# Patient Record
Sex: MALE | Race: OTHER | Employment: OTHER
[De-identification: names, ages, dates, MRNs, and addresses within clinical notes are randomized per-mention and may not be internally consistent; named-entity substitution may affect disease eponyms.]

---

## 2017-02-08 ENCOUNTER — OFFICE VISIT (OUTPATIENT)
Dept: INFECTIOUS DISEASES | Facility: CLINIC | Age: 72
End: 2017-02-08

## 2017-02-08 VITALS — DIASTOLIC BLOOD PRESSURE: 86 MMHG | HEART RATE: 69 BPM | SYSTOLIC BLOOD PRESSURE: 157 MMHG | TEMPERATURE: 98.5 F

## 2017-02-08 DIAGNOSIS — M46.26 OSTEOMYELITIS OF LUMBAR SPINE (HCC): Primary | ICD-10-CM

## 2017-02-08 PROCEDURE — 3017F COLORECTAL CA SCREEN DOC REV: CPT | Performed by: INTERNAL MEDICINE

## 2017-02-08 PROCEDURE — 1036F TOBACCO NON-USER: CPT | Performed by: INTERNAL MEDICINE

## 2017-02-08 PROCEDURE — G8427 DOCREV CUR MEDS BY ELIG CLIN: HCPCS | Performed by: INTERNAL MEDICINE

## 2017-02-08 PROCEDURE — 1123F ACP DISCUSS/DSCN MKR DOCD: CPT | Performed by: INTERNAL MEDICINE

## 2017-02-08 PROCEDURE — 99213 OFFICE O/P EST LOW 20 MIN: CPT | Performed by: INTERNAL MEDICINE

## 2017-02-08 PROCEDURE — G8419 CALC BMI OUT NRM PARAM NOF/U: HCPCS | Performed by: INTERNAL MEDICINE

## 2017-02-08 PROCEDURE — G8484 FLU IMMUNIZE NO ADMIN: HCPCS | Performed by: INTERNAL MEDICINE

## 2017-02-08 PROCEDURE — 4040F PNEUMOC VAC/ADMIN/RCVD: CPT | Performed by: INTERNAL MEDICINE

## 2017-02-08 RX ORDER — DOXYCYCLINE HYCLATE 100 MG
100 TABLET ORAL 2 TIMES DAILY
COMMUNITY
Start: 2017-01-01 | End: 2017-02-08

## 2017-02-08 RX ORDER — BRIMONIDINE TARTRATE 2 MG/ML
SOLUTION/ DROPS OPHTHALMIC
COMMUNITY
Start: 2017-01-18 | End: 2017-02-08 | Stop reason: ALTCHOICE

## 2017-02-08 RX ORDER — KETOROLAC TROMETHAMINE 4 MG/ML
SOLUTION/ DROPS OPHTHALMIC
COMMUNITY
Start: 2016-12-21 | End: 2018-09-07

## 2017-02-08 RX ORDER — LOTEPREDNOL ETABONATE 5 MG/ML
SUSPENSION/ DROPS OPHTHALMIC
COMMUNITY
Start: 2017-02-07 | End: 2018-09-07

## 2017-02-08 RX ORDER — ATORVASTATIN CALCIUM 20 MG/1
20 TABLET, FILM COATED ORAL DAILY
COMMUNITY
Start: 2017-01-01 | End: 2018-09-07

## 2017-02-08 RX ORDER — GATIFLOXACIN 5 MG/ML
SOLUTION/ DROPS OPHTHALMIC
COMMUNITY
Start: 2017-01-18 | End: 2017-02-08 | Stop reason: ALTCHOICE

## 2017-02-08 RX ORDER — DIPHENHYDRAMINE HCL 25 MG
25 TABLET ORAL NIGHTLY PRN
COMMUNITY
End: 2018-09-07

## 2017-02-25 ENCOUNTER — HOSPITAL ENCOUNTER (EMERGENCY)
Age: 72
Discharge: HOME | End: 2017-02-25
Attending: EMERGENCY MEDICINE
Payer: MEDICARE

## 2017-02-25 VITALS
SYSTOLIC BLOOD PRESSURE: 140 MMHG | BODY MASS INDEX: 22.4 KG/M2 | TEMPERATURE: 97.9 F | OXYGEN SATURATION: 97 % | DIASTOLIC BLOOD PRESSURE: 71 MMHG | HEIGHT: 71 IN | RESPIRATION RATE: 16 BRPM | HEART RATE: 71 BPM | WEIGHT: 160 LBS

## 2017-02-25 DIAGNOSIS — R89.9 ABNORMAL LABORATORY TEST RESULT: ICD-10-CM

## 2017-02-25 DIAGNOSIS — Z99.2 END-STAGE RENAL DISEASE ON HEMODIALYSIS (HCC): Primary | ICD-10-CM

## 2017-02-25 DIAGNOSIS — N18.6 END-STAGE RENAL DISEASE ON HEMODIALYSIS (HCC): Primary | ICD-10-CM

## 2017-02-25 LAB
ANION GAP SERPL CALCULATED.3IONS-SCNC: 13 MMOL/L (ref 9–17)
BUN BLDV-MCNC: 42 MG/DL (ref 8–23)
BUN/CREAT BLD: ABNORMAL (ref 9–20)
CALCIUM SERPL-MCNC: 8.9 MG/DL (ref 8.6–10.4)
CHLORIDE BLD-SCNC: 96 MMOL/L (ref 98–107)
CO2: 27 MMOL/L (ref 20–31)
CREAT SERPL-MCNC: 4.53 MG/DL (ref 0.7–1.2)
GFR AFRICAN AMERICAN: 16 ML/MIN
GFR NON-AFRICAN AMERICAN: 13 ML/MIN
GFR SERPL CREATININE-BSD FRML MDRD: ABNORMAL ML/MIN/{1.73_M2}
GFR SERPL CREATININE-BSD FRML MDRD: ABNORMAL ML/MIN/{1.73_M2}
GLUCOSE BLD-MCNC: 75 MG/DL (ref 70–99)
POTASSIUM SERPL-SCNC: 4.5 MMOL/L (ref 3.7–5.3)
SODIUM BLD-SCNC: 136 MMOL/L (ref 135–144)

## 2017-02-25 PROCEDURE — 99283 EMERGENCY DEPT VISIT LOW MDM: CPT

## 2017-02-25 PROCEDURE — 80048 BASIC METABOLIC PNL TOTAL CA: CPT

## 2017-02-25 PROCEDURE — 36415 COLL VENOUS BLD VENIPUNCTURE: CPT

## 2017-02-25 PROCEDURE — 93005 ELECTROCARDIOGRAM TRACING: CPT

## 2017-02-27 LAB
EKG ATRIAL RATE: 73 BPM
EKG P AXIS: 49 DEGREES
EKG P-R INTERVAL: 192 MS
EKG Q-T INTERVAL: 454 MS
EKG QRS DURATION: 156 MS
EKG QTC CALCULATION (BAZETT): 500 MS
EKG R AXIS: -47 DEGREES
EKG T AXIS: 59 DEGREES
EKG VENTRICULAR RATE: 73 BPM

## 2017-04-11 ENCOUNTER — HOSPITAL ENCOUNTER (OUTPATIENT)
Age: 72
Setting detail: SPECIMEN
Discharge: HOME | End: 2017-04-11
Payer: MEDICARE

## 2017-04-11 LAB
ABSOLUTE EOS #: 0.4 K/UL (ref 0–0.4)
ABSOLUTE LYMPH #: 1.7 K/UL (ref 1–4.8)
ABSOLUTE MONO #: 0.4 K/UL (ref 0.2–0.8)
BASOPHILS # BLD: 0 % (ref 0–2)
BASOPHILS ABSOLUTE: 0 K/UL (ref 0–0.2)
DIFFERENTIAL TYPE: ABNORMAL
EOSINOPHILS RELATIVE PERCENT: 9 % (ref 1–4)
HCT VFR BLD CALC: 30.4 % (ref 41–53)
HEMOGLOBIN: 10.5 G/DL (ref 13.5–17.5)
LYMPHOCYTES # BLD: 37 % (ref 24–44)
MCH RBC QN AUTO: 34.8 PG (ref 26–34)
MCHC RBC AUTO-ENTMCNC: 34.6 G/DL (ref 31–37)
MCV RBC AUTO: 100.5 FL (ref 80–100)
MONOCYTES # BLD: 8 % (ref 1–7)
PDW BLD-RTO: 14.8 % (ref 11.5–14.5)
PLATELET # BLD: 66 K/UL (ref 130–400)
PLATELET ESTIMATE: ABNORMAL
PMV BLD AUTO: 9.8 FL (ref 6–12)
POTASSIUM SERPL-SCNC: 4.3 MMOL/L (ref 3.7–5.3)
RBC # BLD: 3.02 M/UL (ref 4.5–5.9)
RBC # BLD: ABNORMAL 10*6/UL
SEG NEUTROPHILS: 46 % (ref 36–66)
SEGMENTED NEUTROPHILS ABSOLUTE COUNT: 2.1 K/UL (ref 1.8–7.7)
WBC # BLD: 4.6 K/UL (ref 3.5–11)
WBC # BLD: ABNORMAL 10*3/UL

## 2017-04-11 PROCEDURE — 84132 ASSAY OF SERUM POTASSIUM: CPT

## 2017-04-11 PROCEDURE — P9603 ONE-WAY ALLOW PRORATED MILES: HCPCS

## 2017-04-11 PROCEDURE — 85025 COMPLETE CBC W/AUTO DIFF WBC: CPT

## 2017-04-11 PROCEDURE — 36415 COLL VENOUS BLD VENIPUNCTURE: CPT

## 2017-05-19 ENCOUNTER — HOSPITAL ENCOUNTER (OUTPATIENT)
Age: 72
Setting detail: SPECIMEN
Discharge: HOME | End: 2017-05-19
Payer: MEDICARE

## 2017-05-19 LAB
C DIFFICILE TOXINS, PCR: NORMAL
SPECIMEN DESCRIPTION: NORMAL

## 2017-05-19 PROCEDURE — 87493 C DIFF AMPLIFIED PROBE: CPT

## 2017-06-08 ENCOUNTER — HOSPITAL ENCOUNTER (OUTPATIENT)
Age: 72
Setting detail: SPECIMEN
Discharge: HOME | End: 2017-06-08
Payer: MEDICARE

## 2017-06-08 LAB
CHOLESTEROL/HDL RATIO: 1.7
CHOLESTEROL: 64 MG/DL
ESTIMATED AVERAGE GLUCOSE: 103 MG/DL
HBA1C MFR BLD: 5.2 % (ref 4–6)
HDLC SERPL-MCNC: 37 MG/DL
LDL CHOLESTEROL: 13 MG/DL (ref 0–130)
TRIGL SERPL-MCNC: 68 MG/DL
VLDLC SERPL CALC-MCNC: ABNORMAL MG/DL (ref 1–30)

## 2017-06-08 PROCEDURE — 80061 LIPID PANEL: CPT

## 2017-06-08 PROCEDURE — 83036 HEMOGLOBIN GLYCOSYLATED A1C: CPT

## 2017-06-08 PROCEDURE — P9603 ONE-WAY ALLOW PRORATED MILES: HCPCS

## 2017-06-08 PROCEDURE — 36415 COLL VENOUS BLD VENIPUNCTURE: CPT

## 2017-06-09 ENCOUNTER — HOSPITAL ENCOUNTER (OUTPATIENT)
Age: 72
Setting detail: SPECIMEN
Discharge: HOME | End: 2017-06-09
Payer: MEDICARE

## 2017-06-09 LAB
C-REACTIVE PROTEIN: 1.9 MG/L (ref 0–5)
SEDIMENTATION RATE, ERYTHROCYTE: 28 MM (ref 0–10)

## 2017-06-09 PROCEDURE — P9603 ONE-WAY ALLOW PRORATED MILES: HCPCS

## 2017-06-09 PROCEDURE — 86140 C-REACTIVE PROTEIN: CPT

## 2017-06-09 PROCEDURE — 85651 RBC SED RATE NONAUTOMATED: CPT

## 2017-06-09 PROCEDURE — 36415 COLL VENOUS BLD VENIPUNCTURE: CPT

## 2017-07-14 ENCOUNTER — HOSPITAL ENCOUNTER (OUTPATIENT)
Age: 72
Setting detail: SPECIMEN
Discharge: HOME | End: 2017-07-14
Payer: MEDICARE

## 2017-07-14 LAB
ALBUMIN SERPL-MCNC: 2.8 G/DL (ref 3.5–5.2)
ALBUMIN/GLOBULIN RATIO: ABNORMAL (ref 1–2.5)
ALP BLD-CCNC: 155 U/L (ref 40–129)
ALT SERPL-CCNC: 35 U/L (ref 5–41)
ANION GAP SERPL CALCULATED.3IONS-SCNC: 17 MMOL/L (ref 9–17)
AST SERPL-CCNC: 29 U/L
BILIRUB SERPL-MCNC: 0.26 MG/DL (ref 0.3–1.2)
BUN BLDV-MCNC: 72 MG/DL (ref 8–23)
BUN/CREAT BLD: 12 (ref 9–20)
CALCIUM SERPL-MCNC: 7.9 MG/DL (ref 8.6–10.4)
CHLORIDE BLD-SCNC: 99 MMOL/L (ref 98–107)
CO2: 24 MMOL/L (ref 20–31)
CREAT SERPL-MCNC: 6.24 MG/DL (ref 0.7–1.2)
ESTIMATED AVERAGE GLUCOSE: 97 MG/DL
GFR AFRICAN AMERICAN: 11 ML/MIN
GFR NON-AFRICAN AMERICAN: 9 ML/MIN
GFR SERPL CREATININE-BSD FRML MDRD: ABNORMAL ML/MIN/{1.73_M2}
GFR SERPL CREATININE-BSD FRML MDRD: ABNORMAL ML/MIN/{1.73_M2}
GLUCOSE BLD-MCNC: 108 MG/DL (ref 70–99)
HBA1C MFR BLD: 5 % (ref 4–6)
HCT VFR BLD CALC: 26.6 % (ref 41–53)
HEMOGLOBIN: 9.1 G/DL (ref 13.5–17.5)
MCH RBC QN AUTO: 36.4 PG (ref 26–34)
MCHC RBC AUTO-ENTMCNC: 34.3 G/DL (ref 31–37)
MCV RBC AUTO: 105.9 FL (ref 80–100)
PDW BLD-RTO: 14.8 % (ref 11.5–14.5)
PLATELET # BLD: 104 K/UL (ref 130–400)
PMV BLD AUTO: 9.9 FL (ref 6–12)
POTASSIUM SERPL-SCNC: 5.7 MMOL/L (ref 3.7–5.3)
RBC # BLD: 2.51 M/UL (ref 4.5–5.9)
SODIUM BLD-SCNC: 140 MMOL/L (ref 135–144)
TOTAL PROTEIN: 5.1 G/DL (ref 6.4–8.3)
WBC # BLD: 4.6 K/UL (ref 3.5–11)

## 2017-07-14 PROCEDURE — 83036 HEMOGLOBIN GLYCOSYLATED A1C: CPT

## 2017-07-14 PROCEDURE — 80053 COMPREHEN METABOLIC PANEL: CPT

## 2017-07-14 PROCEDURE — 36415 COLL VENOUS BLD VENIPUNCTURE: CPT

## 2017-07-14 PROCEDURE — 85027 COMPLETE CBC AUTOMATED: CPT

## 2017-07-14 PROCEDURE — P9603 ONE-WAY ALLOW PRORATED MILES: HCPCS

## 2017-07-18 ENCOUNTER — HOSPITAL ENCOUNTER (OUTPATIENT)
Age: 72
Setting detail: SPECIMEN
Discharge: HOME | End: 2017-07-18
Payer: MEDICARE

## 2017-07-18 LAB
FOLATE: >20 NG/ML
VITAMIN B-12: 827 PG/ML (ref 211–946)

## 2017-07-18 PROCEDURE — 36415 COLL VENOUS BLD VENIPUNCTURE: CPT

## 2017-07-18 PROCEDURE — 82746 ASSAY OF FOLIC ACID SERUM: CPT

## 2017-07-18 PROCEDURE — 82607 VITAMIN B-12: CPT

## 2017-08-09 ENCOUNTER — OFFICE VISIT (OUTPATIENT)
Dept: INFECTIOUS DISEASES | Age: 72
End: 2017-08-09
Payer: MEDICARE

## 2017-08-09 VITALS
SYSTOLIC BLOOD PRESSURE: 154 MMHG | DIASTOLIC BLOOD PRESSURE: 97 MMHG | BODY MASS INDEX: 22.4 KG/M2 | HEIGHT: 71 IN | TEMPERATURE: 98.1 F | WEIGHT: 160 LBS | HEART RATE: 73 BPM

## 2017-08-09 DIAGNOSIS — M46.26 OSTEOMYELITIS OF LUMBAR SPINE (HCC): Primary | ICD-10-CM

## 2017-08-09 PROCEDURE — 99214 OFFICE O/P EST MOD 30 MIN: CPT | Performed by: INTERNAL MEDICINE

## 2017-08-09 PROCEDURE — G8420 CALC BMI NORM PARAMETERS: HCPCS | Performed by: INTERNAL MEDICINE

## 2017-08-09 PROCEDURE — 1036F TOBACCO NON-USER: CPT | Performed by: INTERNAL MEDICINE

## 2017-08-09 PROCEDURE — 4040F PNEUMOC VAC/ADMIN/RCVD: CPT | Performed by: INTERNAL MEDICINE

## 2017-08-09 PROCEDURE — 3017F COLORECTAL CA SCREEN DOC REV: CPT | Performed by: INTERNAL MEDICINE

## 2017-08-09 PROCEDURE — G8427 DOCREV CUR MEDS BY ELIG CLIN: HCPCS | Performed by: INTERNAL MEDICINE

## 2017-08-09 PROCEDURE — 1123F ACP DISCUSS/DSCN MKR DOCD: CPT | Performed by: INTERNAL MEDICINE

## 2017-08-09 RX ORDER — PROMETHAZINE HYDROCHLORIDE 25 MG/1
25 TABLET ORAL EVERY 6 HOURS PRN
COMMUNITY
End: 2018-09-07

## 2017-08-25 ENCOUNTER — HOSPITAL ENCOUNTER (OUTPATIENT)
Age: 72
Setting detail: SPECIMEN
Discharge: HOME | End: 2017-08-25
Payer: MEDICARE

## 2017-08-25 LAB
ABSOLUTE EOS #: 0.7 K/UL (ref 0–0.4)
ABSOLUTE LYMPH #: 2 K/UL (ref 1–4.8)
ABSOLUTE MONO #: 0.4 K/UL (ref 0.2–0.8)
BASOPHILS # BLD: 0 %
BASOPHILS ABSOLUTE: 0 K/UL (ref 0–0.2)
DIFFERENTIAL TYPE: ABNORMAL
EOSINOPHILS RELATIVE PERCENT: 11 %
HCT VFR BLD CALC: 35.6 % (ref 41–53)
HEMOGLOBIN: 12 G/DL (ref 13.5–17.5)
LYMPHOCYTES # BLD: 32 %
MCH RBC QN AUTO: 32.6 PG (ref 26–34)
MCHC RBC AUTO-ENTMCNC: 33.7 G/DL (ref 31–37)
MCV RBC AUTO: 96.7 FL (ref 80–100)
MONOCYTES # BLD: 7 %
PDW BLD-RTO: 14.3 % (ref 11.5–14.5)
PLATELET # BLD: 115 K/UL (ref 130–400)
PLATELET ESTIMATE: ABNORMAL
PMV BLD AUTO: 11.1 FL (ref 6–12)
RBC # BLD: 3.68 M/UL (ref 4.5–5.9)
RBC # BLD: ABNORMAL 10*6/UL
SEG NEUTROPHILS: 50 %
SEGMENTED NEUTROPHILS ABSOLUTE COUNT: 3.2 K/UL (ref 1.8–7.7)
URIC ACID: 4.3 MG/DL (ref 3.4–7)
WBC # BLD: 6.4 K/UL (ref 3.5–11)
WBC # BLD: ABNORMAL 10*3/UL

## 2017-08-25 PROCEDURE — P9603 ONE-WAY ALLOW PRORATED MILES: HCPCS

## 2017-08-25 PROCEDURE — 84550 ASSAY OF BLOOD/URIC ACID: CPT

## 2017-08-25 PROCEDURE — 85025 COMPLETE CBC W/AUTO DIFF WBC: CPT

## 2017-08-25 PROCEDURE — 36415 COLL VENOUS BLD VENIPUNCTURE: CPT

## 2017-09-08 ENCOUNTER — HOSPITAL ENCOUNTER (OUTPATIENT)
Age: 72
Setting detail: SPECIMEN
Discharge: HOME | End: 2017-09-08
Payer: MEDICARE

## 2017-09-08 LAB
C-REACTIVE PROTEIN: 3.8 MG/L (ref 0–5)
SEDIMENTATION RATE, ERYTHROCYTE: 30 MM (ref 0–15)

## 2017-09-08 PROCEDURE — 85651 RBC SED RATE NONAUTOMATED: CPT

## 2017-09-08 PROCEDURE — P9603 ONE-WAY ALLOW PRORATED MILES: HCPCS

## 2017-09-08 PROCEDURE — 36415 COLL VENOUS BLD VENIPUNCTURE: CPT

## 2017-09-08 PROCEDURE — 86140 C-REACTIVE PROTEIN: CPT

## 2017-12-07 ENCOUNTER — HOSPITAL ENCOUNTER (OUTPATIENT)
Age: 72
Setting detail: SPECIMEN
Discharge: HOME | End: 2017-12-07
Payer: MEDICARE

## 2017-12-07 LAB
C-REACTIVE PROTEIN: 7.5 MG/L (ref 0–5)
ESTIMATED AVERAGE GLUCOSE: 126 MG/DL
HBA1C MFR BLD: 6 % (ref 4–6)
SEDIMENTATION RATE, ERYTHROCYTE: 7 MM (ref 0–15)

## 2017-12-07 PROCEDURE — 83036 HEMOGLOBIN GLYCOSYLATED A1C: CPT

## 2017-12-07 PROCEDURE — 86140 C-REACTIVE PROTEIN: CPT

## 2017-12-07 PROCEDURE — 85651 RBC SED RATE NONAUTOMATED: CPT

## 2017-12-07 PROCEDURE — P9603 ONE-WAY ALLOW PRORATED MILES: HCPCS

## 2017-12-07 PROCEDURE — 36415 COLL VENOUS BLD VENIPUNCTURE: CPT

## 2018-02-01 ENCOUNTER — HOSPITAL ENCOUNTER (OUTPATIENT)
Age: 73
Setting detail: SPECIMEN
Discharge: HOME | End: 2018-02-01
Payer: MEDICARE

## 2018-02-01 LAB
C-REACTIVE PROTEIN: 4 MG/L (ref 0–5)
SEDIMENTATION RATE, ERYTHROCYTE: 65 MM (ref 0–15)

## 2018-02-01 PROCEDURE — P9603 ONE-WAY ALLOW PRORATED MILES: HCPCS

## 2018-02-01 PROCEDURE — 86140 C-REACTIVE PROTEIN: CPT

## 2018-02-01 PROCEDURE — 36415 COLL VENOUS BLD VENIPUNCTURE: CPT

## 2018-02-01 PROCEDURE — 85651 RBC SED RATE NONAUTOMATED: CPT

## 2018-02-07 ENCOUNTER — OFFICE VISIT (OUTPATIENT)
Dept: INFECTIOUS DISEASES | Age: 73
End: 2018-02-07
Payer: MEDICARE

## 2018-02-07 VITALS
RESPIRATION RATE: 16 BRPM | TEMPERATURE: 98.5 F | HEIGHT: 71 IN | SYSTOLIC BLOOD PRESSURE: 166 MMHG | BODY MASS INDEX: 22.41 KG/M2 | HEART RATE: 80 BPM | WEIGHT: 160.05 LBS | DIASTOLIC BLOOD PRESSURE: 84 MMHG

## 2018-02-07 DIAGNOSIS — M46.26 OSTEOMYELITIS OF LUMBAR SPINE (HCC): Primary | ICD-10-CM

## 2018-02-07 PROCEDURE — 1123F ACP DISCUSS/DSCN MKR DOCD: CPT | Performed by: INTERNAL MEDICINE

## 2018-02-07 PROCEDURE — 3017F COLORECTAL CA SCREEN DOC REV: CPT | Performed by: INTERNAL MEDICINE

## 2018-02-07 PROCEDURE — G8427 DOCREV CUR MEDS BY ELIG CLIN: HCPCS | Performed by: INTERNAL MEDICINE

## 2018-02-07 PROCEDURE — 4040F PNEUMOC VAC/ADMIN/RCVD: CPT | Performed by: INTERNAL MEDICINE

## 2018-02-07 PROCEDURE — G8484 FLU IMMUNIZE NO ADMIN: HCPCS | Performed by: INTERNAL MEDICINE

## 2018-02-07 PROCEDURE — 1036F TOBACCO NON-USER: CPT | Performed by: INTERNAL MEDICINE

## 2018-02-07 PROCEDURE — G8420 CALC BMI NORM PARAMETERS: HCPCS | Performed by: INTERNAL MEDICINE

## 2018-02-07 PROCEDURE — 99215 OFFICE O/P EST HI 40 MIN: CPT | Performed by: INTERNAL MEDICINE

## 2018-02-07 NOTE — PROGRESS NOTES
Chief Complaint   Patient presents with    Follow-up     6mo labs   Today patient is feeling well . He denied back pain today . He  denies fever , chill , headache, nausea or vomiting, denies abdomen pain, denies cough, shortness of breath , cough or sputum expectoration. Pt has been on oral Doxycycline for suppression . Pt was hospitalized  for diverting loop colostomy and I&D for perianal abscess 12/30 /15   12/29/15 MRI without contrast showed Discitis/osteomyelitis at L2/3 with destructive changes ,Fluid collection anterior aspect L3,  New edema and fluid L3/4  CT-guided L3 biopsy 1/4/16 ,no growth  Tissue culture grew MRSA   Dr Loi Horta was consulted and felt that the pt not a surgical candidate at that point and pt is declining any surgical intervention  . Pt was discharge on IV Vancomycin and oral Cipro finished 8 weeks course  2/19/2016 . Pt also had aspiration bone biopsy done on 6/29/15 which was no growth . Wound cultures as of 6/26/16 PSEUDOMONAS AERUGINOSA LIGHT GROWTH and JOHN ALBICANS LIGHT GROWTH    Patient was treated with ABXS for 6 weeks at that time . He was evaluated by Dr Loi Horta on 6/21/16  x-rays reportedly showed  Progression of lumbar kyphosis with progressive distraction of L2 and L3   and Dr Loi Horta recommend posterior surgical stabilization followed by anterior reconstruction. Pt was [facility] to [facility] surgeryand surgical intervention was declined   S/p diversion of colostomy  .                Current Medications:      Current Outpatient Prescriptions:     fluticasone propionate (FLOVENT DISKUS) 100 MCG/BLIST AEPB inhaler, Inhale 2 puffs into the lungs daily, Disp: , Rfl:     promethazine (PHENERGAN) 25 MG tablet, Take 25 mg by mouth every 6 hours as needed for Nausea, Disp: , Rfl:     atorvastatin (LIPITOR) 20 MG tablet, Take 20 mg by mouth daily, Disp: , Rfl:     NOVOFINE AUTOCOVER 30G X 8 MM MISC, , Disp: , Rfl:     ketorolac 0.4 % SOLN ophthalmic solution, , Disp: , Rfl:     LOTEMAX 0.5 % ophthalmic suspension, , Disp: , Rfl:     diphenhydrAMINE (BENADRYL) 25 MG tablet, Take 25 mg by mouth nightly as needed for Itching, Disp: , Rfl:     ASMANEX 60 METERED DOSES 220 MCG/INH inhaler, , Disp: , Rfl:     NULYTELY WITH FLAVOR PACKS 420 G solution, , Disp: , Rfl:     darbepoetin candice-polysorbate (ARANESP) 60 MCG/ML injection, Inject 60 mcg into the skin once a week, Disp: , Rfl:     famotidine (PEPCID) 20 MG tablet, Take 20 mg by mouth 2 times daily, Disp: , Rfl:     HYDROmorphone (DILAUDID) 2 MG tablet, Take 1 tablet by mouth every 4 hours as needed for Pain, Disp: 20 tablet, Rfl: 0    oxyCODONE-acetaminophen (PERCOCET) 7.5-325 MG per tablet, Take 1 tablet by mouth every 4 hours as needed for Pain For pain rating of 3-5, Disp: 30 tablet, Rfl: 0    Probiotic Product (DIFF-STAT) CHEW, Take 1 tablet by mouth daily, Disp: , Rfl:     insulin glargine (LANTUS) 100 UNIT/ML injection pen, Inject 5 Units into the skin nightly, Disp: , Rfl:     doxycycline (VIBRAMYCIN) 100 MG capsule, Take 100 mg by mouth 2 times daily, Disp: , Rfl:     Nutritional Supplements (BOOST GLUCOSE CONTROL) LIQD, Take 8 oz by mouth nightly , Disp: , Rfl:     bisacodyl (DULCOLAX) 10 MG suppository, Place 10 mg rectally daily as needed for Constipation, Disp: , Rfl:     senna-docusate (PERICOLACE) 8.6-50 MG per tablet, Take 2 tablets by mouth 2 times daily , Disp: , Rfl:     zinc sulfate (ZINCATE) 220 MG capsule, Take 220 mg by mouth daily, Disp: , Rfl:     b complex-C-folic acid (NEPHROCAPS) 1 MG capsule, Take 1 capsule by mouth daily, Disp: , Rfl:     acetaminophen (TYLENOL) 325 MG tablet, Take 650 mg by mouth every 6 hours as needed for Pain, Disp: , Rfl:     lidocaine-prilocaine (EMLA) 2.5-2.5 % cream, Apply 1 Applicatorful topically three times a week To right forearm in the morning on Mon, Wed, Fri before dialysis, Disp: , Rfl:     amLODIPine (NORVASC) 2.5 MG tablet, Take 1

## 2018-02-13 ENCOUNTER — HOSPITAL ENCOUNTER (OUTPATIENT)
Age: 73
Setting detail: SPECIMEN
Discharge: HOME | End: 2018-02-13
Payer: COMMERCIAL

## 2018-02-13 LAB
ANION GAP SERPL CALCULATED.3IONS-SCNC: 16 MMOL/L (ref 9–17)
BUN BLDV-MCNC: 38 MG/DL (ref 8–23)
BUN/CREAT BLD: 8 (ref 9–20)
CALCIUM SERPL-MCNC: 8.2 MG/DL (ref 8.6–10.4)
CHLORIDE BLD-SCNC: 92 MMOL/L (ref 98–107)
CO2: 28 MMOL/L (ref 20–31)
CREAT SERPL-MCNC: 4.98 MG/DL (ref 0.7–1.2)
GFR AFRICAN AMERICAN: 14 ML/MIN
GFR NON-AFRICAN AMERICAN: 11 ML/MIN
GFR SERPL CREATININE-BSD FRML MDRD: ABNORMAL ML/MIN/{1.73_M2}
GFR SERPL CREATININE-BSD FRML MDRD: ABNORMAL ML/MIN/{1.73_M2}
GLUCOSE BLD-MCNC: 145 MG/DL (ref 70–99)
HCT VFR BLD CALC: 33.5 % (ref 41–53)
HEMOGLOBIN: 11.2 G/DL (ref 13.5–17.5)
MCH RBC QN AUTO: 34.8 PG (ref 26–34)
MCHC RBC AUTO-ENTMCNC: 33.5 G/DL (ref 31–37)
MCV RBC AUTO: 103.8 FL (ref 80–100)
NRBC AUTOMATED: ABNORMAL PER 100 WBC
PDW BLD-RTO: 17 % (ref 11.5–14.5)
PLATELET # BLD: 129 K/UL (ref 130–400)
PMV BLD AUTO: 10.9 FL (ref 6–12)
POTASSIUM SERPL-SCNC: 5 MMOL/L (ref 3.7–5.3)
RBC # BLD: 3.22 M/UL (ref 4.5–5.9)
SODIUM BLD-SCNC: 136 MMOL/L (ref 135–144)
WBC # BLD: 7.2 K/UL (ref 3.5–11)

## 2018-02-13 PROCEDURE — 80048 BASIC METABOLIC PNL TOTAL CA: CPT

## 2018-02-13 PROCEDURE — 36415 COLL VENOUS BLD VENIPUNCTURE: CPT

## 2018-02-13 PROCEDURE — P9603 ONE-WAY ALLOW PRORATED MILES: HCPCS

## 2018-02-13 PROCEDURE — 85027 COMPLETE CBC AUTOMATED: CPT

## 2018-04-06 ENCOUNTER — HOSPITAL ENCOUNTER (OUTPATIENT)
Age: 73
Setting detail: SPECIMEN
Discharge: HOME | End: 2018-04-06
Payer: MEDICARE

## 2018-04-06 LAB — URIC ACID: 3 MG/DL (ref 3.4–7)

## 2018-04-06 PROCEDURE — 84550 ASSAY OF BLOOD/URIC ACID: CPT

## 2018-04-06 PROCEDURE — P9603 ONE-WAY ALLOW PRORATED MILES: HCPCS

## 2018-04-06 PROCEDURE — 36415 COLL VENOUS BLD VENIPUNCTURE: CPT

## 2018-04-09 ENCOUNTER — HOSPITAL ENCOUNTER (OUTPATIENT)
Age: 73
Setting detail: SPECIMEN
Discharge: HOME | End: 2018-04-09
Payer: MEDICARE

## 2018-04-09 LAB
ANION GAP SERPL CALCULATED.3IONS-SCNC: 18 MMOL/L (ref 9–17)
BUN BLDV-MCNC: 67 MG/DL (ref 8–23)
BUN/CREAT BLD: 9 (ref 9–20)
CALCIUM SERPL-MCNC: 8.4 MG/DL (ref 8.6–10.4)
CHLORIDE BLD-SCNC: 98 MMOL/L (ref 98–107)
CO2: 21 MMOL/L (ref 20–31)
CREAT SERPL-MCNC: 7.27 MG/DL (ref 0.7–1.2)
GFR AFRICAN AMERICAN: 9 ML/MIN
GFR NON-AFRICAN AMERICAN: 7 ML/MIN
GFR SERPL CREATININE-BSD FRML MDRD: ABNORMAL ML/MIN/{1.73_M2}
GFR SERPL CREATININE-BSD FRML MDRD: ABNORMAL ML/MIN/{1.73_M2}
GLUCOSE BLD-MCNC: 93 MG/DL (ref 70–99)
POTASSIUM SERPL-SCNC: 5.7 MMOL/L (ref 3.7–5.3)
SODIUM BLD-SCNC: 137 MMOL/L (ref 135–144)

## 2018-04-09 PROCEDURE — 80048 BASIC METABOLIC PNL TOTAL CA: CPT

## 2018-04-09 PROCEDURE — P9603 ONE-WAY ALLOW PRORATED MILES: HCPCS

## 2018-04-09 PROCEDURE — 36415 COLL VENOUS BLD VENIPUNCTURE: CPT

## 2018-04-10 ENCOUNTER — HOSPITAL ENCOUNTER (OUTPATIENT)
Age: 73
Setting detail: SPECIMEN
Discharge: HOME | End: 2018-04-10
Payer: MEDICARE

## 2018-04-10 LAB
ANION GAP SERPL CALCULATED.3IONS-SCNC: 15 MMOL/L (ref 9–17)
BUN BLDV-MCNC: 34 MG/DL (ref 8–23)
BUN/CREAT BLD: 7 (ref 9–20)
CALCIUM SERPL-MCNC: 8.6 MG/DL (ref 8.6–10.4)
CHLORIDE BLD-SCNC: 98 MMOL/L (ref 98–107)
CO2: 25 MMOL/L (ref 20–31)
CREAT SERPL-MCNC: 5.04 MG/DL (ref 0.7–1.2)
GFR AFRICAN AMERICAN: 14 ML/MIN
GFR NON-AFRICAN AMERICAN: 11 ML/MIN
GFR SERPL CREATININE-BSD FRML MDRD: ABNORMAL ML/MIN/{1.73_M2}
GFR SERPL CREATININE-BSD FRML MDRD: ABNORMAL ML/MIN/{1.73_M2}
GLUCOSE BLD-MCNC: 77 MG/DL (ref 70–99)
POTASSIUM SERPL-SCNC: 4.4 MMOL/L (ref 3.7–5.3)
SODIUM BLD-SCNC: 138 MMOL/L (ref 135–144)

## 2018-04-10 PROCEDURE — 36415 COLL VENOUS BLD VENIPUNCTURE: CPT

## 2018-04-10 PROCEDURE — P9603 ONE-WAY ALLOW PRORATED MILES: HCPCS

## 2018-04-10 PROCEDURE — 80048 BASIC METABOLIC PNL TOTAL CA: CPT

## 2018-05-01 ENCOUNTER — HOSPITAL ENCOUNTER (OUTPATIENT)
Age: 73
Setting detail: SPECIMEN
Discharge: HOME | End: 2018-05-01
Payer: MEDICARE

## 2018-05-01 LAB
ANION GAP SERPL CALCULATED.3IONS-SCNC: 18 MMOL/L (ref 9–17)
BUN BLDV-MCNC: 43 MG/DL (ref 8–23)
BUN/CREAT BLD: 7 (ref 9–20)
CALCIUM SERPL-MCNC: 8.8 MG/DL (ref 8.6–10.4)
CHLORIDE BLD-SCNC: 94 MMOL/L (ref 98–107)
CO2: 25 MMOL/L (ref 20–31)
CREAT SERPL-MCNC: 5.74 MG/DL (ref 0.7–1.2)
GFR AFRICAN AMERICAN: 12 ML/MIN
GFR NON-AFRICAN AMERICAN: 10 ML/MIN
GFR SERPL CREATININE-BSD FRML MDRD: ABNORMAL ML/MIN/{1.73_M2}
GFR SERPL CREATININE-BSD FRML MDRD: ABNORMAL ML/MIN/{1.73_M2}
GLUCOSE BLD-MCNC: 117 MG/DL (ref 70–99)
HCT VFR BLD CALC: 40.7 % (ref 41–53)
HEMOGLOBIN: 13.4 G/DL (ref 13.5–17.5)
MCH RBC QN AUTO: 32.2 PG (ref 26–34)
MCHC RBC AUTO-ENTMCNC: 33 G/DL (ref 31–37)
MCV RBC AUTO: 97.8 FL (ref 80–100)
NRBC AUTOMATED: ABNORMAL PER 100 WBC
PDW BLD-RTO: 16.9 % (ref 11.5–14.5)
PLATELET # BLD: 142 K/UL (ref 130–400)
PMV BLD AUTO: 9.5 FL (ref 6–12)
POTASSIUM SERPL-SCNC: 5.2 MMOL/L (ref 3.7–5.3)
RBC # BLD: 4.16 M/UL (ref 4.5–5.9)
SODIUM BLD-SCNC: 137 MMOL/L (ref 135–144)
WBC # BLD: 6.7 K/UL (ref 3.5–11)

## 2018-05-01 PROCEDURE — 85027 COMPLETE CBC AUTOMATED: CPT

## 2018-05-01 PROCEDURE — P9603 ONE-WAY ALLOW PRORATED MILES: HCPCS

## 2018-05-01 PROCEDURE — 80048 BASIC METABOLIC PNL TOTAL CA: CPT

## 2018-05-01 PROCEDURE — 36415 COLL VENOUS BLD VENIPUNCTURE: CPT

## 2018-05-15 ENCOUNTER — HOSPITAL ENCOUNTER (OUTPATIENT)
Age: 73
Setting detail: SPECIMEN
Discharge: HOME | End: 2018-05-15
Payer: MEDICARE

## 2018-05-15 LAB
HCT VFR BLD CALC: 44 % (ref 41–53)
HEMOGLOBIN: 14.4 G/DL (ref 13.5–17.5)
MCH RBC QN AUTO: 31.7 PG (ref 26–34)
MCHC RBC AUTO-ENTMCNC: 32.7 G/DL (ref 31–37)
MCV RBC AUTO: 97 FL (ref 80–100)
NRBC AUTOMATED: ABNORMAL PER 100 WBC
PDW BLD-RTO: 16.7 % (ref 11.5–14.5)
PLATELET # BLD: 154 K/UL (ref 130–400)
PMV BLD AUTO: 9.8 FL (ref 6–12)
RBC # BLD: 4.53 M/UL (ref 4.5–5.9)
WBC # BLD: 9.7 K/UL (ref 3.5–11)

## 2018-05-15 PROCEDURE — P9603 ONE-WAY ALLOW PRORATED MILES: HCPCS

## 2018-05-15 PROCEDURE — 36415 COLL VENOUS BLD VENIPUNCTURE: CPT

## 2018-05-15 PROCEDURE — 85027 COMPLETE CBC AUTOMATED: CPT

## 2018-05-18 ENCOUNTER — HOSPITAL ENCOUNTER (OUTPATIENT)
Age: 73
Setting detail: SPECIMEN
Discharge: HOME | End: 2018-05-18
Payer: MEDICARE

## 2018-05-18 LAB
FOLATE: 17.1 NG/ML
TSH SERPL DL<=0.05 MIU/L-ACNC: 1.47 MIU/L (ref 0.3–5)
VITAMIN B-12: 815 PG/ML (ref 232–1245)

## 2018-05-18 PROCEDURE — 82746 ASSAY OF FOLIC ACID SERUM: CPT

## 2018-05-18 PROCEDURE — P9603 ONE-WAY ALLOW PRORATED MILES: HCPCS

## 2018-05-18 PROCEDURE — 82607 VITAMIN B-12: CPT

## 2018-05-18 PROCEDURE — 36415 COLL VENOUS BLD VENIPUNCTURE: CPT

## 2018-05-18 PROCEDURE — 84443 ASSAY THYROID STIM HORMONE: CPT

## 2018-05-25 ENCOUNTER — TELEPHONE (OUTPATIENT)
Dept: OTHER | Age: 73
End: 2018-05-25

## 2018-06-05 ENCOUNTER — HOSPITAL ENCOUNTER (OUTPATIENT)
Age: 73
Setting detail: SPECIMEN
Discharge: HOME | End: 2018-06-05
Payer: MEDICARE

## 2018-06-05 LAB
CHOLESTEROL/HDL RATIO: 2.2
CHOLESTEROL: 71 MG/DL
HDLC SERPL-MCNC: 33 MG/DL
LDL CHOLESTEROL: 13 MG/DL (ref 0–130)
TRIGL SERPL-MCNC: 123 MG/DL
VLDLC SERPL CALC-MCNC: ABNORMAL MG/DL (ref 1–30)

## 2018-06-05 PROCEDURE — 80061 LIPID PANEL: CPT

## 2018-06-05 PROCEDURE — 36415 COLL VENOUS BLD VENIPUNCTURE: CPT

## 2018-06-05 PROCEDURE — P9603 ONE-WAY ALLOW PRORATED MILES: HCPCS

## 2018-06-06 ENCOUNTER — HOSPITAL ENCOUNTER (OUTPATIENT)
Age: 73
Setting detail: SPECIMEN
Discharge: HOME | End: 2018-06-06
Payer: MEDICARE

## 2018-06-06 LAB
ESTIMATED AVERAGE GLUCOSE: 117 MG/DL
HBA1C MFR BLD: 5.7 % (ref 4–6)

## 2018-06-06 PROCEDURE — P9603 ONE-WAY ALLOW PRORATED MILES: HCPCS

## 2018-06-06 PROCEDURE — 83036 HEMOGLOBIN GLYCOSYLATED A1C: CPT

## 2018-06-06 PROCEDURE — 36415 COLL VENOUS BLD VENIPUNCTURE: CPT

## 2018-08-03 ENCOUNTER — HOSPITAL ENCOUNTER (OUTPATIENT)
Age: 73
Setting detail: SPECIMEN
Discharge: HOME | End: 2018-08-03
Payer: MEDICARE

## 2018-08-03 LAB
C-REACTIVE PROTEIN: 1.6 MG/L (ref 0–5)
SEDIMENTATION RATE, ERYTHROCYTE: 29 MM (ref 0–10)

## 2018-08-03 PROCEDURE — 86140 C-REACTIVE PROTEIN: CPT

## 2018-08-03 PROCEDURE — 36415 COLL VENOUS BLD VENIPUNCTURE: CPT

## 2018-08-03 PROCEDURE — P9603 ONE-WAY ALLOW PRORATED MILES: HCPCS

## 2018-08-03 PROCEDURE — 85651 RBC SED RATE NONAUTOMATED: CPT

## 2018-08-21 ENCOUNTER — HOSPITAL ENCOUNTER (OUTPATIENT)
Age: 73
Setting detail: SPECIMEN
Discharge: HOME | End: 2018-08-21
Payer: MEDICARE

## 2018-08-21 LAB
ABSOLUTE EOS #: 0.4 K/UL (ref 0–0.4)
ABSOLUTE IMMATURE GRANULOCYTE: ABNORMAL K/UL (ref 0–0.3)
ABSOLUTE LYMPH #: 1.9 K/UL (ref 1–4.8)
ABSOLUTE MONO #: 0.7 K/UL (ref 0.2–0.8)
ANION GAP SERPL CALCULATED.3IONS-SCNC: 15 MMOL/L (ref 9–17)
BASOPHILS # BLD: 0 % (ref 0–2)
BASOPHILS ABSOLUTE: 0 K/UL (ref 0–0.2)
BUN BLDV-MCNC: 47 MG/DL (ref 8–23)
BUN/CREAT BLD: 9 (ref 9–20)
CALCIUM SERPL-MCNC: 8.5 MG/DL (ref 8.6–10.4)
CHLORIDE BLD-SCNC: 99 MMOL/L (ref 98–107)
CO2: 24 MMOL/L (ref 20–31)
CREAT SERPL-MCNC: 5.42 MG/DL (ref 0.7–1.2)
DIFFERENTIAL TYPE: ABNORMAL
EOSINOPHILS RELATIVE PERCENT: 7 % (ref 1–4)
GFR AFRICAN AMERICAN: 13 ML/MIN
GFR NON-AFRICAN AMERICAN: 10 ML/MIN
GFR SERPL CREATININE-BSD FRML MDRD: ABNORMAL ML/MIN/{1.73_M2}
GFR SERPL CREATININE-BSD FRML MDRD: ABNORMAL ML/MIN/{1.73_M2}
GLUCOSE BLD-MCNC: 150 MG/DL (ref 70–99)
HCT VFR BLD CALC: 28.6 % (ref 41–53)
HEMOGLOBIN: 9.5 G/DL (ref 13.5–17.5)
IMMATURE GRANULOCYTES: ABNORMAL %
LYMPHOCYTES # BLD: 30 % (ref 24–44)
MCH RBC QN AUTO: 33.2 PG (ref 26–34)
MCHC RBC AUTO-ENTMCNC: 33.4 G/DL (ref 31–37)
MCV RBC AUTO: 99.6 FL (ref 80–100)
MONOCYTES # BLD: 11 % (ref 1–7)
NRBC AUTOMATED: ABNORMAL PER 100 WBC
PDW BLD-RTO: 15.9 % (ref 11.5–14.5)
PLATELET # BLD: 104 K/UL (ref 130–400)
PLATELET ESTIMATE: ABNORMAL
PMV BLD AUTO: 9.5 FL (ref 6–12)
POTASSIUM SERPL-SCNC: 4.7 MMOL/L (ref 3.7–5.3)
RBC # BLD: 2.87 M/UL (ref 4.5–5.9)
RBC # BLD: ABNORMAL 10*6/UL
SEG NEUTROPHILS: 52 % (ref 36–66)
SEGMENTED NEUTROPHILS ABSOLUTE COUNT: 3.3 K/UL (ref 1.8–7.7)
SODIUM BLD-SCNC: 138 MMOL/L (ref 135–144)
WBC # BLD: 6.4 K/UL (ref 3.5–11)
WBC # BLD: ABNORMAL 10*3/UL

## 2018-08-21 PROCEDURE — 36415 COLL VENOUS BLD VENIPUNCTURE: CPT

## 2018-08-21 PROCEDURE — 86140 C-REACTIVE PROTEIN: CPT

## 2018-08-21 PROCEDURE — 80048 BASIC METABOLIC PNL TOTAL CA: CPT

## 2018-08-21 PROCEDURE — 85025 COMPLETE CBC W/AUTO DIFF WBC: CPT

## 2018-08-21 PROCEDURE — P9603 ONE-WAY ALLOW PRORATED MILES: HCPCS

## 2018-08-23 LAB — C-REACTIVE PROTEIN: 9 MG/L (ref 0–5)

## 2018-08-27 ENCOUNTER — HOSPITAL ENCOUNTER (OUTPATIENT)
Age: 73
Setting detail: SPECIMEN
Discharge: HOME | End: 2018-08-27
Payer: MEDICARE

## 2018-08-27 LAB
HCT VFR BLD CALC: 26.4 % (ref 41–53)
HEMOGLOBIN: 8.7 G/DL (ref 13.5–17.5)
MCH RBC QN AUTO: 33.3 PG (ref 26–34)
MCHC RBC AUTO-ENTMCNC: 33 G/DL (ref 31–37)
MCV RBC AUTO: 100.8 FL (ref 80–100)
NRBC AUTOMATED: ABNORMAL PER 100 WBC
PDW BLD-RTO: 15.9 % (ref 11.5–14.5)
PLATELET # BLD: 105 K/UL (ref 130–400)
PMV BLD AUTO: 10.3 FL (ref 6–12)
RBC # BLD: 2.62 M/UL (ref 4.5–5.9)
SEDIMENTATION RATE, ERYTHROCYTE: 34 MM (ref 0–15)
WBC # BLD: 6 K/UL (ref 3.5–11)

## 2018-08-27 PROCEDURE — 85651 RBC SED RATE NONAUTOMATED: CPT

## 2018-08-27 PROCEDURE — 85027 COMPLETE CBC AUTOMATED: CPT

## 2018-08-27 PROCEDURE — 36415 COLL VENOUS BLD VENIPUNCTURE: CPT

## 2018-08-27 PROCEDURE — P9603 ONE-WAY ALLOW PRORATED MILES: HCPCS

## 2018-08-28 ENCOUNTER — OFFICE VISIT (OUTPATIENT)
Dept: INFECTIOUS DISEASES | Age: 73
End: 2018-08-28
Payer: MEDICARE

## 2018-08-28 VITALS
HEART RATE: 84 BPM | BODY MASS INDEX: 23.25 KG/M2 | RESPIRATION RATE: 19 BRPM | DIASTOLIC BLOOD PRESSURE: 70 MMHG | TEMPERATURE: 98.8 F | HEIGHT: 71 IN | SYSTOLIC BLOOD PRESSURE: 136 MMHG | WEIGHT: 166.1 LBS

## 2018-08-28 DIAGNOSIS — M46.26 OSTEOMYELITIS OF LUMBAR SPINE (HCC): Primary | ICD-10-CM

## 2018-08-28 PROCEDURE — 3017F COLORECTAL CA SCREEN DOC REV: CPT | Performed by: INTERNAL MEDICINE

## 2018-08-28 PROCEDURE — 1123F ACP DISCUSS/DSCN MKR DOCD: CPT | Performed by: INTERNAL MEDICINE

## 2018-08-28 PROCEDURE — 1101F PT FALLS ASSESS-DOCD LE1/YR: CPT | Performed by: INTERNAL MEDICINE

## 2018-08-28 PROCEDURE — 1036F TOBACCO NON-USER: CPT | Performed by: INTERNAL MEDICINE

## 2018-08-28 PROCEDURE — 4040F PNEUMOC VAC/ADMIN/RCVD: CPT | Performed by: INTERNAL MEDICINE

## 2018-08-28 PROCEDURE — 99214 OFFICE O/P EST MOD 30 MIN: CPT | Performed by: INTERNAL MEDICINE

## 2018-08-28 PROCEDURE — G8428 CUR MEDS NOT DOCUMENT: HCPCS | Performed by: INTERNAL MEDICINE

## 2018-08-28 PROCEDURE — G8420 CALC BMI NORM PARAMETERS: HCPCS | Performed by: INTERNAL MEDICINE

## 2018-08-28 NOTE — PROGRESS NOTES
Chief Complaint   Patient presents with    Osteomyelitis      6 month follow up, could not verify medication, can't remember or has list.    Today patient is feeling well . He denied back pain today . He is complaining of acid reflux and going to have EGD done . He  denies fever , chill , headache, nausea or vomiting, denies abdomen pain, denies cough, shortness of breath , cough or sputum expectoration. Pt has been on oral Doxycycline for suppression . On 8/27 /18 Sedimentation rate was 34  On 8/21 S C-reactive protein was 9    Pt was hospitalized  for diverting loop colostomy and I&D for perianal abscess 12/30 /15   12/29/15 MRI without contrast showed Discitis/osteomyelitis at L2/3 with destructive changes ,Fluid collection anterior aspect L3,New edema and fluid L3/4    CT-guided L3 biopsy 1/4/16 ,no growth  Tissue culture grew MRSA   Dr Rylee Watson was consulted and felt that the pt not a surgical candidate at that point and pt declinedsurgical intervention  . Pt was discharge on IV Vancomycin and oral Cipro finished 8 weeks course  2/19/2016 . Pt also had aspiration bone biopsy done on 6/29/15 which was no growth . Wound cultures as of 6/26/16 PSEUDOMONAS AERUGINOSA LIGHT GROWTH and JOHN ALBICANS LIGHT GROWTH    Patient was treated with ABXS for 6 weeks at that time . He was evaluated by Dr Rylee Watson on 6/21/16  x-rays reportedly showed  Progression of lumbar kyphosis with progressive distraction of L2 and L3   and Dr Rylee Watson recommend posterior surgical stabilization followed by anterior reconstruction. Pt was referred to [facility] orthopedic surgery and surgical intervention was declined   S/p diversion of colostomy  .                Current Medications:      Current Outpatient Prescriptions:     fluticasone propionate (FLOVENT DISKUS) 100 MCG/BLIST AEPB inhaler, Inhale 2 puffs into the lungs daily, Disp: , Rfl:     promethazine (PHENERGAN) 25 MG tablet, Take 25 mg by mouth every 6 hours as needed for Nausea, Disp: , Rfl:     atorvastatin (LIPITOR) 20 MG tablet, Take 20 mg by mouth daily, Disp: , Rfl:     NOVOFINE AUTOCOVER 30G X 8 MM MISC, , Disp: , Rfl:     ketorolac 0.4 % SOLN ophthalmic solution, , Disp: , Rfl:     LOTEMAX 0.5 % ophthalmic suspension, , Disp: , Rfl:     diphenhydrAMINE (BENADRYL) 25 MG tablet, Take 25 mg by mouth nightly as needed for Itching, Disp: , Rfl:     ASMANEX 60 METERED DOSES 220 MCG/INH inhaler, , Disp: , Rfl:     NULYTELY WITH FLAVOR PACKS 420 G solution, , Disp: , Rfl:     darbepoetin candice-polysorbate (ARANESP) 60 MCG/ML injection, Inject 60 mcg into the skin once a week, Disp: , Rfl:     famotidine (PEPCID) 20 MG tablet, Take 20 mg by mouth 2 times daily, Disp: , Rfl:     HYDROmorphone (DILAUDID) 2 MG tablet, Take 1 tablet by mouth every 4 hours as needed for Pain, Disp: 20 tablet, Rfl: 0    oxyCODONE-acetaminophen (PERCOCET) 7.5-325 MG per tablet, Take 1 tablet by mouth every 4 hours as needed for Pain For pain rating of 3-5, Disp: 30 tablet, Rfl: 0    Probiotic Product (DIFF-STAT) CHEW, Take 1 tablet by mouth daily, Disp: , Rfl:     insulin glargine (LANTUS) 100 UNIT/ML injection pen, Inject 5 Units into the skin nightly, Disp: , Rfl:     doxycycline (VIBRAMYCIN) 100 MG capsule, Take 100 mg by mouth 2 times daily, Disp: , Rfl:     Nutritional Supplements (BOOST GLUCOSE CONTROL) LIQD, Take 8 oz by mouth nightly , Disp: , Rfl:     bisacodyl (DULCOLAX) 10 MG suppository, Place 10 mg rectally daily as needed for Constipation, Disp: , Rfl:     senna-docusate (PERICOLACE) 8.6-50 MG per tablet, Take 2 tablets by mouth 2 times daily , Disp: , Rfl:     b complex-C-folic acid (NEPHROCAPS) 1 MG capsule, Take 1 capsule by mouth daily, Disp: , Rfl:     acetaminophen (TYLENOL) 325 MG tablet, Take 650 mg by mouth every 6 hours as needed for Pain, Disp: , Rfl:     lidocaine-prilocaine (EMLA) 2.5-2.5 % cream, Apply 1 Applicatorful topically three times a week To right forearm in the morning on Mon, Wed, Fri before dialysis, Disp: , Rfl:     amLODIPine (NORVASC) 2.5 MG tablet, Take 1 tablet by mouth daily, Disp: 30 tablet, Rfl: 3    gabapentin (NEURONTIN) 100 MG capsule, Take 1 capsule by mouth 3 times daily, Disp: 30 capsule, Rfl: 0    sevelamer (RENVELA) 2.4 G PACK packet, Take 2.4 g by mouth 3 times daily (before meals) , Disp: , Rfl:     polyethylene glycol (GLYCOLAX) powder, Take 17 g by mouth daily, Disp: , Rfl:     midodrine (PROAMATINE) 5 MG tablet, Take 5 mg by mouth every 8 hours as needed Mon, wed, fri for SBP <100 at dialysis, Disp: , Rfl:     allopurinol (ZYLOPRIM) 100 MG tablet,  Take 100 mg by mouth daily , Disp: , Rfl:     clopidogrel (PLAVIX) 75 MG tablet, Take 75 mg by mouth every evening , Disp: , Rfl:     hydrALAZINE (APRESOLINE) 25 MG tablet, Take 25 mg by mouth 2 times daily Give only on non-dialysis days. , Disp: , Rfl:     aspirin 81 MG tablet, Take 81 mg by mouth every morning , Disp: , Rfl:     fluticasone (FLONASE) 50 MCG/ACT nasal spray, 2 sprays by Nasal route 2 times daily.   , Disp: , Rfl:     Past Medical History:   Diagnosis Date    Asthma     CAD (coronary artery disease)     CKD (chronic kidney disease) stage 4, GFR 15-29 ml/min ([facility])     [facility]     Depression     History of kidney stones     Hyperlipidemia     Hypertension     Osteoarthritis     Proteinuria     Type II or unspecified type diabetes mellitus without mention of complication, not stated as uncontrolled      Past Surgical History:   Procedure Laterality Date    BONE BIOPSY  6/29/15    L2-3    CARPAL TUNNEL RELEASE      CORONARY ARTERY BYPASS GRAFT      HIP SURGERY      JOINT REPLACEMENT      OTHER SURGICAL HISTORY  6/11/15    I&D coccyx wound    OTHER SURGICAL HISTORY  12/30/15    diverting loop colostomy; perirectal incision and drainage    SHOULDER SURGERY      left and right     Social History     Social History    Lab Results   Component Value Date     08/21/2018    K 4.7 08/21/2018    CL 99 08/21/2018    CO2 24 08/21/2018    BUN 47 08/21/2018    CREATININE 5.42 08/21/2018    GFRAA 13 08/21/2018    LABGLOM 10 08/21/2018    GLUCOSE 150 08/21/2018    PROT 5.1 07/14/2017    PROT 5.7 11/27/2012    LABALBU 2.8 07/14/2017    CALCIUM 8.5 08/21/2018    BILITOT 0.26 07/14/2017    ALKPHOS 155 07/14/2017    AST 29 07/14/2017    ALT 35 07/14/2017     Calcium:  No components found for: CA    Hepatic Function Panel:    Lab Results   Component Value Date    ALKPHOS 155 07/14/2017    ALT 35 07/14/2017    AST 29 07/14/2017    PROT 5.1 07/14/2017    PROT 5.7 11/27/2012    BILITOT 0.26 07/14/2017    BILIDIR 0.18 06/30/2016    IBILI 0.27 06/30/2016    LABALBU 2.8 07/14/2017       PT/INR:    Lab Results   Component Value Date    PROTIME 11.0 06/30/2016    INR 1.0 06/30/2016           Assessment     L3-L4 discitis /Osteomylitis with bone destruction and lumbar kyphosis  H/o oerianal abscess s/p diverting loop colostomy and I&D S/p diversion of colostomy   Gout   Active Problems:  Hypertension  CKD (chronic kidney disease) stage 4, GFR 15-29 ml/min  Protein-calorie malnutrition (HCC)  Diabetes mellitus ([facility]    Plan:   Cont chronic suppression with oral doxycycline   Follow CRP /sed rate   F/U in 6 months

## 2018-09-07 ENCOUNTER — HOSPITAL ENCOUNTER (OUTPATIENT)
Age: 73
Setting detail: SPECIMEN
Discharge: HOME | End: 2018-09-07
Payer: MEDICARE

## 2018-09-07 ENCOUNTER — APPOINTMENT (OUTPATIENT)
Dept: GENERAL RADIOLOGY | Age: 73
DRG: 698 | End: 2018-09-07
Payer: MEDICARE

## 2018-09-07 ENCOUNTER — HOSPITAL ENCOUNTER (INPATIENT)
Age: 73
LOS: 3 days | Discharge: NURSING, REHAB OR LONG-TERM CARE | DRG: 698 | End: 2018-09-11
Attending: EMERGENCY MEDICINE | Admitting: INTERNAL MEDICINE
Payer: MEDICARE

## 2018-09-07 DIAGNOSIS — R41.82 ALTERED MENTAL STATUS, UNSPECIFIED ALTERED MENTAL STATUS TYPE: Primary | ICD-10-CM

## 2018-09-07 DIAGNOSIS — R05.9 COUGH: ICD-10-CM

## 2018-09-07 DIAGNOSIS — Z95.1 S/P CABG (CORONARY ARTERY BYPASS GRAFT): ICD-10-CM

## 2018-09-07 DIAGNOSIS — N18.5 CHRONIC RENAL FAILURE, STAGE 5 (HCC): ICD-10-CM

## 2018-09-07 LAB
-: ABNORMAL
ABSOLUTE EOS #: 0 K/UL (ref 0–0.4)
ABSOLUTE IMMATURE GRANULOCYTE: ABNORMAL K/UL (ref 0–0.3)
ABSOLUTE LYMPH #: 1.2 K/UL (ref 1–4.8)
ABSOLUTE MONO #: 0.7 K/UL (ref 0.2–0.8)
AMORPHOUS: ABNORMAL
ANION GAP SERPL CALCULATED.3IONS-SCNC: 15 MMOL/L (ref 9–17)
BACTERIA: ABNORMAL
BASOPHILS # BLD: 0 % (ref 0–2)
BASOPHILS ABSOLUTE: 0 K/UL (ref 0–0.2)
BILIRUBIN URINE: NEGATIVE
BUN BLDV-MCNC: 65 MG/DL (ref 8–23)
BUN/CREAT BLD: 9 (ref 9–20)
CALCIUM SERPL-MCNC: 8.6 MG/DL (ref 8.6–10.4)
CASTS UA: ABNORMAL /LPF
CHLORIDE BLD-SCNC: 100 MMOL/L (ref 98–107)
CO2: 24 MMOL/L (ref 20–31)
COLOR: YELLOW
COMMENT UA: ABNORMAL
CREAT SERPL-MCNC: 7.47 MG/DL (ref 0.7–1.2)
CRYSTALS, UA: ABNORMAL /HPF
DIFFERENTIAL TYPE: ABNORMAL
EKG ATRIAL RATE: 68 BPM
EKG P AXIS: 55 DEGREES
EKG P-R INTERVAL: 194 MS
EKG Q-T INTERVAL: 424 MS
EKG QRS DURATION: 150 MS
EKG QTC CALCULATION (BAZETT): 450 MS
EKG R AXIS: -46 DEGREES
EKG T AXIS: 13 DEGREES
EKG VENTRICULAR RATE: 68 BPM
EOSINOPHILS RELATIVE PERCENT: 0 % (ref 1–4)
EPITHELIAL CELLS UA: ABNORMAL /HPF
GFR AFRICAN AMERICAN: 9 ML/MIN
GFR NON-AFRICAN AMERICAN: 7 ML/MIN
GFR SERPL CREATININE-BSD FRML MDRD: ABNORMAL ML/MIN/{1.73_M2}
GFR SERPL CREATININE-BSD FRML MDRD: ABNORMAL ML/MIN/{1.73_M2}
GLUCOSE BLD-MCNC: 116 MG/DL (ref 75–110)
GLUCOSE BLD-MCNC: 125 MG/DL (ref 70–99)
GLUCOSE URINE: ABNORMAL
HCT VFR BLD CALC: 27 % (ref 41–53)
HEMOGLOBIN: 9.1 G/DL (ref 13.5–17.5)
HEPATITIS B CORE IGM ANTIBODY: NONREACTIVE
HEPATITIS B SURFACE ANTIGEN: NONREACTIVE
IMMATURE GRANULOCYTES: ABNORMAL %
KETONES, URINE: NEGATIVE
LACTIC ACID: 0.9 MMOL/L (ref 0.5–2.2)
LEUKOCYTE ESTERASE, URINE: NEGATIVE
LYMPHOCYTES # BLD: 14 % (ref 24–44)
MCH RBC QN AUTO: 34.4 PG (ref 26–34)
MCHC RBC AUTO-ENTMCNC: 33.6 G/DL (ref 31–37)
MCV RBC AUTO: 102.4 FL (ref 80–100)
MONOCYTES # BLD: 8 % (ref 1–7)
MUCUS: ABNORMAL
MYOGLOBIN: 616 NG/ML (ref 28–72)
NITRITE, URINE: NEGATIVE
NRBC AUTOMATED: ABNORMAL PER 100 WBC
OTHER OBSERVATIONS UA: ABNORMAL
PDW BLD-RTO: 15.5 % (ref 11.5–14.5)
PH UA: 7.5 (ref 5–8)
PLATELET # BLD: 109 K/UL (ref 130–400)
PLATELET ESTIMATE: ABNORMAL
PMV BLD AUTO: 9.6 FL (ref 6–12)
POTASSIUM SERPL-SCNC: 5.2 MMOL/L (ref 3.7–5.3)
PROTEIN UA: ABNORMAL
RBC # BLD: 2.63 M/UL (ref 4.5–5.9)
RBC # BLD: ABNORMAL 10*6/UL
RBC UA: ABNORMAL /HPF
RENAL EPITHELIAL, UA: ABNORMAL /HPF
SEG NEUTROPHILS: 78 % (ref 36–66)
SEGMENTED NEUTROPHILS ABSOLUTE COUNT: 6.8 K/UL (ref 1.8–7.7)
SODIUM BLD-SCNC: 139 MMOL/L (ref 135–144)
SPECIFIC GRAVITY UA: 1.02 (ref 1–1.03)
TRICHOMONAS: ABNORMAL
TROPONIN INTERP: ABNORMAL
TROPONIN INTERP: ABNORMAL
TROPONIN T: 0.41 NG/ML
TROPONIN T: 0.45 NG/ML
TURBIDITY: CLEAR
URINE HGB: NEGATIVE
UROBILINOGEN, URINE: NORMAL
WBC # BLD: 8.7 K/UL (ref 3.5–11)
WBC # BLD: ABNORMAL 10*3/UL
WBC UA: ABNORMAL /HPF
YEAST: ABNORMAL

## 2018-09-07 PROCEDURE — G0378 HOSPITAL OBSERVATION PER HR: HCPCS

## 2018-09-07 PROCEDURE — 80048 BASIC METABOLIC PNL TOTAL CA: CPT

## 2018-09-07 PROCEDURE — 81001 URINALYSIS AUTO W/SCOPE: CPT

## 2018-09-07 PROCEDURE — 85025 COMPLETE CBC W/AUTO DIFF WBC: CPT

## 2018-09-07 PROCEDURE — 93005 ELECTROCARDIOGRAM TRACING: CPT

## 2018-09-07 PROCEDURE — P9603 ONE-WAY ALLOW PRORATED MILES: HCPCS

## 2018-09-07 PROCEDURE — 96366 THER/PROPH/DIAG IV INF ADDON: CPT

## 2018-09-07 PROCEDURE — 94664 DEMO&/EVAL PT USE INHALER: CPT

## 2018-09-07 PROCEDURE — 6360000002 HC RX W HCPCS: Performed by: STUDENT IN AN ORGANIZED HEALTH CARE EDUCATION/TRAINING PROGRAM

## 2018-09-07 PROCEDURE — 87340 HEPATITIS B SURFACE AG IA: CPT

## 2018-09-07 PROCEDURE — 82947 ASSAY GLUCOSE BLOOD QUANT: CPT

## 2018-09-07 PROCEDURE — 87040 BLOOD CULTURE FOR BACTERIA: CPT

## 2018-09-07 PROCEDURE — 36415 COLL VENOUS BLD VENIPUNCTURE: CPT

## 2018-09-07 PROCEDURE — 90937 HEMODIALYSIS REPEATED EVAL: CPT

## 2018-09-07 PROCEDURE — 87086 URINE CULTURE/COLONY COUNT: CPT

## 2018-09-07 PROCEDURE — 71046 X-RAY EXAM CHEST 2 VIEWS: CPT

## 2018-09-07 PROCEDURE — 86403 PARTICLE AGGLUT ANTBDY SCRN: CPT

## 2018-09-07 PROCEDURE — 84484 ASSAY OF TROPONIN QUANT: CPT

## 2018-09-07 PROCEDURE — 83605 ASSAY OF LACTIC ACID: CPT

## 2018-09-07 PROCEDURE — 2580000003 HC RX 258: Performed by: STUDENT IN AN ORGANIZED HEALTH CARE EDUCATION/TRAINING PROGRAM

## 2018-09-07 PROCEDURE — 5A1D70Z PERFORMANCE OF URINARY FILTRATION, INTERMITTENT, LESS THAN 6 HOURS PER DAY: ICD-10-PCS | Performed by: INTERNAL MEDICINE

## 2018-09-07 PROCEDURE — 86705 HEP B CORE ANTIBODY IGM: CPT

## 2018-09-07 PROCEDURE — 83874 ASSAY OF MYOGLOBIN: CPT

## 2018-09-07 PROCEDURE — 99285 EMERGENCY DEPT VISIT HI MDM: CPT

## 2018-09-07 PROCEDURE — 99220 PR INITIAL OBSERVATION CARE/DAY 70 MINUTES: CPT | Performed by: INTERNAL MEDICINE

## 2018-09-07 PROCEDURE — 96365 THER/PROPH/DIAG IV INF INIT: CPT

## 2018-09-07 RX ORDER — HEPARIN SODIUM 5000 [USP'U]/ML
5000 INJECTION, SOLUTION INTRAVENOUS; SUBCUTANEOUS EVERY 8 HOURS SCHEDULED
Status: DISCONTINUED | OUTPATIENT
Start: 2018-09-07 | End: 2018-09-11 | Stop reason: HOSPADM

## 2018-09-07 RX ORDER — DEXTROSE MONOHYDRATE 25 G/50ML
12.5 INJECTION, SOLUTION INTRAVENOUS PRN
Status: DISCONTINUED | OUTPATIENT
Start: 2018-09-07 | End: 2018-09-09 | Stop reason: SDUPTHER

## 2018-09-07 RX ORDER — SENNA AND DOCUSATE SODIUM 50; 8.6 MG/1; MG/1
2 TABLET, FILM COATED ORAL DAILY
Status: DISCONTINUED | OUTPATIENT
Start: 2018-09-07 | End: 2018-09-11 | Stop reason: HOSPADM

## 2018-09-07 RX ORDER — GABAPENTIN 100 MG/1
200 CAPSULE ORAL EVERY EVENING
Status: DISCONTINUED | OUTPATIENT
Start: 2018-09-08 | End: 2018-09-11 | Stop reason: HOSPADM

## 2018-09-07 RX ORDER — SUCRALFATE 1 G/1
1 TABLET ORAL
Status: ON HOLD | COMMUNITY
End: 2020-01-01 | Stop reason: HOSPADM

## 2018-09-07 RX ORDER — FLUCONAZOLE 100 MG/1
100 TABLET ORAL DAILY
Status: ON HOLD | COMMUNITY
Start: 2018-08-24 | End: 2019-01-25 | Stop reason: ALTCHOICE

## 2018-09-07 RX ORDER — FAMOTIDINE 20 MG/1
20 TABLET, FILM COATED ORAL 2 TIMES DAILY
Status: DISCONTINUED | OUTPATIENT
Start: 2018-09-07 | End: 2018-09-07

## 2018-09-07 RX ORDER — SEVELAMER CARBONATE 800 MG/1
3200 TABLET, FILM COATED ORAL
Status: DISCONTINUED | OUTPATIENT
Start: 2018-09-07 | End: 2018-09-11 | Stop reason: HOSPADM

## 2018-09-07 RX ORDER — VIT B COMP NO.3/FOLIC/C/BIOTIN 1 MG-60 MG
1 TABLET ORAL DAILY
Status: DISCONTINUED | OUTPATIENT
Start: 2018-09-07 | End: 2018-09-11 | Stop reason: HOSPADM

## 2018-09-07 RX ORDER — FAMOTIDINE 20 MG/1
20 TABLET, FILM COATED ORAL DAILY
Status: DISCONTINUED | OUTPATIENT
Start: 2018-09-07 | End: 2018-09-11 | Stop reason: HOSPADM

## 2018-09-07 RX ORDER — SEVELAMER CARBONATE 800 MG/1
4 TABLET, FILM COATED ORAL
Status: ON HOLD | COMMUNITY
End: 2020-01-01

## 2018-09-07 RX ORDER — MIDODRINE HYDROCHLORIDE 5 MG/1
5 TABLET ORAL EVERY 8 HOURS PRN
Status: DISCONTINUED | OUTPATIENT
Start: 2018-09-07 | End: 2018-09-11 | Stop reason: HOSPADM

## 2018-09-07 RX ORDER — INSULIN GLARGINE 100 [IU]/ML
6 INJECTION, SOLUTION SUBCUTANEOUS NIGHTLY
Status: DISCONTINUED | OUTPATIENT
Start: 2018-09-07 | End: 2018-09-11 | Stop reason: HOSPADM

## 2018-09-07 RX ORDER — ASPIRIN 81 MG/1
81 TABLET ORAL EVERY MORNING
Status: DISCONTINUED | OUTPATIENT
Start: 2018-09-08 | End: 2018-09-11 | Stop reason: HOSPADM

## 2018-09-07 RX ORDER — LIDOCAINE AND PRILOCAINE 25; 25 MG/G; MG/G
1 CREAM TOPICAL
Status: DISCONTINUED | OUTPATIENT
Start: 2018-09-07 | End: 2018-09-11 | Stop reason: HOSPADM

## 2018-09-07 RX ORDER — SODIUM CHLORIDE 0.9 % (FLUSH) 0.9 %
10 SYRINGE (ML) INJECTION PRN
Status: DISCONTINUED | OUTPATIENT
Start: 2018-09-07 | End: 2018-09-11 | Stop reason: HOSPADM

## 2018-09-07 RX ORDER — FLUTICASONE PROPIONATE 50 MCG
2 SPRAY, SUSPENSION (ML) NASAL 2 TIMES DAILY
Status: DISCONTINUED | OUTPATIENT
Start: 2018-09-07 | End: 2018-09-11 | Stop reason: HOSPADM

## 2018-09-07 RX ORDER — SUCRALFATE 1 G/1
1 TABLET ORAL
Status: DISCONTINUED | OUTPATIENT
Start: 2018-09-07 | End: 2018-09-11 | Stop reason: HOSPADM

## 2018-09-07 RX ORDER — CALCIUM CARBONATE 200(500)MG
2 TABLET,CHEWABLE ORAL EVERY 6 HOURS PRN
COMMUNITY

## 2018-09-07 RX ORDER — POLYETHYLENE GLYCOL 3350 17 G/17G
17 POWDER, FOR SOLUTION ORAL DAILY PRN
Status: DISCONTINUED | OUTPATIENT
Start: 2018-09-07 | End: 2018-09-11 | Stop reason: HOSPADM

## 2018-09-07 RX ORDER — CALCIUM CARBONATE 200(500)MG
2 TABLET,CHEWABLE ORAL EVERY 6 HOURS PRN
Status: DISCONTINUED | OUTPATIENT
Start: 2018-09-07 | End: 2018-09-11 | Stop reason: HOSPADM

## 2018-09-07 RX ORDER — BISACODYL 10 MG
10 SUPPOSITORY, RECTAL RECTAL DAILY PRN
Status: DISCONTINUED | OUTPATIENT
Start: 2018-09-07 | End: 2018-09-11 | Stop reason: HOSPADM

## 2018-09-07 RX ORDER — TETRAHYDROZOLINE HCL 0.05 %
1 DROPS OPHTHALMIC (EYE) 2 TIMES DAILY PRN
Status: DISCONTINUED | OUTPATIENT
Start: 2018-09-07 | End: 2018-09-11 | Stop reason: HOSPADM

## 2018-09-07 RX ORDER — LACTOSE-REDUCED FOOD/FIBER
8 LIQUID (ML) ORAL NIGHTLY
Status: DISCONTINUED | OUTPATIENT
Start: 2018-09-07 | End: 2018-09-07 | Stop reason: CLARIF

## 2018-09-07 RX ORDER — TETRAHYDROZOLINE HCL 0.05 %
1 DROPS OPHTHALMIC (EYE) 2 TIMES DAILY PRN
Status: ON HOLD | COMMUNITY
End: 2020-01-01

## 2018-09-07 RX ORDER — OXYCODONE AND ACETAMINOPHEN 7.5; 325 MG/1; MG/1
1 TABLET ORAL EVERY 6 HOURS PRN
Status: DISCONTINUED | OUTPATIENT
Start: 2018-09-07 | End: 2018-09-07 | Stop reason: CLARIF

## 2018-09-07 RX ORDER — DEXTROSE MONOHYDRATE 50 MG/ML
100 INJECTION, SOLUTION INTRAVENOUS PRN
Status: DISCONTINUED | OUTPATIENT
Start: 2018-09-07 | End: 2018-09-09 | Stop reason: SDUPTHER

## 2018-09-07 RX ORDER — CLOPIDOGREL BISULFATE 75 MG/1
75 TABLET ORAL EVERY EVENING
Status: DISCONTINUED | OUTPATIENT
Start: 2018-09-07 | End: 2018-09-11 | Stop reason: HOSPADM

## 2018-09-07 RX ORDER — FLUTICASONE PROPIONATE 110 UG/1
2 AEROSOL, METERED RESPIRATORY (INHALATION) 2 TIMES DAILY
Status: DISCONTINUED | OUTPATIENT
Start: 2018-09-07 | End: 2018-09-11 | Stop reason: HOSPADM

## 2018-09-07 RX ORDER — AMLODIPINE BESYLATE 5 MG/1
5 TABLET ORAL DAILY
Status: DISCONTINUED | OUTPATIENT
Start: 2018-09-07 | End: 2018-09-11 | Stop reason: HOSPADM

## 2018-09-07 RX ORDER — NICOTINE POLACRILEX 4 MG
15 LOZENGE BUCCAL PRN
Status: DISCONTINUED | OUTPATIENT
Start: 2018-09-07 | End: 2018-09-09 | Stop reason: SDUPTHER

## 2018-09-07 RX ORDER — DIPHENHYDRAMINE HCL 25 MG
25 TABLET ORAL NIGHTLY PRN
Status: DISCONTINUED | OUTPATIENT
Start: 2018-09-07 | End: 2018-09-11 | Stop reason: HOSPADM

## 2018-09-07 RX ORDER — SEVELAMER CARBONATE 800 MG/1
2 TABLET, FILM COATED ORAL EVERY 8 HOURS PRN
Status: ON HOLD | COMMUNITY
End: 2020-01-01

## 2018-09-07 RX ORDER — ATORVASTATIN CALCIUM 20 MG/1
20 TABLET, FILM COATED ORAL DAILY
Status: DISCONTINUED | OUTPATIENT
Start: 2018-09-07 | End: 2018-09-11 | Stop reason: HOSPADM

## 2018-09-07 RX ORDER — HYDRALAZINE HYDROCHLORIDE 25 MG/1
25 TABLET, FILM COATED ORAL 2 TIMES DAILY
Status: DISCONTINUED | OUTPATIENT
Start: 2018-09-07 | End: 2018-09-11 | Stop reason: HOSPADM

## 2018-09-07 RX ORDER — SODIUM CHLORIDE 0.9 % (FLUSH) 0.9 %
10 SYRINGE (ML) INJECTION EVERY 12 HOURS SCHEDULED
Status: DISCONTINUED | OUTPATIENT
Start: 2018-09-07 | End: 2018-09-11 | Stop reason: HOSPADM

## 2018-09-07 RX ADMIN — VANCOMYCIN HYDROCHLORIDE 1000 MG: 10 INJECTION, POWDER, LYOPHILIZED, FOR SOLUTION INTRAVENOUS at 18:28

## 2018-09-07 ASSESSMENT — ENCOUNTER SYMPTOMS
HEARTBURN: 0
BLURRED VISION: 0
NAUSEA: 0
COLOR CHANGE: 0
ABDOMINAL PAIN: 0
SORE THROAT: 0
CHEST TIGHTNESS: 0
EYE PAIN: 0
VOMITING: 0
EYE REDNESS: 0
TROUBLE SWALLOWING: 0
GASTROINTESTINAL NEGATIVE: 1
HEMOPTYSIS: 0
BACK PAIN: 0
SHORTNESS OF BREATH: 0
RHINORRHEA: 0
FACIAL SWELLING: 0
COUGH: 1
SINUS PRESSURE: 0
DOUBLE VISION: 0
DIARRHEA: 0
CONSTIPATION: 0
SPUTUM PRODUCTION: 0
WHEEZING: 0
BLOOD IN STOOL: 0
EYE DISCHARGE: 0
EYES NEGATIVE: 1

## 2018-09-07 ASSESSMENT — PAIN DESCRIPTION - LOCATION: LOCATION: HEAD

## 2018-09-07 ASSESSMENT — PAIN SCALES - GENERAL: PAINLEVEL_OUTOF10: 5

## 2018-09-07 ASSESSMENT — PAIN DESCRIPTION - DESCRIPTORS: DESCRIPTORS: ACHING

## 2018-09-07 NOTE — H&P
[address]      [address] [facility]     HISTORY AND PHYSICAL EXAMINATION            Date:   9/7/2018  Patient name:  Amira Lantigua  Date of admission:  9/7/2018 11:17 AM  MRN:   [id number]  Account:  [de-identified]  YOB: 1945  PCP:    Cleveland Goldmann, MD  Room:   [address] [place] [address]  Code Status:    Full Code    Chief Complaint:     Chief Complaint   Patient presents with    Fever    Other     abnormal labs     History Obtained From:     Patient, electronic medical record    History of Present Illness: The patient is a 75 y.o. / male who presents with Fever and Other (abnormal labs)   and he is admitted to the hospital for the management of Altered Mental Status. Patient came from the nursing home 2/2 abnormal labs and a feeling of feeling unwell. He had a fever earlier in the day and had refused to go to dialysis. Troponins were obtained in the ED were elevated compared to his baseline. He is more lethargic than his usual baseline. He had a chronic cough that has persisted over the past 2 months, he also has a history of lung disease. He c/o of a headache  Started approx 2 months ago, on and off. He had one today, that hasn't resolved. He did not take any medications for it. Nothing makes it better/worse. He states it is a 9/10 in severity. No pain anywhere else, breathing well. No issues with urination, no dysuria. He sees a regular Nephrologist after he was referred a while ago from his PCP and has been on HD 3 times a week. He denies fever, chills, n/v/d    History is difficult to obtain and he is lethargic. Patient is being regularly followed-up with ID for a history of Osteomyelitis of the Lumbar Spine  From EMR, patient had progress note from 8/28/2018: 6 month follow-up;  EGD pending at the time.  He was on an oral Doxycycline for suppression. Past Medical History:     Past Medical History:   Diagnosis Date    Asthma     CAD (coronary artery disease)     CKD (chronic kidney disease) stage 4, GFR 15-29 ml/min [facility]     [facility]) [facility]     Depression     History of kidney stones     Hyperlipidemia     Hypertension     Osteoarthritis     Proteinuria     Type II or unspecified type diabetes mellitus without mention of complication, not stated as uncontrolled         Past Surgical History:     Past Surgical History:   Procedure Laterality Date    BONE BIOPSY  6/29/15    L2-3    CARPAL TUNNEL RELEASE      CORONARY ARTERY BYPASS GRAFT      HIP SURGERY      JOINT REPLACEMENT      OTHER SURGICAL HISTORY  6/11/15    I&D coccyx wound    OTHER SURGICAL HISTORY  12/30/15    diverting loop colostomy; perirectal incision and drainage    SHOULDER SURGERY      left and right        Medications Prior to Admission:     Prior to Admission medications    Medication Sig Start Date End Date Taking?  Authorizing Provider   carboxymethylcellulose 1 % ophthalmic solution Place 1 drop into both eyes 3 times daily as needed for Dry Eyes   Yes Historical Provider, MD   vitamin D (CHOLECALCIFEROL) 1000 UNIT TABS tablet Take 1,000 Units by mouth daily   Yes Historical Provider, MD   fluconazole (DIFLUCAN) 100 MG tablet Take 100 mg by mouth daily For 21 days starting 8/24/18 8/24/18  Yes Historical Provider, MD   sevelamer (RENVELA) 800 MG tablet Take 2 tablets by mouth every 8 hours as needed For snacks   Yes Historical Provider, MD   sevelamer (RENVELA) 800 MG tablet Take 4 tablets by mouth 3 times daily (with meals)   Yes Historical Provider, MD   sertraline (ZOLOFT) 50 MG tablet Take 50 mg by mouth daily   Yes Historical Provider, MD   sucralfate (CARAFATE) 1 GM tablet Take 1 g by mouth 3 times daily (before meals)   Yes Historical Provider, MD   calcium carbonate (TUMS) 500 MG chewable tablet Take 2 tablets by glycol (GLYCOLAX) powder Take 17 g by mouth daily as needed (constipation)    Yes Historical Provider, MD   clopidogrel (PLAVIX) 75 MG tablet Take 75 mg by mouth every evening  2/13/14  Yes Historical Provider, MD   hydrALAZINE (APRESOLINE) 25 MG tablet Take 25 mg by mouth 2 times daily Give only on non-dialysis days. 2/13/14  Yes Historical Provider, MD   aspirin 81 MG EC tablet Take 81 mg by mouth every morning    Yes Historical Provider, MD Veronique Garcia 30G X 8 MM MISC  1/17/17   Historical Provider, MD   oxyCODONE-acetaminophen (PERCOCET) 7.5-325 MG per tablet Take 1 tablet by mouth every 4 hours as needed for Pain For pain rating of 3-5  Patient taking differently: Take 1 tablet by mouth every 6 hours as needed for Pain. . 7/1/16   Briseida Nichols MD   Nutritional Supplements (BOOST GLUCOSE CONTROL) LIQD Take 8 oz by mouth nightly     Historical Provider, MD        Allergies:     Oxycontin [oxycodone]; Avodart [dutasteride]; and Darvocet [propoxyphene n-acetaminophen]    Social History:     Tobacco:    reports that he has never smoked. He has never used smokeless tobacco.  Alcohol:      reports that he does not drink alcohol. Drug Use:  reports that he does not use drugs. Family History:     Family History   Problem Relation Age of Onset   [place] Stroke Mother     Heart Attack Mother     Heart Attack Father     Diabetes Sister     Diabetes Brother        Review of Systems:     Positive and Negative as described in HPI. Review of Systems   Constitutional: Negative. Negative for chills, fever and weight loss. HENT: Negative. Negative for hearing loss and tinnitus. Eyes: Negative. Negative for blurred vision and double vision. Respiratory: Positive for cough. Negative for hemoptysis and sputum production (dry cough for 2 months). Cardiovascular: Negative. Negative for chest pain and palpitations. Gastrointestinal: Negative. Negative for heartburn, nausea and vomiting. Result Value Ref Range    Glucose 125 (H) 70 - 99 mg/dL    BUN 65 (H) 8 - 23 mg/dL    CREATININE 7.47 (HH) 0.70 - 1.20 mg/dL    Bun/Cre Ratio 9 9 - 20    Calcium 8.6 8.6 - 10.4 mg/dL    Sodium 139 135 - 144 mmol/L    Potassium 5.2 3.7 - 5.3 mmol/L    Chloride 100 98 - 107 mmol/L    CO2 24 20 - 31 mmol/L    Anion Gap 15 9 - 17 mmol/L    GFR Non-African American 7 (L) >60 mL/min    GFR  9 (L) >60 mL/min    GFR Comment          GFR Staging NOT REPORTED    CBC Auto Differential    Collection Time: 09/07/18  9:10 AM   Result Value Ref Range    WBC 8.7 3.5 - 11.0 k/uL    RBC 2.63 (L) 4.5 - 5.9 m/uL    Hemoglobin 9.1 (L) 13.5 - 17.5 g/dL    Hematocrit 27.0 (L) 41 - 53 %    .4 (H) 80 - 100 fL    MCH 34.4 (H) 26 - 34 pg    MCHC 33.6 31 - 37 g/dL    RDW 15.5 (H) 11.5 - 14.5 %    Platelets 266 (L) 589 - 400 k/uL    MPV 9.6 6.0 - 12.0 fL    NRBC Automated NOT REPORTED per 100 WBC    Differential Type NOT REPORTED     Seg Neutrophils 78 (H) 36 - 66 %    Lymphocytes 14 (L) 24 - 44 %    Monocytes 8 (H) 1 - 7 %    Eosinophils % 0 (L) 1 - 4 %    Basophils 0 0 - 2 %    Immature Granulocytes NOT REPORTED 0 %    Segs Absolute 6.80 1.8 - 7.7 k/uL    Absolute Lymph # 1.20 1.0 - 4.8 k/uL    Absolute Mono # 0.70 0.2 - 0.8 k/uL    Absolute Eos # 0.00 0.0 - 0.4 k/uL    Basophils # 0.00 0.0 - 0.2 k/uL    Absolute Immature Granulocyte NOT REPORTED 0.00 - 0.30 k/uL    WBC Morphology NOT REPORTED     RBC Morphology NOT REPORTED     Platelet Estimate NOT REPORTED    Troponin    Collection Time: 09/07/18  9:10 AM   Result Value Ref Range    Troponin T 0.41 (HH) <0.03 ng/mL    Troponin Interp         Culture Blood #1    Collection Time: 09/07/18  9:15 AM   Result Value Ref Range    Specimen Description . BLOOD     Special Requests LEFT HAND, 7ML     Culture NO GROWTH 2 HOURS     Status Pending    Trop/Myoglobin    Collection Time: 09/07/18 11:40 AM   Result Value Ref Range    Troponin T 0.45 (HH) <0.03 ng/mL    Troponin Interp          Myoglobin 616 (H) 28 - 72 ng/mL   Lactic Acid    Collection Time: 09/07/18 11:40 AM   Result Value Ref Range    Lactic Acid 0.9 0.5 - 2.2 mmol/L   EKG 12 Lead    Collection Time: 09/07/18 11:47 AM   Result Value Ref Range    Ventricular Rate 68 BPM    Atrial Rate 68 BPM    P-R Interval 194 ms    QRS Duration 150 ms    Q-T Interval 424 ms    QTc Calculation (Bazett) 450 ms    P Axis 55 degrees    R Axis -46 degrees    T Axis 13 degrees   Urinalysis Reflex to Culture    Collection Time: 09/07/18 12:50 PM   Result Value Ref Range    Color, UA YELLOW YEL    Turbidity UA CLEAR CLEAR    Glucose, Ur 1+ (A) NEG    Bilirubin Urine NEGATIVE NEG    Ketones, Urine NEGATIVE NEG    Specific [place], UA 1.016 1.000 - 1.030    Urine Hgb NEGATIVE NEG    pH, UA 7.5 5.0 - 8.0    Protein, UA 4+ (A) NEG    Urobilinogen, Urine Normal NORM    Nitrite, Urine NEGATIVE NEG    Leukocyte Esterase, Urine NEGATIVE NEG    Urinalysis Comments NOT REPORTED    Microscopic Urinalysis    Collection Time: 09/07/18 12:50 PM   Result Value Ref Range    -          WBC, UA 0 TO 2 /HPF    RBC, UA 0 TO 2 /HPF    Casts UA NOT REPORTED /LPF    Crystals UA NOT REPORTED NONE /HPF    Epithelial Cells UA 2 TO 5 /HPF    Renal Epithelial, Urine NOT REPORTED 0 /HPF    Bacteria, UA FEW (A) NONE    Mucus, UA NOT REPORTED NONE    Trichomonas, UA NOT REPORTED NONE    Amorphous, UA 1+ (A) NONE    Other Observations UA NOT REPORTED NREQ    Yeast, UA NOT REPORTED NONE       Imaging/Diagnostics:  Xr Chest Standard (2 Vw)    Result Date: 9/7/2018  EXAMINATION: TWO VIEWS OF THE CHEST 9/7/2018 12:18 pm COMPARISON: 06/30/2016 HISTORY: ORDERING SYSTEM PROVIDED HISTORY: Cough, fever TECHNOLOGIST PROVIDED HISTORY: Cough, fever Ordering Physician Provided Reason for Exam: Abnormal lab, pt states hx of cough, fever, no other chest complaints;  Hx of asthma Acuity: Acute Type of Exam: Initial Additional signs and symptoms: Abnormal lab, pt states hx of cough, fever, no other chest complaints; Hx of asthma FINDINGS: Status post CABG. The lungs are without acute focal process. There is no effusion or pneumothorax. The cardiomediastinal silhouette is stable. The osseous structures are stable. No acute process. Assessment :      Primary Problem  Altered mental status    Active Hospital Problems    Diagnosis Date Noted    Diabetes mellitus ([facility] [E11.9] 12/29/2015    Altered mental status [R41.82] 06/09/2015    CKD (chronic kidney disease) stage 4, GFR 15-29 ml/min [N18.4]     Hypertension [I10]        Plan:     Patient status Admit as inpatient in the  Progressive Unit/Step down    Altered Mental status  -F/U daily CBC (WBC 8.7 -->    ;H/H 9.1/27.0 -->    )  -F/U BMP (K 5.2 -->    , BUN/Creat 65/7.47 --> 47/5.42)  -F/U Blood cultures  -ESR 34 (unchanged from 8/27/2018)  -Urinalysis - few bacteria, WBC 0-2, RBC 0-2; LeukE Neg  -troponin's 0.41-->0.45 (higher than baseline), myoglobin 616  -lactic acid 0.9    Cough  -CXR no acute process  -will re-assess post HD for any changes    Chronic Renal Failure  -patient received HD 1 time thus far, 2-3 L total, as tolerated   -Nephrology consulted/following the patient      Consultations:   Dianelys Eller [phone] TO PRIMARY CARE PROVIDER  IP CONSULT TO SOCIAL WORK    Patient is admitted as inpatient status because of co-morbidities listed above, severity of signs and symptoms as outlined, requirement for current medical therapies and most importantly because of direct risk to patient if care not provided in a hospital setting.     Paco Reid MD  9/7/2018  4:23 PM    Copy sent to Dr. Ladi Mckee MD None known

## 2018-09-07 NOTE — ED NOTES
Report to [facility]     Qasim Resendez RN  09/07/18 [id number]

## 2018-09-07 NOTE — ED PROVIDER NOTES
[address]  eMERGENCY dEPARTMENT eNCOUnter      Pt Name: Valeria Miguel  MRN: [id number]  [place] 1945  Date of evaluation: 9/7/18      CHIEF COMPLAINT       Chief Complaint   Patient presents with    Fever    Other     abnormal labs         HISTORY OF PRESENT ILLNESS    Valeria Miguel is a 75 y.o. male who presents complaining of Abnormal labs. Patient was sent in from the nursing home for abnormal labs and feeling ill. Per the nursing home report the patient was having a fever earlier today and was not feeling well and refused to go to dialysis. They did some blood work and it came back showing that his troponin was elevated compared to normal.  Patient was evidently feeling a little lethargic according to them but otherwise patient states that he feels okay. Patient is denying any shortness of breath or chest pain. Patient states he has a cough that is chronic and has not changed any. Patient does have a history of lung disease. Patient denies any pain anywhere but states he does have some swelling in his ankles. Patient cannot tell me why he did not want to go to dialysis today. REVIEW OF SYSTEMS       Review of Systems   Constitutional: Negative for activity change, appetite change, chills, diaphoresis and fever. HENT: Negative for congestion, ear pain, facial swelling, nosebleeds, rhinorrhea, sinus pressure, sore throat and trouble swallowing. Eyes: Negative for pain, discharge and redness. Respiratory: Positive for cough. Negative for chest tightness, shortness of breath and wheezing. Cardiovascular: Negative for chest pain, palpitations and leg swelling. Gastrointestinal: Negative for abdominal pain, blood in stool, constipation, diarrhea, nausea and vomiting. Genitourinary: Negative for difficulty urinating, dysuria, flank pain, frequency, genital sores and hematuria.    Musculoskeletal: Negative for arthralgias, back pain, gait problem, joint swelling, myalgias Cardiovascular: Normal rate, regular rhythm and normal heart sounds. Exam reveals no gallop and no friction rub. No murmur heard. Pulmonary/Chest: Effort normal. No respiratory distress. He has no decreased breath sounds. He has wheezes. He has rhonchi in the right middle field and the right lower field. He has no rales. He exhibits no tenderness. Abdominal: Soft. Bowel sounds are normal. He exhibits no distension and no mass. There is no tenderness. There is no rebound and no guarding. Musculoskeletal: Normal range of motion. He exhibits no edema or tenderness. Neurological: He is alert and oriented to person, place, and time. He displays normal reflexes. No cranial nerve deficit. He exhibits normal muscle tone. Coordination normal.   Skin: Skin is warm and dry. No rash noted. He is not diaphoretic. No erythema. No pallor. Psychiatric: He has a normal mood and affect. His behavior is normal. Judgment and thought content normal.   Nursing note and vitals reviewed. MEDICAL DECISION MAKING:     Patient was sent in due to some abnormal mentation and missing dialysis today. Patient's lab work was just done 2 hours ago which showed that his creatinine is elevated and that his troponin is slightly larger than it normally is but in looking back it does seem to fluctuate based on his kidney function. patient is asymptomatic at this time. Patient does seem a little slow to respond but I don't know if this is normal or not. We'll check a urine and a chest x-ray to rule out any signs of infection. Will repeat his troponin to make sure it is rising. We'll then speak with his nephrologist about possibly getting an dialyzed tonight.     DIAGNOSTIC RESULTS     EKG: All EKG's are interpreted by the Emergency Department Physician who either signs or Co-signs this chart in the absence of a cardiologist.      EKG Interpretation    Interpreted by emergency department physician    Rhythm: normal sinus   Rate:

## 2018-09-07 NOTE — PROGRESS NOTES
Dialysis Safety Checks:    Patient ID Verified (Y/N) [id number]     -Hepatitis Test                   Date      Result  Hepatitis B Surface Antigen   today pend     Hepatitis B Surface Antibody       Hepatitis B Core Antibody        -Treatment Initiation  Blood Vol Processed Goal (Liters)  Pump Speed x Treatment Hours x 60 Minutes    Target Fluid Removal 2 to 3 as manfred     * Intra-treatment documented Safety Checks include the followin) Access and face visible at all times. 2) All connections and blood lines are secure with no kinks. 3) NVL alarm engaged. 4) Hemosafe device applied (if applicable). 5) No collapse of Arterial or Venous blood chambers. 6) All blood lines / pump segments in the air detectors.   --------------------------------------------------------------------------------

## 2018-09-08 PROBLEM — J44.1 ACUTE EXACERBATION OF CHRONIC OBSTRUCTIVE AIRWAYS DISEASE (HCC): Status: ACTIVE | Noted: 2018-09-08

## 2018-09-08 LAB
ABSOLUTE EOS #: 0.3 K/UL (ref 0–0.4)
ABSOLUTE IMMATURE GRANULOCYTE: ABNORMAL K/UL (ref 0–0.3)
ABSOLUTE LYMPH #: 1.4 K/UL (ref 1–4.8)
ABSOLUTE MONO #: 0.6 K/UL (ref 0.1–1.3)
ANION GAP SERPL CALCULATED.3IONS-SCNC: 11 MMOL/L (ref 9–17)
BASOPHILS # BLD: 1 % (ref 0–2)
BASOPHILS ABSOLUTE: 0 K/UL (ref 0–0.2)
BUN BLDV-MCNC: 34 MG/DL (ref 8–23)
BUN/CREAT BLD: ABNORMAL (ref 9–20)
CALCIUM SERPL-MCNC: 8.8 MG/DL (ref 8.6–10.4)
CHLORIDE BLD-SCNC: 97 MMOL/L (ref 98–107)
CO2: 26 MMOL/L (ref 20–31)
CREAT SERPL-MCNC: 5.03 MG/DL (ref 0.7–1.2)
CULTURE: ABNORMAL
DIFFERENTIAL TYPE: ABNORMAL
EOSINOPHILS RELATIVE PERCENT: 6 % (ref 0–4)
GFR AFRICAN AMERICAN: 14 ML/MIN
GFR NON-AFRICAN AMERICAN: 11 ML/MIN
GFR SERPL CREATININE-BSD FRML MDRD: ABNORMAL ML/MIN/{1.73_M2}
GFR SERPL CREATININE-BSD FRML MDRD: ABNORMAL ML/MIN/{1.73_M2}
GLUCOSE BLD-MCNC: 121 MG/DL (ref 70–99)
GLUCOSE BLD-MCNC: 518 MG/DL (ref 75–110)
HCT VFR BLD CALC: 27.4 % (ref 41–53)
HEMOGLOBIN: 9.4 G/DL (ref 13.5–17.5)
IMMATURE GRANULOCYTES: ABNORMAL %
LYMPHOCYTES # BLD: 26 % (ref 24–44)
Lab: ABNORMAL
MCH RBC QN AUTO: 33.4 PG (ref 26–34)
MCHC RBC AUTO-ENTMCNC: 34.3 G/DL (ref 31–37)
MCV RBC AUTO: 97.4 FL (ref 80–100)
MONOCYTES # BLD: 11 % (ref 1–7)
NRBC AUTOMATED: ABNORMAL PER 100 WBC
PDW BLD-RTO: 14.8 % (ref 11.5–14.9)
PLATELET # BLD: 101 K/UL (ref 150–450)
PLATELET ESTIMATE: ABNORMAL
PMV BLD AUTO: 9.2 FL (ref 6–12)
POTASSIUM SERPL-SCNC: 4.4 MMOL/L (ref 3.7–5.3)
RBC # BLD: 2.81 M/UL (ref 4.5–5.9)
RBC # BLD: ABNORMAL 10*6/UL
SEG NEUTROPHILS: 56 % (ref 36–66)
SEGMENTED NEUTROPHILS ABSOLUTE COUNT: 3 K/UL (ref 1.3–9.1)
SODIUM BLD-SCNC: 134 MMOL/L (ref 135–144)
SPECIMEN DESCRIPTION: ABNORMAL
STATUS: ABNORMAL
WBC # BLD: 5.4 K/UL (ref 3.5–11)
WBC # BLD: ABNORMAL 10*3/UL

## 2018-09-08 PROCEDURE — 2580000003 HC RX 258: Performed by: STUDENT IN AN ORGANIZED HEALTH CARE EDUCATION/TRAINING PROGRAM

## 2018-09-08 PROCEDURE — 85025 COMPLETE CBC W/AUTO DIFF WBC: CPT

## 2018-09-08 PROCEDURE — 96372 THER/PROPH/DIAG INJ SC/IM: CPT

## 2018-09-08 PROCEDURE — 2580000003 HC RX 258: Performed by: INTERNAL MEDICINE

## 2018-09-08 PROCEDURE — 6370000000 HC RX 637 (ALT 250 FOR IP): Performed by: STUDENT IN AN ORGANIZED HEALTH CARE EDUCATION/TRAINING PROGRAM

## 2018-09-08 PROCEDURE — 6360000002 HC RX W HCPCS: Performed by: FAMILY MEDICINE

## 2018-09-08 PROCEDURE — 94640 AIRWAY INHALATION TREATMENT: CPT

## 2018-09-08 PROCEDURE — 36415 COLL VENOUS BLD VENIPUNCTURE: CPT

## 2018-09-08 PROCEDURE — 94760 N-INVAS EAR/PLS OXIMETRY 1: CPT

## 2018-09-08 PROCEDURE — 6370000000 HC RX 637 (ALT 250 FOR IP): Performed by: FAMILY MEDICINE

## 2018-09-08 PROCEDURE — 80048 BASIC METABOLIC PNL TOTAL CA: CPT

## 2018-09-08 PROCEDURE — 96375 TX/PRO/DX INJ NEW DRUG ADDON: CPT

## 2018-09-08 PROCEDURE — 2060000000 HC ICU INTERMEDIATE R&B

## 2018-09-08 PROCEDURE — 99233 SBSQ HOSP IP/OBS HIGH 50: CPT | Performed by: INTERNAL MEDICINE

## 2018-09-08 PROCEDURE — 82947 ASSAY GLUCOSE BLOOD QUANT: CPT

## 2018-09-08 PROCEDURE — 94761 N-INVAS EAR/PLS OXIMETRY MLT: CPT

## 2018-09-08 PROCEDURE — 96367 TX/PROPH/DG ADDL SEQ IV INF: CPT

## 2018-09-08 PROCEDURE — 6360000002 HC RX W HCPCS: Performed by: STUDENT IN AN ORGANIZED HEALTH CARE EDUCATION/TRAINING PROGRAM

## 2018-09-08 PROCEDURE — 94664 DEMO&/EVAL PT USE INHALER: CPT

## 2018-09-08 RX ORDER — OXYCODONE HYDROCHLORIDE 5 MG/1
2.5 TABLET ORAL EVERY 6 HOURS PRN
Status: DISCONTINUED | OUTPATIENT
Start: 2018-09-08 | End: 2018-09-11 | Stop reason: HOSPADM

## 2018-09-08 RX ORDER — OXYCODONE HYDROCHLORIDE AND ACETAMINOPHEN 5; 325 MG/1; MG/1
1 TABLET ORAL EVERY 6 HOURS PRN
Status: DISCONTINUED | OUTPATIENT
Start: 2018-09-08 | End: 2018-09-11 | Stop reason: HOSPADM

## 2018-09-08 RX ORDER — LEVOFLOXACIN 5 MG/ML
250 INJECTION, SOLUTION INTRAVENOUS EVERY 24 HOURS
Status: DISCONTINUED | OUTPATIENT
Start: 2018-09-08 | End: 2018-09-09

## 2018-09-08 RX ORDER — IPRATROPIUM BROMIDE AND ALBUTEROL SULFATE 2.5; .5 MG/3ML; MG/3ML
1 SOLUTION RESPIRATORY (INHALATION)
Status: DISCONTINUED | OUTPATIENT
Start: 2018-09-08 | End: 2018-09-11 | Stop reason: HOSPADM

## 2018-09-08 RX ORDER — METHYLPREDNISOLONE SODIUM SUCCINATE 40 MG/ML
30 INJECTION, POWDER, LYOPHILIZED, FOR SOLUTION INTRAMUSCULAR; INTRAVENOUS EVERY 6 HOURS
Status: DISCONTINUED | OUTPATIENT
Start: 2018-09-08 | End: 2018-09-11 | Stop reason: HOSPADM

## 2018-09-08 RX ADMIN — INSULIN GLARGINE 6 UNITS: 100 INJECTION, SOLUTION SUBCUTANEOUS at 22:31

## 2018-09-08 RX ADMIN — Medication 10 ML: at 22:52

## 2018-09-08 RX ADMIN — SUCRALFATE 1 G: 1 TABLET ORAL at 16:41

## 2018-09-08 RX ADMIN — HEPARIN SODIUM 5000 UNITS: 5000 INJECTION, SOLUTION INTRAVENOUS; SUBCUTANEOUS at 00:34

## 2018-09-08 RX ADMIN — CLOPIDOGREL BISULFATE 75 MG: 75 TABLET ORAL at 19:40

## 2018-09-08 RX ADMIN — IPRATROPIUM BROMIDE AND ALBUTEROL SULFATE 1 AMPULE: .5; 3 SOLUTION RESPIRATORY (INHALATION) at 15:40

## 2018-09-08 RX ADMIN — SERTRALINE HYDROCHLORIDE 50 MG: 50 TABLET ORAL at 09:31

## 2018-09-08 RX ADMIN — IPRATROPIUM BROMIDE AND ALBUTEROL SULFATE 1 AMPULE: .5; 3 SOLUTION RESPIRATORY (INHALATION) at 11:52

## 2018-09-08 RX ADMIN — FLUTICASONE PROPIONATE 2 PUFF: 110 AEROSOL, METERED RESPIRATORY (INHALATION) at 22:43

## 2018-09-08 RX ADMIN — SEVELAMER CARBONATE 3200 MG: 800 TABLET, FILM COATED ORAL at 11:59

## 2018-09-08 RX ADMIN — HYDRALAZINE HYDROCHLORIDE 25 MG: 25 TABLET, FILM COATED ORAL at 09:29

## 2018-09-08 RX ADMIN — HEPARIN SODIUM 5000 UNITS: 5000 INJECTION, SOLUTION INTRAVENOUS; SUBCUTANEOUS at 14:51

## 2018-09-08 RX ADMIN — FLUTICASONE PROPIONATE 2 PUFF: 110 AEROSOL, METERED RESPIRATORY (INHALATION) at 09:29

## 2018-09-08 RX ADMIN — SEVELAMER CARBONATE 3200 MG: 800 TABLET, FILM COATED ORAL at 09:33

## 2018-09-08 RX ADMIN — METHYLPREDNISOLONE SODIUM SUCCINATE 30 MG: 40 INJECTION, POWDER, LYOPHILIZED, FOR SOLUTION INTRAMUSCULAR; INTRAVENOUS at 11:57

## 2018-09-08 RX ADMIN — FLUTICASONE PROPIONATE 2 PUFF: 110 AEROSOL, METERED RESPIRATORY (INHALATION) at 00:41

## 2018-09-08 RX ADMIN — CLOPIDOGREL BISULFATE 75 MG: 75 TABLET ORAL at 00:41

## 2018-09-08 RX ADMIN — VITAMIN D, TAB 1000IU (100/BT) 1000 UNITS: 25 TAB at 09:34

## 2018-09-08 RX ADMIN — FLUTICASONE PROPIONATE 2 SPRAY: 50 SPRAY, METERED NASAL at 00:40

## 2018-09-08 RX ADMIN — ASPIRIN 81 MG: 81 TABLET, COATED ORAL at 09:26

## 2018-09-08 RX ADMIN — HYDRALAZINE HYDROCHLORIDE 25 MG: 25 TABLET, FILM COATED ORAL at 00:41

## 2018-09-08 RX ADMIN — IPRATROPIUM BROMIDE AND ALBUTEROL SULFATE 1 AMPULE: .5; 3 SOLUTION RESPIRATORY (INHALATION) at 19:52

## 2018-09-08 RX ADMIN — DICLOFENAC 2 G: 10 GEL TOPICAL at 00:41

## 2018-09-08 RX ADMIN — HYDRALAZINE HYDROCHLORIDE 25 MG: 25 TABLET, FILM COATED ORAL at 22:43

## 2018-09-08 RX ADMIN — GABAPENTIN 200 MG: 100 CAPSULE ORAL at 19:40

## 2018-09-08 RX ADMIN — ATORVASTATIN CALCIUM 20 MG: 20 TABLET, FILM COATED ORAL at 09:26

## 2018-09-08 RX ADMIN — Medication 1 TABLET: at 09:26

## 2018-09-08 RX ADMIN — LEVOFLOXACIN 250 MG: 250 INJECTION, SOLUTION INTRAVENOUS at 11:56

## 2018-09-08 RX ADMIN — DICLOFENAC 2 G: 10 GEL TOPICAL at 09:27

## 2018-09-08 RX ADMIN — SUCRALFATE 1 G: 1 TABLET ORAL at 11:58

## 2018-09-08 RX ADMIN — DICLOFENAC 2 G: 10 GEL TOPICAL at 22:43

## 2018-09-08 RX ADMIN — FLUTICASONE PROPIONATE 2 SPRAY: 50 SPRAY, METERED NASAL at 22:43

## 2018-09-08 RX ADMIN — AMLODIPINE BESYLATE 5 MG: 5 TABLET ORAL at 09:22

## 2018-09-08 RX ADMIN — CARBOXYMETHYLCELLULOSE SODIUM 1 DROP: 10 GEL OPHTHALMIC at 00:41

## 2018-09-08 RX ADMIN — Medication 10 ML: at 09:34

## 2018-09-08 RX ADMIN — DICLOFENAC 2 G: 10 GEL TOPICAL at 14:50

## 2018-09-08 RX ADMIN — HEPARIN SODIUM 5000 UNITS: 5000 INJECTION, SOLUTION INTRAVENOUS; SUBCUTANEOUS at 22:42

## 2018-09-08 RX ADMIN — FLUTICASONE PROPIONATE 2 SPRAY: 50 SPRAY, METERED NASAL at 09:28

## 2018-09-08 RX ADMIN — DOCUSATE SODIUM AND SENNOSIDES 2 TABLET: 8.6; 5 TABLET, FILM COATED ORAL at 09:30

## 2018-09-08 RX ADMIN — SEVELAMER CARBONATE 3200 MG: 800 TABLET, FILM COATED ORAL at 19:40

## 2018-09-08 RX ADMIN — OXYCODONE HYDROCHLORIDE AND ACETAMINOPHEN 1 TABLET: 5; 325 TABLET ORAL at 09:34

## 2018-09-08 RX ADMIN — CEFEPIME HYDROCHLORIDE 1 G: 1 INJECTION, POWDER, FOR SOLUTION INTRAMUSCULAR; INTRAVENOUS at 00:35

## 2018-09-08 RX ADMIN — FAMOTIDINE 20 MG: 20 TABLET, FILM COATED ORAL at 09:28

## 2018-09-08 RX ADMIN — Medication 10 ML: at 00:34

## 2018-09-08 RX ADMIN — CARBOXYMETHYLCELLULOSE SODIUM 1 DROP: 10 GEL OPHTHALMIC at 22:42

## 2018-09-08 RX ADMIN — METHYLPREDNISOLONE SODIUM SUCCINATE 30 MG: 40 INJECTION, POWDER, LYOPHILIZED, FOR SOLUTION INTRAMUSCULAR; INTRAVENOUS at 19:43

## 2018-09-08 ASSESSMENT — PAIN SCALES - GENERAL
PAINLEVEL_OUTOF10: 0
PAINLEVEL_OUTOF10: 1
PAINLEVEL_OUTOF10: 0

## 2018-09-08 ASSESSMENT — ENCOUNTER SYMPTOMS
VOMITING: 0
GASTROINTESTINAL NEGATIVE: 1
COUGH: 0
WHEEZING: 1
NAUSEA: 0
HEARTBURN: 0
EYES NEGATIVE: 1

## 2018-09-08 NOTE — CONSULTS
diverting loop colostomy; perirectal incision and drainage    SHOULDER SURGERY      left and right       Current Medications:      oxyCODONE-acetaminophen (PERCOCET) 5-325 MG per tablet 1 tablet Q6H PRN   And    oxyCODONE (ROXICODONE) immediate release tablet 2.5 mg Q6H PRN   sodium chloride flush 0.9 % injection 10 mL 2 times per day   sodium chloride flush 0.9 % injection 10 mL PRN   amLODIPine (NORVASC) tablet 5 mg Daily   aspirin EC tablet 81 mg QAM   atorvastatin (LIPITOR) tablet 20 mg Daily   B complex-vitamin C-folic acid (NEPHRO-LAWANDA) tablet 1 tablet Daily   bisacodyl (DULCOLAX) suppository 10 mg Daily PRN   calcium carbonate (TUMS) chewable tablet 1,000 mg Q6H PRN   carboxymethylcellulose 1 % ophthalmic solution 1 drop TID PRN   clopidogrel (PLAVIX) tablet 75 mg QPM   [START ON 9/12/2018] darbepoetin candice-polysorbate (ARANESP) injection 60 mcg Weekly   diclofenac sodium 1 % gel 2 g TID   diphenhydrAMINE (BENADRYL) tablet 25 mg Nightly PRN   fluticasone (FLONASE) 50 MCG/ACT nasal spray 2 spray BID   gabapentin (NEURONTIN) capsule 200 mg QPM   hydrALAZINE (APRESOLINE) tablet 25 mg BID   insulin glargine (LANTUS) injection vial 6 Units Nightly   lidocaine-prilocaine (EMLA) cream 1 Applicatorful Once per day on Mon Wed Fri   midodrine (PROAMATINE) tablet 5 mg Q8H PRN   polyethylene glycol (GLYCOLAX) packet 17 g Daily PRN   sennosides-docusate sodium (SENOKOT-S) 8.6-50 MG tablet 2 tablet Daily   sertraline (ZOLOFT) tablet 50 mg Daily   sevelamer (RENVELA) tablet 3,200 mg TID WC   sucralfate (CARAFATE) tablet 1 g TID AC   tetrahydrozoline 0.05 % ophthalmic solution 1 drop BID PRN   vitamin D (CHOLECALCIFEROL) tablet 1,000 Units Daily   glucose (GLUTOSE) 40 % oral gel 15 g PRN   dextrose 50 % solution 12.5 g PRN   glucagon (rDNA) injection 1 mg PRN   dextrose 5 % solution PRN   cefepime (MAXIPIME) 1 g IVPB minibag Q24H   vancomycin (VANCOCIN) intermittent dosing (placeholder) RX Placeholder   famotidine (PEPCID) tablet 20 mg Daily   fluticasone (FLOVENT HFA) 110 MCG/ACT inhaler 2 puff BID   heparin (porcine) injection 5,000 Units 3 times per day       Allergies:  Oxycontin [oxycodone]; Avodart [dutasteride]; and Darvocet [propoxyphene n-acetaminophen]    Social History:   Social History     Social History    Marital status:      Spouse name: N/A    Number of children: N/A    Years of education: N/A     Occupational History    Not on file. Social History Main Topics    Smoking status: Never Smoker    Smokeless tobacco: Never Used    Alcohol use No    Drug use: No    Sexual activity: No     Other Topics Concern    Not on file     Social History Narrative    No narrative on file       Family History:   Family History   Problem Relation Age of Onset    Stroke Mother     Heart Attack Mother     Heart Attack Father     Diabetes Sister     Diabetes Brother        Review of Systems:    Constitutional: Mild fever, no chills, no night sweats, complains of fatigue, generalized weakness, loss of appetite  HEENT:  No headache, otalgia, itchy eyes, epistaxis, nasal discharge or sore throat. Cardiac:  No chest pain, dyspnea, orthopnea or PND, palpitations  Chest:  Complains of Mild chronic cough, no hemoptysis, pleuritic chest pain, wheezing,SOB  Abdomen:  No abdominal pain, nausea, vomiting, diarrhea, melena, dysphagia hematemesis,constipation, abdominal bloating, flank pain  Neuro:  No CVA, TIA or seizure like activity. Skin:   No rashes, no itching. :   No hematuria, no pyuria, no dysuria, no flank pain. Extremities:  No swelling or joint pains.       Objective:  CURRENT TEMPERATURE:  Temp: 99 °F (37.2 °C)  MAXIMUM TEMPERATURE OVER 24HRS:  Temp (24hrs), Av.5 °F (36.9 °C), Min:97.2 °F (36.2 °C), Max:99.5 °F (37.5 °C)    CURRENT RESPIRATORY RATE:  Resp: 16  CURRENT PULSE:  Pulse: 66  CURRENT BLOOD PRESSURE:  BP: (!) 147/67  24HR BLOOD PRESSURE RANGE:  Systolic (58FTX), OEL:442 , Min:99 , Max:161   ;

## 2018-09-08 NOTE — PROGRESS NOTES
breathing well. No issues with urination, no dysuria. He sees a regular Nephrologist after he was referred a while ago from his PCP and has been on HD 3 times a week. He denies fever, chills, n/v/d     History is difficult to obtain and he is lethargic.      Patient is being regularly followed-up with ID for a history of Osteomyelitis of the Lumbar Spine  From EMR, patient had progress note from 8/28/2018: 6 month follow-up;  EGD pending at the time. He was on an oral Doxycycline for suppression. Review of Systems:     Review of Systems   Constitutional: Negative. Negative for chills, fever and weight loss. HENT: Negative. Eyes: Negative. Respiratory: Positive for wheezing (sometimes, chronic X2-3 months). Negative for cough (cough has resolved since yesterday). Cardiovascular: Negative for chest pain and palpitations. Gastrointestinal: Negative. Negative for heartburn, nausea and vomiting. Genitourinary: Negative. Negative for dysuria and urgency. Musculoskeletal: Positive for neck pain (chronic, ongoing). Skin: Negative. BLE skin discoloration, chronic     Neurological: Negative. Negative for headaches. Psychiatric/Behavioral: Negative. Medications: Allergies: Allergies   Allergen Reactions    Oxycontin [Oxycodone] Shortness Of Breath and Other (See Comments)     Patient had an overdose on Oxycontin before and does NOT want this ever again. He can take Oxycodone/acetaminophen as needed.     Avodart [Dutasteride]     Darvocet [Propoxyphene N-Acetaminophen]        Current Meds:   Scheduled Meds:    sodium chloride flush  10 mL Intravenous 2 times per day    amLODIPine  5 mg Oral Daily    aspirin  81 mg Oral QAM    atorvastatin  20 mg Oral Daily    B complex-vitamin C-folic acid  1 tablet Oral Daily    clopidogrel  75 mg Oral QPM    [START ON 9/12/2018] darbepoetin candice-polysorbate  60 mcg Subcutaneous Weekly    diclofenac sodium  2 g Topical TID    fluticasone  2 spray Nasal BID    gabapentin  200 mg Oral QPM    hydrALAZINE  25 mg Oral BID    insulin glargine  6 Units Subcutaneous Nightly    lidocaine-prilocaine  1 Applicatorful Topical Once per day on     sennosides-docusate sodium  2 tablet Oral Daily    sertraline  50 mg Oral Daily    sevelamer  3,200 mg Oral TID WC    sucralfate  1 g Oral TID AC    vitamin D  1,000 Units Oral Daily    cefepime  1 g Intravenous Q24H    vancomycin (VANCOCIN) intermittent dosing (placeholder)   Other RX Placeholder    famotidine  20 mg Oral Daily    fluticasone  2 puff Inhalation BID    heparin (porcine)  5,000 Units Subcutaneous 3 times per day     Continuous Infusions:    dextrose       PRN Meds: oxyCODONE-acetaminophen **AND** oxyCODONE, sodium chloride flush, bisacodyl, calcium carbonate, carboxymethylcellulose, diphenhydrAMINE, midodrine, polyethylene glycol, tetrahydrozoline, glucose, dextrose, glucagon (rDNA), dextrose    Data:     Past Medical History:   has a past medical history of Asthma; CAD (coronary artery disease); CKD (chronic kidney disease) stage 4, GFR 15-29 ml/min ([facility] [address] CVA (cerebral vascular accident) ([facility] [address] Depression; History of kidney stones; Hyperlipidemia; Hypertension; Osteoarthritis; Proteinuria; and Type II or unspecified type diabetes mellitus without mention of complication, not stated as uncontrolled. Social History:   reports that he has never smoked. He has never used smokeless tobacco. He reports that he does not drink alcohol or use drugs.      Family History:   Family History   Problem Relation Age of Onset    Stroke Mother     Heart Attack Mother     Heart Attack Father     Diabetes Sister     Diabetes Brother        Vitals:  BP (!) 143/59   Pulse 66   Temp 99 °F (37.2 °C) (Oral)   Resp 16   Ht 5' 11\" (1.803 m)   Wt 151 lb 10.8 oz (68.8 kg)   SpO2 100%   BMI 21.15 kg/m²   Temp (24hrs), Av.5 °F (36.9 °C), Min:97.2 °F (36.2 °C), Max:99.5 °F (37.5 °C)    Recent Labs      09/07/18   2217   POCGLU  116*       I/O (24Hr): Intake/Output Summary (Last 24 hours) at 09/08/18 0854  Last data filed at 09/07/18 1913   Gross per 24 hour   Intake                0 ml   Output             3200 ml   Net            -3200 ml       Labs:    [unfilled]    Lab Results   Component Value Date/Time    SPECIAL NOT REPORTED 09/07/2018 12:50 PM     Lab Results   Component Value Date/Time    CULTURE CULTURE IN PROGRESS 09/07/2018 12:50 PM       [unfilled]    Radiology:    Xr Chest Standard (2 Vw)    Result Date: 9/7/2018  EXAMINATION: TWO VIEWS OF THE CHEST 9/7/2018 12:18 pm COMPARISON: 06/30/2016 HISTORY: ORDERING SYSTEM PROVIDED HISTORY: Cough, fever TECHNOLOGIST PROVIDED HISTORY: Cough, fever Ordering Physician Provided Reason for Exam: Abnormal lab, pt states hx of cough, fever, no other chest complaints; Hx of asthma Acuity: Acute Type of Exam: Initial Additional signs and symptoms: Abnormal lab, pt states hx of cough, fever, no other chest complaints; Hx of asthma FINDINGS: Status post CABG. The lungs are without acute focal process. There is no effusion or pneumothorax. The cardiomediastinal silhouette is stable. The osseous structures are stable. No acute process. Physical Examination:        Physical Exam   Constitutional: He is oriented to person, place, and time and well-developed, well-nourished, and in no distress. No distress. HENT:   Head: Normocephalic and atraumatic. Eyes: Pupils are equal, round, and reactive to light. Conjunctivae are normal.   Neck: Normal range of motion. Neck supple. No neck stiffness     Cardiovascular: Normal rate, regular rhythm and normal heart sounds. Pulmonary/Chest: Effort normal. He has wheezes (bilateral lower lung fields, anterior/posterior). Abdominal: Soft. Bowel sounds are normal. He exhibits no distension. There is no tenderness. Musculoskeletal: Normal range of motion.    5/5 strength with bed ROM, hip ext/flexion, dorsi/plantarflexion; Neurological: He is alert and oriented to person, place, and time. Right sided tremor noted when patient undergoes hand extension;       Skin: Skin is warm. He is not diaphoretic. Psychiatric: Affect normal.   Vitals reviewed. Assessment:        Primary Problem  Altered mental status    Active Hospital Problems    Diagnosis Date Noted    Diabetes mellitus ([facility] [address] [E11.9] 12/29/2015    Altered mental status [R41.82] 06/09/2015    CKD (chronic kidney disease) stage 4, GFR 15-29 ml/min [N18.4]     Hypertension [I10]        Plan: Altered Mental status  -F/U daily CBC (WBC 8.7 --> 5.4   ;H/H 9.1/27.0 --> 9.4/27.4   )  -F/U BMP (K 5.2 --> 4.4    , BUN/Creat 65/7.47 --> 47/5.42 --> 34/5.03 [baseline])  -F/U Blood cultures - no growth 20 hours  -F/U Urine culture - in progress  -Urinalysis - few bacteria, WBC 0-2, RBC 0-2; LeukE Neg  -ESR 34 (unchanged from 8/27/2018)  -troponin's 0.41-->0.45 (higher than baseline), myoglobin 616  -lactic acid 0.9  -we will continue with Cefepime and Vanc pending culture results    Cough, AECOPD  -CXR no acute process  -will re-assess post HD for any changes  -appears to have resolved, continues to have wheezing  -incentive spirometry ordered     Chronic Renal Failure  -patient received HD 1 time thus far, [id number]; Right arm fistula(patent, palpable) - patient is still 1KG above EDW  -Nephrology consulted/following the patient    Orin Salazar MD  9/8/2018  8:54 AM     Attending Physician Statement  Patient seen and examined  I have discussed the care of the patient, including pertinent history and exam findings,  with the resident. I have reviewed the key elements of all parts of the encounter with the resident. I agree with the assessment, plan and orders as documented by the resident.     Worley Najjar Darr

## 2018-09-09 LAB
ABSOLUTE EOS #: 0 K/UL (ref 0–0.4)
ABSOLUTE IMMATURE GRANULOCYTE: ABNORMAL K/UL (ref 0–0.3)
ABSOLUTE LYMPH #: 0.6 K/UL (ref 1–4.8)
ABSOLUTE MONO #: 0.2 K/UL (ref 0.1–1.3)
ANION GAP SERPL CALCULATED.3IONS-SCNC: 17 MMOL/L (ref 9–17)
BASOPHILS # BLD: 0 % (ref 0–2)
BASOPHILS ABSOLUTE: 0 K/UL (ref 0–0.2)
BUN BLDV-MCNC: 61 MG/DL (ref 8–23)
BUN/CREAT BLD: ABNORMAL (ref 9–20)
CALCIUM SERPL-MCNC: 9 MG/DL (ref 8.6–10.4)
CHLORIDE BLD-SCNC: 90 MMOL/L (ref 98–107)
CO2: 22 MMOL/L (ref 20–31)
CREAT SERPL-MCNC: 6.99 MG/DL (ref 0.7–1.2)
DIFFERENTIAL TYPE: ABNORMAL
EOSINOPHILS RELATIVE PERCENT: 0 % (ref 0–4)
GFR AFRICAN AMERICAN: 9 ML/MIN
GFR NON-AFRICAN AMERICAN: 8 ML/MIN
GFR SERPL CREATININE-BSD FRML MDRD: ABNORMAL ML/MIN/{1.73_M2}
GFR SERPL CREATININE-BSD FRML MDRD: ABNORMAL ML/MIN/{1.73_M2}
GLUCOSE BLD-MCNC: 314 MG/DL (ref 75–110)
GLUCOSE BLD-MCNC: 340 MG/DL (ref 75–110)
GLUCOSE BLD-MCNC: 371 MG/DL (ref 75–110)
GLUCOSE BLD-MCNC: 412 MG/DL (ref 75–110)
GLUCOSE BLD-MCNC: 434 MG/DL (ref 75–110)
GLUCOSE BLD-MCNC: 482 MG/DL (ref 70–99)
GLUCOSE BLD-MCNC: 549 MG/DL (ref 75–110)
HCT VFR BLD CALC: 29.2 % (ref 41–53)
HEMOGLOBIN: 9.6 G/DL (ref 13.5–17.5)
IMMATURE GRANULOCYTES: ABNORMAL %
LYMPHOCYTES # BLD: 9 % (ref 24–44)
MCH RBC QN AUTO: 32.2 PG (ref 26–34)
MCHC RBC AUTO-ENTMCNC: 32.9 G/DL (ref 31–37)
MCV RBC AUTO: 97.8 FL (ref 80–100)
MONOCYTES # BLD: 4 % (ref 1–7)
NRBC AUTOMATED: ABNORMAL PER 100 WBC
PDW BLD-RTO: 14.2 % (ref 11.5–14.9)
PLATELET # BLD: 100 K/UL (ref 150–450)
PLATELET ESTIMATE: ABNORMAL
PMV BLD AUTO: 9.2 FL (ref 6–12)
POTASSIUM SERPL-SCNC: 5.3 MMOL/L (ref 3.7–5.3)
RBC # BLD: 2.99 M/UL (ref 4.5–5.9)
RBC # BLD: ABNORMAL 10*6/UL
SEG NEUTROPHILS: 87 % (ref 36–66)
SEGMENTED NEUTROPHILS ABSOLUTE COUNT: 5.3 K/UL (ref 1.3–9.1)
SODIUM BLD-SCNC: 129 MMOL/L (ref 135–144)
WBC # BLD: 6.1 K/UL (ref 3.5–11)
WBC # BLD: ABNORMAL 10*3/UL

## 2018-09-09 PROCEDURE — 80048 BASIC METABOLIC PNL TOTAL CA: CPT

## 2018-09-09 PROCEDURE — 36415 COLL VENOUS BLD VENIPUNCTURE: CPT

## 2018-09-09 PROCEDURE — 2580000003 HC RX 258: Performed by: INTERNAL MEDICINE

## 2018-09-09 PROCEDURE — 6360000002 HC RX W HCPCS: Performed by: STUDENT IN AN ORGANIZED HEALTH CARE EDUCATION/TRAINING PROGRAM

## 2018-09-09 PROCEDURE — 97530 THERAPEUTIC ACTIVITIES: CPT

## 2018-09-09 PROCEDURE — 82947 ASSAY GLUCOSE BLOOD QUANT: CPT

## 2018-09-09 PROCEDURE — 2060000000 HC ICU INTERMEDIATE R&B

## 2018-09-09 PROCEDURE — 97161 PT EVAL LOW COMPLEX 20 MIN: CPT

## 2018-09-09 PROCEDURE — 94640 AIRWAY INHALATION TREATMENT: CPT

## 2018-09-09 PROCEDURE — 6370000000 HC RX 637 (ALT 250 FOR IP): Performed by: STUDENT IN AN ORGANIZED HEALTH CARE EDUCATION/TRAINING PROGRAM

## 2018-09-09 PROCEDURE — 6370000000 HC RX 637 (ALT 250 FOR IP): Performed by: INTERNAL MEDICINE

## 2018-09-09 PROCEDURE — 6370000000 HC RX 637 (ALT 250 FOR IP): Performed by: FAMILY MEDICINE

## 2018-09-09 PROCEDURE — 99233 SBSQ HOSP IP/OBS HIGH 50: CPT | Performed by: INTERNAL MEDICINE

## 2018-09-09 PROCEDURE — 6360000002 HC RX W HCPCS: Performed by: FAMILY MEDICINE

## 2018-09-09 PROCEDURE — 85025 COMPLETE CBC W/AUTO DIFF WBC: CPT

## 2018-09-09 PROCEDURE — 94761 N-INVAS EAR/PLS OXIMETRY MLT: CPT

## 2018-09-09 RX ORDER — NICOTINE POLACRILEX 4 MG
15 LOZENGE BUCCAL PRN
Status: DISCONTINUED | OUTPATIENT
Start: 2018-09-09 | End: 2018-09-11 | Stop reason: HOSPADM

## 2018-09-09 RX ORDER — DEXTROSE MONOHYDRATE 50 MG/ML
100 INJECTION, SOLUTION INTRAVENOUS PRN
Status: DISCONTINUED | OUTPATIENT
Start: 2018-09-09 | End: 2018-09-11 | Stop reason: HOSPADM

## 2018-09-09 RX ORDER — DEXTROSE MONOHYDRATE 25 G/50ML
12.5 INJECTION, SOLUTION INTRAVENOUS PRN
Status: DISCONTINUED | OUTPATIENT
Start: 2018-09-09 | End: 2018-09-11 | Stop reason: HOSPADM

## 2018-09-09 RX ORDER — LEVOFLOXACIN 250 MG/1
250 TABLET ORAL DAILY
Status: DISCONTINUED | OUTPATIENT
Start: 2018-09-09 | End: 2018-09-11 | Stop reason: HOSPADM

## 2018-09-09 RX ADMIN — METHYLPREDNISOLONE SODIUM SUCCINATE 30 MG: 40 INJECTION, POWDER, LYOPHILIZED, FOR SOLUTION INTRAMUSCULAR; INTRAVENOUS at 06:25

## 2018-09-09 RX ADMIN — HYDRALAZINE HYDROCHLORIDE 25 MG: 25 TABLET, FILM COATED ORAL at 08:59

## 2018-09-09 RX ADMIN — INSULIN LISPRO 12 UNITS: 100 INJECTION, SOLUTION INTRAVENOUS; SUBCUTANEOUS at 18:21

## 2018-09-09 RX ADMIN — ASPIRIN 81 MG: 81 TABLET, COATED ORAL at 08:59

## 2018-09-09 RX ADMIN — DOCUSATE SODIUM AND SENNOSIDES 2 TABLET: 8.6; 5 TABLET, FILM COATED ORAL at 08:59

## 2018-09-09 RX ADMIN — CLOPIDOGREL BISULFATE 75 MG: 75 TABLET ORAL at 18:24

## 2018-09-09 RX ADMIN — HEPARIN SODIUM 5000 UNITS: 5000 INJECTION, SOLUTION INTRAVENOUS; SUBCUTANEOUS at 21:06

## 2018-09-09 RX ADMIN — ATORVASTATIN CALCIUM 20 MG: 20 TABLET, FILM COATED ORAL at 08:59

## 2018-09-09 RX ADMIN — SEVELAMER CARBONATE 3200 MG: 800 TABLET, FILM COATED ORAL at 18:24

## 2018-09-09 RX ADMIN — METHYLPREDNISOLONE SODIUM SUCCINATE 30 MG: 40 INJECTION, POWDER, LYOPHILIZED, FOR SOLUTION INTRAMUSCULAR; INTRAVENOUS at 18:27

## 2018-09-09 RX ADMIN — INSULIN LISPRO 9 UNITS: 100 INJECTION, SOLUTION INTRAVENOUS; SUBCUTANEOUS at 01:24

## 2018-09-09 RX ADMIN — SERTRALINE HYDROCHLORIDE 50 MG: 50 TABLET ORAL at 08:59

## 2018-09-09 RX ADMIN — Medication 1 TABLET: at 09:00

## 2018-09-09 RX ADMIN — METHYLPREDNISOLONE SODIUM SUCCINATE 30 MG: 40 INJECTION, POWDER, LYOPHILIZED, FOR SOLUTION INTRAMUSCULAR; INTRAVENOUS at 13:27

## 2018-09-09 RX ADMIN — INSULIN LISPRO 7 UNITS: 100 INJECTION, SOLUTION INTRAVENOUS; SUBCUTANEOUS at 21:06

## 2018-09-09 RX ADMIN — HEPARIN SODIUM 5000 UNITS: 5000 INJECTION, SOLUTION INTRAVENOUS; SUBCUTANEOUS at 06:24

## 2018-09-09 RX ADMIN — FLUTICASONE PROPIONATE 2 PUFF: 110 AEROSOL, METERED RESPIRATORY (INHALATION) at 09:01

## 2018-09-09 RX ADMIN — SEVELAMER CARBONATE 3200 MG: 800 TABLET, FILM COATED ORAL at 08:59

## 2018-09-09 RX ADMIN — INSULIN LISPRO 18 UNITS: 100 INJECTION, SOLUTION INTRAVENOUS; SUBCUTANEOUS at 06:24

## 2018-09-09 RX ADMIN — FAMOTIDINE 20 MG: 20 TABLET, FILM COATED ORAL at 09:00

## 2018-09-09 RX ADMIN — SUCRALFATE 1 G: 1 TABLET ORAL at 06:25

## 2018-09-09 RX ADMIN — Medication 10 ML: at 21:18

## 2018-09-09 RX ADMIN — FLUTICASONE PROPIONATE 2 SPRAY: 50 SPRAY, METERED NASAL at 09:01

## 2018-09-09 RX ADMIN — OXYCODONE HYDROCHLORIDE 2.5 MG: 5 TABLET ORAL at 13:29

## 2018-09-09 RX ADMIN — Medication 10 ML: at 23:43

## 2018-09-09 RX ADMIN — GABAPENTIN 200 MG: 100 CAPSULE ORAL at 18:24

## 2018-09-09 RX ADMIN — HEPARIN SODIUM 5000 UNITS: 5000 INJECTION, SOLUTION INTRAVENOUS; SUBCUTANEOUS at 13:29

## 2018-09-09 RX ADMIN — METHYLPREDNISOLONE SODIUM SUCCINATE 30 MG: 40 INJECTION, POWDER, LYOPHILIZED, FOR SOLUTION INTRAMUSCULAR; INTRAVENOUS at 01:25

## 2018-09-09 RX ADMIN — SUCRALFATE 1 G: 1 TABLET ORAL at 13:26

## 2018-09-09 RX ADMIN — Medication 10 ML: at 09:00

## 2018-09-09 RX ADMIN — FLUTICASONE PROPIONATE 2 PUFF: 110 AEROSOL, METERED RESPIRATORY (INHALATION) at 21:07

## 2018-09-09 RX ADMIN — DICLOFENAC 2 G: 10 GEL TOPICAL at 21:07

## 2018-09-09 RX ADMIN — FLUTICASONE PROPIONATE 2 SPRAY: 50 SPRAY, METERED NASAL at 21:07

## 2018-09-09 RX ADMIN — DICLOFENAC 2 G: 10 GEL TOPICAL at 14:00

## 2018-09-09 RX ADMIN — AMLODIPINE BESYLATE 5 MG: 5 TABLET ORAL at 09:00

## 2018-09-09 RX ADMIN — METHYLPREDNISOLONE SODIUM SUCCINATE 30 MG: 40 INJECTION, POWDER, LYOPHILIZED, FOR SOLUTION INTRAMUSCULAR; INTRAVENOUS at 23:43

## 2018-09-09 RX ADMIN — CARBOXYMETHYLCELLULOSE SODIUM 1 DROP: 10 GEL OPHTHALMIC at 13:42

## 2018-09-09 RX ADMIN — IPRATROPIUM BROMIDE AND ALBUTEROL SULFATE 1 AMPULE: .5; 3 SOLUTION RESPIRATORY (INHALATION) at 06:57

## 2018-09-09 RX ADMIN — INSULIN GLARGINE 6 UNITS: 100 INJECTION, SOLUTION SUBCUTANEOUS at 21:06

## 2018-09-09 RX ADMIN — LEVOFLOXACIN 250 MG: 250 TABLET, FILM COATED ORAL at 13:26

## 2018-09-09 RX ADMIN — IPRATROPIUM BROMIDE AND ALBUTEROL SULFATE 1 AMPULE: .5; 3 SOLUTION RESPIRATORY (INHALATION) at 20:51

## 2018-09-09 RX ADMIN — VITAMIN D, TAB 1000IU (100/BT) 1000 UNITS: 25 TAB at 08:59

## 2018-09-09 RX ADMIN — HYDRALAZINE HYDROCHLORIDE 25 MG: 25 TABLET, FILM COATED ORAL at 21:06

## 2018-09-09 RX ADMIN — SUCRALFATE 1 G: 1 TABLET ORAL at 18:24

## 2018-09-09 RX ADMIN — IPRATROPIUM BROMIDE AND ALBUTEROL SULFATE 1 AMPULE: .5; 3 SOLUTION RESPIRATORY (INHALATION) at 16:28

## 2018-09-09 RX ADMIN — SEVELAMER CARBONATE 3200 MG: 800 TABLET, FILM COATED ORAL at 13:26

## 2018-09-09 ASSESSMENT — ENCOUNTER SYMPTOMS
NAUSEA: 0
GASTROINTESTINAL NEGATIVE: 1
COUGH: 0
EYES NEGATIVE: 1
HEARTBURN: 0
VOMITING: 0
WHEEZING: 1

## 2018-09-09 ASSESSMENT — PAIN SCALES - GENERAL
PAINLEVEL_OUTOF10: 0
PAINLEVEL_OUTOF10: 0
PAINLEVEL_OUTOF10: 4

## 2018-09-09 NOTE — PROGRESS NOTES
been on HD 3 times a week. He denies fever, chills, n/v/d     History is difficult to obtain and he is lethargic.      Patient is being regularly followed-up with ID for a history of Osteomyelitis of the Lumbar Spine  From EMR, patient had progress note from 8/28/2018: 6 month follow-up;  EGD pending at the time. He was on an oral Doxycycline for suppression. Review of Systems:     Review of Systems   Constitutional: Negative. Negative for chills, fever and weight loss. HENT: Negative. Eyes: Negative. Respiratory: Positive for wheezing (sometimes, chronic X2-3 months). Negative for cough (Cocci continues to improve). Cardiovascular: Negative for chest pain and palpitations. Gastrointestinal: Negative. Negative for heartburn, nausea and vomiting. Genitourinary: Negative. Negative for dysuria and urgency. Musculoskeletal: Positive for neck pain (chronic, ongoing, unchanged). Skin: Negative. BLE skin discoloration, chronic     Neurological: Negative. Negative for headaches. Psychiatric/Behavioral: Negative. Medications: Allergies: Allergies   Allergen Reactions    Oxycontin [Oxycodone] Shortness Of Breath and Other (See Comments)     Patient had an overdose on Oxycontin before and does NOT want this ever again. He can take Oxycodone/acetaminophen as needed.     Avodart [Dutasteride]    Orlene Gala Darvocet [Propoxyphene N-Acetaminophen]        Current Meds:   Scheduled Meds:    insulin lispro  0-18 Units Subcutaneous TID WC    insulin lispro  0-9 Units Subcutaneous Nightly    methylPREDNISolone  30 mg Intravenous Q6H    levofloxacin  250 mg Intravenous Q24H    ipratropium-albuterol  1 ampule Inhalation Q4H WA    sodium chloride flush  10 mL Intravenous 2 times per day    amLODIPine  5 mg Oral Daily    aspirin  81 mg Oral QAM    atorvastatin  20 mg Oral Daily    B complex-vitamin C-folic acid  1 tablet Oral Daily    clopidogrel  75 mg Oral QPM    [START ON 2018] darbepoetin candice-polysorbate  60 mcg Subcutaneous Weekly    diclofenac sodium  2 g Topical TID    fluticasone  2 spray Nasal BID    gabapentin  200 mg Oral QPM    hydrALAZINE  25 mg Oral BID    insulin glargine  6 Units Subcutaneous Nightly    lidocaine-prilocaine  1 Applicatorful Topical Once per day on     sennosides-docusate sodium  2 tablet Oral Daily    sertraline  50 mg Oral Daily    sevelamer  3,200 mg Oral TID WC    sucralfate  1 g Oral TID AC    vitamin D  1,000 Units Oral Daily    famotidine  20 mg Oral Daily    fluticasone  2 puff Inhalation BID    heparin (porcine)  5,000 Units Subcutaneous 3 times per day     Continuous Infusions:    dextrose       PRN Meds: glucose, dextrose, glucagon (rDNA), dextrose, oxyCODONE-acetaminophen **AND** oxyCODONE, sodium chloride flush, bisacodyl, calcium carbonate, carboxymethylcellulose, diphenhydrAMINE, midodrine, polyethylene glycol, tetrahydrozoline    Data:     Past Medical History:   has a past medical history of Asthma; CAD (coronary artery disease); CKD (chronic kidney disease) stage 4, GFR 15-29 ml/min ([facility] CVA (cerebral vascular accident) ([facility] Depression; History of kidney stones; Hyperlipidemia; Hypertension; Osteoarthritis; Proteinuria; and Type II or unspecified type diabetes mellitus without mention of complication, not stated as uncontrolled. Social History:   reports that he has never smoked. He has never used smokeless tobacco. He reports that he does not drink alcohol or use drugs.      Family History:   Family History   Problem Relation Age of Onset    Stroke Mother     Heart Attack Mother     Heart Attack Father     Diabetes Sister     Diabetes Brother        Vitals:  BP (!) 140/75   Pulse 94   Temp 98.4 °F (36.9 °C) (Oral)   Resp 17   Ht 5' 11\" (1.803 m)   Wt 167 lb 12.3 oz (76.1 kg)   SpO2 99%   BMI 23.40 kg/m²   Temp (24hrs), Av.5 °F (36.9 °C), Min:98.4 °F (36.9 °C), Max:98.7 °F (37.1 °C)    Recent Labs      09/08/18   2124  09/09/18   0054  09/09/18   0618  09/09/18   0931   POCGLU  518*  549*  412*  340*       I/O (24Hr): Intake/Output Summary (Last 24 hours) at 09/09/18 0956  Last data filed at 09/09/18 2944   Gross per 24 hour   Intake              720 ml   Output                0 ml   Net              720 ml       Labs:    [unfilled]    Lab Results   Component Value Date/Time    SPECIAL NOT REPORTED 09/07/2018 12:50 PM     Lab Results   Component Value Date/Time    CULTURE (A) 09/07/2018 12:50 PM     STREPTOCOCCI, BETA HEMOLYTIC GROUP B 10 to 50,000 CFU/ML       [facility]    Radiology:    Xr Chest Standard (2 Vw)    Result Date: 9/7/2018  EXAMINATION: TWO VIEWS OF THE CHEST 9/7/2018 12:18 pm COMPARISON: 06/30/2016 HISTORY: ORDERING SYSTEM PROVIDED HISTORY: Cough, fever TECHNOLOGIST PROVIDED HISTORY: Cough, fever Ordering Physician Provided Reason for Exam: Abnormal lab, pt states hx of cough, fever, no other chest complaints; Hx of asthma Acuity: Acute Type of Exam: Initial Additional signs and symptoms: Abnormal lab, pt states hx of cough, fever, no other chest complaints; Hx of asthma FINDINGS: Status post CABG. The lungs are without acute focal process. There is no effusion or pneumothorax. The cardiomediastinal silhouette is stable. The osseous structures are stable. No acute process. Physical Examination:        Physical Exam   Constitutional: He is oriented to person, place, and time and well-developed, well-nourished, and in no distress. No distress. HENT:   Head: Normocephalic and atraumatic. Eyes: Pupils are equal, round, and reactive to light. Conjunctivae are normal.   Neck: Normal range of motion. Neck supple. No neck stiffness     Cardiovascular: Normal rate, regular rhythm and normal heart sounds. Pulmonary/Chest: Effort normal. He has wheezes (bilateral lower lung fields, anterior/posterior, unchanged since yesterday). Abdominal: Soft.  Bowel sounds are normal. He exhibits no distension. There is no tenderness. Musculoskeletal: Normal range of motion. 5/5 strength with bed ROM, hip ext/flexion, dorsi/plantarflexion; Neurological: He is alert and oriented to person, place, and time. Right sided tremor noted when patient undergoes hand extension;       Skin: Skin is warm. He is not diaphoretic. Psychiatric: Affect normal.   Vitals reviewed. Assessment:        Primary Problem  Altered mental status    Active Hospital Problems    Diagnosis Date Noted    Acute exacerbation of chronic obstructive airways disease ([facility] [J44.1] 09/08/2018    Diabetes mellitus ([facility] [E11.9] 12/29/2015    Altered mental status [R41.82] 06/09/2015    CKD (chronic kidney disease) stage 4, GFR 15-29 ml/min [N18.4]     Hypertension [I10]        Plan: Altered Mental status  -F/U daily CBC (WBC 6.1; H/H 9.6/29.2)  -F/U BMP (K 5.2    , BUN/Creat 34/5.03 --> 61/6.99 today)  -F/U Blood cultures - no growth 2 days  -F/U Urine culture - STREPTOCOCCI, BETA HEMOLYTIC GROUP B 10 to 50,000 CFU/ML  -Urinalysis - few bacteria, WBC 0-2, RBC 0-2; LeukE Neg  -ESR 34 (unchanged from 8/27/2018)  -we will continue with Levaquin PO instead of IV    Diabetes  -glucose 518 2124 last night, after patient had baked goods  -placed on high dose ISS for now. -we will continue to monitor    Cough, AECOPD  -CXR no acute process  -appears to have resolved, continues to have wheezing  -incentive spirometry  -OOB with PT     Chronic Renal Failure  -Nephrology consulted and is following the patient    Chandrakant Méndez MD  9/9/2018  9:56 AM   Attending Physician Statement  Patient seen and examined  I have discussed the care of the patient, including pertinent history and exam findings,  with the resident. I have reviewed the key elements of all parts of the encounter with the resident. I agree with the assessment, plan and orders as documented by the resident.     Gustabo Nichols

## 2018-09-09 NOTE — PROGRESS NOTES
NEPHROLOGY progress note      Patient :  Breonna Gonzalez; 75 y.o. MRN# [id number]  Location:  2119/2119-01  Attending:  Cathy Senior MD  Admit Date:  9/7/2018   Hospital Day: 1      Reason for Consult: ESRD requiring dialysis      Chief Complaint: Fever and confusion  History Obtained From:  Patient    History of Present Illness: This is a 75 y.o. male  with HTN, DM , CAD s/p CABG , end stage renal disease 2nd to diabetic kidney disease on hemodialysis MWF at [facility] using a rigth radiocephalic AVF, not on active transplant list.  Patient presented to the hospital complains of fever, and change in mental status lethargic. She was admitted for septic workup. Patient regular dialysis schedule is Monday Wednesday Friday patient did not go to his regular outpatient dialysis and was admitted to the hospital and was dialyzed inpatient on Friday. Patient has a AV fistula for dialysis. No erythema or tenderness over the fistula  Blood cultures no growth so far since 9/7/2018. UA unremarkable for UTI no WBC no leukocyte esterase negative nitrite but Urine culture is positive for streptococcus group B  Chest x-ray unremarkable. He does mention about cough and congestion. Subjective/interval history    Patient is feeling good sitting comfortably, no fever noted. Blood cultures remains negative.   Blood pressure stable    Past Medical History:        Diagnosis Date    Asthma     CAD (coronary artery disease)     CKD (chronic kidney disease) stage 4, GFR 15-29 ml/min [facility])     [facility]A [facility]cere[facility] vascular accident) ([facility]     Depression     History of kidney stones     Hyperlipidemia     Hypertension     Osteoarthritis     Proteinuria     Type II or unspecified type diabetes mellitus without mention of complication, not stated as uncontrolled        Past Surgical History:        Procedure Laterality Date    BONE BIOPSY  6/29/15    L2-3    CARPAL TUNNEL RELEASE      CORONARY ARTERY BYPASS GRAFT      HIP sennosides-docusate sodium (SENOKOT-S) 8.6-50 MG tablet 2 tablet Daily   sertraline (ZOLOFT) tablet 50 mg Daily   sevelamer (RENVELA) tablet 3,200 mg TID WC   sucralfate (CARAFATE) tablet 1 g TID AC   tetrahydrozoline 0.05 % ophthalmic solution 1 drop BID PRN   vitamin D (CHOLECALCIFEROL) tablet 1,000 Units Daily   famotidine (PEPCID) tablet 20 mg Daily   fluticasone (FLOVENT HFA) 110 MCG/ACT inhaler 2 puff BID   heparin (porcine) injection 5,000 Units 3 times per day       Allergies:  Oxycontin [oxycodone]; Avodart [dutasteride]; and Darvocet [propoxyphene n-acetaminophen]    Social History:   Social History     Social History    Marital status:      Spouse name: N/A    Number of children: N/A    Years of education: N/A     Occupational History    Not on file. Social History Main Topics    Smoking status: Never Smoker    Smokeless tobacco: Never Used    Alcohol use No    Drug use: No    Sexual activity: No     Other Topics Concern    Not on file     Social History Narrative    No narrative on file       Family History:   Family History   Problem Relation Age of Onset    Stroke Mother     Heart Attack Mother     Heart Attack Father     Diabetes Sister     Diabetes Brother        Review of Systems:    Constitutional: Mild fever, no chills, no night sweats, complains of fatigue, generalized weakness, loss of appetite  HEENT:  No headache, otalgia, itchy eyes, epistaxis, nasal discharge or sore throat. Cardiac:  No chest pain, dyspnea, orthopnea or PND, palpitations  Chest:  Complains of Mild chronic cough, no hemoptysis, pleuritic chest pain, wheezing,SOB  Abdomen:  No abdominal pain, nausea, vomiting, diarrhea, melena, dysphagia hematemesis,constipation, abdominal bloating, flank pain  Neuro:  No CVA, TIA or seizure like activity. Skin:   No rashes, no itching. :   No hematuria, no pyuria, no dysuria, no flank pain. Extremities:  No swelling or joint pains.       Objective:  CURRENT TEMPERATURE:  Temp: 98.4 °F (36.9 °C)  MAXIMUM TEMPERATURE OVER 24HRS:  Temp (24hrs), Av.4 °F (36.9 °C), Min:98.4 °F (36.9 °C), Max:98.4 °F (36.9 °C)    CURRENT RESPIRATORY RATE:  Resp: 17  CURRENT PULSE:  Pulse: 94  CURRENT BLOOD PRESSURE:  BP: (!) 140/75  24HR BLOOD PRESSURE RANGE:  Systolic (64JSD), XFU:784 , Min:140 , TMD:877   ; Diastolic (60IMX), OYB:15, Min:66, Max:75    24HR INTAKE/OUTPUT:      Intake/Output Summary (Last 24 hours) at 18 1234  Last data filed at 18 7480   Gross per 24 hour   Intake              720 ml   Output                0 ml   Net              720 ml     Patient Vitals for the past 96 hrs (Last 3 readings):   Weight   18 0630 167 lb 12.3 oz (76.1 kg)   18 1913 151 lb 10.8 oz (68.8 kg)   18 1520 158 lb 4.6 oz (71.8 kg)       Physical Exam:  GENERAL APPEARANCE: Alert and cooperative, and appears to be in no acute distress. HEAD: normocephalic  EYES: PERRL, EOMI. Not pale, anicteric   NOSE:  No nasal discharge. THROAT:  Oral cavity and pharynx normal. Moist  CARDIAC: Normal S1 and S2. No S3, S4 or murmurs. Rhythm is regular. LUNGS: Clear to auscultation and percussion without rales, rhonchi, wheezing or diminished breath sounds. NECK: Neck supple, non-tender without lymphadenopathy, masses or thyromegaly. BACK: Examination of the spine reveals normal gait and posture, no spinal deformity, symmetry of spinal muscles, without tenderness, decreased range of motion or muscular spasm  MUSKULOSKELETAL: Adequately aligned spine. No joint erythema or tenderness. EXTREMITIES: No edema. Peripheral pulses intact.    NEURO: Nonfocal  AV fistula positive bruit and thrill no erythema or tenderness    Labs:   CBC:  Recent Labs      18   0910  18   0500  18   0507   WBC  8.7  5.4  6.1   RBC  2.63*  2.81*  2.99*   HGB  9.1*  9.4*  9.6*   HCT  27.0*  27.4*  29.2*   MCV  102.4*  97.4  97.8   MCH  34.4*  33.4  32.2   MCHC  33.6  34.3  32.9   RDW 15.5*  14.8  14.2   PLT  109*  101*  100*   MPV  9.6  9.2  9.2      BMP:   Recent Labs      09/07/18   0910  09/08/18   0500  09/09/18   0507   NA  139  134*  129*   K  5.2  4.4  5.3   CL  100  97*  90*   CO2  24  26  22   BUN  65*  34*  61*   CREATININE  7.47*  5.03*  6.99*   GLUCOSE  125*  121*  482*   CALCIUM  8.6  8.8  9.0      Phosphorus:  No results for input(s): PHOS in the last 72 hours. Magnesium: No results for input(s): MG in the last 72 hours. Albumin: No results for input(s): LABALBU in the last 72 hours. Urinalysis:    Recent Labs      09/07/18   1250   NITRU  NEGATIVE   COLORU  YELLOW   PHUR  7.5   WBCUA  0 TO 2   RBCUA  0 TO 2   MUCUS  NOT REPORTED   TRICHOMONAS  NOT REPORTED   YEAST  NOT REPORTED   BACTERIA  FEW*   SPECGRAV  1.016   LEUKOCYTESUR  NEGATIVE   UROBILINOGEN  Normal   [address]  1+*   [address]  1+*         Radiology:  Reviewed as available. Assessment:  Patient admitted for fever and altered mental status    #1 ESRD secondary to diabetic nephropathy dialysis dependent dialysis days Monday Wednesday Friday dialysis access AV fistula. #2 Fever and altered mental status-septic workup in progress on antibiotics managed by primary team , blood cultures no growth so far  #3 type 2 diabetes IDDM  #4 essential hypertension  #5 anemia of chronic kidney disease  #6 secondary hyperparathyroidism due to renal failure  # #7 UTI, urine culture positive for streptococci beta-hemolytic group B 10-50,000 patient is on antibiotics    Plan:   Continue hemodialysis 3 times per week Monday Wednesday Friday, next dialysis Monday  No objections on discharge from nephrology standpoint if cleared by medical treatment    Thank you for the consultation.       Electronically signed by Trinidad Russo MD on 9/9/2018 at 12:34 PM

## 2018-09-09 NOTE — PROGRESS NOTES
to 100 degrees and DF to neutral  AROM LLE (degrees)  LLE AROM : WFL  LLE General AROM: knee flexion to 90 degrees and DF to neutral  Strength RLE  R Hip Flexion: 3+/5  R Knee Flexion: 4/5  R Knee Extension: 3+/5  R Ankle Dorsiflexion: 4/5  Strength LLE  L Hip Flexion: 3+/5  L Knee Flexion: 4-/5  L Knee Extension: 3+/5  L Ankle Dorsiflexion: 4-/5        Bed mobility  Supine to Sit: Minimal assistance (head of bed slightly elevated)  Comment: pt up in chair at end of session  Transfers  Sit to Stand: Minimal Assistance;Contact guard assistance (bed slightly elevated)  Stand to sit: Contact guard assistance  Bed to Chair: Contact guard assistance (with RW)  Comment: fiert stand pt required min assist but second time only CGA  Ambulation  Ambulation?: Yes  Ambulation 1  Device: Rolling Walker  Assistance: Contact guard assistance  Quality of Gait: marked forwardly flexed trunk; small steps  Distance: 15ft     Balance  Sitting - Static: Good  Sitting - Dynamic: Good  Standing - Static: Good;Fair;+ (SBA with RW and CGA with no device)        Assessment   Body structures, Functions, Activity limitations: Decreased functional mobility ; Decreased ROM; Decreased strength;Decreased endurance  Assessment: Impaired mobilkity due to decreased endurance and strength  Decision Making: Low Complexity  History: CVA  Exam: decreased gait, strength  Clinical Presentation: stable  REQUIRES PT FOLLOW UP: Yes         Plan   Plan  Times per week: 5-6x/wk  Times per day: Daily  Current Treatment Recommendations: Strengthening, Balance Training, Functional Mobility Training, Gait Training, Endurance Training  Safety Devices  Type of devices: Left in chair, Call light within reach, Patient at risk for falls (son present)         Goals  Short term goals  Time Frame for Short term goals: 3-4 sessions  Short term goal 1: bed mobility with min assist into bed  Short term goal 2: transfers SBA fro elevated surface and min assist from lower

## 2018-09-09 NOTE — DISCHARGE SUMMARY
[address]    Discharge Summary     Patient ID: German Guidry  :  1945   MRN: [id number]     ACCOUNT:  [de-identified]   Patient's PCP: Carla Matta MD  Admit Date: 2018   Discharge Date: 2018   Length of Stay: 3  Code Status:  Prior  Admitting Physician: Aury Jenkins MD  Discharge Physician: Mayito Guajardo MD     Active Discharge Diagnoses:       Primary Problem  Altered mental status      [place] Problems    Diagnosis Date Noted    Hyponatremia [E87.1] 09/10/2018    Acute exacerbation of chronic obstructive airways disease ([facility] [J44.1] 2018    Diabetes mellitus ([facility] [E11.9] 2015    Hypertension [I10]        Admission Condition:  Poor     Discharged Condition: Stable    Hospital Stay:     Hospital Course: German Guidry is a 75 y.o. male who was admitted for the management of Altered mental status , presented to ER with Fever and Other (abnormal labs)    CXR was obtained - no acute process. Urinalysis was obtained and Cultures indicated Strep B Beta Hemolytic Group B. Blood cultures were obtained and negative. He was started on Abx- Vancomycin and Cefepime originally. Nephrology was on board and he received HD on admission (3200L)  We provided him with breathing treatments as well. His SOB and cough improved. On  he had hyperglycemia and was placed on an ISS High Dose Corrective. On  he was switched to Levaquin IV and then PO on ;  On  there were no acute events, patient's cough was improving. On 9/10, he received HD, and had an episode of aspiration on a grape. He was able to clear the contents, although this episode was unwitnessed. He was placed NPO as a precaution, with aspiration precautions. He is to resume all of his home medications on discharge. Today on day of discharge pt feels better with no further complaints.  Vitals and Labs are at pts baseline. All consultants involved during this admission are agreeable to d/c with OP follow up. ToprolXL and Spiriva were prescribed. Outpatient ECHO was ordered. We strongly recommend to K[facility] Gillianan prior to dialysis.       Significant therapeutic interventions: Antibiotics    Significant Diagnostic Studies:   Labs / Micro:  CBC:   Lab Results   Component Value Date    WBC 7.7 09/11/2018    RBC 2.96 09/11/2018    HGB 9.7 09/11/2018    HCT 28.8 09/11/2018    MCV 97.2 09/11/2018    MCH 32.8 09/11/2018    MCHC 33.8 09/11/2018    RDW 14.6 09/11/2018     09/11/2018     BMP:    Lab Results   Component Value Date    GLUCOSE 235 09/11/2018     09/11/2018    K 4.5 09/11/2018    CL 95 09/11/2018    CO2 27 09/11/2018    ANIONGAP 14 09/11/2018    BUN 47 09/11/2018    CREATININE 5.68 09/11/2018    BUNCRER NOT REPORTED 09/11/2018    CALCIUM 8.6 09/11/2018    LABGLOM 10 09/11/2018    GFRAA 12 09/11/2018    GFR      09/11/2018    GFR NOT REPORTED 09/11/2018     HFP:    Lab Results   Component Value Date    PROT 5.1 07/14/2017    PROT 5.7 11/27/2012     CMP:    Lab Results   Component Value Date    GLUCOSE 235 09/11/2018     09/11/2018    K 4.5 09/11/2018    CL 95 09/11/2018    CO2 27 09/11/2018    BUN 47 09/11/2018    CREATININE 5.68 09/11/2018    ANIONGAP 14 09/11/2018    ALKPHOS 155 07/14/2017    ALT 35 07/14/2017    AST 29 07/14/2017    BILITOT 0.26 07/14/2017    LABALBU 2.8 07/14/2017    ALBUMIN NOT REPORTED 07/14/2017    LABGLOM 10 09/11/2018    GFRAA 12 09/11/2018    GFR      09/11/2018    GFR NOT REPORTED 09/11/2018    PROT 5.1 07/14/2017    PROT 5.7 11/27/2012    CALCIUM 8.6 09/11/2018     PT/INR:  No results found for: PTINR  PTT:   Lab Results   Component Value Date    APTT 37.8 11/30/2015     FLP:    Lab Results   Component Value Date    CHOL 71 06/05/2018    TRIG 123 06/05/2018    HDL 33 06/05/2018     U/A:    Lab Results   Component Value Date    COLORU YELLOW 09/07/2018    TURBIDITY CLEAR 09/07/2018    SPECGRAV 1.016 09/07/2018    HGBUR NEGATIVE 09/07/2018    PHUR 7.5 09/07/2018    PROTEINU 4+ 09/07/2018    GLUCOSEU 1+ 09/07/2018    KETUA NEGATIVE 09/07/2018    BILIRUBINUR NEGATIVE 09/07/2018    UROBILINOGEN Normal 09/07/2018    NITRU NEGATIVE 09/07/2018    LEUKOCYTESUR NEGATIVE 09/07/2018     TSH:    Lab Results   Component Value Date    TSH 1.47 05/18/2018     Blood culture: negative   Urine culture: positive - STREPTOCOCCI, BETA HEMOLYTIC GROUP B 10 to 50,000 CFU/ML      Radiology:  Xr Chest Standard (2 Vw)    Result Date: 9/7/2018  EXAMINATION: TWO VIEWS OF THE CHEST 9/7/2018 12:18 pm COMPARISON: 06/30/2016 HISTORY: ORDERING SYSTEM PROVIDED HISTORY: Cough, fever TECHNOLOGIST PROVIDED HISTORY: Cough, fever Ordering Physician Provided Reason for Exam: Abnormal lab, pt states hx of cough, fever, no other chest complaints; Hx of asthma Acuity: Acute Type of Exam: Initial Additional signs and symptoms: Abnormal lab, pt states hx of cough, fever, no other chest complaints; Hx of asthma FINDINGS: Status post CABG. The lungs are without acute focal process. There is no effusion or pneumothorax. The cardiomediastinal silhouette is stable. The osseous structures are stable. No acute process. Consultations:    Consults:     Final Specialist Recommendations/Findings:   IP CONSULT TO NEPHROLOGY  IP CONSULT TO PRIMARY CARE PROVIDER  IP CONSULT TO SOCIAL WORK  PHARMACY TO DOSE VANCOMYCIN      The patient was seen and examined on day of discharge and this discharge summary is in conjunction with any daily progress note from day of discharge. Discharge plan:       Disposition: Nursing Home    Physician Follow Up:   Erica Rivas MD  [address] [facility]  [address] [facility] [address] [phone]    Call  PCP follow-up    Sera Lawrence MD  [address] [facility] [address] [facility]  [address] [facility] [address]  [phone]    Call  Infectious Disease follow-up    Ritchie Rahman MD  [address]  [phone]    In 2

## 2018-09-10 PROBLEM — G93.49 UREMIC ENCEPHALOPATHY: Status: ACTIVE | Noted: 2018-09-10

## 2018-09-10 PROBLEM — E87.1 HYPONATREMIA: Status: ACTIVE | Noted: 2018-09-10

## 2018-09-10 PROBLEM — G93.49 UREMIC ENCEPHALOPATHY: Status: RESOLVED | Noted: 2018-09-10 | Resolved: 2018-09-10

## 2018-09-10 PROBLEM — N19 UREMIC ENCEPHALOPATHY: Status: ACTIVE | Noted: 2018-09-10

## 2018-09-10 PROBLEM — N19 UREMIC ENCEPHALOPATHY: Status: RESOLVED | Noted: 2018-09-10 | Resolved: 2018-09-10

## 2018-09-10 LAB
ABSOLUTE EOS #: 0 K/UL (ref 0–0.4)
ABSOLUTE IMMATURE GRANULOCYTE: ABNORMAL K/UL (ref 0–0.3)
ABSOLUTE LYMPH #: 0.5 K/UL (ref 1–4.8)
ABSOLUTE MONO #: 0.2 K/UL (ref 0.1–1.3)
ANION GAP SERPL CALCULATED.3IONS-SCNC: 17 MMOL/L (ref 9–17)
ANION GAP SERPL CALCULATED.3IONS-SCNC: 20 MMOL/L (ref 9–17)
BASOPHILS # BLD: 0 % (ref 0–2)
BASOPHILS ABSOLUTE: 0 K/UL (ref 0–0.2)
BUN BLDV-MCNC: 78 MG/DL (ref 8–23)
BUN BLDV-MCNC: 79 MG/DL (ref 8–23)
BUN/CREAT BLD: ABNORMAL (ref 9–20)
BUN/CREAT BLD: ABNORMAL (ref 9–20)
CALCIUM SERPL-MCNC: 8.8 MG/DL (ref 8.6–10.4)
CALCIUM SERPL-MCNC: 9 MG/DL (ref 8.6–10.4)
CHLORIDE BLD-SCNC: 90 MMOL/L (ref 98–107)
CHLORIDE BLD-SCNC: 93 MMOL/L (ref 98–107)
CO2: 19 MMOL/L (ref 20–31)
CO2: 22 MMOL/L (ref 20–31)
CREAT SERPL-MCNC: 8.76 MG/DL (ref 0.7–1.2)
CREAT SERPL-MCNC: 8.95 MG/DL (ref 0.7–1.2)
DIFFERENTIAL TYPE: ABNORMAL
EOSINOPHILS RELATIVE PERCENT: 0 % (ref 0–4)
GFR AFRICAN AMERICAN: 7 ML/MIN
GFR AFRICAN AMERICAN: 7 ML/MIN
GFR NON-AFRICAN AMERICAN: 6 ML/MIN
GFR NON-AFRICAN AMERICAN: 6 ML/MIN
GFR SERPL CREATININE-BSD FRML MDRD: ABNORMAL ML/MIN/{1.73_M2}
GLUCOSE BLD-MCNC: 182 MG/DL (ref 75–110)
GLUCOSE BLD-MCNC: 212 MG/DL (ref 75–110)
GLUCOSE BLD-MCNC: 297 MG/DL (ref 75–110)
GLUCOSE BLD-MCNC: 310 MG/DL (ref 75–110)
GLUCOSE BLD-MCNC: 346 MG/DL (ref 70–99)
GLUCOSE BLD-MCNC: 358 MG/DL (ref 70–99)
HCT VFR BLD CALC: 26.9 % (ref 41–53)
HEMOGLOBIN: 9.1 G/DL (ref 13.5–17.5)
IMMATURE GRANULOCYTES: ABNORMAL %
LYMPHOCYTES # BLD: 5 % (ref 24–44)
MCH RBC QN AUTO: 32.8 PG (ref 26–34)
MCHC RBC AUTO-ENTMCNC: 33.7 G/DL (ref 31–37)
MCV RBC AUTO: 97.5 FL (ref 80–100)
MONOCYTES # BLD: 3 % (ref 1–7)
NRBC AUTOMATED: ABNORMAL PER 100 WBC
PDW BLD-RTO: 14.5 % (ref 11.5–14.9)
PLATELET # BLD: 98 K/UL (ref 150–450)
PLATELET ESTIMATE: ABNORMAL
PMV BLD AUTO: 9.4 FL (ref 6–12)
POTASSIUM SERPL-SCNC: 5.1 MMOL/L (ref 3.7–5.3)
POTASSIUM SERPL-SCNC: 6.3 MMOL/L (ref 3.7–5.3)
RBC # BLD: 2.76 M/UL (ref 4.5–5.9)
RBC # BLD: ABNORMAL 10*6/UL
SEG NEUTROPHILS: 92 % (ref 36–66)
SEGMENTED NEUTROPHILS ABSOLUTE COUNT: 8.6 K/UL (ref 1.3–9.1)
SODIUM BLD-SCNC: 129 MMOL/L (ref 135–144)
SODIUM BLD-SCNC: 132 MMOL/L (ref 135–144)
WBC # BLD: 9.4 K/UL (ref 3.5–11)
WBC # BLD: ABNORMAL 10*3/UL

## 2018-09-10 PROCEDURE — 6360000002 HC RX W HCPCS: Performed by: FAMILY MEDICINE

## 2018-09-10 PROCEDURE — 6370000000 HC RX 637 (ALT 250 FOR IP): Performed by: INTERNAL MEDICINE

## 2018-09-10 PROCEDURE — 6370000000 HC RX 637 (ALT 250 FOR IP): Performed by: FAMILY MEDICINE

## 2018-09-10 PROCEDURE — 6370000000 HC RX 637 (ALT 250 FOR IP): Performed by: STUDENT IN AN ORGANIZED HEALTH CARE EDUCATION/TRAINING PROGRAM

## 2018-09-10 PROCEDURE — G8998 SWALLOW D/C STATUS: HCPCS

## 2018-09-10 PROCEDURE — G8987 SELF CARE CURRENT STATUS: HCPCS

## 2018-09-10 PROCEDURE — G8988 SELF CARE GOAL STATUS: HCPCS

## 2018-09-10 PROCEDURE — 85025 COMPLETE CBC W/AUTO DIFF WBC: CPT

## 2018-09-10 PROCEDURE — 92610 EVALUATE SWALLOWING FUNCTION: CPT

## 2018-09-10 PROCEDURE — 97165 OT EVAL LOW COMPLEX 30 MIN: CPT

## 2018-09-10 PROCEDURE — 6360000002 HC RX W HCPCS: Performed by: STUDENT IN AN ORGANIZED HEALTH CARE EDUCATION/TRAINING PROGRAM

## 2018-09-10 PROCEDURE — 94761 N-INVAS EAR/PLS OXIMETRY MLT: CPT

## 2018-09-10 PROCEDURE — G8996 SWALLOW CURRENT STATUS: HCPCS

## 2018-09-10 PROCEDURE — G8997 SWALLOW GOAL STATUS: HCPCS

## 2018-09-10 PROCEDURE — 2060000000 HC ICU INTERMEDIATE R&B

## 2018-09-10 PROCEDURE — 99233 SBSQ HOSP IP/OBS HIGH 50: CPT | Performed by: INTERNAL MEDICINE

## 2018-09-10 PROCEDURE — 82947 ASSAY GLUCOSE BLOOD QUANT: CPT

## 2018-09-10 PROCEDURE — 80048 BASIC METABOLIC PNL TOTAL CA: CPT

## 2018-09-10 PROCEDURE — 36415 COLL VENOUS BLD VENIPUNCTURE: CPT

## 2018-09-10 PROCEDURE — 2580000003 HC RX 258: Performed by: INTERNAL MEDICINE

## 2018-09-10 PROCEDURE — 94640 AIRWAY INHALATION TREATMENT: CPT

## 2018-09-10 RX ADMIN — HEPARIN SODIUM 5000 UNITS: 5000 INJECTION, SOLUTION INTRAVENOUS; SUBCUTANEOUS at 06:15

## 2018-09-10 RX ADMIN — FLUTICASONE PROPIONATE 2 PUFF: 110 AEROSOL, METERED RESPIRATORY (INHALATION) at 08:15

## 2018-09-10 RX ADMIN — AMLODIPINE BESYLATE 5 MG: 5 TABLET ORAL at 08:14

## 2018-09-10 RX ADMIN — METHYLPREDNISOLONE SODIUM SUCCINATE 30 MG: 40 INJECTION, POWDER, LYOPHILIZED, FOR SOLUTION INTRAMUSCULAR; INTRAVENOUS at 06:14

## 2018-09-10 RX ADMIN — SEVELAMER CARBONATE 3200 MG: 800 TABLET, FILM COATED ORAL at 17:32

## 2018-09-10 RX ADMIN — VITAMIN D, TAB 1000IU (100/BT) 1000 UNITS: 25 TAB at 08:14

## 2018-09-10 RX ADMIN — HEPARIN SODIUM 5000 UNITS: 5000 INJECTION, SOLUTION INTRAVENOUS; SUBCUTANEOUS at 13:55

## 2018-09-10 RX ADMIN — FAMOTIDINE 20 MG: 20 TABLET, FILM COATED ORAL at 08:14

## 2018-09-10 RX ADMIN — IPRATROPIUM BROMIDE AND ALBUTEROL SULFATE 1 AMPULE: .5; 3 SOLUTION RESPIRATORY (INHALATION) at 07:36

## 2018-09-10 RX ADMIN — DICLOFENAC 2 G: 10 GEL TOPICAL at 13:23

## 2018-09-10 RX ADMIN — INSULIN LISPRO 12 UNITS: 100 INJECTION, SOLUTION INTRAVENOUS; SUBCUTANEOUS at 08:17

## 2018-09-10 RX ADMIN — ATORVASTATIN CALCIUM 20 MG: 20 TABLET, FILM COATED ORAL at 08:14

## 2018-09-10 RX ADMIN — INSULIN GLARGINE 6 UNITS: 100 INJECTION, SOLUTION SUBCUTANEOUS at 21:58

## 2018-09-10 RX ADMIN — METHYLPREDNISOLONE SODIUM SUCCINATE 30 MG: 40 INJECTION, POWDER, LYOPHILIZED, FOR SOLUTION INTRAMUSCULAR; INTRAVENOUS at 17:36

## 2018-09-10 RX ADMIN — METHYLPREDNISOLONE SODIUM SUCCINATE 30 MG: 40 INJECTION, POWDER, LYOPHILIZED, FOR SOLUTION INTRAMUSCULAR; INTRAVENOUS at 13:22

## 2018-09-10 RX ADMIN — SERTRALINE HYDROCHLORIDE 50 MG: 50 TABLET ORAL at 08:14

## 2018-09-10 RX ADMIN — DOCUSATE SODIUM AND SENNOSIDES 2 TABLET: 8.6; 5 TABLET, FILM COATED ORAL at 08:14

## 2018-09-10 RX ADMIN — Medication 10 ML: at 08:15

## 2018-09-10 RX ADMIN — HYDRALAZINE HYDROCHLORIDE 25 MG: 25 TABLET, FILM COATED ORAL at 21:49

## 2018-09-10 RX ADMIN — INSULIN LISPRO 6 UNITS: 100 INJECTION, SOLUTION INTRAVENOUS; SUBCUTANEOUS at 17:31

## 2018-09-10 RX ADMIN — Medication 1 TABLET: at 08:14

## 2018-09-10 RX ADMIN — GABAPENTIN 200 MG: 100 CAPSULE ORAL at 17:36

## 2018-09-10 RX ADMIN — ASPIRIN 81 MG: 81 TABLET, COATED ORAL at 08:14

## 2018-09-10 RX ADMIN — HEPARIN SODIUM 5000 UNITS: 5000 INJECTION, SOLUTION INTRAVENOUS; SUBCUTANEOUS at 21:49

## 2018-09-10 RX ADMIN — FLUTICASONE PROPIONATE 2 PUFF: 110 AEROSOL, METERED RESPIRATORY (INHALATION) at 21:53

## 2018-09-10 RX ADMIN — Medication 10 ML: at 21:57

## 2018-09-10 RX ADMIN — Medication 10 ML: at 06:15

## 2018-09-10 RX ADMIN — FLUTICASONE PROPIONATE 2 SPRAY: 50 SPRAY, METERED NASAL at 08:14

## 2018-09-10 RX ADMIN — SUCRALFATE 1 G: 1 TABLET ORAL at 17:32

## 2018-09-10 RX ADMIN — INSULIN LISPRO 5 UNITS: 100 INJECTION, SOLUTION INTRAVENOUS; SUBCUTANEOUS at 21:48

## 2018-09-10 RX ADMIN — SEVELAMER CARBONATE 3200 MG: 800 TABLET, FILM COATED ORAL at 08:13

## 2018-09-10 RX ADMIN — FLUTICASONE PROPIONATE 2 SPRAY: 50 SPRAY, METERED NASAL at 21:53

## 2018-09-10 RX ADMIN — LEVOFLOXACIN 250 MG: 250 TABLET, FILM COATED ORAL at 08:14

## 2018-09-10 RX ADMIN — CLOPIDOGREL BISULFATE 75 MG: 75 TABLET ORAL at 17:32

## 2018-09-10 RX ADMIN — LIDOCAINE AND PRILOCAINE 1 APPLICATORFUL: 25; 25 CREAM TOPICAL at 08:17

## 2018-09-10 RX ADMIN — SUCRALFATE 1 G: 1 TABLET ORAL at 06:15

## 2018-09-10 RX ADMIN — IPRATROPIUM BROMIDE AND ALBUTEROL SULFATE 1 AMPULE: .5; 3 SOLUTION RESPIRATORY (INHALATION) at 20:10

## 2018-09-10 RX ADMIN — DICLOFENAC 2 G: 10 GEL TOPICAL at 21:00

## 2018-09-10 RX ADMIN — HYDRALAZINE HYDROCHLORIDE 25 MG: 25 TABLET, FILM COATED ORAL at 08:14

## 2018-09-10 ASSESSMENT — ENCOUNTER SYMPTOMS
COUGH: 0
WHEEZING: 1
VOMITING: 0
EYES NEGATIVE: 1
HEARTBURN: 0
NAUSEA: 0
GASTROINTESTINAL NEGATIVE: 1

## 2018-09-10 ASSESSMENT — PAIN SCALES - GENERAL
PAINLEVEL_OUTOF10: 0

## 2018-09-10 NOTE — PROGRESS NOTES
am.     Pain:  Pain Assessment  Patient Currently in Pain: No    Reason for Referral  Kristal Montoya was referred for a bedside swallow evaluation to assess the efficiency of his swallow function, identify signs and symptoms of aspiration and make recommendations regarding safe dietary consistencies, effective compensatory strategies, and safe eating environment. Impression  Dysphagia Diagnosis: Swallow function appears grossly intact  Dysphagia Impression : Pt. demonstrated functional swallow for dry solids and thin liquids when taking small sips. Pt. reports that if he takes large drinks of liquid, he will start to cough. Pt. had UGI on 08/30 at [facility], results found in [facility], that revealed aspiration c cough when drinking the thin barium contrast.  During the UGI test patients are told to take large, continuous gulps of the contrast.  ST educ. pt. to take small sips of liquid to avoid potiential aspiration. Dysphagia Outcome Severity Scale: Level 6: Within functional limits/Modified independence     Treatment Plan  Requires SLP Intervention: No             Recommended Diet and Intervention  Diet Solids Recommendation: Regular  Liquid Consistency Recommendation: Thin          Compensatory Swallowing Strategies  Compensatory Swallowing Strategies: Eat/Feed slowly;Upright as possible for all oral intake;Small bites/sips    General  Behavior/Cognition: Alert; Cooperative  Respiratory Status: Room air  Breath Sounds: Clear  O2 Device: None (Room air)  Communication Observation: Dysarthria  Follows Directions: Complex  Dentition: Dentures top;Dentures bottom  Patient Positioning: Upright in bed  Baseline Vocal Quality: Normal           Vision/Hearing  Vision  Vision: Within Functional Limits  Hearing  Hearing: Within functional limits    Oral Motor Deficits  Oral/Motor  Oral Motor:  Within functional limits    Oral Phase Dysfunction  Oral Phase  Oral Phase: WNL     Indicators of Pharyngeal Phase Dysfunction   Pharyngeal Phase  Pharyngeal Phase: WNL (for small sips of thin liquid)      Education  Patient Education Response: Verbalizes understanding      G-Code  SLP G-Codes  Functional Limitations: Swallowing  Swallow Current Status (): At least 1 percent but less than 20 percent impaired, limited or restricted  Swallow Goal Status (): At least 1 percent but less than 20 percent impaired, limited or restricted  Swallow Discharge Status (): At least 1 percent but less than 20 percent impaired, limited or restricted         Therapy Time  SLP Individual Minutes  Time In: 2373  Time Out: [address]  Minutes: Gaviota Salazar 81 M. A.CCC/SLP    9/10/2018 2:34 PM

## 2018-09-10 NOTE — FLOWSHEET NOTE
09/10/18 1547   Provider Notification   Reason for Communication Review case   Provider Name Dr. Volodymyr Liu   Provider Notification Resident   Method of Communication Call   Response No new orders   Notification Time 1547     Updated Dr. Volodymyr Liu regarding the patient's need for one more night inpatient r/t insurance guidelines prior to discharge back to [facility]. Per social work they will set up transportation for around 10:00 a.m. 09/11/18. No further questions or concerns at this time.

## 2018-09-10 NOTE — CARE COORDINATION
[address] Transitions Interview     9/10/2018    Patient: Jacey Santos Patient : 1945   MRN: [id number]  Reason for Admission: There are no discharge diagnoses documented for the most recent discharge. RARS: Readmission Risk Score: 24       Patient slightly confused when writer visited, nurse in room, she informed writer that the plan is for the patient to [place] [facility] [place]//JU      Readmission Risk  Patient Active Problem List   Diagnosis    Hypertension    CKD (chronic kidney disease) stage 4, GFR 15-29 ml/min    Decubitus ulcer    Osteomyelitis of lumbar spine (HCC)    Lumbar discitis    Discitis of lumbar region    ESRD (end stage renal disease) ([address]    Protein-calorie malnutrition ([address]    Thrombocytopenia ([address]    Perianal abscess    Diabetes mellitus ([address]    SIRS (systemic inflammatory response syndrome) ([address]    Sepsis ([address]    Abscess of anal or rectal region    Pneumonia of left lower lobe due to infectious organism ([address]    Acute exacerbation of chronic obstructive airways disease ([address]    Hyponatremia       Inpatient Assessment  Care Transitions Summary    Care Transitions Inpatient Review  Medication Review  Housing Review  Social Support  Durable Medical Equipment  Functional Review  Hearing and Vision  Care Transitions Interventions         Follow Up  Future Appointments  Date Time Provider Beronica Langford   2019 9:00 AM Eugenio Turner MD INF DIS [address] [place] [address]  There are no preventive care reminders to display for this patient.     Leeann Hernandez RN

## 2018-09-10 NOTE — PROGRESS NOTES
[address] [facility]    Occupational Therapy Evaluation  Date: 9/10/18  Patient Name: Sunday Benites       Room:   MRN: [id number]  Account: [de-identified]   : 1945  (70 y.o.) Gender: male     Discharge Recommendations:   Return to [facility]          Referring Practitioner: Rachael Morris  Diagnosis: Altered Menta Status  Additional Pertinent Hx: CAD, shoulder surgery L & R, hip surgery, carpal tunnel relsease, end stage renal disease    Treatment Diagnosis: impaire self care staus  Past Medical History:  has a past medical history of Asthma; CAD (coronary artery disease); CKD (chronic kidney disease) stage 4, GFR 15-29 ml/min ([facility] CVA (cerebral vascular accident) ([facility] Depression; History of kidney stones; Hyperlipidemia; Hypertension; Osteoarthritis; Proteinuria; and Type II or unspecified type diabetes mellitus without mention of complication, not stated as uncontrolled. Past Surgical History:   has a past surgical history that includes Coronary artery bypass graft; shoulder surgery; hip surgery; joint replacement; other surgical history (6/11/15); bone biopsy (6/29/15); other surgical history (12/30/15); and Carpal tunnel release.     Restrictions  Restrictions/Precautions: Fall Risk     Vitals  Temp: 98.1 °F (36.7 °C)  Pulse: 92  Resp: 16  BP: (!) 152/72  Height: 5' 11\" (180.3 cm)  Weight: 164 lb 3.9 oz (74.5 kg)  BMI (Calculated): 23  Oxygen Therapy  SpO2: 100 %  Pulse Oximeter Device Mode: Intermittent  Pulse Oximeter Device Location: Finger  O2 Device: None (Room air)  Level of Consciousness: Alert    Subjective  Comments: Pt is to return to [employer]: Within Functional Limits (Bifocals for acuity )  Hearing  Hearing: Within functional limits  Social/Functional History  Type of Home: Facility ([facility])  Home Equipment: Electric scooter  Ambulation Assistance: Needs assistance (RW for short distance, electric scooter for long distance)  Transfer Assistance:

## 2018-09-10 NOTE — PROGRESS NOTES
NEPHROLOGY progress note      Patient :  Elham Rodriguez; 75 y.o. MRN# [id number]  Location:  2119/2119-01  Attending:  Claudeen Langton, MD  Admit Date:  9/7/2018   Hospital Day: 2      Reason for Consult: ESRD requiring dialysis      Chief Complaint: Fever and confusion  History Obtained From:  Patient    History of Present Illness: This is a 75 y.o. male  with HTN, DM , CAD s/p CABG , end stage renal disease 2nd to diabetic kidney disease on hemodialysis MWF at [facility] using a rigth radiocephalic AVF, not on active transplant list.  Patient presented to the hospital complains of fever, and change in mental status lethargic. She was admitted for septic workup. Patient regular dialysis schedule is Monday Wednesday Friday patient did not go to his regular outpatient dialysis and was admitted to the hospital and was dialyzed inpatient on Friday. Patient has a AV fistula for dialysis. No erythema or tenderness over the fistula  Blood cultures no growth so far since 9/7/2018. UA unremarkable for UTI no WBC no leukocyte esterase negative nitrite but Urine culture is positive for streptococcus group B  Chest x-ray unremarkable. He does mention about cough and congestion. Subjective/interval history  Seen and examined during dialysis  Patient is feeling good  no fever noted. Blood cultures remains negative.   Blood pressure stable    Past Medical History:        Diagnosis Date    Asthma     CAD (coronary artery disease)     CKD (chronic kidney disease) stage 4, GFR 15-29 ml/min [facility]     CVA (cerebral vascular accident) [facility]     Depression     History of kidney stones     Hyperlipidemia     Hypertension     Osteoarthritis     Proteinuria     Type II or unspecified type diabetes mellitus without mention of complication, not stated as uncontrolled        Past Surgical History:        Procedure Laterality Date    BONE BIOPSY  6/29/15    L2-3    CARPAL TUNNEL RELEASE      CORONARY ARTERY BYPASS GRAFT      HIP SURGERY      JOINT REPLACEMENT      OTHER SURGICAL HISTORY  6/11/15    I&D coccyx wound    OTHER SURGICAL HISTORY  12/30/15    diverting loop colostomy; perirectal incision and drainage    SHOULDER SURGERY      left and right       Current Medications:      glucose (GLUTOSE) 40 % oral gel 15 g PRN   dextrose 50 % solution 12.5 g PRN   glucagon (rDNA) injection 1 mg PRN   dextrose 5 % solution PRN   insulin lispro (HUMALOG) injection vial 0-18 Units TID WC   insulin lispro (HUMALOG) injection vial 0-9 Units Nightly   levofloxacin (LEVAQUIN) tablet 250 mg Daily   oxyCODONE-acetaminophen (PERCOCET) 5-325 MG per tablet 1 tablet Q6H PRN   And    oxyCODONE (ROXICODONE) immediate release tablet 2.5 mg Q6H PRN   methylPREDNISolone sodium (SOLU-MEDROL) injection 30 mg Q6H   ipratropium-albuterol (DUONEB) nebulizer solution 1 ampule Q4H WA   sodium chloride flush 0.9 % injection 10 mL 2 times per day   sodium chloride flush 0.9 % injection 10 mL PRN   amLODIPine (NORVASC) tablet 5 mg Daily   aspirin EC tablet 81 mg QAM   atorvastatin (LIPITOR) tablet 20 mg Daily   B complex-vitamin C-folic acid (NEPHRO-LAWANDA) tablet 1 tablet Daily   bisacodyl (DULCOLAX) suppository 10 mg Daily PRN   calcium carbonate (TUMS) chewable tablet 1,000 mg Q6H PRN   carboxymethylcellulose 1 % ophthalmic solution 1 drop TID PRN   clopidogrel (PLAVIX) tablet 75 mg QPM   [START ON 9/12/2018] darbepoetin candice-polysorbate (ARANESP) injection 60 mcg Weekly   diclofenac sodium 1 % gel 2 g TID   diphenhydrAMINE (BENADRYL) tablet 25 mg Nightly PRN   fluticasone (FLONASE) 50 MCG/ACT nasal spray 2 spray BID   gabapentin (NEURONTIN) capsule 200 mg QPM   hydrALAZINE (APRESOLINE) tablet 25 mg BID   insulin glargine (LANTUS) injection vial 6 Units Nightly   lidocaine-prilocaine (EMLA) cream 1 Applicatorful Once per day on Mon Wed Fri   midodrine (PROAMATINE) tablet 5 mg Q8H PRN   polyethylene glycol (GLYCOLAX) packet 17 g Daily PRN sennosides-docusate sodium (SENOKOT-S) 8.6-50 MG tablet 2 tablet Daily   sertraline (ZOLOFT) tablet 50 mg Daily   sevelamer (RENVELA) tablet 3,200 mg TID WC   sucralfate (CARAFATE) tablet 1 g TID AC   tetrahydrozoline 0.05 % ophthalmic solution 1 drop BID PRN   vitamin D (CHOLECALCIFEROL) tablet 1,000 Units Daily   famotidine (PEPCID) tablet 20 mg Daily   fluticasone (FLOVENT HFA) 110 MCG/ACT inhaler 2 puff BID   heparin (porcine) injection 5,000 Units 3 times per day       Allergies:  Oxycontin [oxycodone]; Avodart [dutasteride]; and Darvocet [propoxyphene n-acetaminophen]    Social History:   Social History     Social History    Marital status:      Spouse name: N/A    Number of children: N/A    Years of education: N/A     Occupational History    Not on file. Social History Main Topics    Smoking status: Never Smoker    Smokeless tobacco: Never Used    Alcohol use No    Drug use: No    Sexual activity: No     Other Topics Concern    Not on file     Social History Narrative    No narrative on file       Family History:   Family History   Problem Relation Age of Onset    Stroke Mother     Heart Attack Mother     Heart Attack Father     Diabetes Sister     Diabetes Brother        Review of Systems:    Constitutional: Mild fever, no chills, no night sweats, complains of fatigue, generalized weakness, loss of appetite  HEENT:  No headache, otalgia, itchy eyes, epistaxis, nasal discharge or sore throat. Cardiac:  No chest pain, dyspnea, orthopnea or PND, palpitations  Chest:  Complains of Mild chronic cough, no hemoptysis, pleuritic chest pain, wheezing,SOB  Abdomen:  No abdominal pain, nausea, vomiting, diarrhea, melena, dysphagia hematemesis,constipation, abdominal bloating, flank pain  Neuro:  No CVA, TIA or seizure like activity. Skin:   No rashes, no itching. :   No hematuria, no pyuria, no dysuria, no flank pain. Extremities:  No swelling or joint pains.       Objective:  CURRENT TEMPERATURE:  Temp: 96.7 °F (35.9 °C)  MAXIMUM TEMPERATURE OVER 24HRS:  Temp (24hrs), Av.5 °F (36.4 °C), Min:96.7 °F (35.9 °C), Max:98.1 °F (36.7 °C)    CURRENT RESPIRATORY RATE:  Resp: 18  CURRENT PULSE:  Pulse: 86  CURRENT BLOOD PRESSURE:  BP: 125/80  24HR BLOOD PRESSURE RANGE:  Systolic (66TJB), AWQ:741 , Min:111 , HDA:894   ; Diastolic (62IIZ), HGI:68, Min:60, Max:84    24HR INTAKE/OUTPUT:      Intake/Output Summary (Last 24 hours) at 09/10/18 1135  Last data filed at 09/10/18 0829   Gross per 24 hour   Intake              100 ml   Output                0 ml   Net              100 ml     Patient Vitals for the past 96 hrs (Last 3 readings):   Weight   09/10/18 0835 168 lb 14 oz (76.6 kg)   09/10/18 0630 177 lb 14.6 oz (80.7 kg)   18 0630 167 lb 12.3 oz (76.1 kg)       Physical Exam:  GENERAL APPEARANCE: Alert and cooperative, and appears to be in no acute distress. HEAD: normocephalic  EYES: PERRL, EOMI. Not pale, anicteric   NOSE:  No nasal discharge. THROAT:  Oral cavity and pharynx normal. Moist  CARDIAC: Normal S1 and S2. No S3, S4 or murmurs. Rhythm is regular. LUNGS: Clear to auscultation and percussion without rales, rhonchi, wheezing or diminished breath sounds. NECK: Neck supple, non-tender without lymphadenopathy, masses or thyromegaly. BACK: Examination of the spine reveals normal gait and posture, no spinal deformity, symmetry of spinal muscles, without tenderness, decreased range of motion or muscular spasm  MUSKULOSKELETAL: Adequately aligned spine. No joint erythema or tenderness. EXTREMITIES: No edema. Peripheral pulses intact.    NEURO: Nonfocal  AV fistula positive bruit and thrill no erythema or tenderness    Labs:   CBC:  Recent Labs      18   0500  18   0507  09/10/18   0445   WBC  5.4  6.1  9.4   RBC  2.81*  2.99*  2.76*   HGB  9.4*  9.6*  9.1*   HCT  27.4*  29.2*  26.9*   MCV  97.4  97.8  97.5   MCH  33.4  32.2  32.8   MCHC  34.3  32.9  33.7   RDW  14.8  14.2 14.5   PLT  101*  100*  98*   MPV  9.2  9.2  9.4      BMP:   Recent Labs      09/09/18   0507  09/10/18   0445  09/10/18   0837   NA  129*  132*  129*   K  5.3  6.3*  5.1   CL  90*  93*  90*   CO2  22  22  19*   BUN  61*  78*  79*   CREATININE  6.99*  8.76*  8.95*   GLUCOSE  482*  346*  358*   CALCIUM  9.0  8.8  9.0      Phosphorus:  No results for input(s): PHOS in the last 72 hours. Magnesium: No results for input(s): MG in the last 72 hours. Albumin: No results for input(s): LABALBU in the last 72 hours. Urinalysis:    Recent Labs      09/07/18   1250   NITRU  NEGATIVE   COLORU  YELLOW   PHUR  7.5   WBCUA  0 TO 2   RBCUA  0 TO 2   MUCUS  NOT REPORTED   TRICHOMONAS  NOT REPORTED   YEAST  NOT REPORTED   BACTERIA  FEW*   SPECGRAV  1.016   LEUKOCYTESUR  NEGATIVE   UROBILINOGEN  Normal   [address]  1+*   [address]  1+*         Radiology:  Reviewed as available. Assessment:  Patient admitted for fever and altered mental status    #1 ESRD secondary to diabetic nephropathy dialysis dependent dialysis days Monday Wednesday Friday dialysis access AV fistula. #2 Fever and altered mental status-septic workup in progress on antibiotics managed by primary team , blood cultures no growth so far  #3 type 2 diabetes IDDM  #4 essential hypertension  #5 anemia of chronic kidney disease  #6 secondary hyperparathyroidism due to renal failure  # #7 UTI, urine culture positive for streptococci beta-hemolytic group B 10-50,000 patient is on antibiotics    Plan:   Continue hemodialysis 3 times per week Monday Wednesday Friday, Hemodialysis today as per orders  No objections on discharge from nephrology standpoint if cleared by medical treatment    Thank you for the consultation.       Electronically signed by Enedelia Arriaza MD on 9/10/2018 at 11:35 AM

## 2018-09-10 NOTE — PROGRESS NOTES
[address] [facility].    PROGRESS NOTE             9/10/2018    9:33 AM    Name:   Kameron Freeman  MRN:     [id number]     Acct:      [de-identified]   Room:   [facility]  IP Day:  2  Admit Date:  9/7/2018 11:17 AM    PCP:   Shine Chun MD  Code Status:  Full Code    Subjective:     C/C:   Chief Complaint   Patient presents with    Fever    Other     abnormal labs     Interval History Status: improved. No acute overnight events  Vital signs stable  T-max 36 9 Celsius  Blood sugars are in the 300s  No chest pain shortness of breath and he continues to have a cough, with slight improvement  No nausea vomiting diarrhea    He is scheduled for HD today    Patient had an episode of choking this morning on grapes  He likely aspirated some saliva, although the patient was able to cough up   This was an unwitnessed event  Episode of tachycardia in the 150s  Following this he resumed to have a pulse of 80    He will be kept nothing by mouth for now and SP Will evaluate and treat. Brief History:   Per EMR, H&P  The patient is a 75 y.o. / male who presents with Fever and Other (abnormal labs)   and he is admitted to the hospital for the management of Altered Mental Status.     Patient came from the nursing home 2/2 abnormal labs and a feeling of feeling unwell. He had a fever earlier in the day and had refused to go to dialysis. Troponins were obtained in the ED were elevated compared to his baseline. He is more lethargic than his usual baseline. He had a chronic cough that has persisted over the past 2 months, he also has a history of lung disease.     He c/o of a headache  Started approx 2 months ago, on and off. He had one today, that hasn't resolved. He did not take any medications for it. Nothing makes it better/worse. He states it is a 9/10 in severity. No pain anywhere else, breathing well.   No issues with urination, no dysuria. He sees a regular Nephrologist after he was referred a while ago from his PCP and has been on HD 3 times a week. He denies fever, chills, n/v/d     History is difficult to obtain and he is lethargic.      Patient is being regularly followed-up with ID for a history of Osteomyelitis of the Lumbar Spine  From EMR, patient had progress note from 8/28/2018: 6 month follow-up;  EGD pending at the time. He was on an oral Doxycycline for suppression. Review of Systems:     Review of Systems   Constitutional: Negative. Negative for chills, fever and weight loss. HENT: Negative. Eyes: Negative. Respiratory: Positive for wheezing (sometimes, chronic X2-3 months). Negative for cough (Cocci continues to improve). Cardiovascular: Negative for chest pain and palpitations. Gastrointestinal: Negative. Negative for heartburn, nausea and vomiting. Genitourinary: Negative. Negative for dysuria and urgency. Musculoskeletal: Positive for neck pain (chronic, ongoing, unchanged). Skin: Negative. BLE skin discoloration, chronic     Neurological: Negative. Negative for headaches. Psychiatric/Behavioral: Negative. Medications: Allergies: Allergies   Allergen Reactions    Oxycontin [Oxycodone] Shortness Of Breath and Other (See Comments)     Patient had an overdose on Oxycontin before and does NOT want this ever again. He can take Oxycodone/acetaminophen as needed.     Avodart [Dutasteride]     Darvocet [Propoxyphene N-Acetaminophen]        Current Meds:   Scheduled Meds:    insulin lispro  0-18 Units Subcutaneous TID WC    insulin lispro  0-9 Units Subcutaneous Nightly    levofloxacin  250 mg Oral Daily    methylPREDNISolone  30 mg Intravenous Q6H    ipratropium-albuterol  1 ampule Inhalation Q4H WA    sodium chloride flush  10 mL Intravenous 2 times per day    amLODIPine  5 mg Oral Daily    aspirin  81 mg Oral QAM    atorvastatin  20 mg Oral Daily    B 24.81 kg/m²   Temp (24hrs), Av.9 °F (36.6 °C), Min:97.6 °F (36.4 °C), Max:98.1 °F (36.7 °C)    Recent Labs      18   1354  18   1647  18   2047  09/10/18   0750   POCGLU  434*  314*  371*  310*       I/O (24Hr): Intake/Output Summary (Last 24 hours) at 09/10/18 0933  Last data filed at 09/10/18 8749   Gross per 24 hour   Intake              100 ml   Output                0 ml   Net              100 ml       Labs:    [unfilled]    Lab Results   Component Value Date/Time    SPECIAL NOT REPORTED 2018 12:50 PM     Lab Results   Component Value Date/Time    CULTURE (A) 2018 12:50 PM     STREPTOCOCCI, BETA HEMOLYTIC GROUP B 10 to 50,000 CFU/ML       [facility]    Radiology:    Xr Chest Standard (2 Vw)    Result Date: 2018  EXAMINATION: TWO VIEWS OF THE CHEST 2018 12:18 pm COMPARISON: 2016 HISTORY: ORDERING SYSTEM PROVIDED HISTORY: Cough, fever TECHNOLOGIST PROVIDED HISTORY: Cough, fever Ordering Physician Provided Reason for Exam: Abnormal lab, pt states hx of cough, fever, no other chest complaints; Hx of asthma Acuity: Acute Type of Exam: Initial Additional signs and symptoms: Abnormal lab, pt states hx of cough, fever, no other chest complaints; Hx of asthma FINDINGS: Status post CABG. The lungs are without acute focal process. There is no effusion or pneumothorax. The cardiomediastinal silhouette is stable. The osseous structures are stable. No acute process. Physical Examination:        Physical Exam   Constitutional: He is oriented to person, place, and time and well-developed, well-nourished, and in no distress. No distress. HENT:   Head: Normocephalic and atraumatic. Eyes: Pupils are equal, round, and reactive to light. Conjunctivae are normal.   Neck: Normal range of motion. Neck supple. No neck stiffness     Cardiovascular: Normal rate, regular rhythm and normal heart sounds.     Pulmonary/Chest: Effort normal. He has wheezes (bilateral lower lung fields, anterior/posterior, unchanged since yesterday). Abdominal: Soft. Bowel sounds are normal. He exhibits no distension. There is no tenderness. Musculoskeletal: Normal range of motion. 5/5 strength with bed ROM, hip ext/flexion, dorsi/plantarflexion; Neurological: He is alert and oriented to person, place, and time. Right sided tremor noted when patient undergoes hand extension;       Skin: Skin is warm. He is not diaphoretic. Psychiatric: Affect normal.   Vitals reviewed. Assessment:        Primary Problem  Altered mental status    Active Hospital Problems    Diagnosis Date Noted    Acute exacerbation of chronic obstructive airways disease ([facility] [address] [J44.1] 09/08/2018    [facility] [E11.9] 12/29/2015    Altered mental status [R41.82] 06/09/2015    CKD (chronic kidney disease) stage 4, GFR 15-29 ml/min [N18.4]     Hypertension [I10]        Plan: Altered Mental status, likely 2/2 Uremic Encephalopathy  -F/U daily CBC (WBC 9.4; H/H 9.4/26. 9)  -Hyponatremia (Na129 on admit, 129 today)  -F/U BMP (K 5.1  , BUN/Creat 78/8.95)   -F/U Blood cultures - no growth 3 days  -F/U Urine culture - STREPTOCOCCI, BETA HEMOLYTIC GROUP B 10 to 50,000 CFU/ML  -Urinalysis - few bacteria, WBC 0-2, RBC 0-2; LeukE Neg  -ESR 34 (unchanged from 8/27/2018)  -we will continue with Levaquin PO     Diabetes  -glucose 358 this am  -placed on high dose ISS for now. -we will continue to monitor    Cough, AECOPD  -CXR no acute process  -appears to have resolved, continues to have wheezing  -incentive spirometry  -OOB with PT  - Possible Aspiration - NPO for now, SLP to evaluate and treat     Chronic Renal Failure  -Nephrology consulted and is following the patient    Jerman Bennett MD  9/10/2018  9:33 AM   Attending Physician Statement  Patient seen and examined  I have discussed the care of the patient, including pertinent history and exam findings,  with the resident.  I have reviewed the key elements of all parts of the encounter with the resident. I agree with the assessment, plan and orders as documented by the resident.     Dulce Maria Nichols

## 2018-09-10 NOTE — PROGRESS NOTES
Physical Therapy  DATE: 9/10/2018    NAME: Chichi Trevino  MRN: [id number]   : 1945    Patient not seen this date for Physical Therapy due to:  [] Blood transfusion in progress  [x] Hemodialysis  []  Patient Declined  [] Spine Precautions   [] Strict Bedrest  [] Surgery/ Procedure  [] Testing      [] Other        [] PT being discontinued at this time. Patient independent. No further needs. [] PT being discontinued at this time as the patient has been transferred to palliative care. No further needs.     Mayra Rose, PTA

## 2018-09-10 NOTE — PLAN OF CARE
Problem: Infection:  Goal: Will remain free from infection  Will remain free from infection   Outcome: Met This Shift  The patient remains free from infection, afebrile, normal WBC. Problem: Pain:  Goal: Patient's pain/discomfort is manageable  Patient's pain/discomfort is manageable   Outcome: Met This Shift  Patient's pain has been well controlled throughout the entire shift, please see MAR. Problem: Mobility - Impaired:  Goal: Mobility will improve  Mobility will improve   Outcome: Ongoing  The patient has been fatigued today and also received dialysis therefore mobility has been minimal.    Problem: Risk for Impaired Skin Integrity  Goal: Tissue integrity - skin and mucous membranes  Structural intactness and normal physiological function of skin and  mucous membranes. Outcome: Met This Shift  The patient's skin remains dry and intact. Assist patient with turning Q2 hours and PRN. Problem: Falls - Risk of:  Goal: Absence of physical injury  Absence of physical injury   Outcome: Met This Shift  The patient remained free from falls this shift, call light within reach, bed in locked and lowest position. Side rails up x2. Continue to monitor closely.

## 2018-09-11 ENCOUNTER — TELEPHONE (OUTPATIENT)
Dept: INFECTIOUS DISEASES | Age: 73
End: 2018-09-11

## 2018-09-11 VITALS
WEIGHT: 182.54 LBS | SYSTOLIC BLOOD PRESSURE: 154 MMHG | HEIGHT: 71 IN | HEART RATE: 89 BPM | DIASTOLIC BLOOD PRESSURE: 74 MMHG | TEMPERATURE: 98.2 F | RESPIRATION RATE: 16 BRPM | BODY MASS INDEX: 25.56 KG/M2 | OXYGEN SATURATION: 97 %

## 2018-09-11 LAB
ABSOLUTE EOS #: 0 K/UL (ref 0–0.4)
ABSOLUTE IMMATURE GRANULOCYTE: ABNORMAL K/UL (ref 0–0.3)
ABSOLUTE LYMPH #: 0.6 K/UL (ref 1–4.8)
ABSOLUTE MONO #: 0.5 K/UL (ref 0.1–1.3)
ANION GAP SERPL CALCULATED.3IONS-SCNC: 14 MMOL/L (ref 9–17)
BASOPHILS # BLD: 0 % (ref 0–2)
BASOPHILS ABSOLUTE: 0 K/UL (ref 0–0.2)
BUN BLDV-MCNC: 47 MG/DL (ref 8–23)
BUN/CREAT BLD: ABNORMAL (ref 9–20)
CALCIUM SERPL-MCNC: 8.6 MG/DL (ref 8.6–10.4)
CHLORIDE BLD-SCNC: 95 MMOL/L (ref 98–107)
CO2: 27 MMOL/L (ref 20–31)
CREAT SERPL-MCNC: 5.68 MG/DL (ref 0.7–1.2)
DIFFERENTIAL TYPE: ABNORMAL
EOSINOPHILS RELATIVE PERCENT: 0 % (ref 0–4)
GFR AFRICAN AMERICAN: 12 ML/MIN
GFR NON-AFRICAN AMERICAN: 10 ML/MIN
GFR SERPL CREATININE-BSD FRML MDRD: ABNORMAL ML/MIN/{1.73_M2}
GFR SERPL CREATININE-BSD FRML MDRD: ABNORMAL ML/MIN/{1.73_M2}
GLUCOSE BLD-MCNC: 235 MG/DL (ref 70–99)
GLUCOSE BLD-MCNC: 287 MG/DL (ref 75–110)
HCT VFR BLD CALC: 28.8 % (ref 41–53)
HEMOGLOBIN: 9.7 G/DL (ref 13.5–17.5)
IMMATURE GRANULOCYTES: ABNORMAL %
LYMPHOCYTES # BLD: 7 % (ref 24–44)
MCH RBC QN AUTO: 32.8 PG (ref 26–34)
MCHC RBC AUTO-ENTMCNC: 33.8 G/DL (ref 31–37)
MCV RBC AUTO: 97.2 FL (ref 80–100)
MONOCYTES # BLD: 6 % (ref 1–7)
NRBC AUTOMATED: ABNORMAL PER 100 WBC
PDW BLD-RTO: 14.6 % (ref 11.5–14.9)
PLATELET # BLD: 107 K/UL (ref 150–450)
PLATELET ESTIMATE: ABNORMAL
PMV BLD AUTO: 9.9 FL (ref 6–12)
POTASSIUM SERPL-SCNC: 4.5 MMOL/L (ref 3.7–5.3)
RBC # BLD: 2.96 M/UL (ref 4.5–5.9)
RBC # BLD: ABNORMAL 10*6/UL
SEG NEUTROPHILS: 87 % (ref 36–66)
SEGMENTED NEUTROPHILS ABSOLUTE COUNT: 6.6 K/UL (ref 1.3–9.1)
SODIUM BLD-SCNC: 136 MMOL/L (ref 135–144)
WBC # BLD: 7.7 K/UL (ref 3.5–11)
WBC # BLD: ABNORMAL 10*3/UL

## 2018-09-11 PROCEDURE — 6370000000 HC RX 637 (ALT 250 FOR IP): Performed by: INTERNAL MEDICINE

## 2018-09-11 PROCEDURE — 80048 BASIC METABOLIC PNL TOTAL CA: CPT

## 2018-09-11 PROCEDURE — 99239 HOSP IP/OBS DSCHRG MGMT >30: CPT | Performed by: INTERNAL MEDICINE

## 2018-09-11 PROCEDURE — 2580000003 HC RX 258: Performed by: INTERNAL MEDICINE

## 2018-09-11 PROCEDURE — 94640 AIRWAY INHALATION TREATMENT: CPT

## 2018-09-11 PROCEDURE — 6370000000 HC RX 637 (ALT 250 FOR IP): Performed by: STUDENT IN AN ORGANIZED HEALTH CARE EDUCATION/TRAINING PROGRAM

## 2018-09-11 PROCEDURE — 6370000000 HC RX 637 (ALT 250 FOR IP): Performed by: FAMILY MEDICINE

## 2018-09-11 PROCEDURE — 36415 COLL VENOUS BLD VENIPUNCTURE: CPT

## 2018-09-11 PROCEDURE — 85025 COMPLETE CBC W/AUTO DIFF WBC: CPT

## 2018-09-11 PROCEDURE — 82947 ASSAY GLUCOSE BLOOD QUANT: CPT

## 2018-09-11 PROCEDURE — 6360000002 HC RX W HCPCS: Performed by: STUDENT IN AN ORGANIZED HEALTH CARE EDUCATION/TRAINING PROGRAM

## 2018-09-11 RX ORDER — METOPROLOL SUCCINATE 25 MG/1
25 TABLET, EXTENDED RELEASE ORAL DAILY
Qty: 30 TABLET | Refills: 0 | Status: SHIPPED | OUTPATIENT
Start: 2018-09-11

## 2018-09-11 RX ORDER — LEVOFLOXACIN 250 MG/1
250 TABLET ORAL DAILY
Qty: 4 TABLET | Refills: 0 | Status: SHIPPED | OUTPATIENT
Start: 2018-09-11 | End: 2018-09-11

## 2018-09-11 RX ORDER — LEVOFLOXACIN 250 MG/1
250 TABLET ORAL DAILY
Qty: 5 TABLET | Refills: 0 | Status: SHIPPED | OUTPATIENT
Start: 2018-09-11 | End: 2018-09-16

## 2018-09-11 RX ADMIN — METHYLPREDNISOLONE SODIUM SUCCINATE 30 MG: 40 INJECTION, POWDER, LYOPHILIZED, FOR SOLUTION INTRAMUSCULAR; INTRAVENOUS at 01:39

## 2018-09-11 RX ADMIN — DOCUSATE SODIUM AND SENNOSIDES 2 TABLET: 8.6; 5 TABLET, FILM COATED ORAL at 09:25

## 2018-09-11 RX ADMIN — SEVELAMER CARBONATE 3200 MG: 800 TABLET, FILM COATED ORAL at 09:22

## 2018-09-11 RX ADMIN — ASPIRIN 81 MG: 81 TABLET, COATED ORAL at 09:20

## 2018-09-11 RX ADMIN — Medication 10 ML: at 01:41

## 2018-09-11 RX ADMIN — Medication 10 ML: at 05:44

## 2018-09-11 RX ADMIN — Medication 1 TABLET: at 09:26

## 2018-09-11 RX ADMIN — VITAMIN D, TAB 1000IU (100/BT) 1000 UNITS: 25 TAB at 09:21

## 2018-09-11 RX ADMIN — SUCRALFATE 1 G: 1 TABLET ORAL at 09:21

## 2018-09-11 RX ADMIN — LEVOFLOXACIN 250 MG: 250 TABLET, FILM COATED ORAL at 09:21

## 2018-09-11 RX ADMIN — ATORVASTATIN CALCIUM 20 MG: 20 TABLET, FILM COATED ORAL at 09:25

## 2018-09-11 RX ADMIN — DICLOFENAC 2 G: 10 GEL TOPICAL at 09:22

## 2018-09-11 RX ADMIN — METHYLPREDNISOLONE SODIUM SUCCINATE 30 MG: 40 INJECTION, POWDER, LYOPHILIZED, FOR SOLUTION INTRAMUSCULAR; INTRAVENOUS at 05:45

## 2018-09-11 RX ADMIN — FLUTICASONE PROPIONATE 2 SPRAY: 50 SPRAY, METERED NASAL at 09:22

## 2018-09-11 RX ADMIN — Medication 10 ML: at 09:27

## 2018-09-11 RX ADMIN — AMLODIPINE BESYLATE 5 MG: 5 TABLET ORAL at 09:25

## 2018-09-11 RX ADMIN — IPRATROPIUM BROMIDE AND ALBUTEROL SULFATE 1 AMPULE: .5; 3 SOLUTION RESPIRATORY (INHALATION) at 08:37

## 2018-09-11 RX ADMIN — FAMOTIDINE 20 MG: 20 TABLET, FILM COATED ORAL at 09:26

## 2018-09-11 RX ADMIN — SERTRALINE HYDROCHLORIDE 50 MG: 50 TABLET ORAL at 09:21

## 2018-09-11 RX ADMIN — SUCRALFATE 1 G: 1 TABLET ORAL at 05:45

## 2018-09-11 RX ADMIN — INSULIN LISPRO 6 UNITS: 100 INJECTION, SOLUTION INTRAVENOUS; SUBCUTANEOUS at 07:30

## 2018-09-11 RX ADMIN — HYDRALAZINE HYDROCHLORIDE 25 MG: 25 TABLET, FILM COATED ORAL at 09:21

## 2018-09-11 RX ADMIN — ANTACID TABLETS 1000 MG: 500 TABLET, CHEWABLE ORAL at 09:21

## 2018-09-11 RX ADMIN — FLUTICASONE PROPIONATE 2 PUFF: 110 AEROSOL, METERED RESPIRATORY (INHALATION) at 09:23

## 2018-09-11 ASSESSMENT — ENCOUNTER SYMPTOMS
SPUTUM PRODUCTION: 0
NAUSEA: 0
HEMOPTYSIS: 0
DOUBLE VISION: 0
ABDOMINAL PAIN: 0
COUGH: 1
BLURRED VISION: 0
SHORTNESS OF BREATH: 0
VOMITING: 0
DIARRHEA: 0

## 2018-09-11 ASSESSMENT — PAIN SCALES - GENERAL: PAINLEVEL_OUTOF10: 0

## 2018-09-11 NOTE — FLOWSHEET NOTE
09/11/18 1521   Encounter Summary   Services provided to: Patient   Referral/Consult From: Nubia   Continue Visiting (9/11/18)   Volunteer Visit Yes   Complexity of Encounter Low   Length of Encounter 15 minutes   Routine   Type Follow up   Spiritual/Buddhist   Type Spiritual support   Intervention [address] Patient received communion

## 2018-09-11 NOTE — PROGRESS NOTES
again. He can take Oxycodone/acetaminophen as needed.  Avodart [Dutasteride]    Jaime Tomlinsonvocet [Propoxyphene N-Acetaminophen]        Current Meds:   Scheduled Meds:    insulin lispro  0-18 Units Subcutaneous TID WC    insulin lispro  0-9 Units Subcutaneous Nightly    levofloxacin  250 mg Oral Daily    methylPREDNISolone  30 mg Intravenous Q6H    ipratropium-albuterol  1 ampule Inhalation Q4H WA    sodium chloride flush  10 mL Intravenous 2 times per day    amLODIPine  5 mg Oral Daily    aspirin  81 mg Oral QAM    atorvastatin  20 mg Oral Daily    B complex-vitamin C-folic acid  1 tablet Oral Daily    clopidogrel  75 mg Oral QPM    [START ON 9/12/2018] darbepoetin candice-polysorbate  60 mcg Subcutaneous Weekly    diclofenac sodium  2 g Topical TID    fluticasone  2 spray Nasal BID    gabapentin  200 mg Oral QPM    hydrALAZINE  25 mg Oral BID    insulin glargine  6 Units Subcutaneous Nightly    lidocaine-prilocaine  1 Applicatorful Topical Once per day on Mon Wed Fri    sennosides-docusate sodium  2 tablet Oral Daily    sertraline  50 mg Oral Daily    sevelamer  3,200 mg Oral TID WC    sucralfate  1 g Oral TID AC    vitamin D  1,000 Units Oral Daily    famotidine  20 mg Oral Daily    fluticasone  2 puff Inhalation BID    heparin (porcine)  5,000 Units Subcutaneous 3 times per day     Continuous Infusions:    dextrose       PRN Meds: glucose, dextrose, glucagon (rDNA), dextrose, oxyCODONE-acetaminophen **AND** oxyCODONE, sodium chloride flush, bisacodyl, calcium carbonate, carboxymethylcellulose, diphenhydrAMINE, midodrine, polyethylene glycol, tetrahydrozoline    Data:     Past Medical History:   has a past medical history of Asthma; CAD (coronary artery disease); CKD (chronic kidney disease) stage 4, GFR 15-29 ml/min ([facility]); CVA (cerebral vascular accident) ([facility] [address] Depression; History of kidney stones; Hyperlipidemia;  Hypertension; Osteoarthritis; Proteinuria; and Type II or unspecified type exam findings,  with the resident. I have reviewed the key elements of all parts of the encounter with the resident. I agree with the assessment, plan and orders as documented by the resident.     Antonia Nichols

## 2018-09-11 NOTE — FLOWSHEET NOTE
Spoke with Medical resident and notified him of 5 beat run of V-Tach over night. No further episodes noted. Pt asymptomatic. Denying all c/o chest pain/discomfort. Resident to notify attending.  Electronically signed by Bhavna Angela RN on 9/11/2018 at 9:51 AM

## 2018-09-11 NOTE — FLOWSHEET NOTE
09/11/18 1007   Handoff   Communication Given Transfer Handoff  (D/c to [facility])   Oncoming Nurse/Offgoing Nurse Marcellus Severe RN/Elidia RN   Handoff Communication Telephone   Time Handoff Given 7399   End of Shift Check Performed Yes     Handoff called to RN Marcellus Severe at [facility]. Nurse informed of 5 beat run of V- Tach and new orders for metoprolol, outpatient ECHO, and f/u with Dr. Jennyfer Thomas in 1 week. Awaiting transportation.  Electronically signed by Pari Ritter RN on 9/11/2018 at 10:09 AM

## 2018-09-12 ENCOUNTER — CARE COORDINATION (OUTPATIENT)
Dept: CASE MANAGEMENT | Age: 73
End: 2018-09-12

## 2018-09-13 LAB
CULTURE: NORMAL
CULTURE: NORMAL
Lab: NORMAL
Lab: NORMAL
SPECIMEN DESCRIPTION: NORMAL
SPECIMEN DESCRIPTION: NORMAL
STATUS: NORMAL
STATUS: NORMAL

## 2018-09-24 NOTE — CARE COORDINATION
[facility] Update Call    2018    Patient: Tess Gregory Patient : 1945   MRN: [id number]  Reason for Admission: There are no discharge diagnoses documented for the most recent discharge. Discharge Date: 18 RARS: Readmission Risk Score: 24       Care Transitions Post Acute Facility Update    Care Transitions Interventions  Post Acute Facility:   [facility] Facility Update  ADLs:  Moderate Assistance   Transfer Assistance:   Moderate Assistance   Ambulation Assistance:  Contact Guard Assist - Hands on patient for balance   How far (in feet) is the patient ambulating?:  60   Does patient use an assistive device?:  Yes (Comment: front wheeled walker)

## 2018-10-01 ENCOUNTER — CARE COORDINATION (OUTPATIENT)
Dept: CASE MANAGEMENT | Age: 73
End: 2018-10-01

## 2018-10-01 NOTE — CARE COORDINATION
[facility] Update Call    10/1/2018    Patient: Rupert Prakash Patient : 1945   MRN: [id number]  Reason for Admission: There are no discharge diagnoses documented for the most recent discharge. Discharge Date: 18 RARS: Readmission Risk Score: 24       Care Transitions Post Acute Facility Update    Care Transitions Interventions  Post Acute Facility:  [address] Update  Reported Nursing Issues:  Increased wrist pain. Dialysis. ADLs:  Minimal Assistance   Transfer Assistance:  Z[address] 64 on patient for balance   How far (in feet) is the patient ambulating?:  145   Barriers to Discharge:  As of 18 patient refusing therapy due to wrist pain. Physician assessment requested.

## 2018-10-02 ENCOUNTER — HOSPITAL ENCOUNTER (OUTPATIENT)
Age: 73
Setting detail: SPECIMEN
Discharge: HOME | End: 2018-10-02
Payer: MEDICARE

## 2018-10-02 ENCOUNTER — OFFICE VISIT (OUTPATIENT)
Dept: INFECTIOUS DISEASES | Age: 73
End: 2018-10-02
Payer: MEDICARE

## 2018-10-02 VITALS
HEIGHT: 71 IN | WEIGHT: 171 LBS | OXYGEN SATURATION: 98 % | BODY MASS INDEX: 23.94 KG/M2 | SYSTOLIC BLOOD PRESSURE: 178 MMHG | DIASTOLIC BLOOD PRESSURE: 74 MMHG | HEART RATE: 77 BPM | TEMPERATURE: 98.5 F | RESPIRATION RATE: 12 BRPM

## 2018-10-02 DIAGNOSIS — M46.26 OSTEOMYELITIS OF LUMBAR SPINE (HCC): Primary | ICD-10-CM

## 2018-10-02 LAB — URIC ACID: 3.3 MG/DL (ref 3.4–7)

## 2018-10-02 PROCEDURE — 1101F PT FALLS ASSESS-DOCD LE1/YR: CPT | Performed by: INTERNAL MEDICINE

## 2018-10-02 PROCEDURE — P9603 ONE-WAY ALLOW PRORATED MILES: HCPCS

## 2018-10-02 PROCEDURE — G8427 DOCREV CUR MEDS BY ELIG CLIN: HCPCS | Performed by: INTERNAL MEDICINE

## 2018-10-02 PROCEDURE — 99214 OFFICE O/P EST MOD 30 MIN: CPT | Performed by: INTERNAL MEDICINE

## 2018-10-02 PROCEDURE — G8420 CALC BMI NORM PARAMETERS: HCPCS | Performed by: INTERNAL MEDICINE

## 2018-10-02 PROCEDURE — 1123F ACP DISCUSS/DSCN MKR DOCD: CPT | Performed by: INTERNAL MEDICINE

## 2018-10-02 PROCEDURE — 1036F TOBACCO NON-USER: CPT | Performed by: INTERNAL MEDICINE

## 2018-10-02 PROCEDURE — G8484 FLU IMMUNIZE NO ADMIN: HCPCS | Performed by: INTERNAL MEDICINE

## 2018-10-02 PROCEDURE — 84550 ASSAY OF BLOOD/URIC ACID: CPT

## 2018-10-02 PROCEDURE — 4040F PNEUMOC VAC/ADMIN/RCVD: CPT | Performed by: INTERNAL MEDICINE

## 2018-10-02 PROCEDURE — 36415 COLL VENOUS BLD VENIPUNCTURE: CPT

## 2018-10-02 PROCEDURE — 3017F COLORECTAL CA SCREEN DOC REV: CPT | Performed by: INTERNAL MEDICINE

## 2018-10-02 PROCEDURE — 1111F DSCHRG MED/CURRENT MED MERGE: CPT | Performed by: INTERNAL MEDICINE

## 2018-10-02 NOTE — PROGRESS NOTES
Take 1 g by mouth 3 times daily (before meals), Disp: , Rfl:     calcium carbonate (TUMS) 500 MG chewable tablet, Take 2 tablets by mouth every 6 hours as needed for Heartburn, Disp: , Rfl:     tetrahydrozoline 0.05 % ophthalmic solution, Place 1 drop into both eyes 2 times daily as needed (irritation/dryness), Disp: , Rfl:     diclofenac sodium 1 % GEL, Apply topically 3 times daily Apply between last two toes and to arch of right foot, Disp: , Rfl:     fluticasone propionate (FLOVENT DISKUS) 100 MCG/BLIST AEPB inhaler, Inhale 2 puffs into the lungs 2 times daily , Disp: , Rfl:     NOVOFINE AUTOCOVER 30G X 8 MM MISC, , Disp: , Rfl:     darbepoetin candice-polysorbate (ARANESP) 60 MCG/ML injection, Inject 60 mcg into the skin once a week On Wednesday with dialysis, Disp: , Rfl:     insulin glargine (LANTUS) 100 UNIT/ML injection pen, Inject 6 Units into the skin nightly , Disp: , Rfl:     doxycycline (VIBRAMYCIN) 100 MG capsule, Take 100 mg by mouth 2 times daily For prophylaxis s/p osteomyelitis, Disp: , Rfl:     Nutritional Supplements (BOOST GLUCOSE CONTROL) LIQD, Take 8 oz by mouth nightly , Disp: , Rfl:     senna-docusate (PERICOLACE) 8.6-50 MG per tablet, Take 2 tablets by mouth daily On non-dialysis days (Sun, Tues, Thurs, Sat), Disp: , Rfl:     b complex-C-folic acid (NEPHROCAPS) 1 MG capsule, Take 1 capsule by mouth daily, Disp: , Rfl:     lidocaine-prilocaine (EMLA) 2.5-2.5 % cream, Apply 1 Applicatorful topically three times a week To right forearm in the morning on Mon, Wed, Fri before dialysis, Disp: , Rfl:     amLODIPine (NORVASC) 2.5 MG tablet, Take 1 tablet by mouth daily (Patient taking differently: Take 5 mg by mouth daily ), Disp: 30 tablet, Rfl: 3    gabapentin (NEURONTIN) 100 MG capsule, Take 1 capsule by mouth 3 times daily (Patient taking differently: Take 200 mg by mouth 3 times daily. Eugenie Reyes ), Disp: 30 capsule, Rfl: 0    polyethylene glycol (GLYCOLAX) powder, Take 17 g by mouth daily

## 2018-10-08 ENCOUNTER — CARE COORDINATION (OUTPATIENT)
Dept: CASE MANAGEMENT | Age: 73
End: 2018-10-08

## 2018-10-08 RX ORDER — FLUTICASONE PROPIONATE 100 MCG
BLISTER, WITH INHALATION DEVICE INHALATION
Qty: 1 EACH | Refills: 5 | OUTPATIENT
Start: 2018-10-08

## 2018-10-09 ENCOUNTER — HOSPITAL ENCOUNTER (OUTPATIENT)
Age: 73
Setting detail: SPECIMEN
Discharge: HOME | End: 2018-10-09
Payer: MEDICARE

## 2018-10-09 LAB
ABSOLUTE EOS #: 0.1 K/UL (ref 0–0.4)
ABSOLUTE IMMATURE GRANULOCYTE: ABNORMAL K/UL (ref 0–0.3)
ABSOLUTE LYMPH #: 1.1 K/UL (ref 1–4.8)
ABSOLUTE MONO #: 0.7 K/UL (ref 0.2–0.8)
ALBUMIN SERPL-MCNC: 3.6 G/DL (ref 3.5–5.2)
ALBUMIN/GLOBULIN RATIO: ABNORMAL (ref 1–2.5)
ALP BLD-CCNC: 196 U/L (ref 40–129)
ALT SERPL-CCNC: 26 U/L (ref 5–41)
ANION GAP SERPL CALCULATED.3IONS-SCNC: 17 MMOL/L (ref 9–17)
AST SERPL-CCNC: 24 U/L
BASOPHILS # BLD: 0 % (ref 0–2)
BASOPHILS ABSOLUTE: 0 K/UL (ref 0–0.2)
BILIRUB SERPL-MCNC: 0.36 MG/DL (ref 0.3–1.2)
BUN BLDV-MCNC: 70 MG/DL (ref 8–23)
BUN/CREAT BLD: 9 (ref 9–20)
CALCIUM SERPL-MCNC: 9 MG/DL (ref 8.6–10.4)
CHLORIDE BLD-SCNC: 96 MMOL/L (ref 98–107)
CO2: 22 MMOL/L (ref 20–31)
CREAT SERPL-MCNC: 7.95 MG/DL (ref 0.7–1.2)
DIFFERENTIAL TYPE: ABNORMAL
EOSINOPHILS RELATIVE PERCENT: 1 % (ref 1–4)
GFR AFRICAN AMERICAN: 8 ML/MIN
GFR NON-AFRICAN AMERICAN: 7 ML/MIN
GFR SERPL CREATININE-BSD FRML MDRD: ABNORMAL ML/MIN/{1.73_M2}
GFR SERPL CREATININE-BSD FRML MDRD: ABNORMAL ML/MIN/{1.73_M2}
GLUCOSE BLD-MCNC: 215 MG/DL (ref 70–99)
HCT VFR BLD CALC: 30.5 % (ref 41–53)
HEMOGLOBIN: 10.2 G/DL (ref 13.5–17.5)
IMMATURE GRANULOCYTES: ABNORMAL %
LYMPHOCYTES # BLD: 10 % (ref 24–44)
MCH RBC QN AUTO: 32.4 PG (ref 26–34)
MCHC RBC AUTO-ENTMCNC: 33.4 G/DL (ref 31–37)
MCV RBC AUTO: 97.1 FL (ref 80–100)
MONOCYTES # BLD: 7 % (ref 1–7)
NRBC AUTOMATED: ABNORMAL PER 100 WBC
PDW BLD-RTO: 14.4 % (ref 11.5–14.5)
PLATELET # BLD: 193 K/UL (ref 130–400)
PLATELET ESTIMATE: ABNORMAL
PMV BLD AUTO: 9.4 FL (ref 6–12)
POTASSIUM SERPL-SCNC: 4.7 MMOL/L (ref 3.7–5.3)
RBC # BLD: 3.14 M/UL (ref 4.5–5.9)
RBC # BLD: ABNORMAL 10*6/UL
SEG NEUTROPHILS: 82 % (ref 36–66)
SEGMENTED NEUTROPHILS ABSOLUTE COUNT: 8.5 K/UL (ref 1.8–7.7)
SODIUM BLD-SCNC: 135 MMOL/L (ref 135–144)
TOTAL PROTEIN: 6.5 G/DL (ref 6.4–8.3)
WBC # BLD: 10.5 K/UL (ref 3.5–11)
WBC # BLD: ABNORMAL 10*3/UL

## 2018-10-09 PROCEDURE — P9603 ONE-WAY ALLOW PRORATED MILES: HCPCS

## 2018-10-09 PROCEDURE — 36415 COLL VENOUS BLD VENIPUNCTURE: CPT

## 2018-10-09 PROCEDURE — 80053 COMPREHEN METABOLIC PANEL: CPT

## 2018-10-09 PROCEDURE — 85025 COMPLETE CBC W/AUTO DIFF WBC: CPT

## 2018-10-15 ENCOUNTER — CARE COORDINATION (OUTPATIENT)
Dept: CASE MANAGEMENT | Age: 73
End: 2018-10-15

## 2018-10-22 ENCOUNTER — CARE COORDINATION (OUTPATIENT)
Dept: CASE MANAGEMENT | Age: 73
End: 2018-10-22

## 2018-10-22 NOTE — CARE COORDINATION
[facility] Update Call    10/22/2018    Patient: Rupert Prakash Patient : 1945   MRN: [id number]  Reason for Admission: There are no discharge diagnoses documented for the most recent discharge. Discharge Date: 18 RARS: Readmission Risk Score: 24       Care Transitions Post Acute Facility Update    Care Transitions Interventions  Post Acute Facility:   [facility] Acute Facility Update  Reported Nursing Issues:  Daily dressing changes to ulcer on foot changing to every 3 days. Wound measurements 0.3x.4x0.1        PT and OT stopped services. PT done 10/19/18. Requested a d/c date ASAP. This is not a justification for skilled services. Early Pearl to call back with date.

## 2018-10-24 ENCOUNTER — HOSPITAL ENCOUNTER (OUTPATIENT)
Age: 73
Setting detail: SPECIMEN
Discharge: HOME | End: 2018-10-24
Payer: MEDICARE

## 2018-10-24 LAB — URIC ACID: 3.6 MG/DL (ref 3.4–7)

## 2018-10-24 PROCEDURE — 36415 COLL VENOUS BLD VENIPUNCTURE: CPT

## 2018-10-24 PROCEDURE — P9603 ONE-WAY ALLOW PRORATED MILES: HCPCS

## 2018-10-24 PROCEDURE — 84550 ASSAY OF BLOOD/URIC ACID: CPT

## 2018-10-30 ENCOUNTER — CARE COORDINATION (OUTPATIENT)
Dept: CASE MANAGEMENT | Age: 73
End: 2018-10-30

## 2018-10-30 NOTE — CARE COORDINATION
[address] [facility] Update Call    10/30/2018    Patient: Anuj Moreira Patient : 1945   MRN: [id number]  Reason for Admission: There are no discharge diagnoses documented for the most recent discharge. Discharge Date: 18 RARS: Readmission Risk Score: 24       Care Transitions Post Acute Facility Update    Care Transitions Interventions  Post Acute Facility:  [address] [place], [place] [address]     Transferred to [address] [place] [address] within facility 10/27/18. Care Transitions Episode Closed.

## 2018-11-18 ENCOUNTER — HOSPITAL ENCOUNTER (INPATIENT)
Age: 73
LOS: 7 days | Discharge: HOME | DRG: 867 | End: 2018-11-25
Attending: EMERGENCY MEDICINE | Admitting: INTERNAL MEDICINE
Payer: MEDICARE

## 2018-11-18 DIAGNOSIS — L03.113 CELLULITIS OF FOREARM, RIGHT: Primary | ICD-10-CM

## 2018-11-18 DIAGNOSIS — D84.9 IMMUNOSUPPRESSION (HCC): ICD-10-CM

## 2018-11-18 DIAGNOSIS — Z87.39 HISTORY OF OSTEOMYELITIS: ICD-10-CM

## 2018-11-18 DIAGNOSIS — T82.9XXA COMPLICATION FROM RENAL DIALYSIS DEVICE, INITIAL ENCOUNTER: ICD-10-CM

## 2018-11-18 PROBLEM — L03.119 CELLULITIS OF FOREARM: Status: ACTIVE | Noted: 2018-11-18

## 2018-11-18 LAB
ALBUMIN SERPL-MCNC: 3.8 G/DL (ref 3.5–5.2)
ALBUMIN/GLOBULIN RATIO: ABNORMAL (ref 1–2.5)
ALP BLD-CCNC: 244 U/L (ref 40–129)
ALT SERPL-CCNC: 43 U/L (ref 5–41)
ANION GAP SERPL CALCULATED.3IONS-SCNC: 14 MMOL/L (ref 9–17)
AST SERPL-CCNC: 34 U/L
BILIRUB SERPL-MCNC: 0.35 MG/DL (ref 0.3–1.2)
BUN BLDV-MCNC: 41 MG/DL (ref 8–23)
BUN/CREAT BLD: ABNORMAL (ref 9–20)
CALCIUM SERPL-MCNC: 9.1 MG/DL (ref 8.6–10.4)
CHLORIDE BLD-SCNC: 93 MMOL/L (ref 98–107)
CO2: 27 MMOL/L (ref 20–31)
CREAT SERPL-MCNC: 5.73 MG/DL (ref 0.7–1.2)
GFR AFRICAN AMERICAN: 12 ML/MIN
GFR NON-AFRICAN AMERICAN: 10 ML/MIN
GFR SERPL CREATININE-BSD FRML MDRD: ABNORMAL ML/MIN/{1.73_M2}
GFR SERPL CREATININE-BSD FRML MDRD: ABNORMAL ML/MIN/{1.73_M2}
GLUCOSE BLD-MCNC: 182 MG/DL (ref 75–110)
GLUCOSE BLD-MCNC: 255 MG/DL (ref 70–99)
HCT VFR BLD CALC: 34.2 % (ref 41–53)
HEMOGLOBIN: 11.3 G/DL (ref 13.5–17.5)
MCH RBC QN AUTO: 32.8 PG (ref 26–34)
MCHC RBC AUTO-ENTMCNC: 33 G/DL (ref 31–37)
MCV RBC AUTO: 99.5 FL (ref 80–100)
NRBC AUTOMATED: ABNORMAL PER 100 WBC
PDW BLD-RTO: 14.9 % (ref 11.5–14.9)
PLATELET # BLD: 119 K/UL (ref 150–450)
PMV BLD AUTO: 9.6 FL (ref 6–12)
POTASSIUM SERPL-SCNC: 4.7 MMOL/L (ref 3.7–5.3)
RBC # BLD: 3.44 M/UL (ref 4.5–5.9)
SODIUM BLD-SCNC: 134 MMOL/L (ref 135–144)
TOTAL PROTEIN: 6.7 G/DL (ref 6.4–8.3)
WBC # BLD: 5 K/UL (ref 3.5–11)

## 2018-11-18 PROCEDURE — 2580000003 HC RX 258: Performed by: INTERNAL MEDICINE

## 2018-11-18 PROCEDURE — 85027 COMPLETE CBC AUTOMATED: CPT

## 2018-11-18 PROCEDURE — 1200000000 HC SEMI PRIVATE

## 2018-11-18 PROCEDURE — 99284 EMERGENCY DEPT VISIT MOD MDM: CPT

## 2018-11-18 PROCEDURE — 80053 COMPREHEN METABOLIC PANEL: CPT

## 2018-11-18 PROCEDURE — 36415 COLL VENOUS BLD VENIPUNCTURE: CPT

## 2018-11-18 PROCEDURE — 2580000003 HC RX 258: Performed by: EMERGENCY MEDICINE

## 2018-11-18 PROCEDURE — 82947 ASSAY GLUCOSE BLOOD QUANT: CPT

## 2018-11-18 PROCEDURE — 6370000000 HC RX 637 (ALT 250 FOR IP): Performed by: INTERNAL MEDICINE

## 2018-11-18 PROCEDURE — 6360000002 HC RX W HCPCS: Performed by: EMERGENCY MEDICINE

## 2018-11-18 RX ORDER — DEXTROSE MONOHYDRATE 25 G/50ML
12.5 INJECTION, SOLUTION INTRAVENOUS PRN
Status: DISCONTINUED | OUTPATIENT
Start: 2018-11-18 | End: 2018-11-25 | Stop reason: HOSPADM

## 2018-11-18 RX ORDER — DEXTROSE MONOHYDRATE 50 MG/ML
100 INJECTION, SOLUTION INTRAVENOUS PRN
Status: DISCONTINUED | OUTPATIENT
Start: 2018-11-18 | End: 2018-11-25 | Stop reason: HOSPADM

## 2018-11-18 RX ORDER — SODIUM CHLORIDE 0.9 % (FLUSH) 0.9 %
10 SYRINGE (ML) INJECTION EVERY 12 HOURS SCHEDULED
Status: DISCONTINUED | OUTPATIENT
Start: 2018-11-18 | End: 2018-11-25 | Stop reason: HOSPADM

## 2018-11-18 RX ORDER — NICOTINE POLACRILEX 4 MG
15 LOZENGE BUCCAL PRN
Status: DISCONTINUED | OUTPATIENT
Start: 2018-11-18 | End: 2018-11-25 | Stop reason: HOSPADM

## 2018-11-18 RX ORDER — HEPARIN SODIUM 5000 [USP'U]/ML
5000 INJECTION, SOLUTION INTRAVENOUS; SUBCUTANEOUS 2 TIMES DAILY
Status: DISCONTINUED | OUTPATIENT
Start: 2018-11-18 | End: 2018-11-25 | Stop reason: HOSPADM

## 2018-11-18 RX ORDER — AMLODIPINE BESYLATE 2.5 MG/1
2.5 TABLET ORAL ONCE
Status: COMPLETED | OUTPATIENT
Start: 2018-11-18 | End: 2018-11-18

## 2018-11-18 RX ORDER — SODIUM CHLORIDE 0.9 % (FLUSH) 0.9 %
10 SYRINGE (ML) INJECTION PRN
Status: DISCONTINUED | OUTPATIENT
Start: 2018-11-18 | End: 2018-11-25 | Stop reason: HOSPADM

## 2018-11-18 RX ORDER — HYDRALAZINE HYDROCHLORIDE 25 MG/1
25 TABLET, FILM COATED ORAL ONCE
Status: COMPLETED | OUTPATIENT
Start: 2018-11-18 | End: 2018-11-18

## 2018-11-18 RX ORDER — ACETAMINOPHEN 325 MG/1
650 TABLET ORAL EVERY 4 HOURS PRN
Status: DISCONTINUED | OUTPATIENT
Start: 2018-11-18 | End: 2018-11-25 | Stop reason: HOSPADM

## 2018-11-18 RX ADMIN — DEXTROSE MONOHYDRATE 1 G: 5 INJECTION INTRAVENOUS at 20:51

## 2018-11-18 RX ADMIN — Medication 10 ML: at 22:42

## 2018-11-18 RX ADMIN — AMLODIPINE BESYLATE 2.5 MG: 2.5 TABLET ORAL at 23:44

## 2018-11-18 RX ADMIN — HYDRALAZINE HYDROCHLORIDE 25 MG: 25 TABLET, FILM COATED ORAL at 23:44

## 2018-11-18 RX ADMIN — INSULIN LISPRO 1 UNITS: 100 INJECTION, SOLUTION INTRAVENOUS; SUBCUTANEOUS at 23:43

## 2018-11-18 ASSESSMENT — PAIN DESCRIPTION - PAIN TYPE: TYPE: CHRONIC PAIN

## 2018-11-18 ASSESSMENT — PAIN DESCRIPTION - DESCRIPTORS: DESCRIPTORS: SHARP

## 2018-11-18 ASSESSMENT — PAIN SCALES - GENERAL: PAINLEVEL_OUTOF10: 6

## 2018-11-18 ASSESSMENT — PAIN DESCRIPTION - LOCATION: LOCATION: SHOULDER

## 2018-11-18 ASSESSMENT — PAIN DESCRIPTION - FREQUENCY: FREQUENCY: CONTINUOUS

## 2018-11-18 ASSESSMENT — PAIN DESCRIPTION - ORIENTATION: ORIENTATION: LEFT

## 2018-11-18 NOTE — ED NOTES
Bed: 12  Expected date:   Expected time:   Means of arrival:   Comments:     Elvira Valero RN  11/18/18 0461

## 2018-11-19 ENCOUNTER — APPOINTMENT (OUTPATIENT)
Dept: INTERVENTIONAL RADIOLOGY/VASCULAR | Age: 73
DRG: 867 | End: 2018-11-19
Payer: MEDICARE

## 2018-11-19 LAB
ABSOLUTE EOS #: 0.6 K/UL (ref 0–0.4)
ABSOLUTE IMMATURE GRANULOCYTE: ABNORMAL K/UL (ref 0–0.3)
ABSOLUTE LYMPH #: 2 K/UL (ref 1–4.8)
ABSOLUTE MONO #: 0.7 K/UL (ref 0.1–1.3)
ANION GAP SERPL CALCULATED.3IONS-SCNC: 14 MMOL/L (ref 9–17)
BASOPHILS # BLD: 1 % (ref 0–2)
BASOPHILS ABSOLUTE: 0.1 K/UL (ref 0–0.2)
BUN BLDV-MCNC: 53 MG/DL (ref 8–23)
BUN/CREAT BLD: ABNORMAL (ref 9–20)
CALCIUM SERPL-MCNC: 8.9 MG/DL (ref 8.6–10.4)
CHLORIDE BLD-SCNC: 98 MMOL/L (ref 98–107)
CO2: 26 MMOL/L (ref 20–31)
CREAT SERPL-MCNC: 6.99 MG/DL (ref 0.7–1.2)
DIFFERENTIAL TYPE: ABNORMAL
EOSINOPHILS RELATIVE PERCENT: 10 % (ref 0–4)
GFR AFRICAN AMERICAN: 9 ML/MIN
GFR NON-AFRICAN AMERICAN: 8 ML/MIN
GFR SERPL CREATININE-BSD FRML MDRD: ABNORMAL ML/MIN/{1.73_M2}
GFR SERPL CREATININE-BSD FRML MDRD: ABNORMAL ML/MIN/{1.73_M2}
GLUCOSE BLD-MCNC: 123 MG/DL (ref 70–99)
GLUCOSE BLD-MCNC: 131 MG/DL (ref 75–110)
GLUCOSE BLD-MCNC: 136 MG/DL (ref 75–110)
GLUCOSE BLD-MCNC: 152 MG/DL (ref 75–110)
GLUCOSE BLD-MCNC: 250 MG/DL (ref 75–110)
HBV SURFACE AB TITR SER: 13.13 MIU/ML
HCT VFR BLD CALC: 32.5 % (ref 41–53)
HEMOGLOBIN: 10.6 G/DL (ref 13.5–17.5)
HEPATITIS B SURFACE ANTIGEN: NONREACTIVE
IMMATURE GRANULOCYTES: ABNORMAL %
INR BLD: 1
LYMPHOCYTES # BLD: 31 % (ref 24–44)
MCH RBC QN AUTO: 32.4 PG (ref 26–34)
MCHC RBC AUTO-ENTMCNC: 32.6 G/DL (ref 31–37)
MCV RBC AUTO: 99.2 FL (ref 80–100)
MONOCYTES # BLD: 11 % (ref 1–7)
NRBC AUTOMATED: ABNORMAL PER 100 WBC
PARTIAL THROMBOPLASTIN TIME: 34.3 SEC (ref 23–31)
PDW BLD-RTO: 14.8 % (ref 11.5–14.9)
PLATELET # BLD: 115 K/UL (ref 150–450)
PLATELET ESTIMATE: ABNORMAL
PMV BLD AUTO: 9.4 FL (ref 6–12)
POTASSIUM SERPL-SCNC: 4.5 MMOL/L (ref 3.7–5.3)
POTASSIUM SERPL-SCNC: 5.4 MMOL/L (ref 3.7–5.3)
PROTHROMBIN TIME: 10.7 SEC (ref 9.7–12)
RBC # BLD: 3.28 M/UL (ref 4.5–5.9)
RBC # BLD: ABNORMAL 10*6/UL
SEG NEUTROPHILS: 47 % (ref 36–66)
SEGMENTED NEUTROPHILS ABSOLUTE COUNT: 3.1 K/UL (ref 1.3–9.1)
SODIUM BLD-SCNC: 138 MMOL/L (ref 135–144)
VANCOMYCIN RANDOM DATE LAST DOSE: NORMAL
VANCOMYCIN RANDOM DOSE AMOUNT: NORMAL
VANCOMYCIN RANDOM TIME LAST DOSE: NORMAL
VANCOMYCIN RANDOM: 5.3 UG/ML
WBC # BLD: 6.5 K/UL (ref 3.5–11)
WBC # BLD: ABNORMAL 10*3/UL

## 2018-11-19 PROCEDURE — 80048 BASIC METABOLIC PNL TOTAL CA: CPT

## 2018-11-19 PROCEDURE — 90937 HEMODIALYSIS REPEATED EVAL: CPT

## 2018-11-19 PROCEDURE — 2580000003 HC RX 258: Performed by: INTERNAL MEDICINE

## 2018-11-19 PROCEDURE — 36556 INSERT NON-TUNNEL CV CATH: CPT | Performed by: RADIOLOGY

## 2018-11-19 PROCEDURE — 6360000002 HC RX W HCPCS: Performed by: EMERGENCY MEDICINE

## 2018-11-19 PROCEDURE — 99222 1ST HOSP IP/OBS MODERATE 55: CPT | Performed by: INTERNAL MEDICINE

## 2018-11-19 PROCEDURE — 77001 FLUOROGUIDE FOR VEIN DEVICE: CPT | Performed by: RADIOLOGY

## 2018-11-19 PROCEDURE — 2709999900 IR NONTUNNELED VASCULAR CATHETER > 5 YEARS

## 2018-11-19 PROCEDURE — 2500000003 HC RX 250 WO HCPCS: Performed by: RADIOLOGY

## 2018-11-19 PROCEDURE — 5A1D70Z PERFORMANCE OF URINARY FILTRATION, INTERMITTENT, LESS THAN 6 HOURS PER DAY: ICD-10-PCS | Performed by: INTERNAL MEDICINE

## 2018-11-19 PROCEDURE — 6370000000 HC RX 637 (ALT 250 FOR IP): Performed by: INTERNAL MEDICINE

## 2018-11-19 PROCEDURE — 82947 ASSAY GLUCOSE BLOOD QUANT: CPT

## 2018-11-19 PROCEDURE — 85610 PROTHROMBIN TIME: CPT

## 2018-11-19 PROCEDURE — 1200000000 HC SEMI PRIVATE

## 2018-11-19 PROCEDURE — 84132 ASSAY OF SERUM POTASSIUM: CPT

## 2018-11-19 PROCEDURE — 02H633Z INSERTION OF INFUSION DEVICE INTO RIGHT ATRIUM, PERCUTANEOUS APPROACH: ICD-10-PCS | Performed by: RADIOLOGY

## 2018-11-19 PROCEDURE — 76937 US GUIDE VASCULAR ACCESS: CPT | Performed by: RADIOLOGY

## 2018-11-19 PROCEDURE — 36415 COLL VENOUS BLD VENIPUNCTURE: CPT

## 2018-11-19 PROCEDURE — 87340 HEPATITIS B SURFACE AG IA: CPT

## 2018-11-19 PROCEDURE — 6360000002 HC RX W HCPCS: Performed by: RADIOLOGY

## 2018-11-19 PROCEDURE — 86317 IMMUNOASSAY INFECTIOUS AGENT: CPT

## 2018-11-19 PROCEDURE — 6360000002 HC RX W HCPCS: Performed by: INTERNAL MEDICINE

## 2018-11-19 PROCEDURE — 85025 COMPLETE CBC W/AUTO DIFF WBC: CPT

## 2018-11-19 PROCEDURE — 99223 1ST HOSP IP/OBS HIGH 75: CPT | Performed by: INTERNAL MEDICINE

## 2018-11-19 PROCEDURE — 80202 ASSAY OF VANCOMYCIN: CPT

## 2018-11-19 PROCEDURE — 85730 THROMBOPLASTIN TIME PARTIAL: CPT

## 2018-11-19 PROCEDURE — 87040 BLOOD CULTURE FOR BACTERIA: CPT

## 2018-11-19 PROCEDURE — 86022 PLATELET ANTIBODIES: CPT

## 2018-11-19 RX ORDER — CARBOXYMETHYLCELLULOSE SODIUM 5 MG/ML
1 SOLUTION/ DROPS OPHTHALMIC 3 TIMES DAILY PRN
Status: DISCONTINUED | OUTPATIENT
Start: 2018-11-19 | End: 2018-11-25 | Stop reason: HOSPADM

## 2018-11-19 RX ORDER — OXYCODONE AND ACETAMINOPHEN 7.5; 325 MG/1; MG/1
1 TABLET ORAL EVERY 6 HOURS PRN
Status: ON HOLD | COMMUNITY
End: 2018-11-24 | Stop reason: HOSPADM

## 2018-11-19 RX ORDER — CLOPIDOGREL BISULFATE 75 MG/1
75 TABLET ORAL EVERY EVENING
Status: DISCONTINUED | OUTPATIENT
Start: 2018-11-19 | End: 2018-11-25 | Stop reason: HOSPADM

## 2018-11-19 RX ORDER — SENNA AND DOCUSATE SODIUM 50; 8.6 MG/1; MG/1
2 TABLET, FILM COATED ORAL DAILY
Status: DISCONTINUED | OUTPATIENT
Start: 2018-11-19 | End: 2018-11-25 | Stop reason: HOSPADM

## 2018-11-19 RX ORDER — HEPARIN SODIUM 5000 [USP'U]/ML
INJECTION, SOLUTION INTRAVENOUS; SUBCUTANEOUS
Status: COMPLETED | OUTPATIENT
Start: 2018-11-19 | End: 2018-11-19

## 2018-11-19 RX ORDER — OXYCODONE AND ACETAMINOPHEN 7.5; 325 MG/1; MG/1
1 TABLET ORAL EVERY 6 HOURS PRN
Status: DISCONTINUED | OUTPATIENT
Start: 2018-11-19 | End: 2018-11-19 | Stop reason: CLARIF

## 2018-11-19 RX ORDER — HYDROMORPHONE HYDROCHLORIDE 2 MG/1
2 TABLET ORAL EVERY 12 HOURS PRN
Status: ON HOLD | COMMUNITY
End: 2018-11-24 | Stop reason: HOSPADM

## 2018-11-19 RX ORDER — AMLODIPINE BESYLATE 5 MG/1
5 TABLET ORAL DAILY
Status: DISCONTINUED | OUTPATIENT
Start: 2018-11-19 | End: 2018-11-25 | Stop reason: HOSPADM

## 2018-11-19 RX ORDER — ALLOPURINOL 100 MG/1
100 TABLET ORAL DAILY
COMMUNITY

## 2018-11-19 RX ORDER — MIDODRINE HYDROCHLORIDE 5 MG/1
5 TABLET ORAL
Status: ON HOLD | COMMUNITY
End: 2020-01-01

## 2018-11-19 RX ORDER — METOPROLOL SUCCINATE 25 MG/1
25 TABLET, EXTENDED RELEASE ORAL DAILY
Status: DISCONTINUED | OUTPATIENT
Start: 2018-11-19 | End: 2018-11-25 | Stop reason: HOSPADM

## 2018-11-19 RX ORDER — SENNA PLUS 8.6 MG/1
2 TABLET ORAL DAILY PRN
Status: DISCONTINUED | OUTPATIENT
Start: 2018-11-19 | End: 2018-11-25 | Stop reason: HOSPADM

## 2018-11-19 RX ORDER — INSULIN GLARGINE 100 [IU]/ML
6 INJECTION, SOLUTION SUBCUTANEOUS NIGHTLY
Status: DISCONTINUED | OUTPATIENT
Start: 2018-11-19 | End: 2018-11-25 | Stop reason: HOSPADM

## 2018-11-19 RX ORDER — OXYCODONE HYDROCHLORIDE AND ACETAMINOPHEN 5; 325 MG/1; MG/1
1 TABLET ORAL EVERY 6 HOURS PRN
Status: DISCONTINUED | OUTPATIENT
Start: 2018-11-19 | End: 2018-11-25 | Stop reason: HOSPADM

## 2018-11-19 RX ORDER — ALLOPURINOL 100 MG/1
100 TABLET ORAL DAILY
Status: DISCONTINUED | OUTPATIENT
Start: 2018-11-19 | End: 2018-11-25 | Stop reason: HOSPADM

## 2018-11-19 RX ORDER — VIT B COMP NO.3/FOLIC/C/BIOTIN 1 MG-60 MG
1 TABLET ORAL DAILY
Status: DISCONTINUED | OUTPATIENT
Start: 2018-11-19 | End: 2018-11-25 | Stop reason: HOSPADM

## 2018-11-19 RX ORDER — POLYETHYLENE GLYCOL 3350 17 G/17G
17 POWDER, FOR SOLUTION ORAL DAILY PRN
Status: DISCONTINUED | OUTPATIENT
Start: 2018-11-19 | End: 2018-11-25 | Stop reason: HOSPADM

## 2018-11-19 RX ORDER — DOXYCYCLINE 100 MG/1
100 CAPSULE ORAL 2 TIMES DAILY
Status: DISCONTINUED | OUTPATIENT
Start: 2018-11-19 | End: 2018-11-21

## 2018-11-19 RX ORDER — CINACALCET 30 MG/1
30 TABLET, FILM COATED ORAL DAILY
Status: ON HOLD | COMMUNITY
End: 2019-01-25 | Stop reason: ALTCHOICE

## 2018-11-19 RX ORDER — SUCRALFATE 1 G/1
1 TABLET ORAL
Status: DISCONTINUED | OUTPATIENT
Start: 2018-11-19 | End: 2018-11-25 | Stop reason: HOSPADM

## 2018-11-19 RX ORDER — LACTOSE-REDUCED FOOD/FIBER
8 LIQUID (ML) ORAL NIGHTLY
Status: DISCONTINUED | OUTPATIENT
Start: 2018-11-19 | End: 2018-11-19 | Stop reason: CLARIF

## 2018-11-19 RX ORDER — MIDODRINE HYDROCHLORIDE 5 MG/1
5 TABLET ORAL 3 TIMES DAILY
Status: DISCONTINUED | OUTPATIENT
Start: 2018-11-19 | End: 2018-11-25 | Stop reason: HOSPADM

## 2018-11-19 RX ORDER — LIDOCAINE HYDROCHLORIDE 10 MG/ML
INJECTION, SOLUTION INFILTRATION; PERINEURAL
Status: COMPLETED | OUTPATIENT
Start: 2018-11-19 | End: 2018-11-19

## 2018-11-19 RX ORDER — OXYCODONE HYDROCHLORIDE 5 MG/1
2.5 TABLET ORAL EVERY 6 HOURS PRN
Status: DISCONTINUED | OUTPATIENT
Start: 2018-11-19 | End: 2018-11-25 | Stop reason: HOSPADM

## 2018-11-19 RX ORDER — CALCIUM CARBONATE 200(500)MG
2 TABLET,CHEWABLE ORAL EVERY 6 HOURS PRN
Status: DISCONTINUED | OUTPATIENT
Start: 2018-11-19 | End: 2018-11-25 | Stop reason: HOSPADM

## 2018-11-19 RX ORDER — HYDRALAZINE HYDROCHLORIDE 25 MG/1
25 TABLET, FILM COATED ORAL 2 TIMES DAILY
Status: DISCONTINUED | OUTPATIENT
Start: 2018-11-19 | End: 2018-11-25 | Stop reason: HOSPADM

## 2018-11-19 RX ORDER — SEVELAMER CARBONATE 800 MG/1
3200 TABLET, FILM COATED ORAL
Status: DISCONTINUED | OUTPATIENT
Start: 2018-11-19 | End: 2018-11-25 | Stop reason: HOSPADM

## 2018-11-19 RX ORDER — FLUTICASONE PROPIONATE 110 UG/1
2 AEROSOL, METERED RESPIRATORY (INHALATION) 2 TIMES DAILY
Status: DISCONTINUED | OUTPATIENT
Start: 2018-11-19 | End: 2018-11-25 | Stop reason: HOSPADM

## 2018-11-19 RX ORDER — SENNA PLUS 8.6 MG/1
2 TABLET ORAL DAILY PRN
Status: ON HOLD | COMMUNITY
End: 2020-01-01

## 2018-11-19 RX ORDER — CINACALCET 30 MG/1
30 TABLET, FILM COATED ORAL DAILY
Status: DISCONTINUED | OUTPATIENT
Start: 2018-11-19 | End: 2018-11-25 | Stop reason: HOSPADM

## 2018-11-19 RX ORDER — HYDROMORPHONE HYDROCHLORIDE 2 MG/1
2 TABLET ORAL EVERY 12 HOURS PRN
Status: DISCONTINUED | OUTPATIENT
Start: 2018-11-19 | End: 2018-11-25 | Stop reason: HOSPADM

## 2018-11-19 RX ORDER — GABAPENTIN 100 MG/1
200 CAPSULE ORAL 3 TIMES DAILY
Status: DISCONTINUED | OUTPATIENT
Start: 2018-11-19 | End: 2018-11-25 | Stop reason: HOSPADM

## 2018-11-19 RX ORDER — ASPIRIN 81 MG/1
81 TABLET ORAL EVERY MORNING
Status: DISCONTINUED | OUTPATIENT
Start: 2018-11-20 | End: 2018-11-25 | Stop reason: HOSPADM

## 2018-11-19 RX ADMIN — HEPARIN SODIUM 1.3 ML: 5000 INJECTION, SOLUTION INTRAVENOUS; SUBCUTANEOUS at 14:07

## 2018-11-19 RX ADMIN — GABAPENTIN 200 MG: 100 CAPSULE ORAL at 15:01

## 2018-11-19 RX ADMIN — LIDOCAINE HYDROCHLORIDE 2 ML: 10 INJECTION, SOLUTION INFILTRATION; PERINEURAL at 13:49

## 2018-11-19 RX ADMIN — DOXYCYCLINE 100 MG: 100 CAPSULE ORAL at 15:01

## 2018-11-19 RX ADMIN — HYDRALAZINE HYDROCHLORIDE 25 MG: 25 TABLET, FILM COATED ORAL at 15:02

## 2018-11-19 RX ADMIN — Medication 1 TABLET: at 15:01

## 2018-11-19 RX ADMIN — ALLOPURINOL 100 MG: 100 TABLET ORAL at 15:01

## 2018-11-19 RX ADMIN — AMLODIPINE BESYLATE 5 MG: 5 TABLET ORAL at 15:01

## 2018-11-19 RX ADMIN — INSULIN GLARGINE 6 UNITS: 100 INJECTION, SOLUTION SUBCUTANEOUS at 21:19

## 2018-11-19 RX ADMIN — Medication 2 PUFF: at 21:20

## 2018-11-19 RX ADMIN — TIOTROPIUM BROMIDE 18 MCG: 18 CAPSULE ORAL; RESPIRATORY (INHALATION) at 16:25

## 2018-11-19 RX ADMIN — HEPARIN SODIUM 5000 UNITS: 5000 INJECTION, SOLUTION INTRAVENOUS; SUBCUTANEOUS at 21:20

## 2018-11-19 RX ADMIN — METOPROLOL SUCCINATE 25 MG: 25 TABLET, EXTENDED RELEASE ORAL at 15:04

## 2018-11-19 RX ADMIN — DOXYCYCLINE 100 MG: 100 CAPSULE ORAL at 21:20

## 2018-11-19 RX ADMIN — SEVELAMER CARBONATE 3200 MG: 800 TABLET, FILM COATED ORAL at 16:24

## 2018-11-19 RX ADMIN — VITAMIN D, TAB 1000IU (100/BT) 1000 UNITS: 25 TAB at 15:01

## 2018-11-19 RX ADMIN — SERTRALINE HYDROCHLORIDE 50 MG: 50 TABLET ORAL at 15:01

## 2018-11-19 RX ADMIN — Medication 10 ML: at 21:26

## 2018-11-19 RX ADMIN — INSULIN LISPRO 3 UNITS: 100 INJECTION, SOLUTION INTRAVENOUS; SUBCUTANEOUS at 21:19

## 2018-11-19 RX ADMIN — MIDODRINE HYDROCHLORIDE 5 MG: 5 TABLET ORAL at 15:04

## 2018-11-19 RX ADMIN — HEPARIN SODIUM 1.2 ML: 5000 INJECTION, SOLUTION INTRAVENOUS; SUBCUTANEOUS at 14:08

## 2018-11-19 RX ADMIN — CINACALCET HYDROCHLORIDE 30 MG: 30 TABLET, COATED ORAL at 16:25

## 2018-11-19 RX ADMIN — HYDRALAZINE HYDROCHLORIDE 25 MG: 25 TABLET, FILM COATED ORAL at 21:26

## 2018-11-19 RX ADMIN — SENNOSIDES AND DOCUSATE SODIUM 2 TABLET: 8.6; 5 TABLET ORAL at 15:09

## 2018-11-19 RX ADMIN — SUCRALFATE 1 G: 1 TABLET ORAL at 15:01

## 2018-11-19 RX ADMIN — CLOPIDOGREL BISULFATE 75 MG: 75 TABLET ORAL at 17:47

## 2018-11-19 RX ADMIN — VANCOMYCIN HYDROCHLORIDE 1000 MG: 1 INJECTION, POWDER, LYOPHILIZED, FOR SOLUTION INTRAVENOUS at 13:39

## 2018-11-19 RX ADMIN — GABAPENTIN 200 MG: 100 CAPSULE ORAL at 21:20

## 2018-11-19 RX ADMIN — MIDODRINE HYDROCHLORIDE 5 MG: 5 TABLET ORAL at 21:20

## 2018-11-19 ASSESSMENT — PAIN SCALES - GENERAL: PAINLEVEL_OUTOF10: 0

## 2018-11-19 NOTE — FLOWSHEET NOTE
11/18/18 2316   Provider Notification   Reason for Communication New orders   Provider Name Dr. Gladys James    Provider Notification Physician   Method of Communication Call   Response See orders   Notification Time 2316   RN spoke with Dr. Gladys James regarding the pt having type 2 Diabetes and not having any insulin ordered. RN explained that pts blood sugar was 255 in the ER and 182 here. Physician stated to order a medium dose sliding scale. RN also explained that the pts BP was 158/76 and that he takes Norvasc 2.5 mg and Hydralazine 25 mg, physician stated to order both for a one time dose to be given now.  Arcelia stated to have pharmacy dose the Vancomycin and to start that tomorrow morning and to put a consult in for Infectious Disease

## 2018-11-19 NOTE — PROGRESS NOTES
lb (78 kg), % Ideal Body 99%  · BMI Classification: BMI 18.5 - 24.9 Normal Weight    Nutrition Interventions:   Continue current diet, Start ONS  Continued Inpatient Monitoring    Nutrition Evaluation:   · Evaluation: Goals set   · Goals: PO intake % of meals and supplements to meet estimated nutrition needs.      · Monitoring: Meal Intake, Supplement Intake, Diet Tolerance, Wound Healing, Weight, Pertinent Labs, Monitor Hemodynamic Status      Ambrosio Kebede RD, LD  Office phone [phone]

## 2018-11-19 NOTE — PROGRESS NOTES
Dialysis Post Treatment Report:     -Access Assessment  access Dr. Maryam darby.      -Ultrafiltration   UF Goal 1000   Prime (-)    NS Flush (-)    Other (-/+)    Total UF Removed 500     -Medications / Blood Administration  Medications Given (Y/N) n   Blood Products (Y/N) n     Narrative:  Pt. to be picked up from dialysis by IR, to place temp. Cath. Pt. tolerated treatment well.

## 2018-11-19 NOTE — PLAN OF CARE
Problem: Nutrition  Goal: Optimal nutrition therapy  Outcome: Ongoing  Nutrition Problem: Increased nutrient needs  Intervention: Food and/or Nutrient Delivery: Continue current diet, Start ONS  Nutritional Goals: PO intake % of meals and supplements to meet estimated nutrition needs.

## 2018-11-19 NOTE — PROGRESS NOTES
hospital dialysis schedule. Other admission notes that there are both nephrology and infectious disease consults for 11/19. A random vancomycin level has been ordered for morning on 11/19. Subsequent vancomycin dosing by pharmacy will follow lab results. Thank you for the consult. Will continue to follow.   Michael Eller RPh  11/19/2018  12:49 AM

## 2018-11-20 PROBLEM — L03.113 CELLULITIS OF FOREARM, RIGHT: Status: ACTIVE | Noted: 2018-11-18

## 2018-11-20 LAB
ABSOLUTE EOS #: 0.6 K/UL (ref 0–0.4)
ABSOLUTE IMMATURE GRANULOCYTE: ABNORMAL K/UL (ref 0–0.3)
ABSOLUTE LYMPH #: 1.6 K/UL (ref 1–4.8)
ABSOLUTE MONO #: 0.6 K/UL (ref 0.1–1.3)
ANION GAP SERPL CALCULATED.3IONS-SCNC: 13 MMOL/L (ref 9–17)
BASOPHILS # BLD: 1 % (ref 0–2)
BASOPHILS ABSOLUTE: 0.1 K/UL (ref 0–0.2)
BUN BLDV-MCNC: 58 MG/DL (ref 8–23)
BUN/CREAT BLD: ABNORMAL (ref 9–20)
CALCIUM SERPL-MCNC: 8.7 MG/DL (ref 8.6–10.4)
CHLORIDE BLD-SCNC: 97 MMOL/L (ref 98–107)
CO2: 26 MMOL/L (ref 20–31)
CREAT SERPL-MCNC: 7.37 MG/DL (ref 0.7–1.2)
DIFFERENTIAL TYPE: ABNORMAL
EOSINOPHILS RELATIVE PERCENT: 10 % (ref 0–4)
GFR AFRICAN AMERICAN: 9 ML/MIN
GFR NON-AFRICAN AMERICAN: 7 ML/MIN
GFR SERPL CREATININE-BSD FRML MDRD: ABNORMAL ML/MIN/{1.73_M2}
GFR SERPL CREATININE-BSD FRML MDRD: ABNORMAL ML/MIN/{1.73_M2}
GLUCOSE BLD-MCNC: 101 MG/DL (ref 70–99)
GLUCOSE BLD-MCNC: 180 MG/DL (ref 75–110)
GLUCOSE BLD-MCNC: 215 MG/DL (ref 75–110)
GLUCOSE BLD-MCNC: 89 MG/DL (ref 75–110)
HBV SURFACE AB TITR SER: 11.63 MIU/ML
HCT VFR BLD CALC: 33 % (ref 41–53)
HEMOGLOBIN: 11.1 G/DL (ref 13.5–17.5)
HEPARIN INDUCED PLATELET ANTIBODY: 0.19 O.D. (ref 0–0.4)
IMMATURE GRANULOCYTES: ABNORMAL %
LYMPHOCYTES # BLD: 25 % (ref 24–44)
MCH RBC QN AUTO: 33.2 PG (ref 26–34)
MCHC RBC AUTO-ENTMCNC: 33.6 G/DL (ref 31–37)
MCV RBC AUTO: 98.7 FL (ref 80–100)
MONOCYTES # BLD: 10 % (ref 1–7)
NRBC AUTOMATED: ABNORMAL PER 100 WBC
PDW BLD-RTO: 15.1 % (ref 11.5–14.9)
PHOSPHORUS: 4.1 MG/DL (ref 2.5–4.5)
PLATELET # BLD: 120 K/UL (ref 150–450)
PLATELET ESTIMATE: ABNORMAL
PMV BLD AUTO: 9.4 FL (ref 6–12)
POTASSIUM SERPL-SCNC: 4.5 MMOL/L (ref 3.7–5.3)
POTASSIUM SERPL-SCNC: 5.7 MMOL/L (ref 3.7–5.3)
RBC # BLD: 3.35 M/UL (ref 4.5–5.9)
RBC # BLD: ABNORMAL 10*6/UL
SEG NEUTROPHILS: 54 % (ref 36–66)
SEGMENTED NEUTROPHILS ABSOLUTE COUNT: 3.4 K/UL (ref 1.3–9.1)
SODIUM BLD-SCNC: 136 MMOL/L (ref 135–144)
WBC # BLD: 6.2 K/UL (ref 3.5–11)
WBC # BLD: ABNORMAL 10*3/UL

## 2018-11-20 PROCEDURE — 99232 SBSQ HOSP IP/OBS MODERATE 35: CPT | Performed by: INTERNAL MEDICINE

## 2018-11-20 PROCEDURE — 6360000002 HC RX W HCPCS: Performed by: EMERGENCY MEDICINE

## 2018-11-20 PROCEDURE — 2580000003 HC RX 258: Performed by: INTERNAL MEDICINE

## 2018-11-20 PROCEDURE — 80048 BASIC METABOLIC PNL TOTAL CA: CPT

## 2018-11-20 PROCEDURE — 86317 IMMUNOASSAY INFECTIOUS AGENT: CPT

## 2018-11-20 PROCEDURE — 1200000000 HC SEMI PRIVATE

## 2018-11-20 PROCEDURE — 6360000002 HC RX W HCPCS: Performed by: INTERNAL MEDICINE

## 2018-11-20 PROCEDURE — 84132 ASSAY OF SERUM POTASSIUM: CPT

## 2018-11-20 PROCEDURE — 82947 ASSAY GLUCOSE BLOOD QUANT: CPT

## 2018-11-20 PROCEDURE — 85025 COMPLETE CBC W/AUTO DIFF WBC: CPT

## 2018-11-20 PROCEDURE — 99233 SBSQ HOSP IP/OBS HIGH 50: CPT | Performed by: INTERNAL MEDICINE

## 2018-11-20 PROCEDURE — 84100 ASSAY OF PHOSPHORUS: CPT

## 2018-11-20 PROCEDURE — 90937 HEMODIALYSIS REPEATED EVAL: CPT

## 2018-11-20 PROCEDURE — 36415 COLL VENOUS BLD VENIPUNCTURE: CPT

## 2018-11-20 PROCEDURE — 6370000000 HC RX 637 (ALT 250 FOR IP): Performed by: INTERNAL MEDICINE

## 2018-11-20 RX ORDER — HEPARIN SODIUM 1000 [USP'U]/ML
3300 INJECTION, SOLUTION INTRAVENOUS; SUBCUTANEOUS PRN
Status: DISCONTINUED | OUTPATIENT
Start: 2018-11-20 | End: 2018-11-25 | Stop reason: HOSPADM

## 2018-11-20 RX ADMIN — HEPARIN SODIUM 5000 UNITS: 5000 INJECTION, SOLUTION INTRAVENOUS; SUBCUTANEOUS at 08:46

## 2018-11-20 RX ADMIN — INSULIN LISPRO 1 UNITS: 100 INJECTION, SOLUTION INTRAVENOUS; SUBCUTANEOUS at 21:16

## 2018-11-20 RX ADMIN — ASPIRIN 81 MG: 81 TABLET, COATED ORAL at 08:46

## 2018-11-20 RX ADMIN — SUCRALFATE 1 G: 1 TABLET ORAL at 12:18

## 2018-11-20 RX ADMIN — CINACALCET HYDROCHLORIDE 30 MG: 30 TABLET, COATED ORAL at 08:44

## 2018-11-20 RX ADMIN — DOXYCYCLINE 100 MG: 100 CAPSULE ORAL at 08:44

## 2018-11-20 RX ADMIN — Medication 10 ML: at 21:26

## 2018-11-20 RX ADMIN — Medication 2 PUFF: at 21:23

## 2018-11-20 RX ADMIN — SUCRALFATE 1 G: 1 TABLET ORAL at 05:54

## 2018-11-20 RX ADMIN — Medication 1 TABLET: at 08:45

## 2018-11-20 RX ADMIN — ALLOPURINOL 100 MG: 100 TABLET ORAL at 08:44

## 2018-11-20 RX ADMIN — METOPROLOL SUCCINATE 25 MG: 25 TABLET, EXTENDED RELEASE ORAL at 08:46

## 2018-11-20 RX ADMIN — SENNOSIDES AND DOCUSATE SODIUM 2 TABLET: 8.6; 5 TABLET ORAL at 08:46

## 2018-11-20 RX ADMIN — MIDODRINE HYDROCHLORIDE 5 MG: 5 TABLET ORAL at 12:18

## 2018-11-20 RX ADMIN — Medication 2 PUFF: at 08:46

## 2018-11-20 RX ADMIN — AMLODIPINE BESYLATE 5 MG: 5 TABLET ORAL at 08:44

## 2018-11-20 RX ADMIN — DOXYCYCLINE 100 MG: 100 CAPSULE ORAL at 21:23

## 2018-11-20 RX ADMIN — HYDRALAZINE HYDROCHLORIDE 25 MG: 25 TABLET, FILM COATED ORAL at 21:22

## 2018-11-20 RX ADMIN — INSULIN LISPRO 4 UNITS: 100 INJECTION, SOLUTION INTRAVENOUS; SUBCUTANEOUS at 12:17

## 2018-11-20 RX ADMIN — SEVELAMER CARBONATE 3200 MG: 800 TABLET, FILM COATED ORAL at 12:18

## 2018-11-20 RX ADMIN — SEVELAMER CARBONATE 3200 MG: 800 TABLET, FILM COATED ORAL at 08:45

## 2018-11-20 RX ADMIN — SERTRALINE HYDROCHLORIDE 50 MG: 50 TABLET ORAL at 08:44

## 2018-11-20 RX ADMIN — INSULIN GLARGINE 6 UNITS: 100 INJECTION, SOLUTION SUBCUTANEOUS at 21:16

## 2018-11-20 RX ADMIN — MIDODRINE HYDROCHLORIDE 5 MG: 5 TABLET ORAL at 08:46

## 2018-11-20 RX ADMIN — Medication 10 ML: at 08:48

## 2018-11-20 RX ADMIN — VITAMIN D, TAB 1000IU (100/BT) 1000 UNITS: 25 TAB at 08:46

## 2018-11-20 RX ADMIN — SUCRALFATE 1 G: 1 TABLET ORAL at 18:48

## 2018-11-20 RX ADMIN — HEPARIN SODIUM 3300 UNITS: 1000 INJECTION, SOLUTION INTRAVENOUS; SUBCUTANEOUS at 18:24

## 2018-11-20 RX ADMIN — MIDODRINE HYDROCHLORIDE 5 MG: 5 TABLET ORAL at 18:48

## 2018-11-20 RX ADMIN — HEPARIN SODIUM 5000 UNITS: 5000 INJECTION, SOLUTION INTRAVENOUS; SUBCUTANEOUS at 21:17

## 2018-11-20 RX ADMIN — SEVELAMER CARBONATE 3200 MG: 800 TABLET, FILM COATED ORAL at 18:47

## 2018-11-20 RX ADMIN — HYDRALAZINE HYDROCHLORIDE 25 MG: 25 TABLET, FILM COATED ORAL at 08:45

## 2018-11-20 RX ADMIN — CLOPIDOGREL BISULFATE 75 MG: 75 TABLET ORAL at 18:48

## 2018-11-20 RX ADMIN — GABAPENTIN 200 MG: 100 CAPSULE ORAL at 21:22

## 2018-11-20 RX ADMIN — TIOTROPIUM BROMIDE 18 MCG: 18 CAPSULE ORAL; RESPIRATORY (INHALATION) at 08:46

## 2018-11-20 RX ADMIN — GABAPENTIN 200 MG: 100 CAPSULE ORAL at 08:44

## 2018-11-20 ASSESSMENT — PAIN SCALES - GENERAL
PAINLEVEL_OUTOF10: 0

## 2018-11-20 NOTE — DISCHARGE INSTR - COC
ml/min N18.4    Decubitus ulcer L89.90    Osteomyelitis of lumbar spine ([facility]) M46.26    Lumbar discitis M46.46    Discitis of lumbar region M46.46    ESRD (end stage renal disease) ([facility] N18.6    Protein-calorie malnutrition ([facility]) E46    Thrombocytopenia ([facility]) D69.6    Perianal abscess K61.0    Diabetes mellitus ([facility] E11.9    SIRS (systemic inflammatory response syndrome) ([facility]) R65.10    Sepsis ([facility]) A41.9    Abscess of anal or rectal region K61.2    Pneumonia of left lower lobe due to infectious organism ([facility] J18.1    Acute exacerbation of chronic obstructive airways disease ([facility]) J44.1    Hyponatremia E87.1    Cellulitis of forearm L03.119       Isolation/Infection:   Isolation          Contact        Patient Infection Status     Infection Encounter Level? Added Added By Resolved Resolved By Review Date Onset Date    MRSA No 01/01/16 [facility] Jorgito RN        Nasal screen - 1/2016          Nurse Assessment:  Last Vital Signs: BP (!) 146/69   Pulse 71   Temp 98.2 °F (36.8 °C) (Oral)   Resp 18   Ht 5' 11\" (1.803 m)   Wt 157 lb 10.1 oz (71.5 kg)   SpO2 94%   BMI 21.98 kg/m²     Last documented pain score (0-10 scale): Pain Level: 0  Last Weight:   Wt Readings from Last 1 Encounters:   11/19/18 157 lb 10.1 oz (71.5 kg)     Mental Status:  oriented, alert, coherent, logical and thought processes intact    IV Access:  [place] [place]:[id number]}    Nursing Mobility/ADLs:  Walking   Assisted  Transfer  Assisted  Bathing  Assisted  Dressing  Assisted  Toileting  Assisted  Feeding  Assisted  Med Admin  Independent  Med Delivery   whole    Wound Care Documentation and Therapy:  Pressure Ulcer 11/18/18 Coccyx (Active)   Chana-wound Assessment Clean;Dry; Intact 11/19/2018  9:32 PM   Pressure Ulcer Staging Stage I 11/19/2018  9:32 PM   Wound Assessment Pink;Painful 11/19/2018  9:32 PM   Closure Adhesive bandage 11/19/2018  9:32 PM   Culture Taken No 11/19/2018  9:32 PM   Dressing Status Clean;Dry; Intact 11/19/2018  9:32 PM   Dressing Changed Changed/New 11/19/2018  3:00 PM   Dressing/Treatment Adhesive bandage 11/19/2018  9:32 PM   Dressing Change Due 11/22/18 11/19/2018  9:32 PM   Drainage Amount None 11/19/2018  9:32 PM   Number of days: 1       Pressure Ulcer 06/26/15 Shoulder Right;Posterior round (Active)   Number of days: 1243       Incision 12/30/15 Rectum (Active)   Number of days: 1055        Elimination:  Continence:   · Bowel: Yes  · Bladder: Yes  Urinary Catheter: None   Colostomy/Ileostomy/Ileal Conduit: No       Date of Last BM:   No intake or output data in the 24 hours ending 11/20/18 0903  No intake/output data recorded. Safety Concerns: At Risk for Falls    Impairments/Disabilities:      None    Nutrition Therapy:  Current Nutrition Therapy:   - Oral Diet:  Renal    Routes of Feeding: Oral  Liquids: No Restrictions  Daily Fluid Restriction: no  Last Modified Barium Swallow with Video (Video Swallowing Test): not done    Treatments at the Time of Hospital Discharge:   Respiratory Treatments: none  Oxygen Therapy:  is not on home oxygen therapy. Ventilator:    - No ventilator support    Rehab Therapies: Physical Therapy and Occupational Therapy  Weight Bearing Status/Restrictions: No weight bearing restirctions  Other Medical Equipment (for information only, NOT a DME order): Other Treatments: skilled nursing assessment, medication education and monitoring, resume previous orders.     Patient's personal belongings (please select all that are sent with patient):  None     RN SIGNATURE:  Ofelia    CASE MANAGEMENT/SOCIAL WORK SECTION    Inpatient Status Date: 11/18/18    Readmission Risk Assessment Score:  Readmission Risk              Risk of Unplanned Readmission:        29           Discharging to Facility/ [facility] [address]  Phone [phone]  Fax [phone]  · Evening fax [phone]   ·     Dialysis Facility (if applicable)

## 2018-11-20 NOTE — PROGRESS NOTES
Nephrology Progress Note    Subjective/   75y.o. year old male who we are seeing in consultation for ESRD on hemodialysis  This is a 75 y.o. 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 He had received 1 gram of vancomycin at the end of outpatient dialysis yesterday. He did not have fever, chills or nausea. At presentation,he was normotensive and WBC was 6.2. Serum potassium on admission was 4.7 mmol/l but up to 5.4 mmol/L today. Interval history:  Patient denies any fever shortness of breath or nausea.   He had only 1 hour of dialysis yesterday and his potassium is 5.7 today  Blood cultures were sent on 11/19/2018-He is on IV vancomycin    Objective/     Vitals:    11/20/18 0557 11/20/18 1341 11/20/18 1437 11/20/18 1500   BP: (!) 146/69 128/65 115/73 (!) 103/55   Pulse: 71 67 65 63   Resp: 18 17 18    Temp: 98.2 °F (36.8 °C) 97.7 °F (36.5 °C)  98.1 °F (36.7 °C)   TempSrc: Oral Oral     SpO2: 94% 96%     Weight:       Height:         24HR INTAKE/OUTPUT:  No intake or output data in the 24 hours ending 11/20/18 1551  Patient Vitals for the past 96 hrs (Last 3 readings):   Weight   11/19/18 1213 157 lb 10.1 oz (71.5 kg)   11/19/18 1109 158 lb 11.7 oz (72 kg)   11/18/18 1723 160 lb (72.6 kg)       Constitutional:  Alert, awake, no apparent distress  Cardiovascular:  S1, S2 without m/r/g  Respiratory:  CTA B without w/r/r  Abdomen: +bs, soft, nt  Ext: LE edema-Right arm AV fistula With warmth redness no drainage    Data/  Recent Labs      11/18/18   1835  11/19/18   0544  11/20/18   0711   WBC  5.0  6.5  6.2   HGB  11.3*  10.6*  11.1*   HCT  34.2*  32.5*  33.0*   MCV  99.5  99.2  98.7   PLT  119*  115*  120*     Recent

## 2018-11-21 LAB
ABSOLUTE EOS #: 0.5 K/UL (ref 0–0.4)
ABSOLUTE IMMATURE GRANULOCYTE: ABNORMAL K/UL (ref 0–0.3)
ABSOLUTE LYMPH #: 1.5 K/UL (ref 1–4.8)
ABSOLUTE MONO #: 0.5 K/UL (ref 0.1–1.3)
ANION GAP SERPL CALCULATED.3IONS-SCNC: 14 MMOL/L (ref 9–17)
BASOPHILS # BLD: 1 % (ref 0–2)
BASOPHILS ABSOLUTE: 0 K/UL (ref 0–0.2)
BUN BLDV-MCNC: 32 MG/DL (ref 8–23)
BUN/CREAT BLD: ABNORMAL (ref 9–20)
CALCIUM SERPL-MCNC: 8.6 MG/DL (ref 8.6–10.4)
CHLORIDE BLD-SCNC: 97 MMOL/L (ref 98–107)
CO2: 27 MMOL/L (ref 20–31)
CREAT SERPL-MCNC: 5.36 MG/DL (ref 0.7–1.2)
DIFFERENTIAL TYPE: ABNORMAL
EOSINOPHILS RELATIVE PERCENT: 9 % (ref 0–4)
GFR AFRICAN AMERICAN: 13 ML/MIN
GFR NON-AFRICAN AMERICAN: 11 ML/MIN
GFR SERPL CREATININE-BSD FRML MDRD: ABNORMAL ML/MIN/{1.73_M2}
GFR SERPL CREATININE-BSD FRML MDRD: ABNORMAL ML/MIN/{1.73_M2}
GLUCOSE BLD-MCNC: 114 MG/DL (ref 75–110)
GLUCOSE BLD-MCNC: 130 MG/DL (ref 75–110)
GLUCOSE BLD-MCNC: 218 MG/DL (ref 75–110)
GLUCOSE BLD-MCNC: 266 MG/DL (ref 75–110)
GLUCOSE BLD-MCNC: 85 MG/DL (ref 75–110)
GLUCOSE BLD-MCNC: 89 MG/DL (ref 75–110)
GLUCOSE BLD-MCNC: 97 MG/DL (ref 70–99)
HCT VFR BLD CALC: 33.5 % (ref 41–53)
HEMOGLOBIN: 10.9 G/DL (ref 13.5–17.5)
IMMATURE GRANULOCYTES: ABNORMAL %
LYMPHOCYTES # BLD: 25 % (ref 24–44)
MCH RBC QN AUTO: 32.3 PG (ref 26–34)
MCHC RBC AUTO-ENTMCNC: 32.7 G/DL (ref 31–37)
MCV RBC AUTO: 98.7 FL (ref 80–100)
MONOCYTES # BLD: 9 % (ref 1–7)
NRBC AUTOMATED: ABNORMAL PER 100 WBC
PDW BLD-RTO: 15.2 % (ref 11.5–14.9)
PLATELET # BLD: 107 K/UL (ref 150–450)
PLATELET ESTIMATE: ABNORMAL
PMV BLD AUTO: 9.2 FL (ref 6–12)
POTASSIUM SERPL-SCNC: 4.8 MMOL/L (ref 3.7–5.3)
RBC # BLD: 3.39 M/UL (ref 4.5–5.9)
RBC # BLD: ABNORMAL 10*6/UL
SEG NEUTROPHILS: 56 % (ref 36–66)
SEGMENTED NEUTROPHILS ABSOLUTE COUNT: 3.4 K/UL (ref 1.3–9.1)
SODIUM BLD-SCNC: 138 MMOL/L (ref 135–144)
WBC # BLD: 6 K/UL (ref 3.5–11)
WBC # BLD: ABNORMAL 10*3/UL

## 2018-11-21 PROCEDURE — G8979 MOBILITY GOAL STATUS: HCPCS

## 2018-11-21 PROCEDURE — 99221 1ST HOSP IP/OBS SF/LOW 40: CPT | Performed by: SURGERY

## 2018-11-21 PROCEDURE — 97530 THERAPEUTIC ACTIVITIES: CPT

## 2018-11-21 PROCEDURE — 1200000000 HC SEMI PRIVATE

## 2018-11-21 PROCEDURE — 80048 BASIC METABOLIC PNL TOTAL CA: CPT

## 2018-11-21 PROCEDURE — 85025 COMPLETE CBC W/AUTO DIFF WBC: CPT

## 2018-11-21 PROCEDURE — 6360000002 HC RX W HCPCS: Performed by: EMERGENCY MEDICINE

## 2018-11-21 PROCEDURE — 97166 OT EVAL MOD COMPLEX 45 MIN: CPT

## 2018-11-21 PROCEDURE — G8978 MOBILITY CURRENT STATUS: HCPCS

## 2018-11-21 PROCEDURE — 6370000000 HC RX 637 (ALT 250 FOR IP): Performed by: INTERNAL MEDICINE

## 2018-11-21 PROCEDURE — 90937 HEMODIALYSIS REPEATED EVAL: CPT

## 2018-11-21 PROCEDURE — 99233 SBSQ HOSP IP/OBS HIGH 50: CPT | Performed by: INTERNAL MEDICINE

## 2018-11-21 PROCEDURE — 36415 COLL VENOUS BLD VENIPUNCTURE: CPT

## 2018-11-21 PROCEDURE — 2580000003 HC RX 258: Performed by: INTERNAL MEDICINE

## 2018-11-21 PROCEDURE — 97116 GAIT TRAINING THERAPY: CPT

## 2018-11-21 PROCEDURE — 6360000002 HC RX W HCPCS: Performed by: INTERNAL MEDICINE

## 2018-11-21 PROCEDURE — 99232 SBSQ HOSP IP/OBS MODERATE 35: CPT | Performed by: INTERNAL MEDICINE

## 2018-11-21 PROCEDURE — 2500000003 HC RX 250 WO HCPCS: Performed by: INTERNAL MEDICINE

## 2018-11-21 PROCEDURE — 97162 PT EVAL MOD COMPLEX 30 MIN: CPT

## 2018-11-21 RX ADMIN — Medication 1 TABLET: at 08:43

## 2018-11-21 RX ADMIN — AMLODIPINE BESYLATE 5 MG: 5 TABLET ORAL at 08:43

## 2018-11-21 RX ADMIN — ASPIRIN 81 MG: 81 TABLET, COATED ORAL at 08:44

## 2018-11-21 RX ADMIN — HEPARIN SODIUM 5000 UNITS: 5000 INJECTION, SOLUTION INTRAVENOUS; SUBCUTANEOUS at 22:50

## 2018-11-21 RX ADMIN — SUCRALFATE 1 G: 1 TABLET ORAL at 05:51

## 2018-11-21 RX ADMIN — METOPROLOL SUCCINATE 25 MG: 25 TABLET, EXTENDED RELEASE ORAL at 08:44

## 2018-11-21 RX ADMIN — SEVELAMER CARBONATE 3200 MG: 800 TABLET, FILM COATED ORAL at 17:42

## 2018-11-21 RX ADMIN — GABAPENTIN 200 MG: 100 CAPSULE ORAL at 08:43

## 2018-11-21 RX ADMIN — MIDODRINE HYDROCHLORIDE 5 MG: 5 TABLET ORAL at 08:44

## 2018-11-21 RX ADMIN — VITAMIN D, TAB 1000IU (100/BT) 1000 UNITS: 25 TAB at 08:43

## 2018-11-21 RX ADMIN — OXYCODONE HYDROCHLORIDE AND ACETAMINOPHEN 1 TABLET: 5; 325 TABLET ORAL at 16:01

## 2018-11-21 RX ADMIN — SEVELAMER CARBONATE 3200 MG: 800 TABLET, FILM COATED ORAL at 08:43

## 2018-11-21 RX ADMIN — ALLOPURINOL 100 MG: 100 TABLET ORAL at 08:44

## 2018-11-21 RX ADMIN — MIDODRINE HYDROCHLORIDE 5 MG: 5 TABLET ORAL at 17:30

## 2018-11-21 RX ADMIN — SERTRALINE HYDROCHLORIDE 50 MG: 50 TABLET ORAL at 08:44

## 2018-11-21 RX ADMIN — SUCRALFATE 1 G: 1 TABLET ORAL at 17:30

## 2018-11-21 RX ADMIN — SENNOSIDES AND DOCUSATE SODIUM 2 TABLET: 8.6; 5 TABLET ORAL at 08:44

## 2018-11-21 RX ADMIN — CLOPIDOGREL BISULFATE 75 MG: 75 TABLET ORAL at 17:30

## 2018-11-21 RX ADMIN — HYDRALAZINE HYDROCHLORIDE 25 MG: 25 TABLET, FILM COATED ORAL at 08:44

## 2018-11-21 RX ADMIN — Medication 2 PUFF: at 22:49

## 2018-11-21 RX ADMIN — DOXYCYCLINE 100 MG: 100 CAPSULE ORAL at 08:43

## 2018-11-21 RX ADMIN — HYDRALAZINE HYDROCHLORIDE 25 MG: 25 TABLET, FILM COATED ORAL at 22:50

## 2018-11-21 RX ADMIN — Medication 10 ML: at 09:47

## 2018-11-21 RX ADMIN — Medication 2 PUFF: at 08:51

## 2018-11-21 RX ADMIN — GABAPENTIN 200 MG: 100 CAPSULE ORAL at 22:50

## 2018-11-21 RX ADMIN — Medication 3 ML: at 15:39

## 2018-11-21 RX ADMIN — Medication 10 ML: at 22:51

## 2018-11-21 RX ADMIN — VANCOMYCIN HYDROCHLORIDE 750 MG: 5 INJECTION, POWDER, LYOPHILIZED, FOR SOLUTION INTRAVENOUS at 17:43

## 2018-11-21 RX ADMIN — GABAPENTIN 200 MG: 100 CAPSULE ORAL at 17:30

## 2018-11-21 ASSESSMENT — PAIN SCALES - GENERAL
PAINLEVEL_OUTOF10: 0
PAINLEVEL_OUTOF10: 8
PAINLEVEL_OUTOF10: 0
PAINLEVEL_OUTOF10: 8
PAINLEVEL_OUTOF10: 8
PAINLEVEL_OUTOF10: 0
PAINLEVEL_OUTOF10: 2
PAINLEVEL_OUTOF10: 0

## 2018-11-21 ASSESSMENT — ENCOUNTER SYMPTOMS
COLOR CHANGE: 1
SHORTNESS OF BREATH: 0
ABDOMINAL PAIN: 0
ALLERGIC/IMMUNOLOGIC NEGATIVE: 1

## 2018-11-21 ASSESSMENT — PAIN DESCRIPTION - ONSET: ONSET: ON-GOING

## 2018-11-21 ASSESSMENT — PAIN DESCRIPTION - FREQUENCY
FREQUENCY: INTERMITTENT

## 2018-11-21 ASSESSMENT — PAIN DESCRIPTION - ORIENTATION
ORIENTATION: RIGHT;LEFT
ORIENTATION: RIGHT
ORIENTATION: RIGHT;LEFT

## 2018-11-21 ASSESSMENT — PAIN DESCRIPTION - DESCRIPTORS
DESCRIPTORS: SHARP
DESCRIPTORS: ACHING
DESCRIPTORS: SHARP

## 2018-11-21 ASSESSMENT — PAIN DESCRIPTION - LOCATION
LOCATION: HIP
LOCATION: HIP
LOCATION: ARM

## 2018-11-21 ASSESSMENT — PAIN DESCRIPTION - PAIN TYPE
TYPE: ACUTE PAIN
TYPE: CHRONIC PAIN
TYPE: CHRONIC PAIN

## 2018-11-21 NOTE — PROGRESS NOTES
Physical Therapy    Facility/Department: [facility] MED SURG  Initial Assessment    NAME: Pelon Zurita  : 1945  MRN: [id number]    Date of Service: 2018    Discharge Recommendations:  ECF with PT        Patient Diagnosis(es): The primary encounter diagnosis was Cellulitis of forearm, right. Diagnoses of Complication from renal dialysis device, initial encounter, Immunosuppression ([address] and History of osteomyelitis were also pertinent to this visit. has a past medical history of Asthma; CAD (coronary artery disease); CKD (chronic kidney disease) stage 4, GFR 15-29 ml/min ([facility]); CVA (cerebral vascular accident) ([address] Depression; History of kidney stones; Hyperlipidemia; Hypertension; Osteoarthritis; Proteinuria; and Type II or unspecified type diabetes mellitus without mention of complication, not stated as uncontrolled. has a past surgical history that includes Coronary artery bypass graft; shoulder surgery; hip surgery; joint replacement; other surgical history (6/11/15); bone biopsy (6/29/15); other surgical history (12/30/15); and Carpal tunnel release. Restrictions  Restrictions/Precautions  Restrictions/Precautions: Fall Risk  Required Braces or Orthoses?: No  Implants present? : Metal implants (L KATHY)  Position Activity Restriction  Other position/activity restrictions: R UE fistula-redness noted. Hemodialysis pt. Vision/Hearing  Vision: Impaired  Vision Exceptions: Wears glasses at all times  Hearing: Within functional limits     Subjective  General  Patient assessed for rehabilitation services?: Yes  Additional Pertinent Hx: presented to the ED at [facility] yesterday after dialysis for further evaluation of redness over his AVF.   Referral Date : 18  Diagnosis: Cellulitis R forearm  Follows Commands: Within Functional Limits  Pain Screening  Patient Currently in Pain: Yes  Pain Assessment  Pain Assessment: 0-10  Pain Level: 8  Pain Type: Chronic pain  Pain Location: Hip  Pain reach, Nurse notified    G-Code  PT G-Codes  Functional Assessment Tool Used: AM-PAC Mobility without Stair Climbing Inpatient   Score: 10/24  Functional Limitation: Mobility: Walking and moving around  Mobility: Walking and Moving Around Current Status (): At least 60 percent but less than 80 percent impaired, limited or restricted  Mobility: Walking and Moving Around Goal Status (): At least 40 percent but less than 60 percent impaired, limited or restricted  OutComes Score                                           AM-PAC Score     AM-PAC Inpatient Mobility without Stair Climbing Raw Score : 10  AM-PAC Inpatient without Stair Climbing T-Scale Score : 34.07  Mobility Inpatient CMS 0-100% Score: 71.66  Mobility Inpatient without Stair CMS G-Code Modifier : CL       Goals  Short term goals  Time Frame for Short term goals: 5 visits  Short term goal 1: Pt able to perfrom supine to sit at [address] with bed rail  Short term goal 2:  Pt able to perfrom sit to supine at mod A   Short term goal 3:  Pt able to ambulate  with RW dist fo 20 ft, min A, 2nd person follow with a chiar for safety  Short term goal 4:  Pt able to transfer at mod A .   Patient Goals   Patient goals : Like to keep walking with assistnace       Therapy Time   Individual Concurrent Group Co-treatment   Time In 0840         Time Out 0934         Minutes 54         Timed Code Treatment Minutes: 24 Minutes       Shay Christy PT

## 2018-11-21 NOTE — CONSULTS
Consults       Department of Internal Medicine  Nephrology Alessandra Lee MD   Consult Note      SUBJECTIVE: This is a 75 y.o. 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 He had received 1 gram of vancomycin at the end of outpatient dialysis yesterday. He did not have fever, chills or nausea. At presentation,he was normotensive and WBC was 6.2. Serum potassium on admission was 4.7 mmol/l but up to 5.4 mmol/L today. Oxycontin [oxycodone];  Avodart [dutasteride]; and Darvocet [propoxyphene n-acetaminophen]    Past Medical History:   Diagnosis Date    Asthma     CAD (coronary artery disease)     CKD (chronic kidney disease) stage 4, GFR 15-29 ml/min [facility])     CVA (cere[facility] vascular accident) ([facility]     Depression     History of kidney stones     Hyperlipidemia     Hypertension     Osteoarthritis     Proteinuria     Type II or unspecified type diabetes mellitus without mention of complication, not stated as uncontrolled        Scheduled Meds:   vancomycin (VANCOCIN) intermittent dosing (placeholder)   Other RX Placeholder    vancomycin  1,000 mg Intravenous Once    sodium chloride flush  10 mL Intravenous 2 times per day    heparin (porcine)  5,000 Units Subcutaneous BID    insulin lispro  0-12 Units Subcutaneous TID WC    insulin lispro  0-6 Units Subcutaneous Nightly     Continuous Infusions:   dextrose       PRN Meds:.sodium chloride flush, acetaminophen, glucose, dextrose, glucagon (rDNA), dextrose    Family History   Problem Relation Age of Onset    Stroke Mother     Heart Attack Mother     Heart Attack Father     Diabetes Sister     Diabetes Brother         Social History     Social History   
Facility-Administered Medications   Medication Dose Route Frequency Provider Last Rate Last Dose    vancomycin (VANCOCIN) 750 mg in dextrose 5 % 250 mL IVPB  750 mg Intravenous Once Artur Arana MD        heparin (porcine) injection 3,300 Units  3,300 Units Intercatheter PRN Nisreen Rojas MD   3,300 Units at 11/20/18 1824    vancomycin (VANCOCIN) intermittent dosing (placeholder)   Other RX Placeholder Artur Arana MD        allopurinol (ZYLOPRIM) tablet 100 mg  100 mg Oral Daily Karla Amador MD   100 mg at 11/21/18 0844    amLODIPine (NORVASC) tablet 5 mg  5 mg Oral Daily Karla Amador MD   5 mg at 11/21/18 0843    aspirin EC tablet 81 mg  81 mg Oral QAM Karla Amador MD   81 mg at 11/21/18 0844    B complex-vitamin C-folic acid (NEPHRO-LAWANDA) tablet 1 tablet  1 tablet Oral Daily Karla Amador MD   1 tablet at 11/21/18 0843    calcium carbonate (TUMS) chewable tablet 1,000 mg  2 tablet Oral Q6H PRN Karla Amador MD        cinacalcet (SENSIPAR) tablet 30 mg  30 mg Oral Daily Karla Amador MD   30 mg at 11/20/18 0844    clopidogrel (PLAVIX) tablet 75 mg  75 mg Oral QPM Karla Amador MD   75 mg at 11/20/18 1848    darbepoetin candice-polysorbate (ARANESP) injection 60 mcg  60 mcg Subcutaneous Weekly Dmitriy Angélica Bellamy MD        doxycycline monohydrate (MONODOX) capsule 100 mg  100 mg Oral BID Karla Amador MD   100 mg at 11/21/18 0843    fluticasone (FLOVENT HFA) 110 MCG/ACT inhaler 2 puff  2 puff Inhalation BID Karla Amador MD   2 puff at 11/21/18 0851    gabapentin (NEURONTIN) capsule 200 mg  200 mg Oral TID Karla Amador MD   200 mg at 11/21/18 0843    hydrALAZINE (APRESOLINE) tablet 25 mg  25 mg Oral BID Karla Amador MD   25 mg at 11/21/18 0844    HYDROmorphone (DILAUDID) tablet 2 mg  2 mg Oral Q12H PRN Karla Amador MD        insulin glargine (LANTUS) injection vial 6 Units  6 Units Subcutaneous Nightly Ranjan Bellamy MD   6 Units at
two toes and to arch of right foot      NOVOFINE AUTOCOVER 30G X 8 MM MISC       darbepoetin candice-polysorbate (ARANESP) 60 MCG/ML injection Inject 60 mcg into the skin once a week On Wednesday with dialysis      Nutritional Supplements (BOOST GLUCOSE CONTROL) LIQD Take 8 oz by mouth nightly       senna-docusate (PERICOLACE) 8.6-50 MG per tablet Take 2 tablets by mouth daily On non-dialysis days (Sun, Tues, Thurs, Sat)      b complex-C-folic acid (NEPHROCAPS) 1 MG capsule Take 1 capsule by mouth daily      lidocaine-prilocaine (EMLA) 2.5-2.5 % cream Apply 1 Applicatorful topically three times a week To right forearm in the morning on Mon, Wed, Fri before dialysis      amLODIPine (NORVASC) 2.5 MG tablet Take 1 tablet by mouth daily (Patient taking differently: Take 5 mg by mouth daily ) 30 tablet 3       Patient denies ASA, NSAID use. Allergies  Allergies   Allergen Reactions    Oxycontin [Oxycodone] Shortness Of Breath and Other (See Comments)     Patient had an overdose on Oxycontin before and does NOT want this ever again. He can take Oxycodone/acetaminophen as needed.  Avodart [Dutasteride]     Darvocet [Propoxyphene N-Acetaminophen]         Social   Social History   Substance Use Topics    Smoking status: Never Smoker    Smokeless tobacco: Never Used    Alcohol use No       MARITAL STATUS:     OCCUPATION:       Patient currently lives   PSYCH HISTORY:  Depression No  Anxiety Yes  Suicide No       Family History   Problem Relation Age of Onset    Stroke Mother     Heart Attack Mother     Heart Attack Father     Diabetes Sister     Diabetes Brother           Review of Systems  No fever / chills / sweats. No weight loss. No visual change, eye pain, eye discharge. No oral lesion, sore throat, dysphagia. Positive for cough, chronic, hx of COPD . Denies chest pain, palpitations. Denies n / v / abd pain. No diarrhea. Denies dysuria or change in urinary function.   Denies joint swelling

## 2018-11-21 NOTE — H&P
interval not displayed. Hematology:  Recent Labs      11/18/18 1835 11/19/18   0544  11/19/18   1115  11/20/18   0711   WBC  5.0  6.5   --   6.2   RBC  3.44*  3.28*   --   3.35*   HGB  11.3*  10.6*   --   11.1*   HCT  34.2*  32.5*   --   33.0*   MCV  99.5  99.2   --   98.7   MCH  32.8  32.4   --   33.2   MCHC  33.0  32.6   --   33.6   RDW  14.9  14.8   --   15.1*   PLT  119*  115*   --   120*   MPV  9.6  9.4   --   9.4   INR   --    --   1.0   --      Chemistry:  Recent Labs      11/18/18 1835 11/19/18   0544  11/19/18   1115  11/20/18   0711   NA  134*  138   --   136   K  4.7  5.4*  4.5  5.7*   CL  93*  98   --   97*   CO2  27  26   --   26   GLUCOSE  255*  123*   --   101*   BUN  41*  53*   --   58*   CREATININE  5.73*  6.99*   --   7.37*   ANIONGAP  14  14   --   13   LABGLOM  10*  8*   --   7*   GFRAA  12*  9*   --   9*   CALCIUM  9.1  8.9   --   8.7   PHOS   --    --    --   4.1     Recent Labs      11/18/18   1835 11/19/18   1148  11/19/18   1628  11/19/18 2011 11/20/18   0620  11/20/18   1126  11/20/18   2040   PROT  6.7   --    --    --    --    --    --    --    LABALBU  3.8   --    --    --    --    --    --    --    AST  34   --    --    --    --    --    --    --    ALT  43*   --    --    --    --    --    --    --    ALKPHOS  244*   --    --    --    --    --    --    --    BILITOT  0.35   --    --    --    --    --    --    --    POCGLU   --    < >  152*  136*  250*  89  215*  180*    < > = values in this interval not displayed. Imaging/Diagnostics:       Ir Nontunneled Vascular Catheter > 5 Years    Result Date: 11/19/2018  PROCEDURE: ULTRASOUND GUIDED VASCULAR ACCESS. FLUOROSCOPY GUIDED PLACEMENT OF A NON-TUNNELED CATHETER. 11/19/2018.  HISTORY: ORDERING SYSTEM PROVIDED HISTORY: cellulitis near fistula, need for temporary access TECHNOLOGIST PROVIDED HISTORY: cellulitis near fistula, need for temporary access SEDATION: Local anesthesia FLUOROSCOPY DOSE AND TYPE OR TIME AND

## 2018-11-21 NOTE — PROGRESS NOTES
5 mg by mouth 3 times daily One time per day every Mon, Wed, Fri for low pressure   Yes Historical Provider, MD   oxyCODONE-acetaminophen (PERCOCET) 7.5-325 MG per tablet Take 1 tablet by mouth every 6 hours as needed for Pain. .   Yes Historical Provider, MD   senna (SENOKOT) 8.6 MG tablet Take 2 tablets by mouth daily as needed for Constipation   Yes Historical Provider, MD   cinacalcet (SENSIPAR) 30 MG tablet Take 30 mg by mouth daily   Yes Historical Provider, MD   tiotropium (SPIRIVA RESPIMAT) 1.25 MCG/ACT AERS inhaler Inhale 2 puffs into the lungs daily 9/11/18  Yes Don Sosa MD   carboxymethylcellulose 1 % ophthalmic solution Place 1 drop into both eyes 3 times daily as needed for Dry Eyes   Yes Historical Provider, MD   sevelamer (RENVELA) 800 MG tablet Take 2 tablets by mouth every 8 hours as needed For snacks   Yes Historical Provider, MD   sevelamer (RENVELA) 800 MG tablet Take 4 tablets by mouth 3 times daily (with meals)   Yes Historical Provider, MD   sertraline (ZOLOFT) 50 MG tablet Take 50 mg by mouth daily   Yes Historical Provider, MD   sucralfate (CARAFATE) 1 GM tablet Take 1 g by mouth 3 times daily (before meals)   Yes Historical Provider, MD   calcium carbonate (TUMS) 500 MG chewable tablet Take 2 tablets by mouth every 6 hours as needed for Heartburn   Yes Historical Provider, MD   tetrahydrozoline 0.05 % ophthalmic solution Place 1 drop into both eyes 2 times daily as needed (irritation/dryness)   Yes Historical Provider, MD   fluticasone propionate (FLOVENT DISKUS) 100 MCG/BLIST AEPB inhaler Inhale 2 puffs into the lungs 2 times daily    Yes Historical Provider, MD   insulin glargine (LANTUS) 100 UNIT/ML injection pen Inject 6 Units into the skin nightly    Yes Historical Provider, MD   doxycycline (VIBRAMYCIN) 100 MG capsule Take 100 mg by mouth 2 times daily For prophylaxis s/p osteomyelitis   Yes Historical Provider, MD   gabapentin (NEURONTIN) 100 MG capsule Take 1 capsule by mouth

## 2018-11-21 NOTE — FLOWSHEET NOTE
rounding on unit. Assessment: Patient was quiet and engaged in brief conversation. He spoke very low so it as difficult to hear him. He said his daughter was here but he had been out of the room. He also mentioned a son. Patient said he lives in an assisted living home. Patient asked  to see if he could get some soup. Intervention and Outcome:  provided active listening and a supportive presence.  let nurse know that patient was requesting soup. Patient thanked  for visit. Plan: Chaplains will remain available to offer spiritual and emotional support as needed. 11/20/18 2023   Encounter Summary   Services provided to: Patient   Referral/Consult From: [address] Visiting (11/20/18)   Complexity of Encounter Low   Length of Encounter 15 minutes   Spiritual Assessment Completed Yes   Spiritual/Congregation   Type Spiritual support   Assessment Calm; Approachable;Coping   Intervention Active listening;Explored feelings, thoughts, concerns   Outcome Expressed gratitude;Expressed feelings/needs/concerns;Receptive     Electronically signed by Edward Bain on 11/20/2018 at 8:27 PM

## 2018-11-21 NOTE — PROGRESS NOTES
appearing, and in no acute distress  Mental status: oriented to person, place, and time with normal affect  Head:  normocephalic, atraumatic. Eye: no icterus, redness, pupils equal and reactive, extraocular eye movements intact, conjunctiva clear  Ear: normal external ear, no discharge, hearing intact  Nose:  no drainage noted  Mouth: mucous membranes moist  Neck: supple, no carotid bruits, thyroid not palpable  Lungs: Bilateral equal air entry, clear to ausculation, no wheezing, rales or rhonchi, normal effort  Cardiovascular: normal rate, regular rhythm, no murmur, gallop, rub.   Abdomen: Soft, nontender, nondistended, normal bowel sounds, no hepatomegaly or splenomegaly  Neurologic: There are no new focal motor or sensory deficits, normal muscle tone and bulk, no abnormal sensation, normal speech, cranial nerves II through XII grossly intact  Skin: No gross lesions, rashes, bruising or bleeding on exposed skin area  Extremities:right arm fistula site cellulitis    Psych: normal affect     Investigations:      Laboratory Testing:  Recent Results (from the past 24 hour(s))   POC Glucose Fingerstick    Collection Time: 11/20/18  6:20 AM   Result Value Ref Range    POC Glucose 89 75 - 110 mg/dL   CBC with DIFF    Collection Time: 11/20/18  7:11 AM   Result Value Ref Range    WBC 6.2 3.5 - 11.0 k/uL    RBC 3.35 (L) 4.5 - 5.9 m/uL    Hemoglobin 11.1 (L) 13.5 - 17.5 g/dL    Hematocrit 33.0 (L) 41 - 53 %    MCV 98.7 80 - 100 fL    MCH 33.2 26 - 34 pg    MCHC 33.6 31 - 37 g/dL    RDW 15.1 (H) 11.5 - 14.9 %    Platelets 418 (L) 970 - 450 k/uL    MPV 9.4 6.0 - 12.0 fL    NRBC Automated NOT REPORTED per 100 WBC    Differential Type NOT REPORTED     Seg Neutrophils 54 36 - 66 %    Lymphocytes 25 24 - 44 %    Monocytes 10 (H) 1 - 7 %    Eosinophils % 10 (H) 0 - 4 %    Basophils 1 0 - 2 %    Immature Granulocytes NOT REPORTED 0 %    Segs Absolute 3.40 1.3 - 9.1 k/uL    Absolute Lymph # 1.60 1.0 - 4.8 k/uL    Absolute Mono # GUIDED PLACEMENT OF A NON-TUNNELED CATHETER. 11/19/2018. HISTORY: ORDERING SYSTEM PROVIDED HISTORY: cellulitis near fistula, need for temporary access TECHNOLOGIST PROVIDED HISTORY: cellulitis near fistula, need for temporary access SEDATION: Local anesthesia FLUOROSCOPY DOSE AND TYPE OR TIME AND EXPOSURES: Fluoroscopy time-1 minute and 8 seconds. D  cGy cm squared. TECHNIQUE: Informed consent was obtained after a detailed explanation of the procedure including risks, benefits, and alternatives. Universal protocol was observed. The right neck and chest were prepped and draped in sterile fashion using maximum sterile barrier technique. Local anesthesia was achieved with lidocaine. A micropuncture needle was used to access the right internal jugular vein using ultrasound guidance. An ultrasound image demonstrating patency of the vein with needle tip located within it. An image was obtained and stored in PACs. A 0.035 guidewire was used to place a 13 Greek x 15 cm temporary hemodialysis catheter using fluoroscopic guidance with tip in the right atrium after fascial tract dilation. The catheter flushed easily and there was a good blood return. The catheter was sutured to the skin. The catheter was locked with heparinized saline. The patient tolerated the procedure well and there were no immediate complications. FINDINGS: Fluoroscopic image demonstrates the tip of the catheter in the upper right atrium. Successful ultrasound and fluoroscopy guided non-tunneled right IJ temporary hemodialysis catheter placement. Catheter is ready for use at this time. Impressions :      1. Active Problems:    Cellulitis of forearm, right  Resolved Problems:    * No resolved hospital problems. *        2.  has a past medical history of Asthma; CAD (coronary artery disease); CKD (chronic kidney disease) stage 4, GFR 15-29 ml/min ([facility]); CVA (cerebral vascular accident) ([facility] [address] Depression;  History of kidney stones;

## 2018-11-22 LAB
ABSOLUTE EOS #: 0.5 K/UL (ref 0–0.4)
ABSOLUTE IMMATURE GRANULOCYTE: ABNORMAL K/UL (ref 0–0.3)
ABSOLUTE LYMPH #: 1.6 K/UL (ref 1–4.8)
ABSOLUTE MONO #: 0.6 K/UL (ref 0.1–1.3)
ANION GAP SERPL CALCULATED.3IONS-SCNC: 12 MMOL/L (ref 9–17)
BASOPHILS # BLD: 1 % (ref 0–2)
BASOPHILS ABSOLUTE: 0.1 K/UL (ref 0–0.2)
BUN BLDV-MCNC: 29 MG/DL (ref 8–23)
BUN/CREAT BLD: ABNORMAL (ref 9–20)
CALCIUM SERPL-MCNC: 8.5 MG/DL (ref 8.6–10.4)
CHLORIDE BLD-SCNC: 97 MMOL/L (ref 98–107)
CO2: 27 MMOL/L (ref 20–31)
CREAT SERPL-MCNC: 4.59 MG/DL (ref 0.7–1.2)
DIFFERENTIAL TYPE: ABNORMAL
EOSINOPHILS RELATIVE PERCENT: 9 % (ref 0–4)
GFR AFRICAN AMERICAN: 15 ML/MIN
GFR NON-AFRICAN AMERICAN: 13 ML/MIN
GFR SERPL CREATININE-BSD FRML MDRD: ABNORMAL ML/MIN/{1.73_M2}
GFR SERPL CREATININE-BSD FRML MDRD: ABNORMAL ML/MIN/{1.73_M2}
GLUCOSE BLD-MCNC: 118 MG/DL (ref 75–110)
GLUCOSE BLD-MCNC: 149 MG/DL (ref 70–99)
GLUCOSE BLD-MCNC: 161 MG/DL (ref 75–110)
GLUCOSE BLD-MCNC: 176 MG/DL (ref 75–110)
GLUCOSE BLD-MCNC: 216 MG/DL (ref 75–110)
HCT VFR BLD CALC: 33.5 % (ref 41–53)
HEMOGLOBIN: 11.2 G/DL (ref 13.5–17.5)
IMMATURE GRANULOCYTES: ABNORMAL %
LYMPHOCYTES # BLD: 29 % (ref 24–44)
MCH RBC QN AUTO: 32.9 PG (ref 26–34)
MCHC RBC AUTO-ENTMCNC: 33.4 G/DL (ref 31–37)
MCV RBC AUTO: 98.6 FL (ref 80–100)
MONOCYTES # BLD: 10 % (ref 1–7)
NRBC AUTOMATED: ABNORMAL PER 100 WBC
PDW BLD-RTO: 14.7 % (ref 11.5–14.9)
PLATELET # BLD: 89 K/UL (ref 150–450)
PLATELET ESTIMATE: ABNORMAL
PMV BLD AUTO: 9.9 FL (ref 6–12)
POTASSIUM SERPL-SCNC: 4.5 MMOL/L (ref 3.7–5.3)
RBC # BLD: 3.39 M/UL (ref 4.5–5.9)
RBC # BLD: ABNORMAL 10*6/UL
SEG NEUTROPHILS: 51 % (ref 36–66)
SEGMENTED NEUTROPHILS ABSOLUTE COUNT: 2.9 K/UL (ref 1.3–9.1)
SODIUM BLD-SCNC: 136 MMOL/L (ref 135–144)
WBC # BLD: 5.7 K/UL (ref 3.5–11)
WBC # BLD: ABNORMAL 10*3/UL

## 2018-11-22 PROCEDURE — 85025 COMPLETE CBC W/AUTO DIFF WBC: CPT

## 2018-11-22 PROCEDURE — 6360000002 HC RX W HCPCS: Performed by: EMERGENCY MEDICINE

## 2018-11-22 PROCEDURE — 36415 COLL VENOUS BLD VENIPUNCTURE: CPT

## 2018-11-22 PROCEDURE — 90937 HEMODIALYSIS REPEATED EVAL: CPT

## 2018-11-22 PROCEDURE — 99232 SBSQ HOSP IP/OBS MODERATE 35: CPT | Performed by: INTERNAL MEDICINE

## 2018-11-22 PROCEDURE — 82947 ASSAY GLUCOSE BLOOD QUANT: CPT

## 2018-11-22 PROCEDURE — 80048 BASIC METABOLIC PNL TOTAL CA: CPT

## 2018-11-22 PROCEDURE — 6370000000 HC RX 637 (ALT 250 FOR IP): Performed by: INTERNAL MEDICINE

## 2018-11-22 PROCEDURE — 1200000000 HC SEMI PRIVATE

## 2018-11-22 PROCEDURE — 2580000003 HC RX 258: Performed by: INTERNAL MEDICINE

## 2018-11-22 RX ADMIN — Medication 10 ML: at 08:25

## 2018-11-22 RX ADMIN — MIDODRINE HYDROCHLORIDE 5 MG: 5 TABLET ORAL at 16:30

## 2018-11-22 RX ADMIN — METOPROLOL SUCCINATE 25 MG: 25 TABLET, EXTENDED RELEASE ORAL at 08:21

## 2018-11-22 RX ADMIN — CLOPIDOGREL BISULFATE 75 MG: 75 TABLET ORAL at 21:09

## 2018-11-22 RX ADMIN — MIDODRINE HYDROCHLORIDE 5 MG: 5 TABLET ORAL at 12:13

## 2018-11-22 RX ADMIN — SEVELAMER CARBONATE 3200 MG: 800 TABLET, FILM COATED ORAL at 15:54

## 2018-11-22 RX ADMIN — ALLOPURINOL 100 MG: 100 TABLET ORAL at 08:21

## 2018-11-22 RX ADMIN — HYDRALAZINE HYDROCHLORIDE 25 MG: 25 TABLET, FILM COATED ORAL at 21:10

## 2018-11-22 RX ADMIN — GABAPENTIN 200 MG: 100 CAPSULE ORAL at 08:19

## 2018-11-22 RX ADMIN — MIDODRINE HYDROCHLORIDE 5 MG: 5 TABLET ORAL at 08:20

## 2018-11-22 RX ADMIN — OXYCODONE HYDROCHLORIDE AND ACETAMINOPHEN 1 TABLET: 5; 325 TABLET ORAL at 08:21

## 2018-11-22 RX ADMIN — SEVELAMER CARBONATE 3200 MG: 800 TABLET, FILM COATED ORAL at 12:13

## 2018-11-22 RX ADMIN — HEPARIN SODIUM 5000 UNITS: 5000 INJECTION, SOLUTION INTRAVENOUS; SUBCUTANEOUS at 08:25

## 2018-11-22 RX ADMIN — VITAMIN D, TAB 1000IU (100/BT) 1000 UNITS: 25 TAB at 08:21

## 2018-11-22 RX ADMIN — INSULIN GLARGINE 6 UNITS: 100 INJECTION, SOLUTION SUBCUTANEOUS at 21:10

## 2018-11-22 RX ADMIN — SUCRALFATE 1 G: 1 TABLET ORAL at 15:55

## 2018-11-22 RX ADMIN — SENNOSIDES AND DOCUSATE SODIUM 2 TABLET: 8.6; 5 TABLET ORAL at 08:20

## 2018-11-22 RX ADMIN — Medication 2 PUFF: at 08:19

## 2018-11-22 RX ADMIN — AMLODIPINE BESYLATE 5 MG: 5 TABLET ORAL at 08:20

## 2018-11-22 RX ADMIN — SEVELAMER CARBONATE 3200 MG: 800 TABLET, FILM COATED ORAL at 08:23

## 2018-11-22 RX ADMIN — SUCRALFATE 1 G: 1 TABLET ORAL at 12:13

## 2018-11-22 RX ADMIN — TIOTROPIUM BROMIDE 18 MCG: 18 CAPSULE ORAL; RESPIRATORY (INHALATION) at 08:19

## 2018-11-22 RX ADMIN — SUCRALFATE 1 G: 1 TABLET ORAL at 08:21

## 2018-11-22 RX ADMIN — SERTRALINE HYDROCHLORIDE 50 MG: 50 TABLET ORAL at 08:21

## 2018-11-22 RX ADMIN — ASPIRIN 81 MG: 81 TABLET, COATED ORAL at 08:20

## 2018-11-22 RX ADMIN — GABAPENTIN 200 MG: 100 CAPSULE ORAL at 21:10

## 2018-11-22 RX ADMIN — HYDRALAZINE HYDROCHLORIDE 25 MG: 25 TABLET, FILM COATED ORAL at 08:20

## 2018-11-22 RX ADMIN — Medication 2 PUFF: at 21:10

## 2018-11-22 RX ADMIN — INSULIN LISPRO 2 UNITS: 100 INJECTION, SOLUTION INTRAVENOUS; SUBCUTANEOUS at 21:10

## 2018-11-22 RX ADMIN — Medication 10 ML: at 21:10

## 2018-11-22 RX ADMIN — GABAPENTIN 200 MG: 100 CAPSULE ORAL at 12:13

## 2018-11-22 RX ADMIN — CINACALCET HYDROCHLORIDE 30 MG: 30 TABLET, COATED ORAL at 08:19

## 2018-11-22 RX ADMIN — Medication 1 TABLET: at 08:19

## 2018-11-22 ASSESSMENT — PAIN SCALES - GENERAL
PAINLEVEL_OUTOF10: 7
PAINLEVEL_OUTOF10: 2

## 2018-11-22 ASSESSMENT — PAIN DESCRIPTION - PAIN TYPE
TYPE: CHRONIC PAIN
TYPE: CHRONIC PAIN

## 2018-11-22 ASSESSMENT — PAIN DESCRIPTION - LOCATION
LOCATION: BACK
LOCATION: BACK

## 2018-11-22 NOTE — PROGRESS NOTES
33.0*  33.5*  33.5*   MCV  98.7  98.7  98.6   PLT  120*  107*  89*     Recent Labs      11/20/18   0711  11/20/18   2156  11/21/18   0607  11/22/18   0602   NA  136   --   138  136   K  5.7*  4.5  4.8  4.5   CL  97*   --   97*  97*   CO2  26   --   27  27   GLUCOSE  101*   --   97  149*   PHOS  4.1   --    --    --    BUN  58*   --   32*  29*   CREATININE  7.37*   --   5.36*  4.59*   LABGLOM  7*   --   11*  13*   GFRAA  9*   --   13*  15*       Assessment/   1. ESRD - He has a Romain catheter through  which he is receiving dialysis. Follow final  blood cultures. IV  vancomycin with pharmacy to dose. Renal diet,i.e 2-gram sodium,2-gram potassium,1500 ml fluid restriction,1-gram phosphorus, 1800 KCal and 1.2 gram protein per day.     2. Hyperkalemia -Result     3. Anemia of ESRD - Hb 11.1 g/dl is within target.     4.  Renal osteodystrophy - Phosphorus within goal.     5. Systemic hypertension - continue current treatment.     6.   Thrombocytopenia - .    Plan/   Vascular and ID recommendations noted  Patient will have a tunnel catheter placed once his blood cultures are negative and clearance from infectious disease  Continue vancomycin  Ultrasound of the fistula pending        [place]bed [place]

## 2018-11-22 NOTE — PROGRESS NOTES
3 times daily  Patient taking differently: Take 200 mg by mouth 3 times daily. . 6/17/15  Yes Shoal Carreno MD   polyethylene glycol (GLYCOLAX) powder Take 17 g by mouth daily as needed (constipation)    Yes Historical Provider, MD   clopidogrel (PLAVIX) 75 MG tablet Take 75 mg by mouth every evening  2/13/14  Yes Historical Provider, MD   hydrALAZINE (APRESOLINE) 25 MG tablet Take 25 mg by mouth 2 times daily Give only on non-dialysis days.  2/13/14  Yes Historical Provider, MD   aspirin 81 MG EC tablet Take 81 mg by mouth every morning    Yes Historical Provider, MD   metoprolol succinate (TOPROL XL) 25 MG extended release tablet Take 1 tablet by mouth daily 9/11/18   Renetta Dill MD   vitamin D (CHOLECALCIFEROL) 1000 UNIT TABS tablet Take 1,000 Units by mouth daily    Historical Provider, MD   fluconazole (DIFLUCAN) 100 MG tablet Take 100 mg by mouth daily For 21 days starting 8/24/18 8/24/18   Historical Provider, MD   diclofenac sodium 1 % GEL Apply topically 3 times daily Apply between last two toes and to arch of right foot    Historical Provider, MD Yue Larry 30G X 8 MM [id number] [facility]  1/17/17   Historical Provider, MD   darbepoetin candice-polysorbate (ARANESP) 60 MCG/ML injection Inject 60 mcg into the skin once a week On Wednesday with dialysis    Historical Provider, MD   Nutritional Supplements (BOOST GLUCOSE CONTROL) LIQD Take 8 oz by mouth nightly     Historical Provider, MD   senna-docusate (PERICOLACE) 8.6-50 MG per tablet Take 2 tablets by mouth daily On non-dialysis days (Sun, Tues, Thurs, Sat)    Historical Provider, MD   b complex-C-folic acid (NEPHROCAPS) 1 MG capsule Take 1 capsule by mouth daily    Historical Provider, MD   lidocaine-prilocaine (EMLA) 2.5-2.5 % cream Apply 1 Applicatorful topically three times a week To right forearm in the morning on Mon, Wed, Fri before dialysis    Historical Provider, MD   amLODIPine (NORVASC) 2.5 MG tablet Take 1 tablet by mouth daily  Patient 9.9 6.0 - 12.0 fL    NRBC Automated NOT REPORTED per 100 WBC    Differential Type NOT REPORTED     Immature Granulocytes NOT REPORTED 0 %    Absolute Immature Granulocyte NOT REPORTED 0.00 - 0.30 k/uL    WBC Morphology NOT REPORTED     RBC Morphology NOT REPORTED     Platelet Estimate NOT REPORTED     Seg Neutrophils 51 36 - 66 %    Lymphocytes 29 24 - 44 %    Monocytes 10 (H) 1 - 7 %    Eosinophils % 9 (H) 0 - 4 %    Basophils 1 0 - 2 %    Segs Absolute 2.90 1.3 - 9.1 k/uL    Absolute Lymph # 1.60 1.0 - 4.8 k/uL    Absolute Mono # 0.60 0.1 - 1.3 k/uL    Absolute Eos # 0.50 (H) 0.0 - 0.4 k/uL    Basophils # 0.10 0.0 - 0.2 k/uL   Basic Metabolic Panel w/ Reflex to MG    Collection Time: 11/22/18  6:02 AM   Result Value Ref Range    Glucose 149 (H) 70 - 99 mg/dL    BUN 29 (H) 8 - 23 mg/dL    CREATININE 4.59 (H) 0.70 - 1.20 mg/dL    Bun/Cre Ratio NOT REPORTED 9 - 20    Calcium 8.5 (L) 8.6 - 10.4 mg/dL    Sodium 136 135 - 144 mmol/L    Potassium 4.5 3.7 - 5.3 mmol/L    Chloride 97 (L) 98 - 107 mmol/L    CO2 27 20 - 31 mmol/L    Anion Gap 12 9 - 17 mmol/L    GFR Non-African American 13 (L) >60 mL/min    GFR African American 15 (L) >60 mL/min    GFR Comment          GFR Staging NOT REPORTED    POC Glucose Fingerstick    Collection Time: 11/22/18  6:24 AM   Result Value Ref Range    POC Glucose 118 (H) 75 - 110 mg/dL       Recent Labs      11/22/18   0602   11/19/18   1115   11/18/18   1835   HGB  11.2*   < >   --    < >  11.3*   HCT  33.5*   < >   --    < >  34.2*   WBC  5.7   < >   --    < >  5.0   MCV  98.6   < >   --    < >  99.5   NA  136   < >   --    < >  134*   K  4.5   < >  4.5   < >  4.7   CL  97*   < >   --    < >  93*   CO2  27   < >   --    < >  27   BUN  29*   < >   --    < >  41*   CREATININE  4.59*   < >   --    < >  5.73*   GLUCOSE  149*   < >   --    < >  255*   INR   --    --   1.0   --    --    PROTIME   --    --   10.7   --    --    APTT   --    --   34.3*   --    --    AST   --    --    --    --   34 ALT   --    --    --    --   43*   LABALBU   --    --    --    --   3.8    < > = values in this interval not displayed. Hematology:  Recent Labs      11/19/18   1115  11/20/18   0711  11/21/18   0607  11/22/18   0602   WBC   --   6.2  6.0  5.7   RBC   --   3.35*  3.39*  3.39*   HGB   --   11.1*  10.9*  11.2*   HCT   --   33.0*  33.5*  33.5*   MCV   --   98.7  98.7  98.6   MCH   --   33.2  32.3  32.9   MCHC   --   33.6  32.7  33.4   RDW   --   15.1*  15.2*  14.7   PLT   --   120*  107*  89*   MPV   --   9.4  9.2  9.9   INR  1.0   --    --    --      Chemistry:  Recent Labs      11/20/18   0711  11/20/18   2156  11/21/18   0607  11/22/18   0602   NA  136   --   138  136   K  5.7*  4.5  4.8  4.5   CL  97*   --   97*  97*   CO2  26   --   27  27   GLUCOSE  101*   --   97  149*   BUN  58*   --   32*  29*   CREATININE  7.37*   --   5.36*  4.59*   ANIONGAP  13   --   14  12   LABGLOM  7*   --   11*  13*   GFRAA  9*   --   13*  15*   CALCIUM  8.7   --   8.6  8.5*   PHOS  4.1   --    --    --      Recent Labs      11/21/18   0711  11/21/18   1107  11/21/18   1606  11/21/18   2126  11/21/18   2326  11/22/18   0624   POCGLU  89  266*  114*  218*  130*  118*       Imaging/Diagnostics:       Ir Nontunneled Vascular Catheter > 5 Years    Result Date: 11/19/2018  PROCEDURE: ULTRASOUND GUIDED VASCULAR ACCESS. FLUOROSCOPY GUIDED PLACEMENT OF A NON-TUNNELED CATHETER. 11/19/2018. HISTORY: ORDERING SYSTEM PROVIDED HISTORY: cellulitis near fistula, need for temporary access TECHNOLOGIST PROVIDED HISTORY: cellulitis near fistula, need for temporary access SEDATION: Local anesthesia FLUOROSCOPY DOSE AND TYPE OR TIME AND EXPOSURES: Fluoroscopy time-1 minute and 8 seconds. D  cGy cm squared. TECHNIQUE: Informed consent was obtained after a detailed explanation of the procedure including risks, benefits, and alternatives. Universal protocol was observed.  The right neck and chest were prepped and draped in sterile fashion using MD Ish   3,300 Units at 11/20/18 1824    vancomycin (VANCOCIN) intermittent dosing (placeholder)   Other RX Placeholder Patrick Rich MD        allopurinol (ZYLOPRIM) tablet 100 mg  100 mg Oral Daily Guillermina Lo MD   100 mg at 11/22/18 0821    amLODIPine (NORVASC) tablet 5 mg  5 mg Oral Daily Guillermina Lo MD   5 mg at 11/22/18 0820    aspirin EC tablet 81 mg  81 mg Oral QAM Guillermina Lo MD   81 mg at 11/22/18 0820    B complex-vitamin C-folic acid (NEPHRO-LAWANDA) tablet 1 tablet  1 tablet Oral Daily Guillermina Lo MD   1 tablet at 11/22/18 0819    calcium carbonate (TUMS) chewable tablet 1,000 mg  2 tablet Oral Q6H PRN Guillermina Lo MD        cinacalcet (SENSIPAR) tablet 30 mg  30 mg Oral Daily Guillermina Lo MD   30 mg at 11/22/18 0819    clopidogrel (PLAVIX) tablet 75 mg  75 mg Oral QPM Guillermina Lo MD   75 mg at 11/21/18 1730    darbepoetin candice-polysorbate (ARANESP) injection 60 mcg  60 mcg Subcutaneous Weekly Guillermina Lo MD   Stopped at 11/22/18 0110    fluticasone (FLOVENT HFA) 110 MCG/ACT inhaler 2 puff  2 puff Inhalation BID Guillermina Lo MD   2 puff at 11/22/18 0819    gabapentin (NEURONTIN) capsule 200 mg  200 mg Oral TID Guillermina Lo MD   200 mg at 11/22/18 0819    hydrALAZINE (APRESOLINE) tablet 25 mg  25 mg Oral BID Guillermina Lo MD   25 mg at 11/22/18 0820    HYDROmorphone (DILAUDID) tablet 2 mg  2 mg Oral Q12H PRN Guillermina Lo MD        insulin glargine (LANTUS) injection vial 6 Units  6 Units Subcutaneous Nightly Guillermina Lo MD   6 Units at 11/20/18 2116    metoprolol succinate (TOPROL XL) extended release tablet 25 mg  25 mg Oral Daily Guillermina Lo MD   25 mg at 11/22/18 1342    midodrine (PROAMATINE) tablet 5 mg  5 mg Oral TID Guillermina Lo MD   5 mg at 11/22/18 0820    polyethylene glycol (GLYCOLAX) packet 17 g  17 g Oral Daily PRN Guillermina Lo MD        senna (SENOKOT) tablet 17.2 mg  2 tablet Oral

## 2018-11-23 ENCOUNTER — APPOINTMENT (OUTPATIENT)
Dept: INTERVENTIONAL RADIOLOGY/VASCULAR | Age: 73
DRG: 867 | End: 2018-11-23
Payer: MEDICARE

## 2018-11-23 LAB
ABSOLUTE EOS #: 0.6 K/UL (ref 0–0.4)
ABSOLUTE IMMATURE GRANULOCYTE: ABNORMAL K/UL (ref 0–0.3)
ABSOLUTE LYMPH #: 2.2 K/UL (ref 1–4.8)
ABSOLUTE MONO #: 0.5 K/UL (ref 0.1–1.3)
ANION GAP SERPL CALCULATED.3IONS-SCNC: 14 MMOL/L (ref 9–17)
BASOPHILS # BLD: 1 % (ref 0–2)
BASOPHILS ABSOLUTE: 0.1 K/UL (ref 0–0.2)
BUN BLDV-MCNC: 48 MG/DL (ref 8–23)
BUN/CREAT BLD: ABNORMAL (ref 9–20)
CALCIUM SERPL-MCNC: 8.6 MG/DL (ref 8.6–10.4)
CHLORIDE BLD-SCNC: 95 MMOL/L (ref 98–107)
CO2: 25 MMOL/L (ref 20–31)
CREAT SERPL-MCNC: 6.7 MG/DL (ref 0.7–1.2)
DIFFERENTIAL TYPE: ABNORMAL
EOSINOPHILS RELATIVE PERCENT: 10 % (ref 0–4)
GFR AFRICAN AMERICAN: 10 ML/MIN
GFR NON-AFRICAN AMERICAN: 8 ML/MIN
GFR SERPL CREATININE-BSD FRML MDRD: ABNORMAL ML/MIN/{1.73_M2}
GFR SERPL CREATININE-BSD FRML MDRD: ABNORMAL ML/MIN/{1.73_M2}
GLUCOSE BLD-MCNC: 107 MG/DL (ref 75–110)
GLUCOSE BLD-MCNC: 111 MG/DL (ref 70–99)
GLUCOSE BLD-MCNC: 158 MG/DL (ref 75–110)
GLUCOSE BLD-MCNC: 164 MG/DL (ref 75–110)
GLUCOSE BLD-MCNC: 96 MG/DL (ref 75–110)
HCT VFR BLD CALC: 31.5 % (ref 41–53)
HEMOGLOBIN: 10.4 G/DL (ref 13.5–17.5)
IMMATURE GRANULOCYTES: ABNORMAL %
LYMPHOCYTES # BLD: 34 % (ref 24–44)
MCH RBC QN AUTO: 32.9 PG (ref 26–34)
MCHC RBC AUTO-ENTMCNC: 33.1 G/DL (ref 31–37)
MCV RBC AUTO: 99.5 FL (ref 80–100)
MONOCYTES # BLD: 8 % (ref 1–7)
NRBC AUTOMATED: ABNORMAL PER 100 WBC
PDW BLD-RTO: 14.8 % (ref 11.5–14.9)
PLATELET # BLD: 88 K/UL (ref 150–450)
PLATELET ESTIMATE: ABNORMAL
PMV BLD AUTO: 9.8 FL (ref 6–12)
POTASSIUM SERPL-SCNC: 5 MMOL/L (ref 3.7–5.3)
RBC # BLD: 3.17 M/UL (ref 4.5–5.9)
RBC # BLD: ABNORMAL 10*6/UL
SEG NEUTROPHILS: 47 % (ref 36–66)
SEGMENTED NEUTROPHILS ABSOLUTE COUNT: 3 K/UL (ref 1.3–9.1)
SODIUM BLD-SCNC: 134 MMOL/L (ref 135–144)
VANCOMYCIN RANDOM DATE LAST DOSE: NORMAL
VANCOMYCIN RANDOM DOSE AMOUNT: NORMAL
VANCOMYCIN RANDOM TIME LAST DOSE: NORMAL
VANCOMYCIN RANDOM: 18.3 UG/ML
WBC # BLD: 6.4 K/UL (ref 3.5–11)
WBC # BLD: ABNORMAL 10*3/UL

## 2018-11-23 PROCEDURE — 2580000003 HC RX 258: Performed by: INTERNAL MEDICINE

## 2018-11-23 PROCEDURE — 36561 INSERT TUNNELED CV CATH: CPT | Performed by: RADIOLOGY

## 2018-11-23 PROCEDURE — 77001 FLUOROGUIDE FOR VEIN DEVICE: CPT | Performed by: RADIOLOGY

## 2018-11-23 PROCEDURE — 85025 COMPLETE CBC W/AUTO DIFF WBC: CPT

## 2018-11-23 PROCEDURE — 36415 COLL VENOUS BLD VENIPUNCTURE: CPT

## 2018-11-23 PROCEDURE — 97116 GAIT TRAINING THERAPY: CPT

## 2018-11-23 PROCEDURE — 2709999900 IR TUNNELED CVC PLACE WO SQ PORT/PUMP > 5 YEARS

## 2018-11-23 PROCEDURE — 82947 ASSAY GLUCOSE BLOOD QUANT: CPT

## 2018-11-23 PROCEDURE — 6360000002 HC RX W HCPCS: Performed by: INTERNAL MEDICINE

## 2018-11-23 PROCEDURE — 2500000003 HC RX 250 WO HCPCS: Performed by: RADIOLOGY

## 2018-11-23 PROCEDURE — 2500000003 HC RX 250 WO HCPCS: Performed by: INTERNAL MEDICINE

## 2018-11-23 PROCEDURE — 6360000002 HC RX W HCPCS: Performed by: RADIOLOGY

## 2018-11-23 PROCEDURE — 1200000000 HC SEMI PRIVATE

## 2018-11-23 PROCEDURE — 80202 ASSAY OF VANCOMYCIN: CPT

## 2018-11-23 PROCEDURE — 97530 THERAPEUTIC ACTIVITIES: CPT

## 2018-11-23 PROCEDURE — 86022 PLATELET ANTIBODIES: CPT

## 2018-11-23 PROCEDURE — 02H633Z INSERTION OF INFUSION DEVICE INTO RIGHT ATRIUM, PERCUTANEOUS APPROACH: ICD-10-PCS | Performed by: RADIOLOGY

## 2018-11-23 PROCEDURE — 97110 THERAPEUTIC EXERCISES: CPT

## 2018-11-23 PROCEDURE — 93931 UPPER EXTREMITY STUDY: CPT

## 2018-11-23 PROCEDURE — 80048 BASIC METABOLIC PNL TOTAL CA: CPT

## 2018-11-23 PROCEDURE — 99232 SBSQ HOSP IP/OBS MODERATE 35: CPT | Performed by: INTERNAL MEDICINE

## 2018-11-23 PROCEDURE — 0JH63XZ INSERTION OF TUNNELED VASCULAR ACCESS DEVICE INTO CHEST SUBCUTANEOUS TISSUE AND FASCIA, PERCUTANEOUS APPROACH: ICD-10-PCS | Performed by: RADIOLOGY

## 2018-11-23 PROCEDURE — 2580000003 HC RX 258: Performed by: RADIOLOGY

## 2018-11-23 PROCEDURE — 6370000000 HC RX 637 (ALT 250 FOR IP): Performed by: INTERNAL MEDICINE

## 2018-11-23 RX ORDER — HEPARIN SODIUM 5000 [USP'U]/ML
INJECTION, SOLUTION INTRAVENOUS; SUBCUTANEOUS
Status: COMPLETED | OUTPATIENT
Start: 2018-11-23 | End: 2018-11-23

## 2018-11-23 RX ORDER — LIDOCAINE HYDROCHLORIDE 10 MG/ML
INJECTION, SOLUTION INFILTRATION; PERINEURAL
Status: COMPLETED | OUTPATIENT
Start: 2018-11-23 | End: 2018-11-23

## 2018-11-23 RX ADMIN — SUCRALFATE 1 G: 1 TABLET ORAL at 06:04

## 2018-11-23 RX ADMIN — INSULIN GLARGINE 6 UNITS: 100 INJECTION, SOLUTION SUBCUTANEOUS at 21:34

## 2018-11-23 RX ADMIN — GABAPENTIN 200 MG: 100 CAPSULE ORAL at 21:30

## 2018-11-23 RX ADMIN — Medication 1 TABLET: at 08:30

## 2018-11-23 RX ADMIN — Medication 1.6 ML: at 20:46

## 2018-11-23 RX ADMIN — SENNOSIDES AND DOCUSATE SODIUM 2 TABLET: 8.6; 5 TABLET ORAL at 08:30

## 2018-11-23 RX ADMIN — LIDOCAINE HYDROCHLORIDE 2 ML: 10 INJECTION, SOLUTION INFILTRATION; PERINEURAL at 15:59

## 2018-11-23 RX ADMIN — Medication 10 ML: at 08:33

## 2018-11-23 RX ADMIN — INSULIN LISPRO 2 UNITS: 100 INJECTION, SOLUTION INTRAVENOUS; SUBCUTANEOUS at 11:39

## 2018-11-23 RX ADMIN — SUCRALFATE 1 G: 1 TABLET ORAL at 11:01

## 2018-11-23 RX ADMIN — GABAPENTIN 200 MG: 100 CAPSULE ORAL at 08:29

## 2018-11-23 RX ADMIN — GABAPENTIN 200 MG: 100 CAPSULE ORAL at 14:21

## 2018-11-23 RX ADMIN — DEXTROSE MONOHYDRATE 1 G: 5 INJECTION INTRAVENOUS at 15:50

## 2018-11-23 RX ADMIN — Medication 10 ML: at 11:02

## 2018-11-23 RX ADMIN — SERTRALINE HYDROCHLORIDE 50 MG: 50 TABLET ORAL at 08:30

## 2018-11-23 RX ADMIN — HEPARIN SODIUM 1.6 ML: 5000 INJECTION, SOLUTION INTRAVENOUS; SUBCUTANEOUS at 16:17

## 2018-11-23 RX ADMIN — ALLOPURINOL 100 MG: 100 TABLET ORAL at 08:29

## 2018-11-23 RX ADMIN — AMLODIPINE BESYLATE 5 MG: 5 TABLET ORAL at 08:29

## 2018-11-23 RX ADMIN — MIDODRINE HYDROCHLORIDE 5 MG: 5 TABLET ORAL at 17:40

## 2018-11-23 RX ADMIN — SUCRALFATE 1 G: 1 TABLET ORAL at 21:30

## 2018-11-23 RX ADMIN — VITAMIN D, TAB 1000IU (100/BT) 1000 UNITS: 25 TAB at 08:30

## 2018-11-23 RX ADMIN — SEVELAMER CARBONATE 3200 MG: 800 TABLET, FILM COATED ORAL at 21:29

## 2018-11-23 RX ADMIN — ASPIRIN 81 MG: 81 TABLET, COATED ORAL at 08:29

## 2018-11-23 RX ADMIN — Medication 2 PUFF: at 21:32

## 2018-11-23 RX ADMIN — HYDRALAZINE HYDROCHLORIDE 25 MG: 25 TABLET, FILM COATED ORAL at 21:30

## 2018-11-23 RX ADMIN — METOPROLOL SUCCINATE 25 MG: 25 TABLET, EXTENDED RELEASE ORAL at 08:30

## 2018-11-23 RX ADMIN — CINACALCET HYDROCHLORIDE 30 MG: 30 TABLET, COATED ORAL at 08:33

## 2018-11-23 RX ADMIN — HYDRALAZINE HYDROCHLORIDE 25 MG: 25 TABLET, FILM COATED ORAL at 08:29

## 2018-11-23 RX ADMIN — Medication 2 PUFF: at 08:30

## 2018-11-23 RX ADMIN — CLOPIDOGREL BISULFATE 75 MG: 75 TABLET ORAL at 21:30

## 2018-11-23 RX ADMIN — SEVELAMER CARBONATE 3200 MG: 800 TABLET, FILM COATED ORAL at 13:05

## 2018-11-23 RX ADMIN — VANCOMYCIN HYDROCHLORIDE 750 MG: 5 INJECTION, POWDER, LYOPHILIZED, FOR SOLUTION INTRAVENOUS at 11:01

## 2018-11-23 RX ADMIN — Medication 10 ML: at 21:32

## 2018-11-23 RX ADMIN — TIOTROPIUM BROMIDE 18 MCG: 18 CAPSULE ORAL; RESPIRATORY (INHALATION) at 08:30

## 2018-11-23 RX ADMIN — HEPARIN SODIUM 1.6 ML: 5000 INJECTION, SOLUTION INTRAVENOUS; SUBCUTANEOUS at 16:18

## 2018-11-23 RX ADMIN — SEVELAMER CARBONATE 3200 MG: 800 TABLET, FILM COATED ORAL at 07:43

## 2018-11-23 NOTE — PROGRESS NOTES
Pt can return to [facility]. Upon discharge call building at [phone]. Pt is a long term resident.

## 2018-11-23 NOTE — PROGRESS NOTES
5 mg by mouth 3 times daily One time per day every Mon, Wed, Fri for low pressure   Yes Historical Provider, MD   oxyCODONE-acetaminophen (PERCOCET) 7.5-325 MG per tablet Take 1 tablet by mouth every 6 hours as needed for Pain. .   Yes Historical Provider, MD   senna (SENOKOT) 8.6 MG tablet Take 2 tablets by mouth daily as needed for Constipation   Yes Historical Provider, MD   cinacalcet (SENSIPAR) 30 MG tablet Take 30 mg by mouth daily   Yes Historical Provider, MD   tiotropium (SPIRIVA RESPIMAT) 1.25 MCG/ACT AERS inhaler Inhale 2 puffs into the lungs daily 9/11/18  Yes Wen Diaz MD   carboxymethylcellulose 1 % ophthalmic solution Place 1 drop into both eyes 3 times daily as needed for Dry Eyes   Yes Historical Provider, MD   sevelamer (RENVELA) 800 MG tablet Take 2 tablets by mouth every 8 hours as needed For snacks   Yes Historical Provider, MD   sevelamer (RENVELA) 800 MG tablet Take 4 tablets by mouth 3 times daily (with meals)   Yes Historical Provider, MD   sertraline (ZOLOFT) 50 MG tablet Take 50 mg by mouth daily   Yes Historical Provider, MD   sucralfate (CARAFATE) 1 GM tablet Take 1 g by mouth 3 times daily (before meals)   Yes Historical Provider, MD   calcium carbonate (TUMS) 500 MG chewable tablet Take 2 tablets by mouth every 6 hours as needed for Heartburn   Yes Historical Provider, MD   tetrahydrozoline 0.05 % ophthalmic solution Place 1 drop into both eyes 2 times daily as needed (irritation/dryness)   Yes Historical Provider, MD   fluticasone propionate (FLOVENT DISKUS) 100 MCG/BLIST AEPB inhaler Inhale 2 puffs into the lungs 2 times daily    Yes Historical Provider, MD   insulin glargine (LANTUS) 100 UNIT/ML injection pen Inject 6 Units into the skin nightly    Yes Historical Provider, MD   doxycycline (VIBRAMYCIN) 100 MG capsule Take 100 mg by mouth 2 times daily For prophylaxis s/p osteomyelitis   Yes Historical Provider, MD   gabapentin (NEURONTIN) 100 MG capsule Take 1 capsule by mouth --    --   10.7   --    --    APTT   --    --   34.3*   --    --    AST   --    --    --    --   34   ALT   --    --    --    --   43*   LABALBU   --    --    --    --   3.8    < > = values in this interval not displayed. Hematology:  Recent Labs      11/21/18 0607 11/22/18   0602 11/23/18   0533   WBC  6.0  5.7  6.4   RBC  3.39*  3.39*  3.17*   HGB  10.9*  11.2*  10.4*   HCT  33.5*  33.5*  31.5*   MCV  98.7  98.6  99.5   MCH  32.3  32.9  32.9   MCHC  32.7  33.4  33.1   RDW  15.2*  14.7  14.8   PLT  107*  89*  88*   MPV  9.2  9.9  9.8     Chemistry:  Recent Labs      11/21/18 0607 11/22/18   0602  11/23/18   0533   NA  138  136  134*   K  4.8  4.5  5.0   CL  97*  97*  95*   CO2  27  27  25   GLUCOSE  97  149*  111*   BUN  32*  29*  48*   CREATININE  5.36*  4.59*  6.70*   ANIONGAP  14  12  14   LABGLOM  11*  13*  8*   GFRAA  13*  15*  10*   CALCIUM  8.6  8.5*  8.6     Recent Labs      11/21/18   2326  11/22/18   0624  11/22/18   1111  11/22/18   1608  11/22/18   2018  11/23/18   0638   POCGLU  130*  118*  161*  176*  216*  96       Imaging/Diagnostics:       Ir Nontunneled Vascular Catheter > 5 Years    Result Date: 11/19/2018  PROCEDURE: ULTRASOUND GUIDED VASCULAR ACCESS. FLUOROSCOPY GUIDED PLACEMENT OF A NON-TUNNELED CATHETER. 11/19/2018. HISTORY: ORDERING SYSTEM PROVIDED HISTORY: cellulitis near fistula, need for temporary access TECHNOLOGIST PROVIDED HISTORY: cellulitis near fistula, need for temporary access SEDATION: Local anesthesia FLUOROSCOPY DOSE AND TYPE OR TIME AND EXPOSURES: Fluoroscopy time-1 minute and 8 seconds. D  cGy cm squared. TECHNIQUE: Informed consent was obtained after a detailed explanation of the procedure including risks, benefits, and alternatives. Universal protocol was observed. The right neck and chest were prepped and draped in sterile fashion using maximum sterile barrier technique. Local anesthesia was achieved with lidocaine.  A micropuncture needle was used to

## 2018-11-23 NOTE — FLOWSHEET NOTE
11/23/18 1200   Encounter Summary   Services provided to: Patient   Referral/Consult From: [facility]   Continue Visiting (11/23/18)   Volunteer Visit Yes   Complexity of Encounter Low   Length of Encounter 15 minutes   Sacraments   Communion Patient received communion  (Visited by [facility] [employer])

## 2018-11-23 NOTE — PROGRESS NOTES
Dialysis Safety Checks:    Patient ID Verified (Y/N) YES     -Hepatitis Test                   Date      Result  Hepatitis B Surface Antigen   18 NEG     Hepatitis B Surface Antibody 18 POS     Hepatitis B Core Antibody        -Treatment Initiation  Blood Vol Processed Goal (Liters)  Pump Speed x Treatment Hours x 60 Minutes    Target Fluid Removal 3000     * Intra-treatment documented Safety Checks include the followin) Access and face visible at all times. 2) All connections and blood lines are secure with no kinks. 3) NVL alarm engaged. 4) Hemosafe device applied (if applicable). 5) No collapse of Arterial or Venous blood chambers. 6) All blood lines / pump segments in the air detectors. --------------------------------------------------------------------------------    Denies any complaints. Came from IR with new tunnelled cath right chest oozing small amount blood. RUE AVF with thrill and bruit. No edema. Bedscale descrepancy. UF as manfred. Pt voiced they normally take off 3L.

## 2018-11-23 NOTE — PROGRESS NOTES
Infectious disease Consult Note      Patient: Cristóbal Rangel  : 1945  Acct#:  [id number]     Date:  2018    Subjective:       History of Present Illness  Patient is a 75 y.o.  male  who is seen in consult for cellulitis of the right forearm over his fistula sight. He denies any fevers or chills. Blood cultures no growth so far . Right IJ temporary HD cath placed. Right arm redness and swelling better ,denied fever or chills . No new complaints . Past Medical History:   Diagnosis Date    Asthma     CAD (coronary artery disease)     CKD (chronic kidney disease) stage 4, GFR 15-29 ml/min ([facility])     [facility]     Depression     History of kidney stones     Hyperlipidemia     Hypertension     Osteoarthritis     Proteinuria     Type II or unspecified type diabetes mellitus without mention of complication, not stated as uncontrolled       Past Surgical History:   Procedure Laterality Date    BONE BIOPSY  6/29/15    L2-3    CARPAL TUNNEL RELEASE      CORONARY ARTERY BYPASS GRAFT      HIP SURGERY      JOINT REPLACEMENT      OTHER SURGICAL HISTORY  6/11/15    I&D coccyx wound    OTHER SURGICAL HISTORY  12/30/15    diverting loop colostomy; perirectal incision and drainage    SHOULDER SURGERY      left and right          Admission Meds  No current facility-administered medications on file prior to encounter.       Current Outpatient Prescriptions on File Prior to Encounter   Medication Sig Dispense Refill    tiotropium (SPIRIVA RESPIMAT) 1.25 MCG/ACT AERS inhaler Inhale 2 puffs into the lungs daily 1 Inhaler 1    carboxymethylcellulose 1 % ophthalmic solution Place 1 drop into both eyes 3 times daily as needed for Dry Eyes      sevelamer (RENVELA) 800 MG tablet Take 2 tablets by mouth every 8 hours as needed For snacks      sevelamer (RENVELA) 800 MG tablet Take 4 tablets by mouth 3 times daily (with meals)      sertraline (ZOLOFT) 50 supple   Pulmonary/Chest: clear to auscultation bilaterally- no wheezes  Cardiovascular: normal rate, regular rhythm, normal S1 and S2  Abdomen: soft, non-tender, non-distended, normal bowel sounds, no masses or organomegaly  Extremities: Right forearm erythema at the AVF site ,no open area ,no fluctuation improving   Musculoskeletal: normal range of motion, no joint swelling, deformity or tenderness  Right IJ HD cath site with no erythema     Data Review:    Recent Labs      11/21/18   0607 11/22/18   0602 11/23/18   0533   WBC  6.0  5.7  6.4   HGB  10.9*  11.2*  10.4*   HCT  33.5*  33.5*  31.5*   MCV  98.7  98.6  99.5   PLT  107*  89*  88*     Recent Labs      11/21/18 0607 11/22/18 0602 11/23/18   0533   NA  138  136  134*   K  4.8  4.5  5.0   CL  97*  97*  95*   CO2  27  27  25   BUN  32*  29*  48*   CREATININE  5.36*  4.59*  6.70*     No results for input(s): AST, ALT, ALB, BILIDIR, BILITOT, ALKPHOS in the last 72 hours. No results for input(s): LIPASE, AMYLASE in the last 72 hours. No results for input(s): PROTIME, INR in the last 72 hours. Imaging Studies:                           All appropriate imaging studies and reports reviewed: Yes                 Assessment:       Cellulitis of right forearm at the AVF site   L3-L4 chronic discitis /Osteomylitis with bone destruction and lumbar kyphosis  H/o perianal abscess s/p diverting loop colostomy and I&D S/p diversion of colostomy   Gout   Active Problems:  Hypertension  CKD (chronic kidney disease) stage 4, GFR 15-29 ml/min  Protein-calorie malnutrition (HCC)  Diabetes mellitus    Recommendations: Follow-final blood cultures . Continue vancomycin to be arranged after each HD until further evaluation in 2 weeks   . [id number] [address] for Tunneled HD  . Avoid accessing his AV fistula until the cellulitis healed .    Will D/W  nephrology         Reba Mosley

## 2018-11-24 LAB
ABSOLUTE EOS #: 0.4 K/UL (ref 0–0.4)
ABSOLUTE IMMATURE GRANULOCYTE: ABNORMAL K/UL (ref 0–0.3)
ABSOLUTE LYMPH #: 1.7 K/UL (ref 1–4.8)
ABSOLUTE MONO #: 0.6 K/UL (ref 0.1–1.3)
ANION GAP SERPL CALCULATED.3IONS-SCNC: 11 MMOL/L (ref 9–17)
BASOPHILS # BLD: 1 % (ref 0–2)
BASOPHILS ABSOLUTE: 0 K/UL (ref 0–0.2)
BUN BLDV-MCNC: 35 MG/DL (ref 8–23)
BUN/CREAT BLD: ABNORMAL (ref 9–20)
CALCIUM SERPL-MCNC: 8.5 MG/DL (ref 8.6–10.4)
CHLORIDE BLD-SCNC: 97 MMOL/L (ref 98–107)
CO2: 27 MMOL/L (ref 20–31)
CREAT SERPL-MCNC: 5.03 MG/DL (ref 0.7–1.2)
DIFFERENTIAL TYPE: ABNORMAL
EOSINOPHILS RELATIVE PERCENT: 7 % (ref 0–4)
GFR AFRICAN AMERICAN: 14 ML/MIN
GFR NON-AFRICAN AMERICAN: 11 ML/MIN
GFR SERPL CREATININE-BSD FRML MDRD: ABNORMAL ML/MIN/{1.73_M2}
GFR SERPL CREATININE-BSD FRML MDRD: ABNORMAL ML/MIN/{1.73_M2}
GLUCOSE BLD-MCNC: 165 MG/DL (ref 75–110)
GLUCOSE BLD-MCNC: 168 MG/DL (ref 75–110)
GLUCOSE BLD-MCNC: 174 MG/DL (ref 75–110)
GLUCOSE BLD-MCNC: 182 MG/DL (ref 70–99)
GLUCOSE BLD-MCNC: 235 MG/DL (ref 75–110)
HCT VFR BLD CALC: 31.6 % (ref 41–53)
HEMOGLOBIN: 10.5 G/DL (ref 13.5–17.5)
HEPARIN INDUCED PLATELET ANTIBODY: 0.18 O.D. (ref 0–0.4)
IMMATURE GRANULOCYTES: ABNORMAL %
LYMPHOCYTES # BLD: 26 % (ref 24–44)
MCH RBC QN AUTO: 32.5 PG (ref 26–34)
MCHC RBC AUTO-ENTMCNC: 33.3 G/DL (ref 31–37)
MCV RBC AUTO: 97.8 FL (ref 80–100)
MONOCYTES # BLD: 9 % (ref 1–7)
NRBC AUTOMATED: ABNORMAL PER 100 WBC
PDW BLD-RTO: 14.7 % (ref 11.5–14.9)
PLATELET # BLD: 98 K/UL (ref 150–450)
PLATELET ESTIMATE: ABNORMAL
PMV BLD AUTO: 10.3 FL (ref 6–12)
POTASSIUM SERPL-SCNC: 4.6 MMOL/L (ref 3.7–5.3)
RBC # BLD: 3.23 M/UL (ref 4.5–5.9)
RBC # BLD: ABNORMAL 10*6/UL
SEG NEUTROPHILS: 57 % (ref 36–66)
SEGMENTED NEUTROPHILS ABSOLUTE COUNT: 3.8 K/UL (ref 1.3–9.1)
SODIUM BLD-SCNC: 135 MMOL/L (ref 135–144)
WBC # BLD: 6.6 K/UL (ref 3.5–11)
WBC # BLD: ABNORMAL 10*3/UL

## 2018-11-24 PROCEDURE — 80048 BASIC METABOLIC PNL TOTAL CA: CPT

## 2018-11-24 PROCEDURE — 99232 SBSQ HOSP IP/OBS MODERATE 35: CPT | Performed by: INTERNAL MEDICINE

## 2018-11-24 PROCEDURE — 1200000000 HC SEMI PRIVATE

## 2018-11-24 PROCEDURE — 85025 COMPLETE CBC W/AUTO DIFF WBC: CPT

## 2018-11-24 PROCEDURE — 6370000000 HC RX 637 (ALT 250 FOR IP): Performed by: INTERNAL MEDICINE

## 2018-11-24 PROCEDURE — APPSS30 APP SPLIT SHARED TIME 16-30 MINUTES: Performed by: NURSE PRACTITIONER

## 2018-11-24 PROCEDURE — 36415 COLL VENOUS BLD VENIPUNCTURE: CPT

## 2018-11-24 PROCEDURE — 2580000003 HC RX 258: Performed by: INTERNAL MEDICINE

## 2018-11-24 PROCEDURE — 82947 ASSAY GLUCOSE BLOOD QUANT: CPT

## 2018-11-24 RX ORDER — OXYCODONE HYDROCHLORIDE AND ACETAMINOPHEN 5; 325 MG/1; MG/1
1 TABLET ORAL EVERY 6 HOURS PRN
Qty: 12 TABLET | Refills: 0 | Status: SHIPPED | OUTPATIENT
Start: 2018-11-24 | End: 2018-11-26

## 2018-11-24 RX ADMIN — SEVELAMER CARBONATE 3200 MG: 800 TABLET, FILM COATED ORAL at 07:35

## 2018-11-24 RX ADMIN — INSULIN LISPRO 2 UNITS: 100 INJECTION, SOLUTION INTRAVENOUS; SUBCUTANEOUS at 07:35

## 2018-11-24 RX ADMIN — SEVELAMER CARBONATE 3200 MG: 800 TABLET, FILM COATED ORAL at 17:52

## 2018-11-24 RX ADMIN — SUCRALFATE 1 G: 1 TABLET ORAL at 11:31

## 2018-11-24 RX ADMIN — SERTRALINE HYDROCHLORIDE 50 MG: 50 TABLET ORAL at 07:34

## 2018-11-24 RX ADMIN — GABAPENTIN 200 MG: 100 CAPSULE ORAL at 07:34

## 2018-11-24 RX ADMIN — TIOTROPIUM BROMIDE 18 MCG: 18 CAPSULE ORAL; RESPIRATORY (INHALATION) at 07:35

## 2018-11-24 RX ADMIN — METOPROLOL SUCCINATE 25 MG: 25 TABLET, EXTENDED RELEASE ORAL at 07:34

## 2018-11-24 RX ADMIN — SENNOSIDES AND DOCUSATE SODIUM 2 TABLET: 8.6; 5 TABLET ORAL at 07:34

## 2018-11-24 RX ADMIN — INSULIN GLARGINE 6 UNITS: 100 INJECTION, SOLUTION SUBCUTANEOUS at 21:14

## 2018-11-24 RX ADMIN — HYDRALAZINE HYDROCHLORIDE 25 MG: 25 TABLET, FILM COATED ORAL at 21:12

## 2018-11-24 RX ADMIN — INSULIN LISPRO 1 UNITS: 100 INJECTION, SOLUTION INTRAVENOUS; SUBCUTANEOUS at 21:14

## 2018-11-24 RX ADMIN — Medication 10 ML: at 21:22

## 2018-11-24 RX ADMIN — AMLODIPINE BESYLATE 5 MG: 5 TABLET ORAL at 07:35

## 2018-11-24 RX ADMIN — Medication 1 TABLET: at 07:34

## 2018-11-24 RX ADMIN — SUCRALFATE 1 G: 1 TABLET ORAL at 15:45

## 2018-11-24 RX ADMIN — SUCRALFATE 1 G: 1 TABLET ORAL at 06:05

## 2018-11-24 RX ADMIN — HYDRALAZINE HYDROCHLORIDE 25 MG: 25 TABLET, FILM COATED ORAL at 07:34

## 2018-11-24 RX ADMIN — ASPIRIN 81 MG: 81 TABLET, COATED ORAL at 07:35

## 2018-11-24 RX ADMIN — ALLOPURINOL 100 MG: 100 TABLET ORAL at 07:35

## 2018-11-24 RX ADMIN — GABAPENTIN 200 MG: 100 CAPSULE ORAL at 21:12

## 2018-11-24 RX ADMIN — Medication 2 PUFF: at 07:44

## 2018-11-24 RX ADMIN — VITAMIN D, TAB 1000IU (100/BT) 1000 UNITS: 25 TAB at 07:35

## 2018-11-24 RX ADMIN — SEVELAMER CARBONATE 3200 MG: 800 TABLET, FILM COATED ORAL at 11:31

## 2018-11-24 RX ADMIN — INSULIN LISPRO 2 UNITS: 100 INJECTION, SOLUTION INTRAVENOUS; SUBCUTANEOUS at 13:34

## 2018-11-24 RX ADMIN — Medication 2 PUFF: at 21:13

## 2018-11-24 RX ADMIN — INSULIN LISPRO 4 UNITS: 100 INJECTION, SOLUTION INTRAVENOUS; SUBCUTANEOUS at 17:52

## 2018-11-24 RX ADMIN — CINACALCET HYDROCHLORIDE 30 MG: 30 TABLET, COATED ORAL at 07:34

## 2018-11-24 RX ADMIN — Medication 10 ML: at 07:43

## 2018-11-24 RX ADMIN — GABAPENTIN 200 MG: 100 CAPSULE ORAL at 15:44

## 2018-11-24 ASSESSMENT — ENCOUNTER SYMPTOMS
GASTROINTESTINAL NEGATIVE: 1
COUGH: 0
ALLERGIC/IMMUNOLOGIC NEGATIVE: 1
ABDOMINAL PAIN: 0
COLOR CHANGE: 0
EYE ITCHING: 0
RESPIRATORY NEGATIVE: 1
EYE DISCHARGE: 0
BACK PAIN: 0
SHORTNESS OF BREATH: 0
DIARRHEA: 0

## 2018-11-24 NOTE — PROGRESS NOTES
Nephrology Progress Note    Subjective/     This is a 75 y.o. 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 He had received 1 gram of vancomycin at the end of outpatient dialysis yesterday. He did not have fever, chills or nausea. At presentation,he was normotensive and WBC was 6.2. Serum potassium on admission was 4.7 mmol/l but up to 5.4 mmol/L today. Interval history: Patient was seen and examined today and is asymptomatic. Redness over his AV fistula is improving and blood cultures are negative so far. He is receiving IV vancomycin intermittently for cellulitis of his AV fistula. He had tunneled catheter placed yesterday and said persistent bleeding over the site with dressing change.   Currently has a sandbag over the bleeding site-his dialysis was yesterday    Objective/     Vitals:    11/23/18 2005 11/23/18 2101 11/24/18 0729 11/24/18 1349   BP: (!) 141/70 131/66 (!) 125/54 (!) 138/59   Pulse: 63 64 67 73   Resp:  16 16 14   Temp: 98.1 °F (36.7 °C) 98.6 °F (37 °C) 98.8 °F (37.1 °C) 98.1 °F (36.7 °C)   TempSrc:  Oral Oral Oral   SpO2:  100% 100% 100%   Weight: 174 lb 2.6 oz (79 kg)      Height:         24HR INTAKE/OUTPUT:      Intake/Output Summary (Last 24 hours) at 11/24/18 1726  Last data filed at 11/23/18 2005   Gross per 24 hour   Intake                0 ml   Output             3000 ml   Net            -3000 ml     Patient Vitals for the past 96 hrs (Last 3 readings):   Weight   11/23/18 2005 174 lb 2.6 oz (79 kg)   11/23/18 1642 180 lb 12.4 oz (82 kg)   11/22/18 0815 180 lb 1.9 oz (81.7 kg)       Constitutional:  Alert, awake, no apparent distress  Cardiovascular:  S1, S2 without m/r/g  Respiratory:  CTA B without w/r/r  Abdomen: +bs, soft, nt  Ext: Improving right arm AV fistula erythema. Positive thrill and bruit in access documented. Data/  Recent Labs      11/22/18   0602  11/23/18   0533  11/24/18   0535   WBC  5.7  6.4  6.6   HGB  11.2*  10.4*  10.5*   HCT  33.5*  31.5*  31.6*   MCV  98.6  99.5  97.8   PLT  89*  88*  98*     Recent Labs      11/22/18   0602  11/23/18   0533  11/24/18   0535   NA  136  134*  135   K  4.5  5.0  4.6   CL  97*  95*  97*   CO2  27  25  27   GLUCOSE  149*  111*  182*   BUN  29*  48*  35*   CREATININE  4.59*  6.70*  5.03*   LABGLOM  13*  8*  11*   GFRAA  15*  10*  14*       Assessment/Plan:     1. ESRD - MWF   Status post tunneled catheter placement    IV vancomycin with pharmacy to dose. Renal diet,i.e 2-gram sodium,2-gram potassium,1500 ml fluid restriction,1-gram phosphorus, 1800 KCal and 1.2 gram protein per day.     2. Hyponatremia - emphasize fluid restriction 1500 mL/day.     3. Anemia of ESRD - Hb 10.4 g/dl is within target.     4.  Renal osteodystrophy - Phosphorus within goal.     5. Systemic hypertension - Controlled on current treatment.     6. Thrombocytopenia - Platelet antibody negative.       Kaleigh Barnett MD, RODRIGO  Attending nephrologist

## 2018-11-24 NOTE — DISCHARGE SUMMARY
every 6 hours as needed for Pain for up to 2 days. .  Replaces:  oxyCODONE-acetaminophen 7.5-325 MG per tablet     vancomycin infusion  Commonly known as:  VANCOCIN  Infuse 1,000 mg intravenously every 24 hours Compound per protocol.  With HD  Three times a week For two weeks        CHANGE how you take these medications    amLODIPine 2.5 MG tablet  Commonly known as:  NORVASC  Take 1 tablet by mouth daily  What changed:  how much to take     gabapentin 100 MG capsule  Commonly known as:  NEURONTIN  Take 1 capsule by mouth 3 times daily  What changed:  how much to take        CONTINUE taking these medications    allopurinol 100 MG tablet  Commonly known as:  ZYLOPRIM     aspirin 81 MG EC tablet     b complex-C-folic acid 1 MG capsule     BOOST GLUCOSE CONTROL Liqd     calcium carbonate 500 MG chewable tablet  Commonly known as:  TUMS     carboxymethylcellulose 1 % ophthalmic solution     cinacalcet 30 MG tablet  Commonly known as:  SENSIPAR     darbepoetin candice-polysorbate 60 MCG/ML injection  Commonly known as:  ARANESP     diclofenac sodium 1 % Gel     FLOVENT DISKUS 100 MCG/BLIST Aepb inhaler  Generic drug:  fluticasone propionate     fluconazole 100 MG tablet  Commonly known as:  DIFLUCAN     hydrALAZINE 25 MG tablet  Commonly known as:  APRESOLINE     insulin glargine 100 UNIT/ML injection pen  Commonly known as:  LANTUS     lidocaine-prilocaine 2.5-2.5 % cream  Commonly known as:  EMLA     metoprolol succinate 25 MG extended release tablet  Commonly known as:  TOPROL XL  Take 1 tablet by mouth daily     midodrine 5 MG tablet  Commonly known as:  PROAMATINE     NOVOFINE AUTOCOVER 30G X 8 MM Misc  Generic drug:  Insulin Pen Needle     polyethylene glycol powder  Commonly known as:  GLYCOLAX     senna 8.6 MG tablet  Commonly known as:  SENOKOT     senna-docusate 8.6-50 MG per tablet  Commonly known as:  PERICOLACE     sertraline 50 MG tablet  Commonly known as:  ZOLOFT     * sevelamer 800 MG tablet  Commonly known

## 2018-11-24 NOTE — PROGRESS NOTES
Inhalation Daily    vitamin D  1,000 Units Oral Daily    sodium chloride flush  10 mL Intravenous 2 times per day    heparin (porcine)  5,000 Units Subcutaneous BID    insulin lispro  0-12 Units Subcutaneous TID WC    insulin lispro  0-6 Units Subcutaneous Nightly       Social History:     Social History     Social History    Marital status:      Spouse name: N/A    Number of children: N/A    Years of education: N/A     Occupational History    Not on file. Social History Main Topics    Smoking status: Never Smoker    Smokeless tobacco: Never Used    Alcohol use No    Drug use: No    Sexual activity: No     Other Topics Concern    Not on file     Social History Narrative    No narrative on file       Family History:     Family History   Problem Relation Age of Onset    Stroke Mother     Heart Attack Mother     Heart Attack Father     Diabetes Sister     Diabetes Brother         Allergies:   Oxycontin [oxycodone]; Avodart [dutasteride]; and Darvocet [propoxyphene n-acetaminophen]     Review of Systems:     Review of Systems   Constitutional: Negative. Negative for appetite change, chills and fever. HENT: Negative. Negative for ear pain and hearing loss. Eyes: Negative for discharge and itching. Respiratory: Negative. Negative for cough and shortness of breath. Cardiovascular: Negative. Negative for chest pain and leg swelling. Gastrointestinal: Negative. Negative for abdominal pain and diarrhea. Endocrine: Negative for cold intolerance and heat intolerance. Genitourinary: Negative. Negative for difficulty urinating and dysuria. Musculoskeletal: Negative for arthralgias and back pain. Skin: Negative for color change. Cellulitis over RUE AVF   Allergic/Immunologic: Negative. Negative for environmental allergies and food allergies. Neurological: Negative. Negative for light-headedness and headaches. Hematological: Bruises/bleeds easily.         On plavix   Psychiatric/Behavioral: Negative for agitation and confusion. Physical Examination :   Patient Vitals for the past 8 hrs:   BP Temp Temp src Pulse Resp SpO2   11/24/18 1349 (!) 138/59 98.1 °F (36.7 °C) Oral 73 14 100 %   11/24/18 0729 (!) 125/54 98.8 °F (37.1 °C) Oral 67 16 100 %       Physical Exam   Constitutional: He is oriented to person, place, and time. He appears well-developed and well-nourished. No distress. HENT:   Head: Normocephalic and atraumatic. Right Ear: External ear normal.   Left Ear: External ear normal.   Eyes: Pupils are equal, round, and reactive to light. Conjunctivae are normal. Right eye exhibits no discharge. Left eye exhibits no discharge. Neck: Normal range of motion. Neck supple. No JVD present. No tracheal deviation present. Cardiovascular: Normal rate, regular rhythm, normal heart sounds and intact distal pulses. Pulmonary/Chest: Effort normal and breath sounds normal. No respiratory distress. Abdominal: Soft. Bowel sounds are normal. There is no tenderness. Genitourinary:   Genitourinary Comments: No morejon   Musculoskeletal: Normal range of motion. He exhibits no edema or deformity. Neurological: He is alert and oriented to person, place, and time. No cranial nerve deficit. Skin: Skin is warm and dry. Capillary refill takes less than 2 seconds. No erythema. Psychiatric: He has a normal mood and affect.  His behavior is normal. Judgment and thought content normal.         Medical Decision Making:   I have independently reviewed/ordered the following labs:    CBC with Differential: Recent Labs      11/23/18 0533 11/24/18   0535   WBC  6.4  6.6   HGB  10.4*  10.5*   HCT  31.5*  31.6*   PLT  88*  98*   LYMPHOPCT  34  26   MONOPCT  8*  9*     BMP:  Recent Labs      11/23/18 0533 11/24/18   0535   NA  134*  135   K  5.0  4.6   CL  95*  97*   CO2  25  27   BUN  48*  35*   CREATININE  6.70*  5.03*     Hepatic Function Panel: No results for input(s):

## 2018-11-25 VITALS
WEIGHT: 174.16 LBS | OXYGEN SATURATION: 97 % | HEART RATE: 76 BPM | SYSTOLIC BLOOD PRESSURE: 100 MMHG | DIASTOLIC BLOOD PRESSURE: 55 MMHG | BODY MASS INDEX: 24.38 KG/M2 | RESPIRATION RATE: 12 BRPM | TEMPERATURE: 97.9 F | HEIGHT: 71 IN

## 2018-11-25 LAB
ABSOLUTE EOS #: 0.6 K/UL (ref 0–0.4)
ABSOLUTE IMMATURE GRANULOCYTE: ABNORMAL K/UL (ref 0–0.3)
ABSOLUTE LYMPH #: 2.1 K/UL (ref 1–4.8)
ABSOLUTE MONO #: 0.6 K/UL (ref 0.1–1.3)
ANION GAP SERPL CALCULATED.3IONS-SCNC: 16 MMOL/L (ref 9–17)
BASOPHILS # BLD: 1 % (ref 0–2)
BASOPHILS ABSOLUTE: 0 K/UL (ref 0–0.2)
BUN BLDV-MCNC: 58 MG/DL (ref 8–23)
BUN/CREAT BLD: ABNORMAL (ref 9–20)
CALCIUM SERPL-MCNC: 8.6 MG/DL (ref 8.6–10.4)
CHLORIDE BLD-SCNC: 96 MMOL/L (ref 98–107)
CO2: 24 MMOL/L (ref 20–31)
CREAT SERPL-MCNC: 7.53 MG/DL (ref 0.7–1.2)
CULTURE: NORMAL
CULTURE: NORMAL
DIFFERENTIAL TYPE: ABNORMAL
EOSINOPHILS RELATIVE PERCENT: 9 % (ref 0–4)
GFR AFRICAN AMERICAN: 9 ML/MIN
GFR NON-AFRICAN AMERICAN: 7 ML/MIN
GFR SERPL CREATININE-BSD FRML MDRD: ABNORMAL ML/MIN/{1.73_M2}
GFR SERPL CREATININE-BSD FRML MDRD: ABNORMAL ML/MIN/{1.73_M2}
GLUCOSE BLD-MCNC: 101 MG/DL (ref 75–110)
GLUCOSE BLD-MCNC: 114 MG/DL (ref 70–99)
GLUCOSE BLD-MCNC: 192 MG/DL (ref 75–110)
HCT VFR BLD CALC: 29.7 % (ref 41–53)
HEMOGLOBIN: 9.9 G/DL (ref 13.5–17.5)
IMMATURE GRANULOCYTES: ABNORMAL %
LYMPHOCYTES # BLD: 31 % (ref 24–44)
Lab: NORMAL
Lab: NORMAL
MCH RBC QN AUTO: 32.9 PG (ref 26–34)
MCHC RBC AUTO-ENTMCNC: 33.4 G/DL (ref 31–37)
MCV RBC AUTO: 98.5 FL (ref 80–100)
MONOCYTES # BLD: 9 % (ref 1–7)
NRBC AUTOMATED: ABNORMAL PER 100 WBC
PDW BLD-RTO: 14.6 % (ref 11.5–14.9)
PLATELET # BLD: 90 K/UL (ref 150–450)
PLATELET ESTIMATE: ABNORMAL
PMV BLD AUTO: 9.8 FL (ref 6–12)
POTASSIUM SERPL-SCNC: 5.3 MMOL/L (ref 3.7–5.3)
RBC # BLD: 3.01 M/UL (ref 4.5–5.9)
RBC # BLD: ABNORMAL 10*6/UL
SEG NEUTROPHILS: 50 % (ref 36–66)
SEGMENTED NEUTROPHILS ABSOLUTE COUNT: 3.4 K/UL (ref 1.3–9.1)
SODIUM BLD-SCNC: 136 MMOL/L (ref 135–144)
SPECIMEN DESCRIPTION: NORMAL
SPECIMEN DESCRIPTION: NORMAL
STATUS: NORMAL
STATUS: NORMAL
WBC # BLD: 6.6 K/UL (ref 3.5–11)
WBC # BLD: ABNORMAL 10*3/UL

## 2018-11-25 PROCEDURE — 85025 COMPLETE CBC W/AUTO DIFF WBC: CPT

## 2018-11-25 PROCEDURE — 82947 ASSAY GLUCOSE BLOOD QUANT: CPT

## 2018-11-25 PROCEDURE — 80048 BASIC METABOLIC PNL TOTAL CA: CPT

## 2018-11-25 PROCEDURE — 6370000000 HC RX 637 (ALT 250 FOR IP): Performed by: INTERNAL MEDICINE

## 2018-11-25 PROCEDURE — 99239 HOSP IP/OBS DSCHRG MGMT >30: CPT | Performed by: INTERNAL MEDICINE

## 2018-11-25 PROCEDURE — 36415 COLL VENOUS BLD VENIPUNCTURE: CPT

## 2018-11-25 PROCEDURE — 2580000003 HC RX 258: Performed by: INTERNAL MEDICINE

## 2018-11-25 RX ADMIN — Medication 1 TABLET: at 08:09

## 2018-11-25 RX ADMIN — SENNOSIDES AND DOCUSATE SODIUM 2 TABLET: 8.6; 5 TABLET ORAL at 08:09

## 2018-11-25 RX ADMIN — SUCRALFATE 1 G: 1 TABLET ORAL at 11:47

## 2018-11-25 RX ADMIN — MIDODRINE HYDROCHLORIDE 5 MG: 5 TABLET ORAL at 15:08

## 2018-11-25 RX ADMIN — AMLODIPINE BESYLATE 5 MG: 5 TABLET ORAL at 08:09

## 2018-11-25 RX ADMIN — SEVELAMER CARBONATE 3200 MG: 800 TABLET, FILM COATED ORAL at 08:09

## 2018-11-25 RX ADMIN — GABAPENTIN 200 MG: 100 CAPSULE ORAL at 15:08

## 2018-11-25 RX ADMIN — Medication 10 ML: at 08:14

## 2018-11-25 RX ADMIN — VITAMIN D, TAB 1000IU (100/BT) 1000 UNITS: 25 TAB at 08:09

## 2018-11-25 RX ADMIN — GABAPENTIN 200 MG: 100 CAPSULE ORAL at 08:09

## 2018-11-25 RX ADMIN — METOPROLOL SUCCINATE 25 MG: 25 TABLET, EXTENDED RELEASE ORAL at 08:09

## 2018-11-25 RX ADMIN — ASPIRIN 81 MG: 81 TABLET, COATED ORAL at 08:09

## 2018-11-25 RX ADMIN — CINACALCET HYDROCHLORIDE 30 MG: 30 TABLET, COATED ORAL at 08:09

## 2018-11-25 RX ADMIN — SERTRALINE HYDROCHLORIDE 50 MG: 50 TABLET ORAL at 08:09

## 2018-11-25 RX ADMIN — INSULIN LISPRO 2 UNITS: 100 INJECTION, SOLUTION INTRAVENOUS; SUBCUTANEOUS at 11:47

## 2018-11-25 RX ADMIN — HYDRALAZINE HYDROCHLORIDE 25 MG: 25 TABLET, FILM COATED ORAL at 08:09

## 2018-11-25 RX ADMIN — Medication 2 PUFF: at 08:10

## 2018-11-25 RX ADMIN — ALLOPURINOL 100 MG: 100 TABLET ORAL at 08:09

## 2018-11-25 RX ADMIN — SUCRALFATE 1 G: 1 TABLET ORAL at 06:00

## 2018-11-25 RX ADMIN — SEVELAMER CARBONATE 3200 MG: 800 TABLET, FILM COATED ORAL at 11:47

## 2018-11-25 NOTE — CARE COORDINATION
CASE MANAGEMENT DISCHARGE PLANNING NOTE :  Pt is to return to [facility] at d/c, is long term resident  crispin started, not signed  Will need Vanco 1000 mg with HD x 2 weeks  Has redness over AV fistula which is improving  Will follow , vince d/c today  crispin not signed  Electronically signed by Kenia Andrews RN on 11/24/2018 at 12:46 PM
CASE MANAGEMENT DISCHARGE PLANNING NOTE :  Pt is to return to [facility] at d/c, is long term resident  crispin started, not signed  Will need vanco with HD, pharmacy to dose  Has redness over AV fistula which is improving  Will follow   Electronically signed by Giovana Alba RN on 11/23/2018 at 3:11 PM
ONGOING DISCHARGE PLAN:    LSW following for d/c back to [facility] where he is a long term resident.    Goes to OP HD at [facility] M-W-F at 10:45am.     On PO Doxy and IV Vanco.  Per ID, follow cultures, if negative- nephro to order fistulogram.    Electronically signed by Giselle Rojas RN on 11/20/2018 at 9:00 AM
Social Work-Received referral to make arrangements for pt to transfer to [facility]. Notified [facility]. They will admit at 600. AYDIN is not signed. Once AYDIN is signed, please fax AYDIN to [facility] [phone] and Linda Moreau at [phone]. Please notify family. Please call report to [phone].  Climmie Flakes
hours as needed For snacks       !! sevelamer (RENVELA) 800 MG tablet 4 tablets   Take 4 tablets by mouth 3 times daily (with meals)       sertraline (ZOLOFT) 50 MG tablet 50 mg   Take 50 mg by mouth daily       sucralfate (CARAFATE) 1 GM tablet 1 g   Take 1 g by mouth 3 times daily (before meals)       calcium carbonate (TUMS) 500 MG chewable tablet 2 tablets   Take 2 tablets by mouth every 6 hours as needed for Heartburn       tetrahydrozoline 0.05 % ophthalmic solution 1 drop   Place 1 drop into both eyes 2 times daily as needed (irritation/dryness)       fluticasone propionate (FLOVENT DISKUS) 100 MCG/BLIST AEPB inhaler 2 puffs   Inhale 2 puffs into the lungs 2 times daily        insulin glargine (LANTUS) 100 UNIT/ML injection pen 6 Units   Inject 6 Units into the skin nightly        gabapentin (NEURONTIN) 100 MG capsule 100 mg   Take 1 capsule by mouth 3 times daily   Quantity: 30 capsule Refills: 0       polyethylene glycol (GLYCOLAX) powder 17 g   Take 17 g by mouth daily as needed (constipation)        hydrALAZINE (APRESOLINE) 25 MG tablet 25 mg   Take 25 mg by mouth 2 times daily Give only on non-dialysis days.        aspirin 81 MG EC tablet 81 mg   Take 81 mg by mouth every morning        metoprolol succinate (TOPROL XL) 25 MG extended release tablet 25 mg   Take 1 tablet by mouth daily   Quantity: 30 tablet Refills: 0       vitamin D (CHOLECALCIFEROL) 1000 UNIT TABS tablet 1,000 Units   Take 1,000 Units by mouth daily       fluconazole (DIFLUCAN) 100 MG tablet 100 mg   Take 100 mg by mouth daily For 21 days starting 8/24/18       diclofenac sodium 1 % GEL    Apply topically 3 times daily Apply between last two toes and to arch of right foot       NOVOFINE AUTOCOVER 30G X 8 MM MISC           darbepoetin candice-polysorbate (ARANESP) 60 MCG/ML injection 60 mcg   Inject 60 mcg into the skin once a week On Wednesday with dialysis       Nutritional Supplements (BOOST GLUCOSE CONTROL) LIQD 8 oz   Take 8 oz by mouth

## 2018-11-25 NOTE — FLOWSHEET NOTE
11/25/18 1305   Encounter Summary   Services provided to: Patient   Referral/Consult From: Nubia   Continue Visiting (11/25/18 (V))   Volunteer Visit Yes   Complexity of Encounter Low   Routine   Type Follow up   Spiritual/Zoroastrianism   Type Spiritual support   Intervention Prayer

## 2018-11-25 NOTE — PROGRESS NOTES
History and Physical Service  [facility] Internal Medicine    Progress note              Date:   11/25/2018  Patient name:  Valerie Montano  MRN:   [id number]  Account:  [de-identified]  YOB: 1945  PCP:    Lucina Driscoll MD  Code Status:    Full Code    Chief Complaint:     Cellulitis      History Obtained From:     patient    History of Present Illness: The patient is a 75 y.o. / male who presents  Right arm cellulitis  Fever no chills  Some pain  On hd  For esrd  HTN  Onset more than 2 years ago  kiran mild to mod  Controlled with current po meds  Not associated with headaches or blurry vision  No chest pain            Past Medical History:     Past Medical History:   Diagnosis Date    Asthma     CAD (coronary artery disease)     CKD (chronic kidney disease) stage 4, GFR 15-29 ml/min ([facility])     [facility] accident[facility] [address]     Depression     History of kidney stones     Hyperlipidemia     Hypertension     Osteoarthritis     Proteinuria     Type II or unspecified type diabetes mellitus without mention of complication, not stated as uncontrolled         Past Surgical History:     Past Surgical History:   Procedure Laterality Date    BONE BIOPSY  6/29/15    L2-3    CARPAL TUNNEL RELEASE      CORONARY ARTERY BYPASS GRAFT      HIP SURGERY      JOINT REPLACEMENT      OTHER SURGICAL HISTORY  6/11/15    I&D coccyx wound    OTHER SURGICAL HISTORY  12/30/15    diverting loop colostomy; perirectal incision and drainage    SHOULDER SURGERY      left and right        Medications Prior to Admission:     Prior to Admission medications    Medication Sig Start Date End Date Taking? Authorizing Provider   oxyCODONE-acetaminophen (PERCOCET) 5-325 MG per tablet Take 1 tablet by mouth every 6 hours as needed for Pain for up to 2 days. . 11/24/18 11/26/18 Yes Jules Thomas MD   vancomycin (VANCOCIN) infusion Infuse 1,000 mg intravenously every 24 hours Compound Morphology NOT REPORTED     Platelet Estimate NOT REPORTED     Seg Neutrophils 50 36 - 66 %    Lymphocytes 31 24 - 44 %    Monocytes 9 (H) 1 - 7 %    Eosinophils % 9 (H) 0 - 4 %    Basophils 1 0 - 2 %    Segs Absolute 3.40 1.3 - 9.1 k/uL    Absolute Lymph # 2.10 1.0 - 4.8 k/uL    Absolute Mono # 0.60 0.1 - 1.3 k/uL    Absolute Eos # 0.60 (H) 0.0 - 0.4 k/uL    Basophils # 0.00 0.0 - 0.2 k/uL   Basic Metabolic Panel w/ Reflex to MG    Collection Time: 11/25/18  6:01 AM   Result Value Ref Range    Glucose 114 (H) 70 - 99 mg/dL    BUN 58 (H) 8 - 23 mg/dL    CREATININE 7.53 (HH) 0.70 - 1.20 mg/dL    Bun/Cre Ratio NOT REPORTED 9 - 20    Calcium 8.6 8.6 - 10.4 mg/dL    Sodium 136 135 - 144 mmol/L    Potassium 5.3 3.7 - 5.3 mmol/L    Chloride 96 (L) 98 - 107 mmol/L    CO2 24 20 - 31 mmol/L    Anion Gap 16 9 - 17 mmol/L    GFR Non-African American 7 (L) >60 mL/min    GFR  9 (L) >60 mL/min    GFR Comment          GFR Staging NOT REPORTED    POC Glucose Fingerstick    Collection Time: 11/25/18  6:54 AM   Result Value Ref Range    POC Glucose 101 75 - 110 mg/dL       Recent Labs      11/25/18   0601   11/19/18   1115   11/18/18   1835   HGB  9.9*   < >   --    < >  11.3*   HCT  29.7*   < >   --    < >  34.2*   WBC  6.6   < >   --    < >  5.0   MCV  98.5   < >   --    < >  99.5   NA  136   < >   --    < >  134*   K  5.3   < >  4.5   < >  4.7   CL  96*   < >   --    < >  93*   CO2  24   < >   --    < >  27   BUN  58*   < >   --    < >  41*   CREATININE  7.53*   < >   --    < >  5.73*   GLUCOSE  114*   < >   --    < >  255*   INR   --    --   1.0   --    --    PROTIME   --    --   10.7   --    --    APTT   --    --   34.3*   --    --    AST   --    --    --    --   34   ALT   --    --    --    --   43*   LABALBU   --    --    --    --   3.8    < > = values in this interval not displayed.        Hematology:  Recent Labs      11/23/18   0533  11/24/18   0535  11/25/18   0601   WBC  6.4  6.6  6.6   RBC  3.17*  3.23* 3.01*   HGB  10.4*  10.5*  9.9*   HCT  31.5*  31.6*  29.7*   MCV  99.5  97.8  98.5   MCH  32.9  32.5  32.9   MCHC  33.1  33.3  33.4   RDW  14.8  14.7  14.6   PLT  88*  98*  90*   MPV  9.8  10.3  9.8     Chemistry:  Recent Labs      11/23/18   0533  11/24/18   0535  11/25/18   0601   NA  134*  135  136   K  5.0  4.6  5.3   CL  95*  97*  96*   CO2  25  27  24   GLUCOSE  111*  182*  114*   BUN  48*  35*  58*   CREATININE  6.70*  5.03*  7.53*   ANIONGAP  14  11  16   LABGLOM  8*  11*  7*   GFRAA  10*  14*  9*   CALCIUM  8.6  8.5*  8.6     Recent Labs      11/23/18   2103  11/24/18   0736  11/24/18   1134  11/24/18   1608  11/24/18   2051  11/25/18   0654   POCGLU  107  174*  165*  235*  168*  101       Imaging/Diagnostics:       Ir Nontunneled Vascular Catheter > 5 Years    Result Date: 11/19/2018  PROCEDURE: ULTRASOUND GUIDED VASCULAR ACCESS. FLUOROSCOPY GUIDED PLACEMENT OF A NON-TUNNELED CATHETER. 11/19/2018. HISTORY: ORDERING SYSTEM PROVIDED HISTORY: cellulitis near fistula, need for temporary access TECHNOLOGIST PROVIDED HISTORY: cellulitis near fistula, need for temporary access SEDATION: Local anesthesia FLUOROSCOPY DOSE AND TYPE OR TIME AND EXPOSURES: Fluoroscopy time-1 minute and 8 seconds. D  cGy cm squared. TECHNIQUE: Informed consent was obtained after a detailed explanation of the procedure including risks, benefits, and alternatives. Universal protocol was observed. The right neck and chest were prepped and draped in sterile fashion using maximum sterile barrier technique. Local anesthesia was achieved with lidocaine. A micropuncture needle was used to access the right internal jugular vein using ultrasound guidance. An ultrasound image demonstrating patency of the vein with needle tip located within it. An image was obtained and stored in PACs.   A 0.035 guidewire was used to place a 13 Kinyarwanda x 15 cm temporary hemodialysis catheter using fluoroscopic guidance with tip in the right atrium after allopurinol (ZYLOPRIM) tablet 100 mg  100 mg Oral Daily MD Hailey   100 mg at 11/25/18 0809    amLODIPine (NORVASC) tablet 5 mg  5 mg Oral Daily MD Hailey   5 mg at 11/25/18 0809    aspirin EC tablet 81 mg  81 mg Oral QAM MD Hailey   81 mg at 11/25/18 0809    B complex-vitamin C-folic acid (NEPHRO-LAWANDA) tablet 1 tablet  1 tablet Oral Daily MD Hailey   1 tablet at 11/25/18 0809    calcium carbonate (TUMS) chewable tablet 1,000 mg  2 tablet Oral Q6H PRN MD Hailey        cinacalcet (SENSIPAR) tablet 30 mg  30 mg Oral Daily MD Hailey   30 mg at 11/25/18 0809    clopidogrel (PLAVIX) tablet 75 mg  75 mg Oral QPM MD Hailey   Stopped at 11/24/18 1754    darbepoetin candice-polysorbate (ARANESP) injection 60 mcg  60 mcg Subcutaneous Weekly MD Hailey   Stopped at 11/22/18 0110    fluticasone (FLOVENT HFA) 110 MCG/ACT inhaler 2 puff  2 puff Inhalation BID MD Hailey   2 puff at 11/25/18 0810    gabapentin (NEURONTIN) capsule 200 mg  200 mg Oral TID MD Hailey   200 mg at 11/25/18 0809    hydrALAZINE (APRESOLINE) tablet 25 mg  25 mg Oral BID MD Hailey   25 mg at 11/25/18 0809    HYDROmorphone (DILAUDID) tablet 2 mg  2 mg Oral Q12H PRN MD Hailey        insulin glargine (LANTUS) injection vial 6 Units  6 Units Subcutaneous Nightly MD Hailey   6 Units at 11/24/18 2114    metoprolol succinate (TOPROL XL) extended release tablet 25 mg  25 mg Oral Daily MD Hailey   25 mg at 11/25/18 0809    midodrine (PROAMATINE) tablet 5 mg  5 mg Oral TID MD Hailey   5 mg at 11/23/18 1740    polyethylene glycol (GLYCOLAX) packet 17 g  17 g Oral Daily PRN MD Hailey        senna (SENOKOT) tablet 17.2 mg  2 tablet Oral Daily PRN Dmitriy Karon Oneal MD        sennosides-docusate sodium (SENOKOT-S) 8.6-50 MG tablet 2 tablet  2 tablet Oral Daily MD Hailey   2 MD Ras Roa Ala, MD  11/25/2018  10:45 AM

## 2018-11-26 ENCOUNTER — CARE COORDINATION (OUTPATIENT)
Dept: CASE MANAGEMENT | Age: 73
End: 2018-11-26

## 2018-11-26 NOTE — CARE COORDINATION
[facility] Update Call    2018    Patient: Edwardo Gottron Patient : 1945   MRN: [id number]  Reason for Admission: There are no discharge diagnoses documented for the most recent discharge. Discharge Date: 18 RARS: Readmission Risk Score: [facility] Update    Care Transitions Interventions  Post Acute Facility:   [facility] Update  Barriers to Discharge:  Mild decline assessed. OT through 12/3/18, PT planned through 12/10/18   Anticipated date for discharge:  12/10/18    Anticipated discharge services:  [address] within facility.

## 2018-11-30 ENCOUNTER — HOSPITAL ENCOUNTER (EMERGENCY)
Age: 73
Discharge: NURSING, REHAB OR LONG-TERM CARE | End: 2018-11-30
Attending: EMERGENCY MEDICINE
Payer: MEDICARE

## 2018-11-30 VITALS
HEART RATE: 96 BPM | HEIGHT: 71 IN | OXYGEN SATURATION: 98 % | BODY MASS INDEX: 24.36 KG/M2 | WEIGHT: 174 LBS | TEMPERATURE: 98.7 F | RESPIRATION RATE: 16 BRPM | DIASTOLIC BLOOD PRESSURE: 80 MMHG | SYSTOLIC BLOOD PRESSURE: 168 MMHG

## 2018-11-30 DIAGNOSIS — T82.9XXA DIALYSIS COMPLICATION, INITIAL ENCOUNTER: Primary | ICD-10-CM

## 2018-11-30 LAB
ABSOLUTE EOS #: 0.5 K/UL (ref 0–0.4)
ABSOLUTE IMMATURE GRANULOCYTE: ABNORMAL K/UL (ref 0–0.3)
ABSOLUTE LYMPH #: 1.6 K/UL (ref 1–4.8)
ABSOLUTE MONO #: 0.6 K/UL (ref 0.1–1.3)
ANION GAP SERPL CALCULATED.3IONS-SCNC: 14 MMOL/L (ref 9–17)
BASOPHILS # BLD: 1 % (ref 0–2)
BASOPHILS ABSOLUTE: 0 K/UL (ref 0–0.2)
BUN BLDV-MCNC: 63 MG/DL (ref 8–23)
BUN/CREAT BLD: ABNORMAL (ref 9–20)
CALCIUM SERPL-MCNC: 8.4 MG/DL (ref 8.6–10.4)
CHLORIDE BLD-SCNC: 96 MMOL/L (ref 98–107)
CO2: 27 MMOL/L (ref 20–31)
CREAT SERPL-MCNC: 6.8 MG/DL (ref 0.7–1.2)
DIFFERENTIAL TYPE: ABNORMAL
EOSINOPHILS RELATIVE PERCENT: 9 % (ref 0–4)
GFR AFRICAN AMERICAN: 10 ML/MIN
GFR NON-AFRICAN AMERICAN: 8 ML/MIN
GFR SERPL CREATININE-BSD FRML MDRD: ABNORMAL ML/MIN/{1.73_M2}
GFR SERPL CREATININE-BSD FRML MDRD: ABNORMAL ML/MIN/{1.73_M2}
GLUCOSE BLD-MCNC: 198 MG/DL (ref 70–99)
HCT VFR BLD CALC: 28.5 % (ref 41–53)
HEMOGLOBIN: 9.6 G/DL (ref 13.5–17.5)
IMMATURE GRANULOCYTES: ABNORMAL %
INR BLD: 1
LYMPHOCYTES # BLD: 30 % (ref 24–44)
MCH RBC QN AUTO: 33.4 PG (ref 26–34)
MCHC RBC AUTO-ENTMCNC: 33.6 G/DL (ref 31–37)
MCV RBC AUTO: 99.4 FL (ref 80–100)
MONOCYTES # BLD: 10 % (ref 1–7)
NRBC AUTOMATED: ABNORMAL PER 100 WBC
PARTIAL THROMBOPLASTIN TIME: 30.6 SEC (ref 23–31)
PDW BLD-RTO: 14.5 % (ref 11.5–14.9)
PLATELET # BLD: 127 K/UL (ref 150–450)
PLATELET ESTIMATE: ABNORMAL
PMV BLD AUTO: 9.5 FL (ref 6–12)
POTASSIUM SERPL-SCNC: 4.6 MMOL/L (ref 3.7–5.3)
PROTHROMBIN TIME: 10 SEC (ref 9.7–12)
RBC # BLD: 2.86 M/UL (ref 4.5–5.9)
RBC # BLD: ABNORMAL 10*6/UL
SEG NEUTROPHILS: 50 % (ref 36–66)
SEGMENTED NEUTROPHILS ABSOLUTE COUNT: 2.8 K/UL (ref 1.3–9.1)
SODIUM BLD-SCNC: 137 MMOL/L (ref 135–144)
WBC # BLD: 5.5 K/UL (ref 3.5–11)
WBC # BLD: ABNORMAL 10*3/UL

## 2018-11-30 PROCEDURE — 99285 EMERGENCY DEPT VISIT HI MDM: CPT

## 2018-11-30 PROCEDURE — 85730 THROMBOPLASTIN TIME PARTIAL: CPT

## 2018-11-30 PROCEDURE — 85025 COMPLETE CBC W/AUTO DIFF WBC: CPT

## 2018-11-30 PROCEDURE — 6370000000 HC RX 637 (ALT 250 FOR IP): Performed by: EMERGENCY MEDICINE

## 2018-11-30 PROCEDURE — 36415 COLL VENOUS BLD VENIPUNCTURE: CPT

## 2018-11-30 PROCEDURE — 80048 BASIC METABOLIC PNL TOTAL CA: CPT

## 2018-11-30 PROCEDURE — 85610 PROTHROMBIN TIME: CPT

## 2018-11-30 RX ORDER — OXYCODONE AND ACETAMINOPHEN 7.5; 325 MG/1; MG/1
1 TABLET ORAL EVERY 6 HOURS PRN
Status: ON HOLD | COMMUNITY
End: 2019-01-27

## 2018-11-30 RX ORDER — ACETAMINOPHEN 325 MG/1
650 TABLET ORAL ONCE
Status: COMPLETED | OUTPATIENT
Start: 2018-11-30 | End: 2018-11-30

## 2018-11-30 RX ADMIN — ACETAMINOPHEN 650 MG: 325 TABLET, FILM COATED ORAL at 03:10

## 2018-11-30 ASSESSMENT — ENCOUNTER SYMPTOMS
RESPIRATORY NEGATIVE: 1
EYES NEGATIVE: 1
BACK PAIN: 0
ABDOMINAL PAIN: 0
SHORTNESS OF BREATH: 0
GASTROINTESTINAL NEGATIVE: 1

## 2018-11-30 ASSESSMENT — PAIN SCALES - GENERAL
PAINLEVEL_OUTOF10: 3
PAINLEVEL_OUTOF10: 2

## 2018-11-30 NOTE — ED PROVIDER NOTES
Eosinophils % 9 (*)     Absolute Eos # 0.50 (*)     All other components within normal limits   BASIC METABOLIC PANEL - Abnormal; Notable for the following:     Glucose 198 (*)     BUN 63 (*)     CREATININE 6.80 (*)     Calcium 8.4 (*)     Chloride 96 (*)     GFR Non- 8 (*)     GFR  10 (*)     All other components within normal limits   APTT   PROTIME-INR     EMERGENCY DEPARTMENTCOURSE:         Vitals:    Vitals:    11/30/18 0233 11/30/18 0246   BP: (!) 202/88 (!) 168/80   Pulse: 103 96   Resp: 18 16   Temp: 98.7 °F (37.1 °C)    TempSrc: Oral    SpO2: 98% 98%   Weight: 174 lb (78.9 kg)    Height: 5' 11\" (1.803 m)        The patient was given the following medications while in the emergency department:  Orders Placed This Encounter   Medications    acetaminophen (TYLENOL) tablet 650 mg     CONSULTS:  None    FINAL IMPRESSION      1. Dialysis complication, initial encounter          DISPOSITION/PLAN   DISPOSITION Decision To Discharge 11/30/2018 03:38:59 AM      PATIENT REFERRED TO:  Samia Mann, [address] [place] [address]  [phone]    Call in 1 day      DISCHARGE MEDICATIONS:  New Prescriptions    No medications on file     Adelita Casarez MD  Attending Emergency Physician  [employer] voice recognition software used in portions of this document.                     Ofelia Dill MD  11/30/18 4660

## 2018-12-03 ENCOUNTER — CARE COORDINATION (OUTPATIENT)
Dept: CASE MANAGEMENT | Age: 73
End: 2018-12-03

## 2018-12-03 ENCOUNTER — CARE COORDINATION (OUTPATIENT)
Dept: CARE COORDINATION | Age: 73
End: 2018-12-03

## 2018-12-10 ENCOUNTER — HOSPITAL ENCOUNTER (OUTPATIENT)
Age: 73
Setting detail: SPECIMEN
Discharge: HOME | End: 2018-12-10
Payer: COMMERCIAL

## 2018-12-10 ENCOUNTER — CARE COORDINATION (OUTPATIENT)
Dept: CASE MANAGEMENT | Age: 73
End: 2018-12-10

## 2018-12-10 LAB
ANION GAP SERPL CALCULATED.3IONS-SCNC: 16 MMOL/L (ref 9–17)
BUN BLDV-MCNC: 64 MG/DL (ref 8–23)
BUN/CREAT BLD: 8 (ref 9–20)
CALCIUM SERPL-MCNC: 8.5 MG/DL (ref 8.6–10.4)
CHLORIDE BLD-SCNC: 100 MMOL/L (ref 98–107)
CO2: 23 MMOL/L (ref 20–31)
CREAT SERPL-MCNC: 8.13 MG/DL (ref 0.7–1.2)
ESTIMATED AVERAGE GLUCOSE: 131 MG/DL
GFR AFRICAN AMERICAN: 8 ML/MIN
GFR NON-AFRICAN AMERICAN: 7 ML/MIN
GFR SERPL CREATININE-BSD FRML MDRD: ABNORMAL ML/MIN/{1.73_M2}
GFR SERPL CREATININE-BSD FRML MDRD: ABNORMAL ML/MIN/{1.73_M2}
GLUCOSE BLD-MCNC: 125 MG/DL (ref 70–99)
HBA1C MFR BLD: 6.2 % (ref 4–6)
POTASSIUM SERPL-SCNC: 5.4 MMOL/L (ref 3.7–5.3)
SODIUM BLD-SCNC: 139 MMOL/L (ref 135–144)

## 2018-12-10 PROCEDURE — P9603 ONE-WAY ALLOW PRORATED MILES: HCPCS

## 2018-12-10 PROCEDURE — 80048 BASIC METABOLIC PNL TOTAL CA: CPT

## 2018-12-10 PROCEDURE — 83036 HEMOGLOBIN GLYCOSYLATED A1C: CPT

## 2018-12-10 PROCEDURE — 36415 COLL VENOUS BLD VENIPUNCTURE: CPT

## 2018-12-10 NOTE — CARE COORDINATION
[facility] Update Call    12/10/2018    Patient: Roslyn Giraldo Patient : 1945   MRN: [id number]  Reason for Admission: There are no discharge diagnoses documented for the most recent discharge.   Discharge Date: 18 RARS: Readmission Risk Score: 0       Care Transitions Post Acute Facility Update    Care Transitions Interventions  Post Acute Facility:  [facility] Update  Anticipated date for discharge:  18    Anticipated discharge services:  [facility] within facility

## 2018-12-13 ENCOUNTER — CARE COORDINATION (OUTPATIENT)
Dept: CASE MANAGEMENT | Age: 73
End: 2018-12-13

## 2018-12-13 DIAGNOSIS — M46.26 OSTEOMYELITIS OF LUMBAR SPINE (HCC): Primary | ICD-10-CM

## 2018-12-13 PROCEDURE — 1111F DSCHRG MED/CURRENT MED MERGE: CPT

## 2018-12-13 RX ORDER — M-VIT,TX,IRON,MINS/CALC/FOLIC 27MG-0.4MG
1 TABLET ORAL DAILY
COMMUNITY

## 2018-12-13 RX ORDER — OXYCODONE HYDROCHLORIDE AND ACETAMINOPHEN 5; 325 MG/1; MG/1
1 TABLET ORAL EVERY 6 HOURS PRN
COMMUNITY
End: 2019-01-15 | Stop reason: ALTCHOICE

## 2018-12-13 RX ORDER — HYDROMORPHONE HYDROCHLORIDE 2 MG/1
2 TABLET ORAL 2 TIMES DAILY
Status: ON HOLD | COMMUNITY
End: 2019-01-27 | Stop reason: HOSPADM

## 2018-12-18 ENCOUNTER — CARE COORDINATION (OUTPATIENT)
Dept: CASE MANAGEMENT | Age: 73
End: 2018-12-18

## 2018-12-18 NOTE — CARE COORDINATION
Called [facility] to follow up on patient's transition to [address] [place] [address] since 12/10/18. This nurse was transferred to a  stating no one available to answer your call. Called back spoke with nurse Tanisha River who informs this nurse patient was LTC prior to being skilled. Post acute care coordinator signing off.  Edgar Francisco, CHAZN RN  Care Transition Coordinator  [phone]

## 2019-01-01 ENCOUNTER — HOSPITAL ENCOUNTER (OUTPATIENT)
Age: 74
Setting detail: SPECIMEN
Discharge: HOME | End: 2019-12-03
Payer: MEDICARE

## 2019-01-01 ENCOUNTER — HOSPITAL ENCOUNTER (OUTPATIENT)
Age: 74
Setting detail: SPECIMEN
Discharge: HOME | End: 2019-10-14
Payer: MEDICARE

## 2019-01-01 ENCOUNTER — OFFICE VISIT (OUTPATIENT)
Dept: INFECTIOUS DISEASES | Age: 74
End: 2019-01-01
Payer: MEDICARE

## 2019-01-01 VITALS
TEMPERATURE: 98.6 F | WEIGHT: 196.21 LBS | BODY MASS INDEX: 28.09 KG/M2 | SYSTOLIC BLOOD PRESSURE: 157 MMHG | HEIGHT: 70 IN | HEART RATE: 77 BPM | DIASTOLIC BLOOD PRESSURE: 77 MMHG

## 2019-01-01 DIAGNOSIS — M46.26 OSTEOMYELITIS OF LUMBAR SPINE (HCC): Primary | ICD-10-CM

## 2019-01-01 LAB
ANION GAP SERPL CALCULATED.3IONS-SCNC: 15 MMOL/L (ref 9–17)
ANION GAP SERPL CALCULATED.3IONS-SCNC: 21 MMOL/L (ref 9–17)
BUN BLDV-MCNC: 43 MG/DL (ref 8–23)
BUN BLDV-MCNC: 65 MG/DL (ref 8–23)
BUN/CREAT BLD: 5 (ref 9–20)
BUN/CREAT BLD: ABNORMAL (ref 9–20)
CALCIUM SERPL-MCNC: 8.1 MG/DL (ref 8.6–10.4)
CALCIUM SERPL-MCNC: 8.4 MG/DL (ref 8.6–10.4)
CHLORIDE BLD-SCNC: 96 MMOL/L (ref 98–107)
CHLORIDE BLD-SCNC: 99 MMOL/L (ref 98–107)
CO2: 20 MMOL/L (ref 20–31)
CO2: 23 MMOL/L (ref 20–31)
CREAT SERPL-MCNC: 10.6 MG/DL (ref 0.7–1.2)
CREAT SERPL-MCNC: 8.28 MG/DL (ref 0.7–1.2)
ESTIMATED AVERAGE GLUCOSE: 140 MG/DL
ESTIMATED AVERAGE GLUCOSE: 160 MG/DL
GFR AFRICAN AMERICAN: 6 ML/MIN
GFR AFRICAN AMERICAN: 8 ML/MIN
GFR NON-AFRICAN AMERICAN: 5 ML/MIN
GFR NON-AFRICAN AMERICAN: 6 ML/MIN
GFR SERPL CREATININE-BSD FRML MDRD: ABNORMAL ML/MIN/{1.73_M2}
GLUCOSE BLD-MCNC: 151 MG/DL (ref 70–99)
GLUCOSE BLD-MCNC: 79 MG/DL (ref 70–99)
HBA1C MFR BLD: 6.5 % (ref 4–6)
HBA1C MFR BLD: 7.2 % (ref 4–6)
POTASSIUM SERPL-SCNC: 3.9 MMOL/L (ref 3.7–5.3)
POTASSIUM SERPL-SCNC: 4.6 MMOL/L (ref 3.7–5.3)
SODIUM BLD-SCNC: 134 MMOL/L (ref 135–144)
SODIUM BLD-SCNC: 140 MMOL/L (ref 135–144)

## 2019-01-01 PROCEDURE — 1123F ACP DISCUSS/DSCN MKR DOCD: CPT | Performed by: INTERNAL MEDICINE

## 2019-01-01 PROCEDURE — 36415 COLL VENOUS BLD VENIPUNCTURE: CPT

## 2019-01-01 PROCEDURE — 1036F TOBACCO NON-USER: CPT | Performed by: INTERNAL MEDICINE

## 2019-01-01 PROCEDURE — 80048 BASIC METABOLIC PNL TOTAL CA: CPT

## 2019-01-01 PROCEDURE — 83036 HEMOGLOBIN GLYCOSYLATED A1C: CPT

## 2019-01-01 PROCEDURE — 3017F COLORECTAL CA SCREEN DOC REV: CPT | Performed by: INTERNAL MEDICINE

## 2019-01-01 PROCEDURE — 99214 OFFICE O/P EST MOD 30 MIN: CPT | Performed by: INTERNAL MEDICINE

## 2019-01-01 PROCEDURE — P9603 ONE-WAY ALLOW PRORATED MILES: HCPCS

## 2019-01-01 PROCEDURE — G8427 DOCREV CUR MEDS BY ELIG CLIN: HCPCS | Performed by: INTERNAL MEDICINE

## 2019-01-01 PROCEDURE — 4040F PNEUMOC VAC/ADMIN/RCVD: CPT | Performed by: INTERNAL MEDICINE

## 2019-01-01 PROCEDURE — G8419 CALC BMI OUT NRM PARAM NOF/U: HCPCS | Performed by: INTERNAL MEDICINE

## 2019-01-01 RX ORDER — SEVELAMER HYDROCHLORIDE 800 MG/1
TABLET, FILM COATED ORAL
Status: ON HOLD | COMMUNITY
Start: 2019-05-13 | End: 2020-01-01

## 2019-01-08 ENCOUNTER — HOSPITAL ENCOUNTER (OUTPATIENT)
Age: 74
Setting detail: SPECIMEN
Discharge: HOME | End: 2019-01-08
Payer: COMMERCIAL

## 2019-01-08 LAB
ABSOLUTE EOS #: 0.37 K/UL (ref 0–0.44)
ABSOLUTE IMMATURE GRANULOCYTE: <0.03 K/UL (ref 0–0.3)
ABSOLUTE LYMPH #: 1.67 K/UL (ref 1.1–3.7)
ABSOLUTE MONO #: 0.64 K/UL (ref 0.1–1.2)
BASOPHILS # BLD: 1 % (ref 0–2)
BASOPHILS ABSOLUTE: 0.03 K/UL (ref 0–0.2)
DIFFERENTIAL TYPE: ABNORMAL
EOSINOPHILS RELATIVE PERCENT: 9 % (ref 1–4)
HCT VFR BLD CALC: 31.5 % (ref 40.7–50.3)
HEMOGLOBIN: 10.3 G/DL (ref 13–17)
IMMATURE GRANULOCYTES: 1 %
LYMPHOCYTES # BLD: 37 % (ref 24–43)
MCH RBC QN AUTO: 33.6 PG (ref 25.2–33.5)
MCHC RBC AUTO-ENTMCNC: 32.7 G/DL (ref 28.4–34.8)
MCV RBC AUTO: 102.6 FL (ref 82.6–102.9)
MONOCYTES # BLD: 15 % (ref 3–12)
NRBC AUTOMATED: 0 PER 100 WBC
PDW BLD-RTO: 13.3 % (ref 11.8–14.4)
PLATELET # BLD: 84 K/UL (ref 138–453)
PLATELET ESTIMATE: ABNORMAL
PMV BLD AUTO: 12.7 FL (ref 8.1–13.5)
RBC # BLD: 3.07 M/UL (ref 4.21–5.77)
RBC # BLD: ABNORMAL 10*6/UL
SEG NEUTROPHILS: 37 % (ref 36–65)
SEGMENTED NEUTROPHILS ABSOLUTE COUNT: 1.59 K/UL (ref 1.5–8.1)
WBC # BLD: 4.3 K/UL (ref 3.5–11.3)
WBC # BLD: ABNORMAL 10*3/UL

## 2019-01-08 PROCEDURE — 85025 COMPLETE CBC W/AUTO DIFF WBC: CPT

## 2019-01-08 PROCEDURE — 36415 COLL VENOUS BLD VENIPUNCTURE: CPT

## 2019-01-08 PROCEDURE — P9603 ONE-WAY ALLOW PRORATED MILES: HCPCS

## 2019-01-15 ENCOUNTER — OFFICE VISIT (OUTPATIENT)
Dept: INFECTIOUS DISEASES | Age: 74
End: 2019-01-15
Payer: MEDICARE

## 2019-01-15 VITALS
BODY MASS INDEX: 24.28 KG/M2 | OXYGEN SATURATION: 97 % | HEIGHT: 71 IN | RESPIRATION RATE: 14 BRPM | DIASTOLIC BLOOD PRESSURE: 49 MMHG | SYSTOLIC BLOOD PRESSURE: 77 MMHG | HEART RATE: 83 BPM

## 2019-01-15 DIAGNOSIS — L03.113 CELLULITIS OF RIGHT UPPER EXTREMITY: Primary | ICD-10-CM

## 2019-01-15 PROCEDURE — 3017F COLORECTAL CA SCREEN DOC REV: CPT | Performed by: INTERNAL MEDICINE

## 2019-01-15 PROCEDURE — 1101F PT FALLS ASSESS-DOCD LE1/YR: CPT | Performed by: INTERNAL MEDICINE

## 2019-01-15 PROCEDURE — 99214 OFFICE O/P EST MOD 30 MIN: CPT | Performed by: INTERNAL MEDICINE

## 2019-01-15 PROCEDURE — 4040F PNEUMOC VAC/ADMIN/RCVD: CPT | Performed by: INTERNAL MEDICINE

## 2019-01-15 PROCEDURE — 1036F TOBACCO NON-USER: CPT | Performed by: INTERNAL MEDICINE

## 2019-01-15 PROCEDURE — G8420 CALC BMI NORM PARAMETERS: HCPCS | Performed by: INTERNAL MEDICINE

## 2019-01-15 PROCEDURE — G8427 DOCREV CUR MEDS BY ELIG CLIN: HCPCS | Performed by: INTERNAL MEDICINE

## 2019-01-15 PROCEDURE — G8484 FLU IMMUNIZE NO ADMIN: HCPCS | Performed by: INTERNAL MEDICINE

## 2019-01-15 PROCEDURE — 1123F ACP DISCUSS/DSCN MKR DOCD: CPT | Performed by: INTERNAL MEDICINE

## 2019-01-23 ENCOUNTER — HOSPITAL ENCOUNTER (OUTPATIENT)
Dept: INTERVENTIONAL RADIOLOGY/VASCULAR | Age: 74
Discharge: HOME | DRG: 193 | End: 2019-01-25
Payer: MEDICARE

## 2019-01-23 DIAGNOSIS — N18.6 ESRD (END STAGE RENAL DISEASE) (HCC): ICD-10-CM

## 2019-01-23 PROCEDURE — 77001 FLUOROGUIDE FOR VEIN DEVICE: CPT

## 2019-01-23 PROCEDURE — 2709999900 IR REMOVE TUNNELED CVAD WO SQ PORT/PUMP

## 2019-01-23 PROCEDURE — 36589 REMOVAL TUNNELED CV CATH: CPT

## 2019-01-24 ENCOUNTER — APPOINTMENT (OUTPATIENT)
Dept: GENERAL RADIOLOGY | Age: 74
DRG: 193 | End: 2019-01-24
Payer: MEDICARE

## 2019-01-24 ENCOUNTER — HOSPITAL ENCOUNTER (INPATIENT)
Age: 74
LOS: 3 days | Discharge: NURSING, REHAB OR LONG-TERM CARE | DRG: 193 | End: 2019-01-27
Attending: EMERGENCY MEDICINE | Admitting: INTERNAL MEDICINE
Payer: MEDICARE

## 2019-01-24 DIAGNOSIS — D63.1 ANEMIA DUE TO CHRONIC KIDNEY DISEASE, ON CHRONIC DIALYSIS (HCC): ICD-10-CM

## 2019-01-24 DIAGNOSIS — J10.1 INFLUENZA A: Primary | ICD-10-CM

## 2019-01-24 DIAGNOSIS — Z99.2 ANEMIA DUE TO CHRONIC KIDNEY DISEASE, ON CHRONIC DIALYSIS (HCC): ICD-10-CM

## 2019-01-24 DIAGNOSIS — N18.6 ANEMIA DUE TO CHRONIC KIDNEY DISEASE, ON CHRONIC DIALYSIS (HCC): ICD-10-CM

## 2019-01-24 DIAGNOSIS — N18.6 ESRD (END STAGE RENAL DISEASE) ON DIALYSIS (HCC): ICD-10-CM

## 2019-01-24 DIAGNOSIS — A41.9 SEPSIS, DUE TO UNSPECIFIED ORGANISM: ICD-10-CM

## 2019-01-24 DIAGNOSIS — M46.46 LUMBAR DISCITIS: ICD-10-CM

## 2019-01-24 DIAGNOSIS — L03.119 CELLULITIS OF FOREARM: ICD-10-CM

## 2019-01-24 DIAGNOSIS — Z99.2 ESRD (END STAGE RENAL DISEASE) ON DIALYSIS (HCC): ICD-10-CM

## 2019-01-24 PROBLEM — J11.1 INFLUENZA: Status: ACTIVE | Noted: 2019-01-24

## 2019-01-24 LAB
-: ABNORMAL
ABSOLUTE EOS #: 0.1 K/UL (ref 0–0.4)
ABSOLUTE IMMATURE GRANULOCYTE: ABNORMAL K/UL (ref 0–0.3)
ABSOLUTE LYMPH #: 1 K/UL (ref 1–4.8)
ABSOLUTE MONO #: 0.5 K/UL (ref 0.1–1.3)
ALBUMIN SERPL-MCNC: 4.1 G/DL (ref 3.5–5.2)
ALBUMIN/GLOBULIN RATIO: ABNORMAL (ref 1–2.5)
ALP BLD-CCNC: 253 U/L (ref 40–129)
ALT SERPL-CCNC: 46 U/L (ref 5–41)
AMORPHOUS: ABNORMAL
ANION GAP SERPL CALCULATED.3IONS-SCNC: 18 MMOL/L (ref 9–17)
AST SERPL-CCNC: 37 U/L
BACTERIA: ABNORMAL
BASOPHILS # BLD: 1 % (ref 0–2)
BASOPHILS ABSOLUTE: 0 K/UL (ref 0–0.2)
BILIRUB SERPL-MCNC: 0.54 MG/DL (ref 0.3–1.2)
BILIRUBIN URINE: NEGATIVE
BUN BLDV-MCNC: 55 MG/DL (ref 8–23)
BUN/CREAT BLD: ABNORMAL (ref 9–20)
CALCIUM SERPL-MCNC: 9.9 MG/DL (ref 8.6–10.4)
CASTS UA: ABNORMAL /LPF
CHLORIDE BLD-SCNC: 95 MMOL/L (ref 98–107)
CO2: 25 MMOL/L (ref 20–31)
COLOR: ABNORMAL
COMMENT UA: ABNORMAL
CREAT SERPL-MCNC: 9.97 MG/DL (ref 0.7–1.2)
CRYSTALS, UA: ABNORMAL /HPF
DIFFERENTIAL TYPE: ABNORMAL
DIRECT EXAM: ABNORMAL
DIRECT EXAM: ABNORMAL
EOSINOPHILS RELATIVE PERCENT: 1 % (ref 0–4)
EPITHELIAL CELLS UA: ABNORMAL /HPF
GFR AFRICAN AMERICAN: 6 ML/MIN
GFR NON-AFRICAN AMERICAN: 5 ML/MIN
GFR SERPL CREATININE-BSD FRML MDRD: ABNORMAL ML/MIN/{1.73_M2}
GFR SERPL CREATININE-BSD FRML MDRD: ABNORMAL ML/MIN/{1.73_M2}
GLUCOSE BLD-MCNC: 176 MG/DL (ref 70–99)
GLUCOSE URINE: ABNORMAL
HCT VFR BLD CALC: 39.6 % (ref 41–53)
HEMOGLOBIN: 13.2 G/DL (ref 13.5–17.5)
IMMATURE GRANULOCYTES: ABNORMAL %
KETONES, URINE: NEGATIVE
LACTIC ACID, SEPSIS WHOLE BLOOD: NORMAL MMOL/L (ref 0.5–1.9)
LACTIC ACID, SEPSIS: 1.3 MMOL/L (ref 0.5–1.9)
LEUKOCYTE ESTERASE, URINE: ABNORMAL
LYMPHOCYTES # BLD: 14 % (ref 24–44)
Lab: ABNORMAL
MCH RBC QN AUTO: 33.6 PG (ref 26–34)
MCHC RBC AUTO-ENTMCNC: 33.5 G/DL (ref 31–37)
MCV RBC AUTO: 100.3 FL (ref 80–100)
MONOCYTES # BLD: 7 % (ref 1–7)
MUCUS: ABNORMAL
NITRITE, URINE: NEGATIVE
NRBC AUTOMATED: ABNORMAL PER 100 WBC
OTHER OBSERVATIONS UA: ABNORMAL
PDW BLD-RTO: 14.2 % (ref 11.5–14.9)
PH UA: 6 (ref 5–8)
PLATELET # BLD: 123 K/UL (ref 150–450)
PLATELET ESTIMATE: ABNORMAL
PMV BLD AUTO: 10 FL (ref 6–12)
POTASSIUM SERPL-SCNC: 5.3 MMOL/L (ref 3.7–5.3)
PROTEIN UA: ABNORMAL
RBC # BLD: 3.94 M/UL (ref 4.5–5.9)
RBC # BLD: ABNORMAL 10*6/UL
RBC UA: ABNORMAL /HPF
RENAL EPITHELIAL, UA: ABNORMAL /HPF
SEG NEUTROPHILS: 77 % (ref 36–66)
SEGMENTED NEUTROPHILS ABSOLUTE COUNT: 5.4 K/UL (ref 1.3–9.1)
SODIUM BLD-SCNC: 138 MMOL/L (ref 135–144)
SPECIFIC GRAVITY UA: 1.02 (ref 1–1.03)
SPECIMEN DESCRIPTION: ABNORMAL
STATUS: ABNORMAL
TOTAL PROTEIN: 7.3 G/DL (ref 6.4–8.3)
TRICHOMONAS: ABNORMAL
TURBIDITY: ABNORMAL
URINE HGB: ABNORMAL
UROBILINOGEN, URINE: NORMAL
WBC # BLD: 7 K/UL (ref 3.5–11)
WBC # BLD: ABNORMAL 10*3/UL
WBC UA: ABNORMAL /HPF
YEAST: ABNORMAL

## 2019-01-24 PROCEDURE — 36415 COLL VENOUS BLD VENIPUNCTURE: CPT

## 2019-01-24 PROCEDURE — 6370000000 HC RX 637 (ALT 250 FOR IP): Performed by: EMERGENCY MEDICINE

## 2019-01-24 PROCEDURE — 2580000003 HC RX 258: Performed by: EMERGENCY MEDICINE

## 2019-01-24 PROCEDURE — 6360000002 HC RX W HCPCS: Performed by: EMERGENCY MEDICINE

## 2019-01-24 PROCEDURE — 96365 THER/PROPH/DIAG IV INF INIT: CPT

## 2019-01-24 PROCEDURE — 96367 TX/PROPH/DG ADDL SEQ IV INF: CPT

## 2019-01-24 PROCEDURE — 87040 BLOOD CULTURE FOR BACTERIA: CPT

## 2019-01-24 PROCEDURE — 80053 COMPREHEN METABOLIC PANEL: CPT

## 2019-01-24 PROCEDURE — 83605 ASSAY OF LACTIC ACID: CPT

## 2019-01-24 PROCEDURE — 2060000000 HC ICU INTERMEDIATE R&B

## 2019-01-24 PROCEDURE — 85025 COMPLETE CBC W/AUTO DIFF WBC: CPT

## 2019-01-24 PROCEDURE — 81001 URINALYSIS AUTO W/SCOPE: CPT

## 2019-01-24 PROCEDURE — 87086 URINE CULTURE/COLONY COUNT: CPT

## 2019-01-24 PROCEDURE — 71045 X-RAY EXAM CHEST 1 VIEW: CPT

## 2019-01-24 PROCEDURE — 87804 INFLUENZA ASSAY W/OPTIC: CPT

## 2019-01-24 PROCEDURE — 99285 EMERGENCY DEPT VISIT HI MDM: CPT

## 2019-01-24 RX ORDER — SODIUM CHLORIDE 0.9 % (FLUSH) 0.9 %
10 SYRINGE (ML) INJECTION EVERY 12 HOURS SCHEDULED
Status: DISCONTINUED | OUTPATIENT
Start: 2019-01-24 | End: 2019-01-25 | Stop reason: SDUPTHER

## 2019-01-24 RX ORDER — ACETAMINOPHEN 500 MG
1000 TABLET ORAL ONCE
Status: COMPLETED | OUTPATIENT
Start: 2019-01-24 | End: 2019-01-24

## 2019-01-24 RX ORDER — SODIUM CHLORIDE 0.9 % (FLUSH) 0.9 %
10 SYRINGE (ML) INJECTION PRN
Status: DISCONTINUED | OUTPATIENT
Start: 2019-01-24 | End: 2019-01-25 | Stop reason: SDUPTHER

## 2019-01-24 RX ADMIN — PIPERACILLIN SODIUM,TAZOBACTAM SODIUM 3.38 G: 3; .375 INJECTION, POWDER, FOR SOLUTION INTRAVENOUS at 20:13

## 2019-01-24 RX ADMIN — ACETAMINOPHEN 1000 MG: 500 TABLET, FILM COATED ORAL at 21:35

## 2019-01-24 RX ADMIN — VANCOMYCIN HYDROCHLORIDE 1000 MG: 1 INJECTION, POWDER, LYOPHILIZED, FOR SOLUTION INTRAVENOUS at 20:14

## 2019-01-24 ASSESSMENT — ENCOUNTER SYMPTOMS
RHINORRHEA: 1
COUGH: 1
ABDOMINAL PAIN: 0
SHORTNESS OF BREATH: 1
VOMITING: 0

## 2019-01-24 ASSESSMENT — PAIN DESCRIPTION - PAIN TYPE: TYPE: ACUTE PAIN

## 2019-01-24 ASSESSMENT — PAIN DESCRIPTION - LOCATION: LOCATION: ABDOMEN

## 2019-01-24 ASSESSMENT — PAIN SCALES - GENERAL
PAINLEVEL_OUTOF10: 6
PAINLEVEL_OUTOF10: 0

## 2019-01-25 PROBLEM — N18.6 ESRD ON HEMODIALYSIS (HCC): Status: ACTIVE | Noted: 2019-01-25

## 2019-01-25 PROBLEM — Z99.2 ESRD ON HEMODIALYSIS (HCC): Status: ACTIVE | Noted: 2019-01-25

## 2019-01-25 LAB
ANION GAP SERPL CALCULATED.3IONS-SCNC: 17 MMOL/L (ref 9–17)
BUN BLDV-MCNC: 60 MG/DL (ref 8–23)
BUN/CREAT BLD: ABNORMAL (ref 9–20)
CALCIUM SERPL-MCNC: 8.7 MG/DL (ref 8.6–10.4)
CHLORIDE BLD-SCNC: 96 MMOL/L (ref 98–107)
CO2: 23 MMOL/L (ref 20–31)
CREAT SERPL-MCNC: 10.79 MG/DL (ref 0.7–1.2)
CULTURE: NO GROWTH
FOLATE: >20 NG/ML
GFR AFRICAN AMERICAN: 6 ML/MIN
GFR NON-AFRICAN AMERICAN: 5 ML/MIN
GFR SERPL CREATININE-BSD FRML MDRD: ABNORMAL ML/MIN/{1.73_M2}
GFR SERPL CREATININE-BSD FRML MDRD: ABNORMAL ML/MIN/{1.73_M2}
GLUCOSE BLD-MCNC: 149 MG/DL (ref 75–110)
GLUCOSE BLD-MCNC: 151 MG/DL (ref 70–99)
GLUCOSE BLD-MCNC: 166 MG/DL (ref 75–110)
GLUCOSE BLD-MCNC: 185 MG/DL (ref 75–110)
GLUCOSE BLD-MCNC: 237 MG/DL (ref 75–110)
HCT VFR BLD CALC: 33.8 % (ref 41–53)
HEMOGLOBIN: 11 G/DL (ref 13.5–17.5)
INR BLD: 1.1
Lab: NORMAL
MCH RBC QN AUTO: 32.5 PG (ref 26–34)
MCHC RBC AUTO-ENTMCNC: 32.4 G/DL (ref 31–37)
MCV RBC AUTO: 100.3 FL (ref 80–100)
NRBC AUTOMATED: ABNORMAL PER 100 WBC
PDW BLD-RTO: 14.2 % (ref 11.5–14.9)
PHOSPHORUS: 3.4 MG/DL (ref 2.5–4.5)
PLATELET # BLD: 106 K/UL (ref 150–450)
PMV BLD AUTO: 9.3 FL (ref 6–12)
POTASSIUM SERPL-SCNC: 4.8 MMOL/L (ref 3.7–5.3)
PROCALCITONIN: 1.78 NG/ML
PROTHROMBIN TIME: 14.3 SEC (ref 11.8–14.6)
RBC # BLD: 3.37 M/UL (ref 4.5–5.9)
SODIUM BLD-SCNC: 136 MMOL/L (ref 135–144)
SPECIMEN DESCRIPTION: NORMAL
STATUS: NORMAL
VITAMIN B-12: 994 PG/ML (ref 232–1245)
WBC # BLD: 5.5 K/UL (ref 3.5–11)

## 2019-01-25 PROCEDURE — 85610 PROTHROMBIN TIME: CPT

## 2019-01-25 PROCEDURE — 90937 HEMODIALYSIS REPEATED EVAL: CPT

## 2019-01-25 PROCEDURE — 6360000002 HC RX W HCPCS: Performed by: NURSE PRACTITIONER

## 2019-01-25 PROCEDURE — 6370000000 HC RX 637 (ALT 250 FOR IP): Performed by: NURSE PRACTITIONER

## 2019-01-25 PROCEDURE — 2580000003 HC RX 258: Performed by: NURSE PRACTITIONER

## 2019-01-25 PROCEDURE — 82607 VITAMIN B-12: CPT

## 2019-01-25 PROCEDURE — 85027 COMPLETE CBC AUTOMATED: CPT

## 2019-01-25 PROCEDURE — 84100 ASSAY OF PHOSPHORUS: CPT

## 2019-01-25 PROCEDURE — 94640 AIRWAY INHALATION TREATMENT: CPT

## 2019-01-25 PROCEDURE — 2060000000 HC ICU INTERMEDIATE R&B

## 2019-01-25 PROCEDURE — 90935 HEMODIALYSIS ONE EVALUATION: CPT

## 2019-01-25 PROCEDURE — 99223 1ST HOSP IP/OBS HIGH 75: CPT | Performed by: INTERNAL MEDICINE

## 2019-01-25 PROCEDURE — 80048 BASIC METABOLIC PNL TOTAL CA: CPT

## 2019-01-25 PROCEDURE — 36415 COLL VENOUS BLD VENIPUNCTURE: CPT

## 2019-01-25 PROCEDURE — 94761 N-INVAS EAR/PLS OXIMETRY MLT: CPT

## 2019-01-25 PROCEDURE — 2700000000 HC OXYGEN THERAPY PER DAY

## 2019-01-25 PROCEDURE — 5A1D70Z PERFORMANCE OF URINARY FILTRATION, INTERMITTENT, LESS THAN 6 HOURS PER DAY: ICD-10-PCS | Performed by: INTERNAL MEDICINE

## 2019-01-25 PROCEDURE — 82746 ASSAY OF FOLIC ACID SERUM: CPT

## 2019-01-25 PROCEDURE — 82947 ASSAY GLUCOSE BLOOD QUANT: CPT

## 2019-01-25 PROCEDURE — 87641 MR-STAPH DNA AMP PROBE: CPT

## 2019-01-25 PROCEDURE — 84145 PROCALCITONIN (PCT): CPT

## 2019-01-25 RX ORDER — SUCRALFATE 1 G/1
1 TABLET ORAL
Status: DISCONTINUED | OUTPATIENT
Start: 2019-01-25 | End: 2019-01-27 | Stop reason: HOSPADM

## 2019-01-25 RX ORDER — FLUTICASONE PROPIONATE 110 UG/1
2 AEROSOL, METERED RESPIRATORY (INHALATION) 2 TIMES DAILY
Status: DISCONTINUED | OUTPATIENT
Start: 2019-01-25 | End: 2019-01-27 | Stop reason: HOSPADM

## 2019-01-25 RX ORDER — NICOTINE POLACRILEX 4 MG
15 LOZENGE BUCCAL PRN
Status: DISCONTINUED | OUTPATIENT
Start: 2019-01-25 | End: 2019-01-27 | Stop reason: HOSPADM

## 2019-01-25 RX ORDER — DEXTROSE MONOHYDRATE 50 MG/ML
100 INJECTION, SOLUTION INTRAVENOUS PRN
Status: DISCONTINUED | OUTPATIENT
Start: 2019-01-25 | End: 2019-01-27 | Stop reason: HOSPADM

## 2019-01-25 RX ORDER — ASPIRIN 81 MG/1
81 TABLET ORAL EVERY MORNING
Status: DISCONTINUED | OUTPATIENT
Start: 2019-01-25 | End: 2019-01-27 | Stop reason: HOSPADM

## 2019-01-25 RX ORDER — INSULIN GLARGINE 100 [IU]/ML
6 INJECTION, SOLUTION SUBCUTANEOUS NIGHTLY
Status: DISCONTINUED | OUTPATIENT
Start: 2019-01-25 | End: 2019-01-27 | Stop reason: HOSPADM

## 2019-01-25 RX ORDER — POLYVINYL ALCOHOL 14 MG/ML
1 SOLUTION/ DROPS OPHTHALMIC 3 TIMES DAILY PRN
Status: DISCONTINUED | OUTPATIENT
Start: 2019-01-25 | End: 2019-01-27 | Stop reason: HOSPADM

## 2019-01-25 RX ORDER — CALCIUM CARBONATE 200(500)MG
2 TABLET,CHEWABLE ORAL EVERY 6 HOURS PRN
Status: DISCONTINUED | OUTPATIENT
Start: 2019-01-25 | End: 2019-01-27 | Stop reason: HOSPADM

## 2019-01-25 RX ORDER — OXYCODONE HYDROCHLORIDE 5 MG/1
2.5 TABLET ORAL EVERY 6 HOURS PRN
Status: DISCONTINUED | OUTPATIENT
Start: 2019-01-25 | End: 2019-01-27 | Stop reason: HOSPADM

## 2019-01-25 RX ORDER — SEVELAMER CARBONATE 800 MG/1
3200 TABLET, FILM COATED ORAL
Status: DISCONTINUED | OUTPATIENT
Start: 2019-01-25 | End: 2019-01-27 | Stop reason: HOSPADM

## 2019-01-25 RX ORDER — OXYCODONE HYDROCHLORIDE AND ACETAMINOPHEN 5; 325 MG/1; MG/1
1 TABLET ORAL EVERY 6 HOURS PRN
Status: DISCONTINUED | OUTPATIENT
Start: 2019-01-25 | End: 2019-01-27 | Stop reason: HOSPADM

## 2019-01-25 RX ORDER — LACTOSE-REDUCED FOOD/FIBER
8 LIQUID (ML) ORAL NIGHTLY
Status: DISCONTINUED | OUTPATIENT
Start: 2019-01-25 | End: 2019-01-25 | Stop reason: CLARIF

## 2019-01-25 RX ORDER — OXYCODONE AND ACETAMINOPHEN 7.5; 325 MG/1; MG/1
1 TABLET ORAL EVERY 6 HOURS PRN
Status: DISCONTINUED | OUTPATIENT
Start: 2019-01-25 | End: 2019-01-25 | Stop reason: CLARIF

## 2019-01-25 RX ORDER — DOXYCYCLINE HYCLATE 100 MG/1
100 CAPSULE ORAL 2 TIMES DAILY
Status: ON HOLD | COMMUNITY
End: 2019-01-25

## 2019-01-25 RX ORDER — DEXTROSE MONOHYDRATE 25 G/50ML
12.5 INJECTION, SOLUTION INTRAVENOUS PRN
Status: DISCONTINUED | OUTPATIENT
Start: 2019-01-25 | End: 2019-01-27 | Stop reason: HOSPADM

## 2019-01-25 RX ORDER — OSELTAMIVIR PHOSPHATE 30 MG/1
30 CAPSULE ORAL EVERY 12 HOURS
Status: DISCONTINUED | OUTPATIENT
Start: 2019-01-25 | End: 2019-01-25

## 2019-01-25 RX ORDER — GABAPENTIN 100 MG/1
100 CAPSULE ORAL 3 TIMES DAILY
Status: DISCONTINUED | OUTPATIENT
Start: 2019-01-25 | End: 2019-01-27 | Stop reason: HOSPADM

## 2019-01-25 RX ORDER — HYDRALAZINE HYDROCHLORIDE 25 MG/1
25 TABLET, FILM COATED ORAL 2 TIMES DAILY
Status: DISCONTINUED | OUTPATIENT
Start: 2019-01-25 | End: 2019-01-27 | Stop reason: HOSPADM

## 2019-01-25 RX ORDER — SEVELAMER CARBONATE 800 MG/1
1600 TABLET, FILM COATED ORAL PRN
Status: DISCONTINUED | OUTPATIENT
Start: 2019-01-25 | End: 2019-01-27 | Stop reason: HOSPADM

## 2019-01-25 RX ORDER — SODIUM CHLORIDE 0.9 % (FLUSH) 0.9 %
10 SYRINGE (ML) INJECTION PRN
Status: DISCONTINUED | OUTPATIENT
Start: 2019-01-25 | End: 2019-01-27 | Stop reason: HOSPADM

## 2019-01-25 RX ORDER — ACETAMINOPHEN 325 MG/1
650 TABLET ORAL EVERY 6 HOURS PRN
Status: DISCONTINUED | OUTPATIENT
Start: 2019-01-25 | End: 2019-01-27 | Stop reason: HOSPADM

## 2019-01-25 RX ORDER — HYDROMORPHONE HYDROCHLORIDE 2 MG/1
2 TABLET ORAL 2 TIMES DAILY
Status: DISCONTINUED | OUTPATIENT
Start: 2019-01-25 | End: 2019-01-27 | Stop reason: HOSPADM

## 2019-01-25 RX ORDER — BISACODYL 10 MG
10 SUPPOSITORY, RECTAL RECTAL DAILY PRN
Status: DISCONTINUED | OUTPATIENT
Start: 2019-01-25 | End: 2019-01-27 | Stop reason: HOSPADM

## 2019-01-25 RX ORDER — ALLOPURINOL 100 MG/1
100 TABLET ORAL DAILY
Status: DISCONTINUED | OUTPATIENT
Start: 2019-01-25 | End: 2019-01-27 | Stop reason: HOSPADM

## 2019-01-25 RX ORDER — OSELTAMIVIR PHOSPHATE 30 MG/1
30 CAPSULE ORAL
Status: DISCONTINUED | OUTPATIENT
Start: 2019-01-28 | End: 2019-01-26

## 2019-01-25 RX ORDER — METOPROLOL SUCCINATE 25 MG/1
25 TABLET, EXTENDED RELEASE ORAL DAILY
Status: DISCONTINUED | OUTPATIENT
Start: 2019-01-25 | End: 2019-01-27 | Stop reason: HOSPADM

## 2019-01-25 RX ORDER — POLYETHYLENE GLYCOL 3350 17 G/17G
17 POWDER, FOR SOLUTION ORAL DAILY PRN
Status: DISCONTINUED | OUTPATIENT
Start: 2019-01-25 | End: 2019-01-27 | Stop reason: HOSPADM

## 2019-01-25 RX ORDER — MIDODRINE HYDROCHLORIDE 10 MG/1
10 TABLET ORAL
Status: DISCONTINUED | OUTPATIENT
Start: 2019-01-25 | End: 2019-01-27 | Stop reason: HOSPADM

## 2019-01-25 RX ORDER — M-VIT,TX,IRON,MINS/CALC/FOLIC 27MG-0.4MG
1 TABLET ORAL DAILY
Status: DISCONTINUED | OUTPATIENT
Start: 2019-01-25 | End: 2019-01-27 | Stop reason: HOSPADM

## 2019-01-25 RX ORDER — OSELTAMIVIR PHOSPHATE 30 MG/1
30 CAPSULE ORAL ONCE
Status: DISCONTINUED | OUTPATIENT
Start: 2019-01-28 | End: 2019-01-25

## 2019-01-25 RX ORDER — HEPARIN SODIUM 5000 [USP'U]/ML
5000 INJECTION, SOLUTION INTRAVENOUS; SUBCUTANEOUS EVERY 8 HOURS SCHEDULED
Status: DISCONTINUED | OUTPATIENT
Start: 2019-01-25 | End: 2019-01-27 | Stop reason: HOSPADM

## 2019-01-25 RX ORDER — SODIUM CHLORIDE 0.9 % (FLUSH) 0.9 %
10 SYRINGE (ML) INJECTION EVERY 12 HOURS SCHEDULED
Status: DISCONTINUED | OUTPATIENT
Start: 2019-01-25 | End: 2019-01-27 | Stop reason: HOSPADM

## 2019-01-25 RX ORDER — LIDOCAINE AND PRILOCAINE 25; 25 MG/G; MG/G
1 CREAM TOPICAL
Status: DISCONTINUED | OUTPATIENT
Start: 2019-01-25 | End: 2019-01-27 | Stop reason: HOSPADM

## 2019-01-25 RX ORDER — AMLODIPINE BESYLATE 10 MG/1
10 TABLET ORAL DAILY
Status: DISCONTINUED | OUTPATIENT
Start: 2019-01-25 | End: 2019-01-27 | Stop reason: HOSPADM

## 2019-01-25 RX ORDER — ONDANSETRON 2 MG/ML
4 INJECTION INTRAMUSCULAR; INTRAVENOUS EVERY 6 HOURS PRN
Status: DISCONTINUED | OUTPATIENT
Start: 2019-01-25 | End: 2019-01-27 | Stop reason: HOSPADM

## 2019-01-25 RX ORDER — SENNA AND DOCUSATE SODIUM 50; 8.6 MG/1; MG/1
2 TABLET, FILM COATED ORAL DAILY
Status: DISCONTINUED | OUTPATIENT
Start: 2019-01-25 | End: 2019-01-27 | Stop reason: HOSPADM

## 2019-01-25 RX ADMIN — DICLOFENAC 2 G: 10 GEL TOPICAL at 10:40

## 2019-01-25 RX ADMIN — SERTRALINE HYDROCHLORIDE 50 MG: 50 TABLET ORAL at 10:39

## 2019-01-25 RX ADMIN — SEVELAMER CARBONATE 3200 MG: 800 TABLET, FILM COATED ORAL at 21:46

## 2019-01-25 RX ADMIN — PIPERACILLIN SODIUM,TAZOBACTAM SODIUM 2.25 G: 2; .25 INJECTION, POWDER, FOR SOLUTION INTRAVENOUS at 04:50

## 2019-01-25 RX ADMIN — LIDOCAINE AND PRILOCAINE 1 APPLICATORFUL: 25; 25 CREAM TOPICAL at 16:16

## 2019-01-25 RX ADMIN — DOCUSATE SODIUM AND SENNOSIDES 2 TABLET: 8.6; 5 TABLET, FILM COATED ORAL at 10:37

## 2019-01-25 RX ADMIN — HYDRALAZINE HYDROCHLORIDE 25 MG: 25 TABLET, FILM COATED ORAL at 10:38

## 2019-01-25 RX ADMIN — HEPARIN SODIUM 5000 UNITS: 5000 INJECTION INTRAVENOUS; SUBCUTANEOUS at 22:31

## 2019-01-25 RX ADMIN — DICLOFENAC 2 G: 10 GEL TOPICAL at 14:45

## 2019-01-25 RX ADMIN — HYDROMORPHONE HYDROCHLORIDE 2 MG: 2 TABLET ORAL at 22:31

## 2019-01-25 RX ADMIN — ACETAMINOPHEN 650 MG: 325 TABLET, FILM COATED ORAL at 10:38

## 2019-01-25 RX ADMIN — AMLODIPINE BESYLATE 10 MG: 10 TABLET ORAL at 10:38

## 2019-01-25 RX ADMIN — FLUTICASONE PROPIONATE 2 PUFF: 110 AEROSOL, METERED RESPIRATORY (INHALATION) at 12:46

## 2019-01-25 RX ADMIN — MIDODRINE HYDROCHLORIDE 10 MG: 10 TABLET ORAL at 06:42

## 2019-01-25 RX ADMIN — INSULIN GLARGINE 6 UNITS: 100 INJECTION, SOLUTION SUBCUTANEOUS at 23:13

## 2019-01-25 RX ADMIN — GABAPENTIN 100 MG: 100 CAPSULE ORAL at 14:44

## 2019-01-25 RX ADMIN — MULTIPLE VITAMINS W/ MINERALS TAB 1 TABLET: TAB at 10:37

## 2019-01-25 RX ADMIN — SEVELAMER CARBONATE 3200 MG: 800 TABLET, FILM COATED ORAL at 08:17

## 2019-01-25 RX ADMIN — DICLOFENAC 2 G: 10 GEL TOPICAL at 22:07

## 2019-01-25 RX ADMIN — INSULIN LISPRO 1 UNITS: 100 INJECTION, SOLUTION INTRAVENOUS; SUBCUTANEOUS at 23:13

## 2019-01-25 RX ADMIN — HYDRALAZINE HYDROCHLORIDE 25 MG: 25 TABLET, FILM COATED ORAL at 21:46

## 2019-01-25 RX ADMIN — ASPIRIN 81 MG: 81 TABLET, COATED ORAL at 10:40

## 2019-01-25 RX ADMIN — FLUTICASONE PROPIONATE 2 PUFF: 110 AEROSOL, METERED RESPIRATORY (INHALATION) at 22:31

## 2019-01-25 RX ADMIN — IPRATROPIUM BROMIDE 0.5 MG: 0.5 SOLUTION RESPIRATORY (INHALATION) at 14:43

## 2019-01-25 RX ADMIN — Medication 10 ML: at 10:40

## 2019-01-25 RX ADMIN — SUCRALFATE 1 G: 1 TABLET ORAL at 10:41

## 2019-01-25 RX ADMIN — ACETAMINOPHEN 650 MG: 325 TABLET, FILM COATED ORAL at 21:45

## 2019-01-25 RX ADMIN — OSELTAMIVIR PHOSPHATE 30 MG: 30 CAPSULE ORAL at 04:50

## 2019-01-25 RX ADMIN — HYDROMORPHONE HYDROCHLORIDE 2 MG: 2 TABLET ORAL at 14:44

## 2019-01-25 RX ADMIN — SUCRALFATE 1 G: 1 TABLET ORAL at 21:46

## 2019-01-25 RX ADMIN — GABAPENTIN 100 MG: 100 CAPSULE ORAL at 21:46

## 2019-01-25 RX ADMIN — ALLOPURINOL 100 MG: 100 TABLET ORAL at 10:39

## 2019-01-25 RX ADMIN — PIPERACILLIN SODIUM,TAZOBACTAM SODIUM 2.25 G: 2; .25 INJECTION, POWDER, FOR SOLUTION INTRAVENOUS at 16:16

## 2019-01-25 RX ADMIN — SEVELAMER CARBONATE 3200 MG: 800 TABLET, FILM COATED ORAL at 12:48

## 2019-01-25 RX ADMIN — GABAPENTIN 100 MG: 100 CAPSULE ORAL at 10:37

## 2019-01-25 RX ADMIN — HEPARIN SODIUM 5000 UNITS: 5000 INJECTION INTRAVENOUS; SUBCUTANEOUS at 12:49

## 2019-01-25 RX ADMIN — INSULIN LISPRO 4 UNITS: 100 INJECTION, SOLUTION INTRAVENOUS; SUBCUTANEOUS at 12:49

## 2019-01-25 RX ADMIN — SUCRALFATE 1 G: 1 TABLET ORAL at 06:42

## 2019-01-25 RX ADMIN — Medication 10 ML: at 23:18

## 2019-01-25 RX ADMIN — METOPROLOL SUCCINATE 25 MG: 25 TABLET, EXTENDED RELEASE ORAL at 10:39

## 2019-01-25 RX ADMIN — Medication 10 ML: at 10:39

## 2019-01-25 ASSESSMENT — ENCOUNTER SYMPTOMS
WHEEZING: 1
ABDOMINAL PAIN: 0
VOMITING: 0
RHINORRHEA: 1
DIARRHEA: 0
SHORTNESS OF BREATH: 1
NAUSEA: 0
BACK PAIN: 0
SORE THROAT: 0
CONSTIPATION: 0
COUGH: 1

## 2019-01-25 ASSESSMENT — PAIN DESCRIPTION - FREQUENCY: FREQUENCY: INTERMITTENT

## 2019-01-25 ASSESSMENT — PAIN DESCRIPTION - ONSET: ONSET: ON-GOING

## 2019-01-25 ASSESSMENT — PAIN SCALES - GENERAL
PAINLEVEL_OUTOF10: 0
PAINLEVEL_OUTOF10: 3
PAINLEVEL_OUTOF10: 0
PAINLEVEL_OUTOF10: 3

## 2019-01-25 ASSESSMENT — PAIN DESCRIPTION - PAIN TYPE: TYPE: CHRONIC PAIN

## 2019-01-25 ASSESSMENT — PAIN DESCRIPTION - DESCRIPTORS: DESCRIPTORS: DISCOMFORT

## 2019-01-25 ASSESSMENT — PAIN DESCRIPTION - LOCATION: LOCATION: ABDOMEN

## 2019-01-25 ASSESSMENT — PAIN DESCRIPTION - PROGRESSION: CLINICAL_PROGRESSION: GRADUALLY WORSENING

## 2019-01-26 LAB
ANION GAP SERPL CALCULATED.3IONS-SCNC: 15 MMOL/L (ref 9–17)
BUN BLDV-MCNC: 40 MG/DL (ref 8–23)
BUN/CREAT BLD: ABNORMAL (ref 9–20)
CALCIUM SERPL-MCNC: 8.8 MG/DL (ref 8.6–10.4)
CHLORIDE BLD-SCNC: 100 MMOL/L (ref 98–107)
CO2: 26 MMOL/L (ref 20–31)
CREAT SERPL-MCNC: 7.67 MG/DL (ref 0.7–1.2)
GFR AFRICAN AMERICAN: 8 ML/MIN
GFR NON-AFRICAN AMERICAN: 7 ML/MIN
GFR SERPL CREATININE-BSD FRML MDRD: ABNORMAL ML/MIN/{1.73_M2}
GFR SERPL CREATININE-BSD FRML MDRD: ABNORMAL ML/MIN/{1.73_M2}
GLUCOSE BLD-MCNC: 138 MG/DL (ref 75–110)
GLUCOSE BLD-MCNC: 145 MG/DL (ref 75–110)
GLUCOSE BLD-MCNC: 151 MG/DL (ref 70–99)
GLUCOSE BLD-MCNC: 168 MG/DL (ref 75–110)
GLUCOSE BLD-MCNC: 221 MG/DL (ref 75–110)
HCT VFR BLD CALC: 34.5 % (ref 41–53)
HEMOGLOBIN: 11.5 G/DL (ref 13.5–17.5)
MCH RBC QN AUTO: 33.7 PG (ref 26–34)
MCHC RBC AUTO-ENTMCNC: 33.4 G/DL (ref 31–37)
MCV RBC AUTO: 100.9 FL (ref 80–100)
MRSA, DNA, NASAL: NORMAL
NRBC AUTOMATED: ABNORMAL PER 100 WBC
PDW BLD-RTO: 13.7 % (ref 11.5–14.9)
PLATELET # BLD: 108 K/UL (ref 150–450)
PMV BLD AUTO: 9.8 FL (ref 6–12)
POTASSIUM SERPL-SCNC: 4.3 MMOL/L (ref 3.7–5.3)
RBC # BLD: 3.42 M/UL (ref 4.5–5.9)
SODIUM BLD-SCNC: 141 MMOL/L (ref 135–144)
SPECIMEN DESCRIPTION: NORMAL
WBC # BLD: 7 K/UL (ref 3.5–11)

## 2019-01-26 PROCEDURE — 2060000000 HC ICU INTERMEDIATE R&B

## 2019-01-26 PROCEDURE — 6370000000 HC RX 637 (ALT 250 FOR IP): Performed by: NURSE PRACTITIONER

## 2019-01-26 PROCEDURE — 36415 COLL VENOUS BLD VENIPUNCTURE: CPT

## 2019-01-26 PROCEDURE — 80048 BASIC METABOLIC PNL TOTAL CA: CPT

## 2019-01-26 PROCEDURE — 6360000002 HC RX W HCPCS: Performed by: NURSE PRACTITIONER

## 2019-01-26 PROCEDURE — 82947 ASSAY GLUCOSE BLOOD QUANT: CPT

## 2019-01-26 PROCEDURE — 99233 SBSQ HOSP IP/OBS HIGH 50: CPT | Performed by: INTERNAL MEDICINE

## 2019-01-26 PROCEDURE — 85027 COMPLETE CBC AUTOMATED: CPT

## 2019-01-26 PROCEDURE — 2700000000 HC OXYGEN THERAPY PER DAY

## 2019-01-26 PROCEDURE — 94640 AIRWAY INHALATION TREATMENT: CPT

## 2019-01-26 PROCEDURE — 2580000003 HC RX 258: Performed by: NURSE PRACTITIONER

## 2019-01-26 PROCEDURE — 94761 N-INVAS EAR/PLS OXIMETRY MLT: CPT

## 2019-01-26 PROCEDURE — 6370000000 HC RX 637 (ALT 250 FOR IP): Performed by: INTERNAL MEDICINE

## 2019-01-26 RX ORDER — OSELTAMIVIR PHOSPHATE 30 MG/1
30 CAPSULE ORAL
Status: DISCONTINUED | OUTPATIENT
Start: 2019-01-26 | End: 2019-01-27 | Stop reason: HOSPADM

## 2019-01-26 RX ADMIN — HEPARIN SODIUM 5000 UNITS: 5000 INJECTION INTRAVENOUS; SUBCUTANEOUS at 06:02

## 2019-01-26 RX ADMIN — GABAPENTIN 100 MG: 100 CAPSULE ORAL at 09:27

## 2019-01-26 RX ADMIN — HYDROMORPHONE HYDROCHLORIDE 2 MG: 2 TABLET ORAL at 09:26

## 2019-01-26 RX ADMIN — GABAPENTIN 100 MG: 100 CAPSULE ORAL at 13:21

## 2019-01-26 RX ADMIN — SUCRALFATE 1 G: 1 TABLET ORAL at 13:21

## 2019-01-26 RX ADMIN — IPRATROPIUM BROMIDE 0.5 MG: 0.5 SOLUTION RESPIRATORY (INHALATION) at 08:10

## 2019-01-26 RX ADMIN — PIPERACILLIN SODIUM,TAZOBACTAM SODIUM 2.25 G: 2; .25 INJECTION, POWDER, FOR SOLUTION INTRAVENOUS at 06:11

## 2019-01-26 RX ADMIN — HEPARIN SODIUM 5000 UNITS: 5000 INJECTION INTRAVENOUS; SUBCUTANEOUS at 22:10

## 2019-01-26 RX ADMIN — FLUTICASONE PROPIONATE 2 PUFF: 110 AEROSOL, METERED RESPIRATORY (INHALATION) at 09:26

## 2019-01-26 RX ADMIN — Medication 10 ML: at 09:27

## 2019-01-26 RX ADMIN — INSULIN LISPRO 2 UNITS: 100 INJECTION, SOLUTION INTRAVENOUS; SUBCUTANEOUS at 16:52

## 2019-01-26 RX ADMIN — SEVELAMER CARBONATE 3200 MG: 800 TABLET, FILM COATED ORAL at 13:20

## 2019-01-26 RX ADMIN — OSELTAMIVIR PHOSPHATE 30 MG: 30 CAPSULE ORAL at 13:22

## 2019-01-26 RX ADMIN — DOCUSATE SODIUM AND SENNOSIDES 2 TABLET: 8.6; 5 TABLET, FILM COATED ORAL at 09:26

## 2019-01-26 RX ADMIN — ALLOPURINOL 100 MG: 100 TABLET ORAL at 09:27

## 2019-01-26 RX ADMIN — HYDRALAZINE HYDROCHLORIDE 25 MG: 25 TABLET, FILM COATED ORAL at 09:27

## 2019-01-26 RX ADMIN — MULTIPLE VITAMINS W/ MINERALS TAB 1 TABLET: TAB at 09:27

## 2019-01-26 RX ADMIN — ASPIRIN 81 MG: 81 TABLET, COATED ORAL at 09:27

## 2019-01-26 RX ADMIN — FLUTICASONE PROPIONATE 2 PUFF: 110 AEROSOL, METERED RESPIRATORY (INHALATION) at 20:38

## 2019-01-26 RX ADMIN — METOPROLOL SUCCINATE 25 MG: 25 TABLET, EXTENDED RELEASE ORAL at 09:26

## 2019-01-26 RX ADMIN — SEVELAMER CARBONATE 3200 MG: 800 TABLET, FILM COATED ORAL at 09:27

## 2019-01-26 RX ADMIN — HEPARIN SODIUM 5000 UNITS: 5000 INJECTION INTRAVENOUS; SUBCUTANEOUS at 13:20

## 2019-01-26 RX ADMIN — INSULIN GLARGINE 6 UNITS: 100 INJECTION, SOLUTION SUBCUTANEOUS at 21:53

## 2019-01-26 RX ADMIN — HYDROMORPHONE HYDROCHLORIDE 2 MG: 2 TABLET ORAL at 20:34

## 2019-01-26 RX ADMIN — DICLOFENAC 2 G: 10 GEL TOPICAL at 13:22

## 2019-01-26 RX ADMIN — PIPERACILLIN SODIUM,TAZOBACTAM SODIUM 2.25 G: 2; .25 INJECTION, POWDER, FOR SOLUTION INTRAVENOUS at 17:02

## 2019-01-26 RX ADMIN — DICLOFENAC 2 G: 10 GEL TOPICAL at 09:26

## 2019-01-26 RX ADMIN — INSULIN LISPRO 4 UNITS: 100 INJECTION, SOLUTION INTRAVENOUS; SUBCUTANEOUS at 13:20

## 2019-01-26 RX ADMIN — SERTRALINE HYDROCHLORIDE 50 MG: 50 TABLET ORAL at 09:27

## 2019-01-26 RX ADMIN — Medication 10 ML: at 09:28

## 2019-01-26 RX ADMIN — SUCRALFATE 1 G: 1 TABLET ORAL at 06:02

## 2019-01-26 RX ADMIN — SUCRALFATE 1 G: 1 TABLET ORAL at 16:51

## 2019-01-26 RX ADMIN — OXYCODONE HYDROCHLORIDE 2.5 MG: 5 TABLET ORAL at 20:33

## 2019-01-26 RX ADMIN — AMLODIPINE BESYLATE 10 MG: 10 TABLET ORAL at 09:26

## 2019-01-26 RX ADMIN — DICLOFENAC 2 G: 10 GEL TOPICAL at 20:35

## 2019-01-26 RX ADMIN — SEVELAMER CARBONATE 3200 MG: 800 TABLET, FILM COATED ORAL at 16:51

## 2019-01-26 RX ADMIN — IPRATROPIUM BROMIDE 0.5 MG: 0.5 SOLUTION RESPIRATORY (INHALATION) at 15:44

## 2019-01-26 RX ADMIN — GABAPENTIN 100 MG: 100 CAPSULE ORAL at 20:33

## 2019-01-26 RX ADMIN — Medication 10 ML: at 20:36

## 2019-01-26 ASSESSMENT — PAIN SCALES - GENERAL
PAINLEVEL_OUTOF10: 3
PAINLEVEL_OUTOF10: 4
PAINLEVEL_OUTOF10: 5
PAINLEVEL_OUTOF10: 5
PAINLEVEL_OUTOF10: 3

## 2019-01-26 ASSESSMENT — PAIN DESCRIPTION - DESCRIPTORS: DESCRIPTORS: DISCOMFORT

## 2019-01-26 ASSESSMENT — PAIN DESCRIPTION - LOCATION
LOCATION: ABDOMEN
LOCATION: ABDOMEN

## 2019-01-26 ASSESSMENT — PAIN DESCRIPTION - FREQUENCY: FREQUENCY: INTERMITTENT

## 2019-01-26 ASSESSMENT — PAIN DESCRIPTION - PAIN TYPE
TYPE: CHRONIC PAIN
TYPE: CHRONIC PAIN

## 2019-01-26 ASSESSMENT — PAIN DESCRIPTION - PROGRESSION: CLINICAL_PROGRESSION: NOT CHANGED

## 2019-01-26 ASSESSMENT — PAIN DESCRIPTION - ONSET: ONSET: ON-GOING

## 2019-01-27 VITALS
OXYGEN SATURATION: 100 % | WEIGHT: 180.78 LBS | BODY MASS INDEX: 25.31 KG/M2 | RESPIRATION RATE: 16 BRPM | HEART RATE: 56 BPM | SYSTOLIC BLOOD PRESSURE: 111 MMHG | DIASTOLIC BLOOD PRESSURE: 52 MMHG | TEMPERATURE: 98.2 F | HEIGHT: 71 IN

## 2019-01-27 LAB
ANION GAP SERPL CALCULATED.3IONS-SCNC: 15 MMOL/L (ref 9–17)
BUN BLDV-MCNC: 55 MG/DL (ref 8–23)
BUN/CREAT BLD: ABNORMAL (ref 9–20)
CALCIUM SERPL-MCNC: 8.1 MG/DL (ref 8.6–10.4)
CHLORIDE BLD-SCNC: 96 MMOL/L (ref 98–107)
CO2: 25 MMOL/L (ref 20–31)
CREAT SERPL-MCNC: 9.49 MG/DL (ref 0.7–1.2)
GFR AFRICAN AMERICAN: 7 ML/MIN
GFR NON-AFRICAN AMERICAN: 5 ML/MIN
GFR SERPL CREATININE-BSD FRML MDRD: ABNORMAL ML/MIN/{1.73_M2}
GFR SERPL CREATININE-BSD FRML MDRD: ABNORMAL ML/MIN/{1.73_M2}
GLUCOSE BLD-MCNC: 112 MG/DL (ref 75–110)
GLUCOSE BLD-MCNC: 165 MG/DL (ref 70–99)
GLUCOSE BLD-MCNC: 206 MG/DL (ref 75–110)
HCT VFR BLD CALC: 32 % (ref 41–53)
HEMOGLOBIN: 10.6 G/DL (ref 13.5–17.5)
MCH RBC QN AUTO: 32.5 PG (ref 26–34)
MCHC RBC AUTO-ENTMCNC: 33.2 G/DL (ref 31–37)
MCV RBC AUTO: 97.9 FL (ref 80–100)
NRBC AUTOMATED: ABNORMAL PER 100 WBC
PDW BLD-RTO: 13.9 % (ref 11.5–14.9)
PLATELET # BLD: 87 K/UL (ref 150–450)
PMV BLD AUTO: 10.1 FL (ref 6–12)
POTASSIUM SERPL-SCNC: 4.9 MMOL/L (ref 3.7–5.3)
RBC # BLD: 3.27 M/UL (ref 4.5–5.9)
SODIUM BLD-SCNC: 136 MMOL/L (ref 135–144)
WBC # BLD: 4.3 K/UL (ref 3.5–11)

## 2019-01-27 PROCEDURE — 80048 BASIC METABOLIC PNL TOTAL CA: CPT

## 2019-01-27 PROCEDURE — 2580000003 HC RX 258: Performed by: NURSE PRACTITIONER

## 2019-01-27 PROCEDURE — 85027 COMPLETE CBC AUTOMATED: CPT

## 2019-01-27 PROCEDURE — 6370000000 HC RX 637 (ALT 250 FOR IP): Performed by: NURSE PRACTITIONER

## 2019-01-27 PROCEDURE — 82947 ASSAY GLUCOSE BLOOD QUANT: CPT

## 2019-01-27 PROCEDURE — 99239 HOSP IP/OBS DSCHRG MGMT >30: CPT | Performed by: INTERNAL MEDICINE

## 2019-01-27 PROCEDURE — 36415 COLL VENOUS BLD VENIPUNCTURE: CPT

## 2019-01-27 PROCEDURE — 6360000002 HC RX W HCPCS: Performed by: NURSE PRACTITIONER

## 2019-01-27 PROCEDURE — 94640 AIRWAY INHALATION TREATMENT: CPT

## 2019-01-27 PROCEDURE — 94761 N-INVAS EAR/PLS OXIMETRY MLT: CPT

## 2019-01-27 RX ORDER — OXYCODONE AND ACETAMINOPHEN 7.5; 325 MG/1; MG/1
1 TABLET ORAL EVERY 6 HOURS PRN
Qty: 28 TABLET | Refills: 0 | Status: SHIPPED | OUTPATIENT
Start: 2019-01-27 | End: 2019-02-03

## 2019-01-27 RX ORDER — OSELTAMIVIR PHOSPHATE 30 MG/1
30 CAPSULE ORAL DAILY
Qty: 3 CAPSULE | Refills: 0 | Status: SHIPPED | OUTPATIENT
Start: 2019-01-27 | End: 2019-05-22

## 2019-01-27 RX ORDER — OXYCODONE HYDROCHLORIDE AND ACETAMINOPHEN 5; 325 MG/1; MG/1
1 TABLET ORAL EVERY 6 HOURS PRN
Qty: 9 TABLET | Refills: 0 | Status: CANCELLED | OUTPATIENT
Start: 2019-01-27 | End: 2019-01-30

## 2019-01-27 RX ORDER — OXYCODONE HYDROCHLORIDE 5 MG/1
2.5 TABLET ORAL EVERY 6 HOURS PRN
Qty: 12 TABLET | Refills: 0 | Status: CANCELLED | OUTPATIENT
Start: 2019-01-27 | End: 2019-01-30

## 2019-01-27 RX ADMIN — PIPERACILLIN SODIUM,TAZOBACTAM SODIUM 2.25 G: 2; .25 INJECTION, POWDER, FOR SOLUTION INTRAVENOUS at 05:12

## 2019-01-27 RX ADMIN — HYDRALAZINE HYDROCHLORIDE 25 MG: 25 TABLET, FILM COATED ORAL at 09:31

## 2019-01-27 RX ADMIN — HEPARIN SODIUM 5000 UNITS: 5000 INJECTION INTRAVENOUS; SUBCUTANEOUS at 05:12

## 2019-01-27 RX ADMIN — SEVELAMER CARBONATE 3200 MG: 800 TABLET, FILM COATED ORAL at 09:30

## 2019-01-27 RX ADMIN — SUCRALFATE 1 G: 1 TABLET ORAL at 09:30

## 2019-01-27 RX ADMIN — FLUTICASONE PROPIONATE 2 PUFF: 110 AEROSOL, METERED RESPIRATORY (INHALATION) at 09:45

## 2019-01-27 RX ADMIN — SERTRALINE HYDROCHLORIDE 50 MG: 50 TABLET ORAL at 09:31

## 2019-01-27 RX ADMIN — DICLOFENAC 2 G: 10 GEL TOPICAL at 09:32

## 2019-01-27 RX ADMIN — SUCRALFATE 1 G: 1 TABLET ORAL at 05:12

## 2019-01-27 RX ADMIN — ASPIRIN 81 MG: 81 TABLET, COATED ORAL at 09:30

## 2019-01-27 RX ADMIN — ALLOPURINOL 100 MG: 100 TABLET ORAL at 09:30

## 2019-01-27 RX ADMIN — AMLODIPINE BESYLATE 10 MG: 10 TABLET ORAL at 09:30

## 2019-01-27 RX ADMIN — INSULIN LISPRO 4 UNITS: 100 INJECTION, SOLUTION INTRAVENOUS; SUBCUTANEOUS at 12:24

## 2019-01-27 RX ADMIN — HYDROMORPHONE HYDROCHLORIDE 2 MG: 2 TABLET ORAL at 09:32

## 2019-01-27 RX ADMIN — METOPROLOL SUCCINATE 25 MG: 25 TABLET, EXTENDED RELEASE ORAL at 09:29

## 2019-01-27 RX ADMIN — MULTIPLE VITAMINS W/ MINERALS TAB 1 TABLET: TAB at 09:30

## 2019-01-27 RX ADMIN — GABAPENTIN 100 MG: 100 CAPSULE ORAL at 09:30

## 2019-01-27 RX ADMIN — HEPARIN SODIUM 5000 UNITS: 5000 INJECTION INTRAVENOUS; SUBCUTANEOUS at 14:46

## 2019-01-27 RX ADMIN — GABAPENTIN 100 MG: 100 CAPSULE ORAL at 14:46

## 2019-01-27 RX ADMIN — SEVELAMER CARBONATE 3200 MG: 800 TABLET, FILM COATED ORAL at 12:29

## 2019-01-27 RX ADMIN — IPRATROPIUM BROMIDE 0.5 MG: 0.5 SOLUTION RESPIRATORY (INHALATION) at 00:31

## 2019-01-27 RX ADMIN — DOCUSATE SODIUM AND SENNOSIDES 2 TABLET: 8.6; 5 TABLET, FILM COATED ORAL at 09:29

## 2019-01-27 RX ADMIN — DICLOFENAC 2 G: 10 GEL TOPICAL at 14:46

## 2019-01-27 ASSESSMENT — PAIN SCALES - GENERAL
PAINLEVEL_OUTOF10: 3
PAINLEVEL_OUTOF10: 6

## 2019-01-28 ENCOUNTER — HOSPITAL ENCOUNTER (OUTPATIENT)
Age: 74
Setting detail: SPECIMEN
Discharge: HOME | End: 2019-01-28
Payer: MEDICARE

## 2019-01-28 LAB
ABSOLUTE EOS #: 0.28 K/UL (ref 0–0.44)
ABSOLUTE IMMATURE GRANULOCYTE: <0.03 K/UL (ref 0–0.3)
ABSOLUTE LYMPH #: 1.72 K/UL (ref 1.1–3.7)
ABSOLUTE MONO #: 0.37 K/UL (ref 0.1–1.2)
BASOPHILS # BLD: 0 % (ref 0–2)
BASOPHILS ABSOLUTE: <0.03 K/UL (ref 0–0.2)
DIFFERENTIAL TYPE: ABNORMAL
EOSINOPHILS RELATIVE PERCENT: 7 % (ref 1–4)
HCT VFR BLD CALC: 30 % (ref 40.7–50.3)
HEMOGLOBIN: 9.7 G/DL (ref 13–17)
IMMATURE GRANULOCYTES: 0 %
LYMPHOCYTES # BLD: 40 % (ref 24–43)
MCH RBC QN AUTO: 32.6 PG (ref 25.2–33.5)
MCHC RBC AUTO-ENTMCNC: 32.3 G/DL (ref 28.4–34.8)
MCV RBC AUTO: 100.7 FL (ref 82.6–102.9)
MONOCYTES # BLD: 9 % (ref 3–12)
NRBC AUTOMATED: 0 PER 100 WBC
PDW BLD-RTO: 13 % (ref 11.8–14.4)
PLATELET # BLD: ABNORMAL K/UL (ref 138–453)
PLATELET ESTIMATE: ABNORMAL
PLATELET, FLUORESCENCE: 72 K/UL (ref 138–453)
PLATELET, IMMATURE FRACTION: 6.8 % (ref 1.1–10.3)
PMV BLD AUTO: ABNORMAL FL (ref 8.1–13.5)
RBC # BLD: 2.98 M/UL (ref 4.21–5.77)
RBC # BLD: ABNORMAL 10*6/UL
SEG NEUTROPHILS: 45 % (ref 36–65)
SEGMENTED NEUTROPHILS ABSOLUTE COUNT: 1.94 K/UL (ref 1.5–8.1)
WBC # BLD: 4.3 K/UL (ref 3.5–11.3)
WBC # BLD: ABNORMAL 10*3/UL

## 2019-01-28 PROCEDURE — 85025 COMPLETE CBC W/AUTO DIFF WBC: CPT

## 2019-01-28 PROCEDURE — P9603 ONE-WAY ALLOW PRORATED MILES: HCPCS

## 2019-01-28 PROCEDURE — 85055 RETICULATED PLATELET ASSAY: CPT

## 2019-01-28 PROCEDURE — 36415 COLL VENOUS BLD VENIPUNCTURE: CPT

## 2019-02-21 ENCOUNTER — HOSPITAL ENCOUNTER (OUTPATIENT)
Age: 74
Setting detail: SPECIMEN
Discharge: HOME | End: 2019-02-21
Payer: MEDICARE

## 2019-02-21 LAB
C-REACTIVE PROTEIN: 5.7 MG/L (ref 0–5)
SEDIMENTATION RATE, ERYTHROCYTE: 41 MM (ref 0–10)

## 2019-02-21 PROCEDURE — P9603 ONE-WAY ALLOW PRORATED MILES: HCPCS

## 2019-02-21 PROCEDURE — 86140 C-REACTIVE PROTEIN: CPT

## 2019-02-21 PROCEDURE — 85651 RBC SED RATE NONAUTOMATED: CPT

## 2019-02-21 PROCEDURE — 36415 COLL VENOUS BLD VENIPUNCTURE: CPT

## 2019-02-24 PROBLEM — J10.1 INFLUENZA A: Status: RESOLVED | Noted: 2019-01-24 | Resolved: 2019-02-24

## 2019-04-09 ENCOUNTER — OFFICE VISIT (OUTPATIENT)
Dept: INFECTIOUS DISEASES | Age: 74
End: 2019-04-09
Payer: MEDICARE

## 2019-04-09 VITALS
BODY MASS INDEX: 25.2 KG/M2 | TEMPERATURE: 99.3 F | DIASTOLIC BLOOD PRESSURE: 80 MMHG | SYSTOLIC BLOOD PRESSURE: 120 MMHG | HEART RATE: 64 BPM | HEIGHT: 71 IN | WEIGHT: 180 LBS

## 2019-04-09 DIAGNOSIS — M46.26 OSTEOMYELITIS OF LUMBAR SPINE (HCC): Primary | ICD-10-CM

## 2019-04-09 PROCEDURE — 1123F ACP DISCUSS/DSCN MKR DOCD: CPT | Performed by: INTERNAL MEDICINE

## 2019-04-09 PROCEDURE — 3017F COLORECTAL CA SCREEN DOC REV: CPT | Performed by: INTERNAL MEDICINE

## 2019-04-09 PROCEDURE — G8427 DOCREV CUR MEDS BY ELIG CLIN: HCPCS | Performed by: INTERNAL MEDICINE

## 2019-04-09 PROCEDURE — G8419 CALC BMI OUT NRM PARAM NOF/U: HCPCS | Performed by: INTERNAL MEDICINE

## 2019-04-09 PROCEDURE — 1036F TOBACCO NON-USER: CPT | Performed by: INTERNAL MEDICINE

## 2019-04-09 PROCEDURE — 99214 OFFICE O/P EST MOD 30 MIN: CPT | Performed by: INTERNAL MEDICINE

## 2019-04-09 PROCEDURE — 4040F PNEUMOC VAC/ADMIN/RCVD: CPT | Performed by: INTERNAL MEDICINE

## 2019-04-09 NOTE — PROGRESS NOTES
Chief Complaint   Patient presents with    Follow-up     foot infection   He was  admitted 11/18/18 with cellulitis of the R forearm at the AVF site,blood cultures were negative ,was treated with IV Vancomycin ,tunnelled HD cath was placed and subsequently remved . H/o  diverting loop colostomy and I&D for perianal abscess 12/30 /15   H/O  Discitis/osteomyelitis at L2/3 with destructive changes no MRI   Pt also had aspiration bone biopsy done on 6/29/15 with no growth    CT-guided L3 biopsy 1/4/16 ,no growth,Tissue culture grew MRSA   Pt was treated with IV Vancomycin followed by oral Doxycycline for suppression . Today :  He had right fifth toe ulcer and left big toe dorsum ulcer with no purulent drainage . No redness or pain at the fistula site . He denies fever , chill , headache, nausea or vomiting, denies abdomen pain, denies cough, shortness of breath  or sputum expectoration. He denied back pain today .                 Current Medications:      Current Outpatient Medications:     oseltamivir (TAMIFLU) 30 MG capsule, Take 1 capsule by mouth daily, Disp: 3 capsule, Rfl: 0    ipratropium (ATROVENT) 0.02 % nebulizer solution, Take 0.5 mg by nebulization every 8 hours, Disp: , Rfl:     Multiple Vitamins-Minerals (THERAPEUTIC MULTIVITAMIN-MINERALS) tablet, Take 1 tablet by mouth daily, Disp: , Rfl:     HUMALOG 100 UNIT/ML injection vial, Inject into the skin 4 times daily (after meals and at bedtime) Sliding Scale, Disp: , Rfl:     allopurinol (ZYLOPRIM) 100 MG tablet, Take 100 mg by mouth daily, Disp: , Rfl:     midodrine (PROAMATINE) 5 MG tablet, Take 10 mg by mouth One time per day every Mon, Wed, Fri for low pressure , Disp: , Rfl:     senna (SENOKOT) 8.6 MG tablet, Take 2 tablets by mouth daily as needed for Constipation, Disp: , Rfl:     metoprolol succinate (TOPROL XL) 25 MG extended release tablet, Take 1 tablet by mouth daily, Disp: 30 tablet, Rfl: 0    carboxymethylcellulose 1 % ophthalmic solution, Place 1 drop into both eyes 3 times daily as needed for Dry Eyes, Disp: , Rfl:     sevelamer (RENVELA) 800 MG tablet, Take 2 tablets by mouth every 8 hours as needed For snacks, Disp: , Rfl:     sevelamer (RENVELA) 800 MG tablet, Take 4 tablets by mouth 3 times daily (with meals), Disp: , Rfl:     sertraline (ZOLOFT) 50 MG tablet, Take 50 mg by mouth daily, Disp: , Rfl:     sucralfate (CARAFATE) 1 GM tablet, Take 1 g by mouth 3 times daily (before meals), Disp: , Rfl:     calcium carbonate (TUMS) 500 MG chewable tablet, Take 2 tablets by mouth every 6 hours as needed for Heartburn, Disp: , Rfl:     tetrahydrozoline 0.05 % ophthalmic solution, Place 1 drop into both eyes 2 times daily as needed (irritation/dryness), Disp: , Rfl:     diclofenac sodium 1 % GEL, Apply topically 3 times daily Apply between last two toes and to arch of right foot, Disp: , Rfl:     fluticasone propionate (FLOVENT DISKUS) 100 MCG/BLIST AEPB inhaler, Inhale 2 puffs into the lungs 2 times daily , Disp: , Rfl:     NOVOFINE AUTOCOVER 30G X 8 MM MISC, , Disp: , Rfl:     darbepoetin candice-polysorbate (ARANESP) 60 MCG/ML injection, Inject 60 mcg into the skin once a week On Wednesday with dialysis, Disp: , Rfl:     insulin glargine (LANTUS) 100 UNIT/ML injection pen, Inject 6 Units into the skin nightly , Disp: , Rfl:     Nutritional Supplements (BOOST GLUCOSE CONTROL) LIQD, Take 8 oz by mouth nightly , Disp: , Rfl:     senna-docusate (PERICOLACE) 8.6-50 MG per tablet, Take 2 tablets by mouth daily On non-dialysis days (Sun, Tues, Thurs, Sat), Disp: , Rfl:     lidocaine-prilocaine (EMLA) 2.5-2.5 % cream, Apply 1 Applicatorful topically three times a week To right forearm in the morning on Mon, Wed, Fri before dialysis, Disp: , Rfl:     amLODIPine (NORVASC) 2.5 MG tablet, Take 1 tablet by mouth daily (Patient taking differently: Take 10 mg by mouth daily ), Disp: 30 tablet, Rfl: 3    gabapentin (NEURONTIN) 100 MG per session: Not on file    Stress: Not on file   Relationships    Social connections:     Talks on phone: Not on file     Gets together: Not on file     Attends Restoration service: Not on file     Active member of club or organization: Not on file     Attends meetings of clubs or organizations: Not on file     Relationship status: Not on file    Intimate partner violence:     Fear of current or ex partner: Not on file     Emotionally abused: Not on file     Physically abused: Not on file     Forced sexual activity: Not on file   Other Topics Concern    Not on file   Social History Narrative    Not on file       Review of Systems  No fever / chills / sweats. Denies cough / sputum. Denies chest pain, palpitations. Denies n / v / abd pain. No diarrhea. Objective:     Vitals:     Vitals:    04/09/19 0836   BP: 120/80   Pulse: 64   Temp: 99.3 °F (37.4 °C)                                                                             General Appearance: alert, in no acute distress  Skin: warm and dry, erythematous rash to the sacral area ,no open ulcer       head: normocephalic and atraumatic  Eyes: pupils equal, round, conjunctivae normal  Neck: neck supple and non tender  Pulmonary/Chest: clear to auscultation bilaterally- no wheezes, rales or rhonchi, normal air movement, no respiratory distress  Cardiovascular: normal rate, regular rhythm, normal S1 and S2  Abdomen: soft, non-tender, non-distended  Extremities: no cyanosis, clubbing or edema   Right fifth toe medial ulcer with no SOI  .   Right arm fistula ,no erythema ,no warmth or tenderness   Right medial fifth toe ulcer and left big toe dorsum ulcers with no signs of infection                                                             Lab Data     CBC with Differential:      Lab Results   Component Value Date    WBC 4.3 01/28/2019    RBC 2.98 01/28/2019    HGB 9.7 01/28/2019    HCT 30.0 01/28/2019    PLT See Reflexed IPF Result 01/28/2019    [facility]

## 2019-04-29 ENCOUNTER — HOSPITAL ENCOUNTER (OUTPATIENT)
Age: 74
Setting detail: SPECIMEN
Discharge: HOME | End: 2019-04-29
Payer: MEDICARE

## 2019-04-29 LAB — URIC ACID: 5.6 MG/DL (ref 3.4–7)

## 2019-04-29 PROCEDURE — P9603 ONE-WAY ALLOW PRORATED MILES: HCPCS

## 2019-04-29 PROCEDURE — 36415 COLL VENOUS BLD VENIPUNCTURE: CPT

## 2019-04-29 PROCEDURE — 84550 ASSAY OF BLOOD/URIC ACID: CPT

## 2019-05-22 ENCOUNTER — APPOINTMENT (OUTPATIENT)
Dept: GENERAL RADIOLOGY | Age: 74
DRG: 871 | End: 2019-05-22
Payer: MEDICARE

## 2019-05-22 ENCOUNTER — APPOINTMENT (OUTPATIENT)
Dept: CT IMAGING | Age: 74
DRG: 871 | End: 2019-05-22
Payer: MEDICARE

## 2019-05-22 ENCOUNTER — HOSPITAL ENCOUNTER (INPATIENT)
Age: 74
LOS: 5 days | Discharge: NURSING, REHAB OR LONG-TERM CARE | DRG: 871 | End: 2019-05-27
Attending: EMERGENCY MEDICINE | Admitting: INTERNAL MEDICINE
Payer: MEDICARE

## 2019-05-22 DIAGNOSIS — A41.9 SEPSIS, DUE TO UNSPECIFIED ORGANISM: Primary | ICD-10-CM

## 2019-05-22 DIAGNOSIS — N39.0 URINARY TRACT INFECTION WITHOUT HEMATURIA, SITE UNSPECIFIED: ICD-10-CM

## 2019-05-22 DIAGNOSIS — N18.4 CKD (CHRONIC KIDNEY DISEASE) STAGE 4, GFR 15-29 ML/MIN (HCC): ICD-10-CM

## 2019-05-22 DIAGNOSIS — M46.26 OSTEOMYELITIS OF LUMBAR SPINE (HCC): ICD-10-CM

## 2019-05-22 DIAGNOSIS — J18.9 PNEUMONIA DUE TO ORGANISM: ICD-10-CM

## 2019-05-22 DIAGNOSIS — N18.6 ESRD (END STAGE RENAL DISEASE) (HCC): ICD-10-CM

## 2019-05-22 LAB
-: ABNORMAL
ABSOLUTE EOS #: 0.1 K/UL (ref 0–0.4)
ABSOLUTE IMMATURE GRANULOCYTE: ABNORMAL K/UL (ref 0–0.3)
ABSOLUTE LYMPH #: 0.5 K/UL (ref 1–4.8)
ABSOLUTE MONO #: 0.5 K/UL (ref 0.1–1.3)
ALBUMIN SERPL-MCNC: 3.8 G/DL (ref 3.5–5.2)
ALBUMIN/GLOBULIN RATIO: ABNORMAL (ref 1–2.5)
ALP BLD-CCNC: 308 U/L (ref 40–129)
ALT SERPL-CCNC: 32 U/L (ref 5–41)
AMMONIA: 32 UMOL/L (ref 16–60)
AMORPHOUS: ABNORMAL
ANION GAP SERPL CALCULATED.3IONS-SCNC: 19 MMOL/L (ref 9–17)
AST SERPL-CCNC: 29 U/L
BACTERIA: ABNORMAL
BASOPHILS # BLD: 1 % (ref 0–2)
BASOPHILS ABSOLUTE: 0 K/UL (ref 0–0.2)
BILIRUB SERPL-MCNC: 0.44 MG/DL (ref 0.3–1.2)
BILIRUBIN URINE: ABNORMAL
BNP INTERPRETATION: ABNORMAL
BUN BLDV-MCNC: 41 MG/DL (ref 8–23)
BUN/CREAT BLD: ABNORMAL (ref 9–20)
CALCIUM SERPL-MCNC: 9.3 MG/DL (ref 8.6–10.4)
CASTS UA: ABNORMAL /LPF
CHLORIDE BLD-SCNC: 91 MMOL/L (ref 98–107)
CO2: 22 MMOL/L (ref 20–31)
COLOR: ABNORMAL
COMMENT UA: ABNORMAL
CREAT SERPL-MCNC: 8 MG/DL (ref 0.7–1.2)
CRYSTALS, UA: ABNORMAL /HPF
DIFFERENTIAL TYPE: ABNORMAL
EOSINOPHILS RELATIVE PERCENT: 1 % (ref 0–4)
EPITHELIAL CELLS UA: ABNORMAL /HPF
GFR AFRICAN AMERICAN: 8 ML/MIN
GFR NON-AFRICAN AMERICAN: 7 ML/MIN
GFR SERPL CREATININE-BSD FRML MDRD: ABNORMAL ML/MIN/{1.73_M2}
GFR SERPL CREATININE-BSD FRML MDRD: ABNORMAL ML/MIN/{1.73_M2}
GLUCOSE BLD-MCNC: 275 MG/DL (ref 70–99)
GLUCOSE BLD-MCNC: 278 MG/DL (ref 75–110)
GLUCOSE URINE: ABNORMAL
HCT VFR BLD CALC: 38.2 % (ref 41–53)
HEMOGLOBIN: 13 G/DL (ref 13.5–17.5)
IMMATURE GRANULOCYTES: ABNORMAL %
INR BLD: 1
KETONES, URINE: NEGATIVE
LACTIC ACID: 2 MMOL/L (ref 0.5–2.2)
LACTIC ACID: 2.3 MMOL/L (ref 0.5–2.2)
LEUKOCYTE ESTERASE, URINE: ABNORMAL
LYMPHOCYTES # BLD: 7 % (ref 24–44)
MCH RBC QN AUTO: 34.1 PG (ref 26–34)
MCHC RBC AUTO-ENTMCNC: 34.1 G/DL (ref 31–37)
MCV RBC AUTO: 100.1 FL (ref 80–100)
MONOCYTES # BLD: 6 % (ref 1–7)
MUCUS: ABNORMAL
NITRITE, URINE: NEGATIVE
NRBC AUTOMATED: ABNORMAL PER 100 WBC
OTHER OBSERVATIONS UA: ABNORMAL
PARTIAL THROMBOPLASTIN TIME: 53.6 SEC (ref 24–36)
PDW BLD-RTO: 14 % (ref 11.5–14.9)
PH UA: 5.5 (ref 5–8)
PLATELET # BLD: 100 K/UL (ref 150–450)
PLATELET ESTIMATE: ABNORMAL
PMV BLD AUTO: 9.4 FL (ref 6–12)
POTASSIUM SERPL-SCNC: 5 MMOL/L (ref 3.7–5.3)
PRO-BNP: 2717 PG/ML
PROTEIN UA: ABNORMAL
PROTHROMBIN TIME: 13.5 SEC (ref 11.8–14.6)
RBC # BLD: 3.81 M/UL (ref 4.5–5.9)
RBC # BLD: ABNORMAL 10*6/UL
RBC UA: ABNORMAL /HPF
RENAL EPITHELIAL, UA: ABNORMAL /HPF
SEG NEUTROPHILS: 85 % (ref 36–66)
SEGMENTED NEUTROPHILS ABSOLUTE COUNT: 6.8 K/UL (ref 1.3–9.1)
SODIUM BLD-SCNC: 132 MMOL/L (ref 135–144)
SPECIFIC GRAVITY UA: 1.02 (ref 1–1.03)
TOTAL PROTEIN: 7.6 G/DL (ref 6.4–8.3)
TRICHOMONAS: ABNORMAL
TROPONIN INTERP: ABNORMAL
TROPONIN INTERP: ABNORMAL
TROPONIN T: ABNORMAL NG/ML
TROPONIN T: ABNORMAL NG/ML
TROPONIN, HIGH SENSITIVITY: 223 NG/L (ref 0–22)
TROPONIN, HIGH SENSITIVITY: 242 NG/L (ref 0–22)
TURBIDITY: ABNORMAL
URINE HGB: ABNORMAL
UROBILINOGEN, URINE: NORMAL
WBC # BLD: 8 K/UL (ref 3.5–11)
WBC # BLD: ABNORMAL 10*3/UL
WBC UA: ABNORMAL /HPF
YEAST: ABNORMAL

## 2019-05-22 PROCEDURE — 86403 PARTICLE AGGLUT ANTBDY SCRN: CPT

## 2019-05-22 PROCEDURE — 99285 EMERGENCY DEPT VISIT HI MDM: CPT

## 2019-05-22 PROCEDURE — 96366 THER/PROPH/DIAG IV INF ADDON: CPT

## 2019-05-22 PROCEDURE — 83880 ASSAY OF NATRIURETIC PEPTIDE: CPT

## 2019-05-22 PROCEDURE — 87205 SMEAR GRAM STAIN: CPT

## 2019-05-22 PROCEDURE — 6370000000 HC RX 637 (ALT 250 FOR IP): Performed by: STUDENT IN AN ORGANIZED HEALTH CARE EDUCATION/TRAINING PROGRAM

## 2019-05-22 PROCEDURE — 80053 COMPREHEN METABOLIC PANEL: CPT

## 2019-05-22 PROCEDURE — 74177 CT ABD & PELVIS W/CONTRAST: CPT

## 2019-05-22 PROCEDURE — 93005 ELECTROCARDIOGRAM TRACING: CPT | Performed by: STUDENT IN AN ORGANIZED HEALTH CARE EDUCATION/TRAINING PROGRAM

## 2019-05-22 PROCEDURE — 96365 THER/PROPH/DIAG IV INF INIT: CPT

## 2019-05-22 PROCEDURE — 82140 ASSAY OF AMMONIA: CPT

## 2019-05-22 PROCEDURE — 70450 CT HEAD/BRAIN W/O DYE: CPT

## 2019-05-22 PROCEDURE — 36415 COLL VENOUS BLD VENIPUNCTURE: CPT

## 2019-05-22 PROCEDURE — 87149 DNA/RNA DIRECT PROBE: CPT

## 2019-05-22 PROCEDURE — 85025 COMPLETE CBC W/AUTO DIFF WBC: CPT

## 2019-05-22 PROCEDURE — 2000000000 HC ICU R&B

## 2019-05-22 PROCEDURE — 87040 BLOOD CULTURE FOR BACTERIA: CPT

## 2019-05-22 PROCEDURE — 96368 THER/DIAG CONCURRENT INF: CPT

## 2019-05-22 PROCEDURE — 6360000002 HC RX W HCPCS: Performed by: STUDENT IN AN ORGANIZED HEALTH CARE EDUCATION/TRAINING PROGRAM

## 2019-05-22 PROCEDURE — 71045 X-RAY EXAM CHEST 1 VIEW: CPT

## 2019-05-22 PROCEDURE — 87086 URINE CULTURE/COLONY COUNT: CPT

## 2019-05-22 PROCEDURE — 82947 ASSAY GLUCOSE BLOOD QUANT: CPT

## 2019-05-22 PROCEDURE — 72131 CT LUMBAR SPINE W/O DYE: CPT

## 2019-05-22 PROCEDURE — 2580000003 HC RX 258: Performed by: STUDENT IN AN ORGANIZED HEALTH CARE EDUCATION/TRAINING PROGRAM

## 2019-05-22 PROCEDURE — 81001 URINALYSIS AUTO W/SCOPE: CPT

## 2019-05-22 PROCEDURE — 6360000004 HC RX CONTRAST MEDICATION: Performed by: STUDENT IN AN ORGANIZED HEALTH CARE EDUCATION/TRAINING PROGRAM

## 2019-05-22 PROCEDURE — 84484 ASSAY OF TROPONIN QUANT: CPT

## 2019-05-22 PROCEDURE — 85610 PROTHROMBIN TIME: CPT

## 2019-05-22 PROCEDURE — 72128 CT CHEST SPINE W/O DYE: CPT

## 2019-05-22 PROCEDURE — 83605 ASSAY OF LACTIC ACID: CPT

## 2019-05-22 PROCEDURE — 85730 THROMBOPLASTIN TIME PARTIAL: CPT

## 2019-05-22 RX ORDER — 0.9 % SODIUM CHLORIDE 0.9 %
1000 INTRAVENOUS SOLUTION INTRAVENOUS ONCE
Status: COMPLETED | OUTPATIENT
Start: 2019-05-22 | End: 2019-05-22

## 2019-05-22 RX ORDER — SODIUM CHLORIDE 0.9 % (FLUSH) 0.9 %
10 SYRINGE (ML) INJECTION PRN
Status: DISCONTINUED | OUTPATIENT
Start: 2019-05-22 | End: 2019-05-27 | Stop reason: HOSPADM

## 2019-05-22 RX ORDER — OXYCODONE AND ACETAMINOPHEN 7.5; 325 MG/1; MG/1
1 TABLET ORAL EVERY 6 HOURS PRN
Status: ON HOLD | COMMUNITY
End: 2019-05-27 | Stop reason: SDUPTHER

## 2019-05-22 RX ORDER — ACETAMINOPHEN 650 MG/1
650 SUPPOSITORY RECTAL ONCE
Status: COMPLETED | OUTPATIENT
Start: 2019-05-22 | End: 2019-05-22

## 2019-05-22 RX ORDER — 0.9 % SODIUM CHLORIDE 0.9 %
80 INTRAVENOUS SOLUTION INTRAVENOUS ONCE
Status: COMPLETED | OUTPATIENT
Start: 2019-05-22 | End: 2019-05-22

## 2019-05-22 RX ORDER — MIDODRINE HYDROCHLORIDE 5 MG/1
10 TABLET ORAL
Status: ON HOLD | COMMUNITY
End: 2020-01-01

## 2019-05-22 RX ORDER — FLUTICASONE PROPIONATE 50 MCG
2 SPRAY, SUSPENSION (ML) NASAL DAILY
COMMUNITY

## 2019-05-22 RX ORDER — DOXYCYCLINE HYCLATE 100 MG
100 TABLET ORAL 2 TIMES DAILY
COMMUNITY
Start: 2019-01-31 | End: 2019-01-01

## 2019-05-22 RX ORDER — HYDROMORPHONE HYDROCHLORIDE 2 MG/1
2 TABLET ORAL EVERY 12 HOURS PRN
Status: ON HOLD | COMMUNITY
End: 2019-05-27 | Stop reason: HOSPADM

## 2019-05-22 RX ADMIN — PIPERACILLIN SODIUM,TAZOBACTAM SODIUM 2.25 G: 2; .25 INJECTION, POWDER, FOR SOLUTION INTRAVENOUS at 21:27

## 2019-05-22 RX ADMIN — Medication 10 ML: at 21:23

## 2019-05-22 RX ADMIN — SODIUM CHLORIDE 1000 ML: 9 INJECTION, SOLUTION INTRAVENOUS at 20:23

## 2019-05-22 RX ADMIN — IOVERSOL 75 ML: 741 INJECTION INTRA-ARTERIAL; INTRAVENOUS at 21:23

## 2019-05-22 RX ADMIN — VANCOMYCIN HYDROCHLORIDE 1250 MG: 5 INJECTION, POWDER, LYOPHILIZED, FOR SOLUTION INTRAVENOUS at 21:27

## 2019-05-22 RX ADMIN — SODIUM CHLORIDE 80 ML: 9 INJECTION, SOLUTION INTRAVENOUS at 21:23

## 2019-05-22 RX ADMIN — ACETAMINOPHEN 650 MG: 650 SUPPOSITORY RECTAL at 20:23

## 2019-05-22 ASSESSMENT — PAIN SCALES - GENERAL: PAINLEVEL_OUTOF10: 0

## 2019-05-23 ENCOUNTER — APPOINTMENT (OUTPATIENT)
Dept: INTERVENTIONAL RADIOLOGY/VASCULAR | Age: 74
DRG: 871 | End: 2019-05-23
Payer: MEDICARE

## 2019-05-23 LAB
ABSOLUTE EOS #: 0 K/UL (ref 0–0.4)
ABSOLUTE IMMATURE GRANULOCYTE: ABNORMAL K/UL (ref 0–0.3)
ABSOLUTE LYMPH #: 1 K/UL (ref 1–4.8)
ABSOLUTE MONO #: 0.7 K/UL (ref 0.1–1.3)
ADENOVIRUS PCR: NOT DETECTED
ANION GAP SERPL CALCULATED.3IONS-SCNC: 14 MMOL/L (ref 9–17)
BASOPHILS # BLD: 0 % (ref 0–2)
BASOPHILS ABSOLUTE: 0 K/UL (ref 0–0.2)
BORDETELLA PERTUSSIS PCR: NOT DETECTED
BUN BLDV-MCNC: 47 MG/DL (ref 8–23)
BUN/CREAT BLD: ABNORMAL (ref 9–20)
CALCIUM SERPL-MCNC: 8 MG/DL (ref 8.6–10.4)
CHLAMYDIA PNEUMONIAE BY PCR: NOT DETECTED
CHLORIDE BLD-SCNC: 95 MMOL/L (ref 98–107)
CO2: 20 MMOL/L (ref 20–31)
CORONAVIRUS 229E PCR: NOT DETECTED
CORONAVIRUS HKU1 PCR: NOT DETECTED
CORONAVIRUS NL63 PCR: NOT DETECTED
CORONAVIRUS OC43 PCR: NOT DETECTED
CREAT SERPL-MCNC: 8.89 MG/DL (ref 0.7–1.2)
DIFFERENTIAL TYPE: ABNORMAL
EOSINOPHILS RELATIVE PERCENT: 0 % (ref 0–4)
GFR AFRICAN AMERICAN: 7 ML/MIN
GFR NON-AFRICAN AMERICAN: 6 ML/MIN
GFR SERPL CREATININE-BSD FRML MDRD: ABNORMAL ML/MIN/{1.73_M2}
GFR SERPL CREATININE-BSD FRML MDRD: ABNORMAL ML/MIN/{1.73_M2}
GLUCOSE BLD-MCNC: 140 MG/DL (ref 75–110)
GLUCOSE BLD-MCNC: 151 MG/DL (ref 75–110)
GLUCOSE BLD-MCNC: 152 MG/DL (ref 75–110)
GLUCOSE BLD-MCNC: 176 MG/DL (ref 70–99)
GLUCOSE BLD-MCNC: 200 MG/DL (ref 75–110)
HCT VFR BLD CALC: 30.7 % (ref 41–53)
HCT VFR BLD CALC: 31.9 % (ref 41–53)
HEMOGLOBIN: 10.3 G/DL (ref 13.5–17.5)
HEMOGLOBIN: 10.9 G/DL (ref 13.5–17.5)
HUMAN METAPNEUMOVIRUS PCR: NOT DETECTED
IMMATURE GRANULOCYTES: ABNORMAL %
INFLUENZA A BY PCR: NOT DETECTED
INFLUENZA A H1 (2009) PCR: NORMAL
INFLUENZA A H1 PCR: NORMAL
INFLUENZA A H3 PCR: NORMAL
INFLUENZA B BY PCR: NOT DETECTED
LV EF: 63 %
LVEF MODALITY: NORMAL
LYMPHOCYTES # BLD: 9 % (ref 24–44)
MCH RBC QN AUTO: 33.5 PG (ref 26–34)
MCHC RBC AUTO-ENTMCNC: 34.1 G/DL (ref 31–37)
MCV RBC AUTO: 98.4 FL (ref 80–100)
MONOCYTES # BLD: 6 % (ref 1–7)
MYCOPLASMA PNEUMONIAE PCR: NOT DETECTED
NRBC AUTOMATED: ABNORMAL PER 100 WBC
PARAINFLUENZA 1 PCR: NOT DETECTED
PARAINFLUENZA 2 PCR: NOT DETECTED
PARAINFLUENZA 3 PCR: NOT DETECTED
PARAINFLUENZA 4 PCR: NOT DETECTED
PDW BLD-RTO: 14 % (ref 11.5–14.9)
PLATELET # BLD: 92 K/UL (ref 150–450)
PLATELET ESTIMATE: ABNORMAL
PMV BLD AUTO: 9 FL (ref 6–12)
POTASSIUM SERPL-SCNC: 4.7 MMOL/L (ref 3.7–5.3)
PROCALCITONIN: 2.19 NG/ML
RBC # BLD: 3.25 M/UL (ref 4.5–5.9)
RBC # BLD: ABNORMAL 10*6/UL
RESP SYNCYTIAL VIRUS PCR: NOT DETECTED
RHINO/ENTEROVIRUS PCR: NOT DETECTED
SEG NEUTROPHILS: 85 % (ref 36–66)
SEGMENTED NEUTROPHILS ABSOLUTE COUNT: 9.1 K/UL (ref 1.3–9.1)
SODIUM BLD-SCNC: 129 MMOL/L (ref 135–144)
SPECIMEN DESCRIPTION: NORMAL
WBC # BLD: 10.9 K/UL (ref 3.5–11)
WBC # BLD: ABNORMAL 10*3/UL

## 2019-05-23 PROCEDURE — P9047 ALBUMIN (HUMAN), 25%, 50ML: HCPCS | Performed by: INTERNAL MEDICINE

## 2019-05-23 PROCEDURE — 6370000000 HC RX 637 (ALT 250 FOR IP): Performed by: STUDENT IN AN ORGANIZED HEALTH CARE EDUCATION/TRAINING PROGRAM

## 2019-05-23 PROCEDURE — 82947 ASSAY GLUCOSE BLOOD QUANT: CPT

## 2019-05-23 PROCEDURE — 94761 N-INVAS EAR/PLS OXIMETRY MLT: CPT

## 2019-05-23 PROCEDURE — 2700000000 HC OXYGEN THERAPY PER DAY

## 2019-05-23 PROCEDURE — 6360000002 HC RX W HCPCS: Performed by: INTERNAL MEDICINE

## 2019-05-23 PROCEDURE — 6360000002 HC RX W HCPCS: Performed by: STUDENT IN AN ORGANIZED HEALTH CARE EDUCATION/TRAINING PROGRAM

## 2019-05-23 PROCEDURE — 87798 DETECT AGENT NOS DNA AMP: CPT

## 2019-05-23 PROCEDURE — 80048 BASIC METABOLIC PNL TOTAL CA: CPT

## 2019-05-23 PROCEDURE — 2580000003 HC RX 258: Performed by: STUDENT IN AN ORGANIZED HEALTH CARE EDUCATION/TRAINING PROGRAM

## 2019-05-23 PROCEDURE — 87186 SC STD MICRODIL/AGAR DIL: CPT

## 2019-05-23 PROCEDURE — 85018 HEMOGLOBIN: CPT

## 2019-05-23 PROCEDURE — 87633 RESP VIRUS 12-25 TARGETS: CPT

## 2019-05-23 PROCEDURE — 94640 AIRWAY INHALATION TREATMENT: CPT

## 2019-05-23 PROCEDURE — 6370000000 HC RX 637 (ALT 250 FOR IP): Performed by: NURSE PRACTITIONER

## 2019-05-23 PROCEDURE — 2580000003 HC RX 258: Performed by: INTERNAL MEDICINE

## 2019-05-23 PROCEDURE — 93306 TTE W/DOPPLER COMPLETE: CPT

## 2019-05-23 PROCEDURE — 84145 PROCALCITONIN (PCT): CPT

## 2019-05-23 PROCEDURE — 6360000002 HC RX W HCPCS: Performed by: NURSE PRACTITIONER

## 2019-05-23 PROCEDURE — 99291 CRITICAL CARE FIRST HOUR: CPT | Performed by: INTERNAL MEDICINE

## 2019-05-23 PROCEDURE — 85014 HEMATOCRIT: CPT

## 2019-05-23 PROCEDURE — 85025 COMPLETE CBC W/AUTO DIFF WBC: CPT

## 2019-05-23 PROCEDURE — 87641 MR-STAPH DNA AMP PROBE: CPT

## 2019-05-23 PROCEDURE — 6370000000 HC RX 637 (ALT 250 FOR IP): Performed by: INTERNAL MEDICINE

## 2019-05-23 PROCEDURE — 87205 SMEAR GRAM STAIN: CPT

## 2019-05-23 PROCEDURE — 2060000000 HC ICU INTERMEDIATE R&B

## 2019-05-23 PROCEDURE — 2580000003 HC RX 258: Performed by: NURSE PRACTITIONER

## 2019-05-23 PROCEDURE — 87077 CULTURE AEROBIC IDENTIFY: CPT

## 2019-05-23 PROCEDURE — 36415 COLL VENOUS BLD VENIPUNCTURE: CPT

## 2019-05-23 PROCEDURE — 87070 CULTURE OTHR SPECIMN AEROBIC: CPT

## 2019-05-23 PROCEDURE — 94669 MECHANICAL CHEST WALL OSCILL: CPT

## 2019-05-23 PROCEDURE — 87581 M.PNEUMON DNA AMP PROBE: CPT

## 2019-05-23 PROCEDURE — 87486 CHLMYD PNEUM DNA AMP PROBE: CPT

## 2019-05-23 PROCEDURE — 6360000004 HC RX CONTRAST MEDICATION: Performed by: INTERNAL MEDICINE

## 2019-05-23 RX ORDER — ACETAMINOPHEN 325 MG/1
650 TABLET ORAL EVERY 4 HOURS PRN
Status: DISCONTINUED | OUTPATIENT
Start: 2019-05-23 | End: 2019-05-23

## 2019-05-23 RX ORDER — MIDODRINE HYDROCHLORIDE 5 MG/1
5 TABLET ORAL 3 TIMES DAILY
Status: DISCONTINUED | OUTPATIENT
Start: 2019-05-23 | End: 2019-05-24

## 2019-05-23 RX ORDER — SENNA AND DOCUSATE SODIUM 50; 8.6 MG/1; MG/1
2 TABLET, FILM COATED ORAL
Status: DISCONTINUED | OUTPATIENT
Start: 2019-05-23 | End: 2019-05-27 | Stop reason: HOSPADM

## 2019-05-23 RX ORDER — ONDANSETRON 2 MG/ML
4 INJECTION INTRAMUSCULAR; INTRAVENOUS EVERY 6 HOURS PRN
Status: DISCONTINUED | OUTPATIENT
Start: 2019-05-23 | End: 2019-05-27 | Stop reason: HOSPADM

## 2019-05-23 RX ORDER — FLUTICASONE PROPIONATE 110 UG/1
2 AEROSOL, METERED RESPIRATORY (INHALATION) DAILY
Status: DISCONTINUED | OUTPATIENT
Start: 2019-05-23 | End: 2019-05-27 | Stop reason: HOSPADM

## 2019-05-23 RX ORDER — ASPIRIN 81 MG/1
81 TABLET, CHEWABLE ORAL EVERY MORNING
Status: DISCONTINUED | OUTPATIENT
Start: 2019-05-23 | End: 2019-05-27 | Stop reason: HOSPADM

## 2019-05-23 RX ORDER — DEXTROSE MONOHYDRATE 50 MG/ML
100 INJECTION, SOLUTION INTRAVENOUS PRN
Status: DISCONTINUED | OUTPATIENT
Start: 2019-05-23 | End: 2019-05-27 | Stop reason: HOSPADM

## 2019-05-23 RX ORDER — SEVELAMER CARBONATE 800 MG/1
3200 TABLET, FILM COATED ORAL
Status: DISCONTINUED | OUTPATIENT
Start: 2019-05-23 | End: 2019-05-27 | Stop reason: HOSPADM

## 2019-05-23 RX ORDER — ALBUTEROL SULFATE 2.5 MG/3ML
2.5 SOLUTION RESPIRATORY (INHALATION)
Status: DISCONTINUED | OUTPATIENT
Start: 2019-05-23 | End: 2019-05-27 | Stop reason: HOSPADM

## 2019-05-23 RX ORDER — NICOTINE POLACRILEX 4 MG
15 LOZENGE BUCCAL PRN
Status: DISCONTINUED | OUTPATIENT
Start: 2019-05-23 | End: 2019-05-27 | Stop reason: HOSPADM

## 2019-05-23 RX ORDER — INSULIN GLARGINE 100 [IU]/ML
10 INJECTION, SOLUTION SUBCUTANEOUS NIGHTLY
Status: DISCONTINUED | OUTPATIENT
Start: 2019-05-23 | End: 2019-05-27 | Stop reason: HOSPADM

## 2019-05-23 RX ORDER — SODIUM CHLORIDE 0.9 % (FLUSH) 0.9 %
10 SYRINGE (ML) INJECTION EVERY 12 HOURS SCHEDULED
Status: DISCONTINUED | OUTPATIENT
Start: 2019-05-23 | End: 2019-05-27 | Stop reason: HOSPADM

## 2019-05-23 RX ORDER — MIDODRINE HYDROCHLORIDE 5 MG/1
5 TABLET ORAL
Status: DISCONTINUED | OUTPATIENT
Start: 2019-05-24 | End: 2019-05-23

## 2019-05-23 RX ORDER — 0.9 % SODIUM CHLORIDE 0.9 %
500 INTRAVENOUS SOLUTION INTRAVENOUS ONCE
Status: COMPLETED | OUTPATIENT
Start: 2019-05-23 | End: 2019-05-23

## 2019-05-23 RX ORDER — IPRATROPIUM BROMIDE AND ALBUTEROL SULFATE 2.5; .5 MG/3ML; MG/3ML
1 SOLUTION RESPIRATORY (INHALATION)
Status: DISCONTINUED | OUTPATIENT
Start: 2019-05-23 | End: 2019-05-23

## 2019-05-23 RX ORDER — SODIUM CHLORIDE 9 MG/ML
INJECTION, SOLUTION INTRAVENOUS CONTINUOUS
Status: DISCONTINUED | OUTPATIENT
Start: 2019-05-23 | End: 2019-05-24

## 2019-05-23 RX ORDER — ACETAMINOPHEN 650 MG/1
650 SUPPOSITORY RECTAL EVERY 4 HOURS PRN
Status: DISCONTINUED | OUTPATIENT
Start: 2019-05-23 | End: 2019-05-25

## 2019-05-23 RX ORDER — CALCIUM CARBONATE 200(500)MG
2 TABLET,CHEWABLE ORAL EVERY 6 HOURS PRN
Status: DISCONTINUED | OUTPATIENT
Start: 2019-05-23 | End: 2019-05-27 | Stop reason: HOSPADM

## 2019-05-23 RX ORDER — M-VIT,TX,IRON,MINS/CALC/FOLIC 27MG-0.4MG
1 TABLET ORAL DAILY
Status: DISCONTINUED | OUTPATIENT
Start: 2019-05-23 | End: 2019-05-27 | Stop reason: HOSPADM

## 2019-05-23 RX ORDER — ALBUMIN (HUMAN) 12.5 G/50ML
25 SOLUTION INTRAVENOUS EVERY 6 HOURS
Status: COMPLETED | OUTPATIENT
Start: 2019-05-23 | End: 2019-05-24

## 2019-05-23 RX ORDER — SODIUM CHLORIDE 0.9 % (FLUSH) 0.9 %
10 SYRINGE (ML) INJECTION PRN
Status: DISCONTINUED | OUTPATIENT
Start: 2019-05-23 | End: 2019-05-27 | Stop reason: HOSPADM

## 2019-05-23 RX ORDER — DOXYCYCLINE 100 MG/1
100 CAPSULE ORAL 2 TIMES DAILY
Status: DISCONTINUED | OUTPATIENT
Start: 2019-05-23 | End: 2019-05-23

## 2019-05-23 RX ORDER — ALBUTEROL SULFATE 2.5 MG/3ML
2.5 SOLUTION RESPIRATORY (INHALATION) 4 TIMES DAILY
Status: DISCONTINUED | OUTPATIENT
Start: 2019-05-23 | End: 2019-05-27 | Stop reason: HOSPADM

## 2019-05-23 RX ORDER — SUCRALFATE 1 G/1
1 TABLET ORAL
Status: DISCONTINUED | OUTPATIENT
Start: 2019-05-23 | End: 2019-05-27 | Stop reason: HOSPADM

## 2019-05-23 RX ORDER — LACTOSE-REDUCED FOOD/FIBER
8 LIQUID (ML) ORAL NIGHTLY
Status: DISCONTINUED | OUTPATIENT
Start: 2019-05-23 | End: 2019-05-23 | Stop reason: RX

## 2019-05-23 RX ORDER — POLYETHYLENE GLYCOL 3350 17 G/17G
17 POWDER, FOR SOLUTION ORAL DAILY PRN
Status: DISCONTINUED | OUTPATIENT
Start: 2019-05-23 | End: 2019-05-27 | Stop reason: HOSPADM

## 2019-05-23 RX ORDER — DEXTROSE MONOHYDRATE 25 G/50ML
12.5 INJECTION, SOLUTION INTRAVENOUS PRN
Status: DISCONTINUED | OUTPATIENT
Start: 2019-05-23 | End: 2019-05-27 | Stop reason: HOSPADM

## 2019-05-23 RX ORDER — POLYVINYL ALCOHOL 14 MG/ML
1 SOLUTION/ DROPS OPHTHALMIC 3 TIMES DAILY PRN
Status: DISCONTINUED | OUTPATIENT
Start: 2019-05-23 | End: 2019-05-27 | Stop reason: HOSPADM

## 2019-05-23 RX ORDER — HEPARIN SODIUM 5000 [USP'U]/ML
5000 INJECTION, SOLUTION INTRAVENOUS; SUBCUTANEOUS EVERY 8 HOURS SCHEDULED
Status: DISCONTINUED | OUTPATIENT
Start: 2019-05-23 | End: 2019-05-27 | Stop reason: HOSPADM

## 2019-05-23 RX ORDER — ALLOPURINOL 100 MG/1
100 TABLET ORAL DAILY
Status: DISCONTINUED | OUTPATIENT
Start: 2019-05-23 | End: 2019-05-27 | Stop reason: HOSPADM

## 2019-05-23 RX ORDER — TETRAHYDROZOLINE HCL 0.05 %
1 DROPS OPHTHALMIC (EYE) 2 TIMES DAILY PRN
Status: DISCONTINUED | OUTPATIENT
Start: 2019-05-23 | End: 2019-05-27 | Stop reason: HOSPADM

## 2019-05-23 RX ADMIN — ALBUMIN (HUMAN) 25 G: 0.25 INJECTION, SOLUTION INTRAVENOUS at 13:51

## 2019-05-23 RX ADMIN — ALBUTEROL SULFATE 2.5 MG: 2.5 SOLUTION RESPIRATORY (INHALATION) at 20:28

## 2019-05-23 RX ADMIN — SODIUM CHLORIDE: 9 INJECTION, SOLUTION INTRAVENOUS at 08:01

## 2019-05-23 RX ADMIN — FLUTICASONE PROPIONATE 2 PUFF: 110 AEROSOL, METERED RESPIRATORY (INHALATION) at 14:10

## 2019-05-23 RX ADMIN — HEPARIN SODIUM 5000 UNITS: 5000 INJECTION INTRAVENOUS; SUBCUTANEOUS at 13:56

## 2019-05-23 RX ADMIN — PERFLUTREN 2.2 MG: 6.52 INJECTION, SUSPENSION INTRAVENOUS at 12:03

## 2019-05-23 RX ADMIN — MIDODRINE HYDROCHLORIDE 5 MG: 5 TABLET ORAL at 13:53

## 2019-05-23 RX ADMIN — SODIUM CHLORIDE: 9 INJECTION, SOLUTION INTRAVENOUS at 14:18

## 2019-05-23 RX ADMIN — SUCRALFATE 1 G: 1 TABLET ORAL at 16:32

## 2019-05-23 RX ADMIN — ALBUTEROL SULFATE 2.5 MG: 2.5 SOLUTION RESPIRATORY (INHALATION) at 16:13

## 2019-05-23 RX ADMIN — DICLOFENAC 2 G: 10 GEL TOPICAL at 14:01

## 2019-05-23 RX ADMIN — INSULIN GLARGINE 10 UNITS: 100 INJECTION, SOLUTION SUBCUTANEOUS at 21:13

## 2019-05-23 RX ADMIN — IPRATROPIUM BROMIDE AND ALBUTEROL SULFATE 1 AMPULE: .5; 3 SOLUTION RESPIRATORY (INHALATION) at 07:26

## 2019-05-23 RX ADMIN — DICLOFENAC 2 G: 10 GEL TOPICAL at 10:05

## 2019-05-23 RX ADMIN — SODIUM CHLORIDE 500 ML: 9 INJECTION, SOLUTION INTRAVENOUS at 07:17

## 2019-05-23 RX ADMIN — PIPERACILLIN SODIUM,TAZOBACTAM SODIUM 2.25 G: 2; .25 INJECTION, POWDER, FOR SOLUTION INTRAVENOUS at 10:01

## 2019-05-23 RX ADMIN — HEPARIN SODIUM 5000 UNITS: 5000 INJECTION INTRAVENOUS; SUBCUTANEOUS at 06:15

## 2019-05-23 RX ADMIN — SEVELAMER CARBONATE 3200 MG: 800 TABLET, FILM COATED ORAL at 16:32

## 2019-05-23 RX ADMIN — ALBUMIN (HUMAN) 25 G: 0.25 INJECTION, SOLUTION INTRAVENOUS at 20:59

## 2019-05-23 RX ADMIN — IPRATROPIUM BROMIDE AND ALBUTEROL SULFATE 1 AMPULE: .5; 3 SOLUTION RESPIRATORY (INHALATION) at 11:05

## 2019-05-23 RX ADMIN — INSULIN LISPRO 2 UNITS: 100 INJECTION, SOLUTION INTRAVENOUS; SUBCUTANEOUS at 17:16

## 2019-05-23 RX ADMIN — INSULIN LISPRO 2 UNITS: 100 INJECTION, SOLUTION INTRAVENOUS; SUBCUTANEOUS at 21:13

## 2019-05-23 RX ADMIN — HEPARIN SODIUM 5000 UNITS: 5000 INJECTION INTRAVENOUS; SUBCUTANEOUS at 21:00

## 2019-05-23 RX ADMIN — Medication 10 ML: at 10:05

## 2019-05-23 RX ADMIN — PIPERACILLIN SODIUM,TAZOBACTAM SODIUM 2.25 G: 2; .25 INJECTION, POWDER, FOR SOLUTION INTRAVENOUS at 21:51

## 2019-05-23 RX ADMIN — ALBUMIN (HUMAN) 25 G: 0.25 INJECTION, SOLUTION INTRAVENOUS at 08:01

## 2019-05-23 ASSESSMENT — ENCOUNTER SYMPTOMS
WHEEZING: 0
ABDOMINAL PAIN: 0
ABDOMINAL DISTENTION: 0
COUGH: 0
SHORTNESS OF BREATH: 0

## 2019-05-23 ASSESSMENT — PAIN DESCRIPTION - PAIN TYPE: TYPE: CHRONIC PAIN

## 2019-05-23 ASSESSMENT — PAIN DESCRIPTION - LOCATION: LOCATION: BACK

## 2019-05-23 ASSESSMENT — PAIN SCALES - GENERAL
PAINLEVEL_OUTOF10: 0
PAINLEVEL_OUTOF10: 4

## 2019-05-23 NOTE — ED PROVIDER NOTES
[facility] ED     Emergency Department     Faculty Attestation        I performed a history and physical examination of the patient and discussed management with the resident. I reviewed the residents note and agree with the documented findings and plan of care. Any areas of disagreement are noted on the chart. I was personally present for the key portions of any procedures. I have documented in the chart those procedures where I was not present during the key portions. I have reviewed the emergency nurses triage note. I agree with the chief complaint, past medical history, past surgical history, allergies, medications, social and family history as documented unless otherwise noted below. Documentation of the HPI, Physical Exam and Medical Decision Making performed by medical students or scribes is based on my personal performance of the HPI, PE and MDM. For Physician Assistant/ Nurse Practitioner cases/documentation I have have had a face to face evaluation with this patient and have completed at least one if not all key elements of the E/M (history, physical exam, and MDM). Additional findings are as noted. Pertinent Comments     Primary Care Physician: Bolivar Cunha MD    History: This is a 75 y.o. male who presents to the Emergency Department with complaint of altered mental status after getting dialysis today. Patient apparently is normally verbal and oriented at baseline. Not able to provide any history right now. EMS and nursing home reported that he ran a fever after dialysis and became altered. No reports of trauma. Physical:     weight is 175 lb (79.4 kg). His axillary temperature is 102.9 °F (39.4 °C). His blood pressure is 149/76 (abnormal) and his pulse is 112. His respiration is 19 and oxygen saturation is 99%. 75 y.o. male     Febrile, ill in appearance. He is protecting his airway. He is tachycardic. Skin is very warm.   Abdomen is diffusely tender with a reducible hernia. Lungs are coarse bilaterally. Right upper extremity fistula with palpable thrill. Impression: Sepsis, septic shock, altered mental status, possible CVA    Plan: Broad septic workup. Patient was called as a code sepsis. We'll get a head CT. I don't appreciate any lateralizing deficits. Patient is febrile and I believe altered mental status is more explained by sepsis picture. Lower suspicion for stroke at this time. Lactic acid ×2, blood cultures ×2, IV fluids were ordered. I feel that the source is most likely pulmonary, abdomen a possibility as well. Meningitis remains a possibility. We'll consider a lumbar puncture if no other source is found.     (Please note that portions of this note were completed with a voice recognition program.  Efforts were made to edit the dictations but occasionally words are mis-transcribed.)    Cheyenne Bronson DO  Attending Emergency Physician         Cheyenne Bronson DO  05/22/19 2013

## 2019-05-23 NOTE — H&P
[address]      [address] [facility]     HISTORY AND PHYSICAL EXAMINATION            Date:   5/23/2019  Patient name:  Joseph Guzman  Date of admission:  5/22/2019  7:55 PM  MRN:   [id number]  Account:  [de-identified]  YOB: 1945  PCP:    Tin Grajeda MD  Room:   2003/2003-01  Code Status:    Full Code    Chief Complaint:     Chief Complaint   Patient presents with    Altered Mental Status       History Obtained From:     patient, electronic medical record    History of Present Illness: The patient is a 75 y.o. / male who presents withAltered Mental Status   and he is admitted to the hospital for medical management. Per [facility] EMR, the patient presented to the ER with altered mental status after dialysis today. He had a fever during dialysis of 104F and then had altered mental status. His is noted to be alert and oriented at baseline. Code sepsis was called with etiology either pulmonary, abdominal and possibly meningitis. Patient currently lives at a nursing facility [facility]). Patient has ESRD on HD. He was started on Vanc + Zosyn. He is on chronic doxycycline for osteomyelitis. Prior hospitalizations:   He was hospitalized in January 2019 for Influenza. Hospitalized in November 2018 for Cellulitis of the fistula site. Hospitalized in September 2018 - for Urine indicating Strep Beta Hemolytic, tx with Vancomycin/Cefepime and transitioned to Levaquin.  Outpatient ECHO was not completed per records    Past Medical History:     Past Medical History:   Diagnosis Date    Asthma     CAD (coronary artery disease)     CKD (chronic kidney disease) stage 4, GFR 15-29 ml/min ([facility])     CVA [facility]re[facility] vascular accident) [facility] [address]     Depression     History of kidney stones     Hyperlipidemia     Hypertension     Osteoarthritis     Proteinuria     Type II or unspecified type diabetes mellitus without mention of complication, not stated as uncontrolled         Past SurgicalHistory:     Past Surgical History:   Procedure Laterality Date    BONE BIOPSY  6/29/15    L2-3    CARPAL TUNNEL RELEASE      CORONARY ARTERY BYPASS GRAFT      HIP SURGERY      JOINT REPLACEMENT      OTHER SURGICAL HISTORY  6/11/15    I&D coccyx wound    OTHER SURGICAL HISTORY  12/30/15    diverting loop colostomy; perirectal incision and drainage    SHOULDER SURGERY      left and right        Medications Prior to Admission:        Prior to Admission medications    Medication Sig Start Date End Date Taking? Authorizing Provider   doxycycline hyclate (VIBRA-TABS) 100 MG tablet Take 100 mg by mouth 2 times daily Until 7/15/19 for osteomyelitis 1/31/19 7/15/19 Yes Historical Provider, MD   fluticasone (FLONASE) 50 MCG/ACT nasal spray 2 sprays by Each Nostril route daily   Yes Historical Provider, MD   Pentafluoroprop-Tetrafluoroeth (PAIN EASE) AERO Apply topically three times a week On MWF before dialysys   Yes Historical Provider, MD   HYDROmorphone (DILAUDID) 2 MG tablet Take 2 mg by mouth every 12 hours as needed for Pain. Yes Historical Provider, MD   midodrine (PROAMATINE) 5 MG tablet Take 10 mg by mouth three times a week If needed during dialysis   Yes Historical Provider, MD   oxyCODONE-acetaminophen (PERCOCET) 7.5-325 MG per tablet Take 1 tablet by mouth every 6 hours as needed for Pain.    Yes Historical Provider, MD   ipratropium (ATROVENT) 0.02 % nebulizer solution Take 0.5 mg by nebulization every 8 hours   Yes Historical Provider, MD   Multiple Vitamins-Minerals (THERAPEUTIC MULTIVITAMIN-MINERALS) tablet Take 1 tablet by mouth daily   Yes Historical Provider, MD   HUMALOG 100 UNIT/ML injection vial Inject into the skin 4 times daily (after meals and at bedtime) Per Sliding Scale as follows:  151-200 = 1 unit  201-250 = 2 units  251-300 = 3 units  301-350 = 4 units  351-400 = 5 units  >400 = notify physician   Yes Historical Provider, MD   allopurinol (ZYLOPRIM) 100 MG tablet Take 100 mg by mouth daily   Yes Historical Provider, MD   midodrine (PROAMATINE) 5 MG tablet Take 5 mg by mouth three times a week One time per day every Mon, Wed, Fri for low pressure    Yes Historical Provider, MD   senna (SENOKOT) 8.6 MG tablet Take 2 tablets by mouth daily as needed for Constipation   Yes Historical Provider, MD   metoprolol succinate (TOPROL XL) 25 MG extended release tablet Take 1 tablet by mouth daily 9/11/18  Yes Hua Edwards MD   carboxymethylcellulose 1 % ophthalmic solution Place 1 drop into both eyes 3 times daily as needed for Dry Eyes   Yes Historical Provider, MD   sevelamer (RENVELA) 800 MG tablet Take 2 tablets by mouth every 8 hours as needed For snacks   Yes Historical Provider, MD   sevelamer (RENVELA) 800 MG tablet Take 4 tablets by mouth 3 times daily (with meals)   Yes Historical Provider, MD   sertraline (ZOLOFT) 50 MG tablet Take 50 mg by mouth daily   Yes Historical Provider, MD   sucralfate (CARAFATE) 1 GM tablet Take 1 g by mouth 3 times daily (before meals)   Yes Historical Provider, MD   calcium carbonate (TUMS) 500 MG chewable tablet Take 2 tablets by mouth every 6 hours as needed for Heartburn   Yes Historical Provider, MD   tetrahydrozoline 0.05 % ophthalmic solution Place 1 drop into both eyes 2 times daily as needed (irritation/dryness)   Yes Historical Provider, MD   diclofenac sodium 1 % GEL Apply topically 3 times daily Apply between last two toes and to arch of right foot   Yes Historical Provider, MD   fluticasone propionate (FLOVENT DISKUS) 100 MCG/BLIST AEPB inhaler Inhale 2 puffs into the lungs 2 times daily    Yes Historical Provider, MD   insulin glargine (LANTUS) 100 UNIT/ML injection pen Inject 10 Units into the skin nightly    Yes Historical Provider, MD   senna-docusate (PERICOLACE) 8.6-50 MG per tablet Take 2 tablets by mouth four times a week On non-dialysis days (Sun, Tues, Thurs, Sat)   Yes Historical Provider, MD   amLODIPine (NORVASC) 2.5 MG tablet Take 1 tablet by mouth daily 6/17/15  Yes Emily Carvajal MD   gabapentin (NEURONTIN) 100 MG capsule Take 1 capsule by mouth 3 times daily 6/17/15  Yes Emily Carvajal MD   polyethylene glycol (GLYCOLAX) powder Take 17 g by mouth daily as needed (constipation)    Yes Historical Provider, MD   hydrALAZINE (APRESOLINE) 10 MG tablet Take 10 mg by mouth 3 times daily  2/13/14  Yes Historical Provider, MD   aspirin 81 MG chewable tablet Take 81 mg by mouth every morning    Yes Historical Provider, MD Gonsalves November 30G X 8 MM [id number]  1/17/17   Historical Provider, MD   Nutritional Supplements (BOOST GLUCOSE CONTROL) LIQD Take 8 oz by mouth nightly     Historical Provider, MD        Allergies:     Oxycontin [oxycodone]; Avodart [dutasteride]; and Darvocet [propoxyphene n-acetaminophen]    Social History:     Tobacco:    reports that he has never smoked. He has never used smokeless tobacco.  Alcohol:      reports that he does not drink alcohol. Drug Use:  reports that he does not use drugs. Family History:     Family History   Problem Relation Age of Onset   [facility] Stroke Mother     Heart Attack Mother     Heart Attack Father     Diabetes Sister     Diabetes Brother        Review of Systems:     Positive and Negative as described in HPI. Review of Systems   Unable to perform ROS: Mental status change   Respiratory: Negative for cough, shortness of breath and wheezing. Cardiovascular: Negative for chest pain, palpitations and leg swelling. Gastrointestinal: Negative for abdominal distention and abdominal pain. Genitourinary:        Minimal urine 2/2 ESRD on HD     Skin: Positive for wound (chronic osteomyelitis).        Physical Exam:   BP (!) 105/44   Pulse 88   Temp 99 °F (37.2 °C) (Oral)   Resp 19   Ht 5' 10\" (1.778 m)   Wt 189 lb 9.5 oz (86 kg)   SpO2 96% BMI 27.20 kg/m²   Temp (24hrs), Av.4 °F (38 °C), Min:98 °F (36.7 °C), Max:102.9 °F (39.4 °C)    Recent Labs     19  0810   POCGLU 278* 140*     No intake or output data in the 24 hours ending 19 0839    Physical Exam   Constitutional: He is oriented to person, place, and time. He appears well-developed. No distress. HENT:   Head: Normocephalic and atraumatic. Mouth/Throat: Oropharynx is clear and moist. No oropharyngeal exudate. Eyes: Pupils are equal, round, and reactive to light. Conjunctivae are normal. Right eye exhibits no discharge. Left eye exhibits no discharge. Neck: Normal range of motion. Neck supple. No thyromegaly present. Cardiovascular: Normal rate, regular rhythm and normal heart sounds. Pulmonary/Chest: Effort normal. No respiratory distress. He has wheezes (Ronchi present bilaterally). Abdominal: Soft. Bowel sounds are normal. He exhibits no distension. There is no tenderness. Neurological: He is alert and oriented to person, place, and time. Skin: Skin is warm and dry. Capillary refill takes less than 2 seconds. He is not diaphoretic. Psychiatric: His speech is normal and behavior is normal. Thought content normal. His mood appears anxious. Vitals reviewed.     Investigations:     Laboratory Testing:  Recent Results (from the past 24 hour(s))   CBC Auto Differential    Collection Time: 19  7:56 PM   Result Value Ref Range    WBC 8.0 3.5 - 11.0 k/uL    RBC 3.81 (L) 4.5 - 5.9 m/uL    Hemoglobin 13.0 (L) 13.5 - 17.5 g/dL    Hematocrit 38.2 (L) 41 - 53 %    .1 (H) 80 - 100 fL    MCH 34.1 (H) 26 - 34 pg    MCHC 34.1 31 - 37 g/dL    RDW 14.0 11.5 - 14.9 %    Platelets 932 (L) 058 - 450 k/uL    MPV 9.4 6.0 - 12.0 fL    NRBC Automated NOT REPORTED per 100 WBC    Differential Type NOT REPORTED     Seg Neutrophils 85 (H) 36 - 66 %    Lymphocytes 7 (L) 24 - 44 %    Monocytes 6 1 - 7 %    Eosinophils % 1 0 - 4 %    Basophils 1 0 - 2 % Immature Granulocytes NOT REPORTED 0 %    Segs Absolute 6.80 1.3 - 9.1 k/uL    Absolute Lymph # 0.50 (L) 1.0 - 4.8 k/uL    Absolute Mono # 0.50 0.1 - 1.3 k/uL    Absolute Eos # 0.10 0.0 - 0.4 k/uL    Basophils # 0.00 0.0 - 0.2 k/uL    Absolute Immature Granulocyte NOT REPORTED 0.00 - 0.30 k/uL    WBC Morphology NOT REPORTED     RBC Morphology NOT REPORTED     Platelet Estimate NOT REPORTED    Comprehensive Metabolic Panel    Collection Time: 05/22/19  7:56 PM   Result Value Ref Range    Glucose 275 (H) 70 - 99 mg/dL    BUN 41 (H) 8 - 23 mg/dL    CREATININE 8.00 (HH) 0.70 - 1.20 mg/dL    Bun/Cre Ratio NOT REPORTED 9 - 20    Calcium 9.3 8.6 - 10.4 mg/dL    Sodium 132 (L) 135 - 144 mmol/L    Potassium 5.0 3.7 - 5.3 mmol/L    Chloride 91 (L) 98 - 107 mmol/L    CO2 22 20 - 31 mmol/L    Anion Gap 19 (H) 9 - 17 mmol/L    Alkaline Phosphatase 308 (H) 40 - 129 U/L    ALT 32 5 - 41 U/L    AST 29 <40 U/L    Total Bilirubin 0.44 0.3 - 1.2 mg/dL    Total Protein 7.6 6.4 - 8.3 g/dL    Alb 3.8 3.5 - 5.2 g/dL    Albumin/Globulin Ratio NOT REPORTED 1.0 - 2.5    GFR Non-African American 7 (L) >60 mL/min    GFR  8 (L) >60 mL/min    GFR Comment          GFR Staging NOT REPORTED    Culture Blood #1    Collection Time: 05/22/19  7:56 PM   Result Value Ref Range    Specimen Description . BLOOD LEFT FOREARM 10ML EACH     Special Requests NOT REPORTED     Culture NO GROWTH 3 HOURS    Protime-INR    Collection Time: 05/22/19  7:56 PM   Result Value Ref Range    Protime 13.5 11.8 - 14.6 sec    INR 1.0    APTT    Collection Time: 05/22/19  7:56 PM   Result Value Ref Range    PTT 53.6 (H) 24.0 - 36.0 sec   Brain Natriuretic Peptide    Collection Time: 05/22/19  7:56 PM   Result Value Ref Range    Pro-BNP 2,717 (H) <300 pg/mL    BNP Interpretation Pro-BNP Reference Range:    Lactic Acid    Collection Time: 05/22/19  7:56 PM   Result Value Ref Range    Lactic Acid 2.3 (H) 0.5 - 2.2 mmol/L   Troponin    Collection Time: 05/22/19 7:56 PM   Result Value Ref Range    Troponin, High Sensitivity 242 (HH) 0 - 22 ng/L    Troponin T NOT REPORTED <0.03 ng/mL    Troponin Interp NOT REPORTED    POC Glucose Fingerstick    Collection Time: 05/22/19  7:59 PM   Result Value Ref Range    POC Glucose 278 (H) 75 - 110 mg/dL   Culture Blood #1    Collection Time: 05/22/19  8:05 PM   Result Value Ref Range    Specimen Description . BLOOD LEFT HAND 10ML EACH     Special Requests NOT REPORTED     Culture NO GROWTH 3 HOURS    Urinalysis with Microscopic    Collection Time: 05/22/19  8:13 PM   Result Value Ref Range    Color, UA DARK YELLOW (A) YELLOW    Turbidity UA CLOUDY (A) CLEAR    Glucose, Ur TRACE (A) NEGATIVE    Bilirubin Urine (A) NEGATIVE     Presumptive positive. Unable to confirm due to unavailability of reagent. Ketones, Urine NEGATIVE NEGATIVE    Specific [place], UA 1.016 1.000 - 1.030    Urine Hgb SMALL (A) NEGATIVE    pH, UA 5.5 5.0 - 8.0    Protein, UA 4+ (A) NEGATIVE    Urobilinogen, Urine Normal Normal    Nitrite, Urine NEGATIVE NEGATIVE    Leukocyte Esterase, Urine LARGE (A) NEGATIVE    Urinalysis Comments NOT REPORTED     -          WBC, UA 10 TO 20 /HPF    RBC, UA 2 TO 5 /HPF    Casts UA NOT REPORTED /LPF    Crystals UA NOT REPORTED None /HPF    Epithelial Cells UA 5 TO 10 /HPF    Renal Epithelial, Urine NOT REPORTED 0 /HPF    Bacteria, UA FEW (A) None    Mucus, UA NOT REPORTED None    Trichomonas, UA NOT REPORTED None    Amorphous, UA 1+ (A) None    Other Observations UA NOT REPORTED NOT REQ.     Yeast, UA NOT REPORTED None   EKG 12 Lead    Collection Time: 05/22/19  8:20 PM   Result Value Ref Range    Ventricular Rate 113 BPM    Atrial Rate 113 BPM    P-R Interval 200 ms    QRS Duration 138 ms    Q-T Interval 342 ms    QTc Calculation (Bazett) 469 ms    P Axis 62 degrees    R Axis -65 degrees    T Axis 75 degrees   Ammonia    Collection Time: 05/22/19 10:58 PM   Result Value Ref Range    Ammonia 32 16 - 60 umol/L   Troponin    Collection Time: 05/22/19 10:58 PM   Result Value Ref Range    Troponin, High Sensitivity 223 (HH) 0 - 22 ng/L    Troponin T NOT REPORTED <0.03 ng/mL    Troponin Interp NOT REPORTED    Lactic Acid    Collection Time: 05/22/19 10:58 PM   Result Value Ref Range    Lactic Acid 2.0 0.5 - 2.2 mmol/L   Basic Metabolic Prof    Collection Time: 05/23/19  4:42 AM   Result Value Ref Range    Glucose 176 (H) 70 - 99 mg/dL    BUN 47 (H) 8 - 23 mg/dL    CREATININE 8.89 (HH) 0.70 - 1.20 mg/dL    Bun/Cre Ratio NOT REPORTED 9 - 20    Calcium 8.0 (L) 8.6 - 10.4 mg/dL    Sodium 129 (L) 135 - 144 mmol/L    Potassium 4.7 3.7 - 5.3 mmol/L    Chloride 95 (L) 98 - 107 mmol/L    CO2 20 20 - 31 mmol/L    Anion Gap 14 9 - 17 mmol/L    GFR Non-African American 6 (L) >60 mL/min    GFR  7 (L) >60 mL/min    GFR Comment          GFR Staging NOT REPORTED    CBC with DIFF    Collection Time: 05/23/19  4:42 AM   Result Value Ref Range    WBC 10.9 3.5 - 11.0 k/uL    RBC 3.25 (L) 4.5 - 5.9 m/uL    Hemoglobin 10.9 (L) 13.5 - 17.5 g/dL    Hematocrit 31.9 (L) 41 - 53 %    MCV 98.4 80 - 100 fL    MCH 33.5 26 - 34 pg    MCHC 34.1 31 - 37 g/dL    RDW 14.0 11.5 - 14.9 %    Platelets 92 (L) 922 - 450 k/uL    MPV 9.0 6.0 - 12.0 fL    NRBC Automated NOT REPORTED per 100 WBC    Differential Type NOT REPORTED     Immature Granulocytes NOT REPORTED 0 %    Absolute Immature Granulocyte NOT REPORTED 0.00 - 0.30 k/uL    WBC Morphology NOT REPORTED     RBC Morphology NOT REPORTED     Platelet Estimate NOT REPORTED     Seg Neutrophils 85 (H) 36 - 66 %    Lymphocytes 9 (L) 24 - 44 %    Monocytes 6 1 - 7 %    Eosinophils % 0 0 - 4 %    Basophils 0 0 - 2 %    Segs Absolute 9.10 1.3 - 9.1 k/uL    Absolute Lymph # 1.00 1.0 - 4.8 k/uL    Absolute Mono # 0.70 0.1 - 1.3 k/uL    Absolute Eos # 0.00 0.0 - 0.4 k/uL    Basophils # 0.00 0.0 - 0.2 k/uL   POC Glucose Fingerstick    Collection Time: 05/23/19  8:10 AM   Result Value Ref Range    POC Glucose 140 (H) 75 - 110 mg/dL Imaging/Diagnostics:  Ct Head Wo Contrast    Result Date: 5/22/2019  EXAMINATION: CT OF THE HEAD WITHOUT CONTRAST; CT OF THE CHEST, ABDOMEN, AND PELVIS WITH CONTRAST; CT OF THE THORACIC SPINE WITHOUT CONTRAST; CT OF THE LUMBAR SPINE WITHOUT CONTRAST  5/22/2019 8:57 pm TECHNIQUE: CT of the head was performed without the administration of intravenous contrast. Dose modulation, iterative reconstruction, and/or weight based adjustment of the mA/kV was utilized to reduce the radiation dose to as low as reasonably achievable.; CT of the chest, abdomen and pelvis was performed with the administration of intravenous contrast. Multiplanar reformatted images are provided for review. Dose modulation, iterative reconstruction, and/or weight based adjustment of the mA/kV was utilized to reduce the radiation dose to as low as reasonably achievable.; CT of the thoracic spine was performed without the administration of intravenous contrast. Multiplanar reformatted images are provided for review. Dose modulation, iterative reconstruction, and/or weight based adjustment of the mA/kV was utilized to reduce the radiation dose to as low as reasonably achievable.; CT of the lumbar spine was performed without the administration of intravenous contrast. Multiplanar reformatted images are provided for review. Dose modulation, iterative reconstruction, and/or weight based adjustment of the mA/kV was utilized to reduce the radiation dose to as low as reasonably achievable. COMPARISON: Head CT dated 11/30/2015. abdomen and pelvis CT dated 03/27/2016 HISTORY: ORDERING SYSTEM PROVIDED HISTORY: AMS TECHNOLOGIST PROVIDED HISTORY: Ordering Physician Provided Reason for Exam: patient here for ams and fever after dialysis today FINDINGS: Head CT: BRAIN/VENTRICLES: There is no acute intracranial hemorrhage, mass effect or midline shift. No abnormal extra-axial fluid collection.   The gray-white differentiation is maintained without evidence of an acute infarct. There is no evidence of hydrocephalus. Chronic microvascular ischemic changes in periventricular white matter distribution. ORBITS: The visualized portion of the orbits demonstrate no acute abnormality. SINUSES: The visualized paranasal sinuses and mastoid air cells demonstrate no acute abnormality. SOFT TISSUES/SKULL:  No acute abnormality of the visualized skull or soft tissues. Chest CT: Mediastinum: No thoracic aortic aneurysm . Mild cardiomegaly. No pericardial effusion. No abnormal lymph node enlargement. Lungs/pleura: Mild infiltrate in the lingula. Mild atelectasis at the lung bases. No effusions. No pneumothorax. Soft Tissues/Bones: No lytic or blastic lesions in all visualized osseous structures. CT abdomen and pelvis: Organs: Liver, pancreas, spleen, bilateral adrenal glands are unremarkable. Cholelithiasis. Atrophic kidneys contains small cysts. GI/Bowel: Stomach, small and large bowel loops are grossly unremarkable. Colonic diverticulosis without acute diverticulitis. Pelvis: Urinary bladder is unremarkable. No lymphadenopathy. No soft tissue masses or abnormal fluid collections. Peritoneum/Retroperitoneum: No free intraperitoneal fluid or air. No abdominal aortic aneurysm. No retroperitoneal lymphadenopathy. 5 x 5 x 2 cm well-circumscribed fluid collection superficial to the right lobe of the liver is stable since 2016, therefore benign. Bones/Soft Tissues: No lytic or blastic lesions in all visualized osseous structures. Old pathologic fracture of L3. No convincing evidence for discitis/osteomyelitis in the left side. Left hip arthroplasty. Atrophy of left psoas muscle. The overall appearance of distal left psoas muscle is similar to CT of 2016. Head CT: No acute intracranial abnormalities. Chest CT: Mild infiltrate in the lingula. Mild atelectasis at the lung bases. Abdomen and pelvis CT: No acute findings. Cholelithiasis.   Old pathologic fracture of L3 without convincing evidence for acute discitis/osteomyelitis. Ct Thoracic Spine Wo Contrast    Result Date: 5/22/2019  EXAMINATION: CT OF THE HEAD WITHOUT CONTRAST; CT OF THE CHEST, ABDOMEN, AND PELVIS WITH CONTRAST; CT OF THE THORACIC SPINE WITHOUT CONTRAST; CT OF THE LUMBAR SPINE WITHOUT CONTRAST  5/22/2019 8:57 pm TECHNIQUE: CT of the head was performed without the administration of intravenous contrast. Dose modulation, iterative reconstruction, and/or weight based adjustment of the mA/kV was utilized to reduce the radiation dose to as low as reasonably achievable.; CT of the chest, abdomen and pelvis was performed with the administration of intravenous contrast. Multiplanar reformatted images are provided for review. Dose modulation, iterative reconstruction, and/or weight based adjustment of the mA/kV was utilized to reduce the radiation dose to as low as reasonably achievable.; CT of the thoracic spine was performed without the administration of intravenous contrast. Multiplanar reformatted images are provided for review. Dose modulation, iterative reconstruction, and/or weight based adjustment of the mA/kV was utilized to reduce the radiation dose to as low as reasonably achievable.; CT of the lumbar spine was performed without the administration of intravenous contrast. Multiplanar reformatted images are provided for review. Dose modulation, iterative reconstruction, and/or weight based adjustment of the mA/kV was utilized to reduce the radiation dose to as low as reasonably achievable. COMPARISON: Head CT dated 11/30/2015. abdomen and pelvis CT dated 03/27/2016 HISTORY: ORDERING SYSTEM PROVIDED HISTORY: AMS TECHNOLOGIST PROVIDED HISTORY: Ordering Physician Provided Reason for Exam: patient here for ams and fever after dialysis today FINDINGS: Head CT: BRAIN/VENTRICLES: There is no acute intracranial hemorrhage, mass effect or midline shift. No abnormal extra-axial fluid collection.   The gray-white differentiation is maintained without evidence of an acute infarct. There is no evidence of hydrocephalus. Chronic microvascular ischemic changes in periventricular white matter distribution. ORBITS: The visualized portion of the orbits demonstrate no acute abnormality. SINUSES: The visualized paranasal sinuses and mastoid air cells demonstrate no acute abnormality. SOFT TISSUES/SKULL:  No acute abnormality of the visualized skull or soft tissues. Chest CT: Mediastinum: No thoracic aortic aneurysm . Mild cardiomegaly. No pericardial effusion. No abnormal lymph node enlargement. Lungs/pleura: Mild infiltrate in the lingula. Mild atelectasis at the lung bases. No effusions. No pneumothorax. Soft Tissues/Bones: No lytic or blastic lesions in all visualized osseous structures. CT abdomen and pelvis: Organs: Liver, pancreas, spleen, bilateral adrenal glands are unremarkable. Cholelithiasis. Atrophic kidneys contains small cysts. GI/Bowel: Stomach, small and large bowel loops are grossly unremarkable. Colonic diverticulosis without acute diverticulitis. Pelvis: Urinary bladder is unremarkable. No lymphadenopathy. No soft tissue masses or abnormal fluid collections. Peritoneum/Retroperitoneum: No free intraperitoneal fluid or air. No abdominal aortic aneurysm. No retroperitoneal lymphadenopathy. 5 x 5 x 2 cm well-circumscribed fluid collection superficial to the right lobe of the liver is stable since 2016, therefore benign. Bones/Soft Tissues: No lytic or blastic lesions in all visualized osseous structures. Old pathologic fracture of L3. No convincing evidence for discitis/osteomyelitis in the left side. Left hip arthroplasty. Atrophy of left psoas muscle. The overall appearance of distal left psoas muscle is similar to CT of 2016. Head CT: No acute intracranial abnormalities. Chest CT: Mild infiltrate in the lingula. Mild atelectasis at the lung bases. Abdomen and pelvis CT: No acute findings. Cholelithiasis. Old pathologic fracture of L3 without convincing evidence for acute discitis/osteomyelitis. Ct Lumbar Spine Wo Contrast    Result Date: 5/22/2019  EXAMINATION: CT OF THE HEAD WITHOUT CONTRAST; CT OF THE CHEST, ABDOMEN, AND PELVIS WITH CONTRAST; CT OF THE THORACIC SPINE WITHOUT CONTRAST; CT OF THE LUMBAR SPINE WITHOUT CONTRAST  5/22/2019 8:57 pm TECHNIQUE: CT of the head was performed without the administration of intravenous contrast. Dose modulation, iterative reconstruction, and/or weight based adjustment of the mA/kV was utilized to reduce the radiation dose to as low as reasonably achievable.; CT of the chest, abdomen and pelvis was performed with the administration of intravenous contrast. Multiplanar reformatted images are provided for review. Dose modulation, iterative reconstruction, and/or weight based adjustment of the mA/kV was utilized to reduce the radiation dose to as low as reasonably achievable.; CT of the thoracic spine was performed without the administration of intravenous contrast. Multiplanar reformatted images are provided for review. Dose modulation, iterative reconstruction, and/or weight based adjustment of the mA/kV was utilized to reduce the radiation dose to as low as reasonably achievable.; CT of the lumbar spine was performed without the administration of intravenous contrast. Multiplanar reformatted images are provided for review. Dose modulation, iterative reconstruction, and/or weight based adjustment of the mA/kV was utilized to reduce the radiation dose to as low as reasonably achievable. COMPARISON: Head CT dated 11/30/2015. abdomen and pelvis CT dated 03/27/2016 HISTORY: ORDERING SYSTEM PROVIDED HISTORY: AMS TECHNOLOGIST PROVIDED HISTORY: Ordering Physician Provided Reason for Exam: patient here for ams and fever after dialysis today FINDINGS: Head CT: BRAIN/VENTRICLES: There is no acute intracranial hemorrhage, mass effect or midline shift.   No abnormal extra-axial fluid collection. The gray-white differentiation is maintained without evidence of an acute infarct. There is no evidence of hydrocephalus. Chronic microvascular ischemic changes in periventricular white matter distribution. ORBITS: The visualized portion of the orbits demonstrate no acute abnormality. SINUSES: The visualized paranasal sinuses and mastoid air cells demonstrate no acute abnormality. SOFT TISSUES/SKULL:  No acute abnormality of the visualized skull or soft tissues. Chest CT: Mediastinum: No thoracic aortic aneurysm . Mild cardiomegaly. No pericardial effusion. No abnormal lymph node enlargement. Lungs/pleura: Mild infiltrate in the lingula. Mild atelectasis at the lung bases. No effusions. No pneumothorax. Soft Tissues/Bones: No lytic or blastic lesions in all visualized osseous structures. CT abdomen and pelvis: Organs: Liver, pancreas, spleen, bilateral adrenal glands are unremarkable. Cholelithiasis. Atrophic kidneys contains small cysts. GI/Bowel: Stomach, small and large bowel loops are grossly unremarkable. Colonic diverticulosis without acute diverticulitis. Pelvis: Urinary bladder is unremarkable. No lymphadenopathy. No soft tissue masses or abnormal fluid collections. Peritoneum/Retroperitoneum: No free intraperitoneal fluid or air. No abdominal aortic aneurysm. No retroperitoneal lymphadenopathy. 5 x 5 x 2 cm well-circumscribed fluid collection superficial to the right lobe of the liver is stable since 2016, therefore benign. Bones/Soft Tissues: No lytic or blastic lesions in all visualized osseous structures. Old pathologic fracture of L3. No convincing evidence for discitis/osteomyelitis in the left side. Left hip arthroplasty. Atrophy of left psoas muscle. The overall appearance of distal left psoas muscle is similar to CT of 2016. Head CT: No acute intracranial abnormalities. Chest CT: Mild infiltrate in the lingula.   Mild atelectasis at the lung bases. Abdomen and pelvis CT: No acute findings. Cholelithiasis. Old pathologic fracture of L3 without convincing evidence for acute discitis/osteomyelitis. Xr Chest Portable    Result Date: 5/22/2019  EXAMINATION: ONE XRAY VIEW OF THE CHEST 5/22/2019 8:31 pm COMPARISON: January 24 HISTORY: ORDERING SYSTEM PROVIDED HISTORY: AMS, sepsis. ? pneumonia TECHNOLOGIST PROVIDED HISTORY: AMS, sepsis. ? pneumonia Ordering Physician Provided Reason for Exam: AMS, sepsis. ? pneumonia Acuity: Acute Type of Exam: Initial FINDINGS: Sternotomy wires and mediastinal clips are noted. Heart size is stable. Patchy multifocal airspace disease is noted bilaterally, greatest in the left lower lung zone. There is no pneumothorax. Elevation of the right hemidiaphragm is noted     Left greater than right basilar airspace disease. This is nonspecific however no Bernida Romanian should be considered. Upright two view chest would be more helpful     Ct Chest Abdomen Pelvis W Contrast    Result Date: 5/22/2019  EXAMINATION: CT OF THE HEAD WITHOUT CONTRAST; CT OF THE CHEST, ABDOMEN, AND PELVIS WITH CONTRAST; CT OF THE THORACIC SPINE WITHOUT CONTRAST; CT OF THE LUMBAR SPINE WITHOUT CONTRAST  5/22/2019 8:57 pm TECHNIQUE: CT of the head was performed without the administration of intravenous contrast. Dose modulation, iterative reconstruction, and/or weight based adjustment of the mA/kV was utilized to reduce the radiation dose to as low as reasonably achievable.; CT of the chest, abdomen and pelvis was performed with the administration of intravenous contrast. Multiplanar reformatted images are provided for review. Dose modulation, iterative reconstruction, and/or weight based adjustment of the mA/kV was utilized to reduce the radiation dose to as low as reasonably achievable.; CT of the thoracic spine was performed without the administration of intravenous contrast. Multiplanar reformatted images are provided for review.  Dose modulation, iterative reconstruction, and/or weight based adjustment of the mA/kV was utilized to reduce the radiation dose to as low as reasonably achievable.; CT of the lumbar spine was performed without the administration of intravenous contrast. Multiplanar reformatted images are provided for review. Dose modulation, iterative reconstruction, and/or weight based adjustment of the mA/kV was utilized to reduce the radiation dose to as low as reasonably achievable. COMPARISON: Head CT dated 11/30/2015. abdomen and pelvis CT dated 03/27/2016 HISTORY: ORDERING SYSTEM PROVIDED HISTORY: AMS TECHNOLOGIST PROVIDED HISTORY: Ordering Physician Provided Reason for Exam: patient here for ams and fever after dialysis today FINDINGS: Head CT: BRAIN/VENTRICLES: There is no acute intracranial hemorrhage, mass effect or midline shift. No abnormal extra-axial fluid collection. The gray-white differentiation is maintained without evidence of an acute infarct. There is no evidence of hydrocephalus. Chronic microvascular ischemic changes in periventricular white matter distribution. ORBITS: The visualized portion of the orbits demonstrate no acute abnormality. SINUSES: The visualized paranasal sinuses and mastoid air cells demonstrate no acute abnormality. SOFT TISSUES/SKULL:  No acute abnormality of the visualized skull or soft tissues. Chest CT: Mediastinum: No thoracic aortic aneurysm . Mild cardiomegaly. No pericardial effusion. No abnormal lymph node enlargement. Lungs/pleura: Mild infiltrate in the lingula. Mild atelectasis at the lung bases. No effusions. No pneumothorax. Soft Tissues/Bones: No lytic or blastic lesions in all visualized osseous structures. CT abdomen and pelvis: Organs: Liver, pancreas, spleen, bilateral adrenal glands are unremarkable. Cholelithiasis. Atrophic kidneys contains small cysts. GI/Bowel: Stomach, small and large bowel loops are grossly unremarkable. Colonic diverticulosis without acute diverticulitis. Pelvis: Urinary bladder is unremarkable. No lymphadenopathy. No soft tissue masses or abnormal fluid collections. Peritoneum/Retroperitoneum: No free intraperitoneal fluid or air. No abdominal aortic aneurysm. No retroperitoneal lymphadenopathy. 5 x 5 x 2 cm well-circumscribed fluid collection superficial to the right lobe of the liver is stable since 2016, therefore benign. Bones/Soft Tissues: No lytic or blastic lesions in all visualized osseous structures. Old pathologic fracture of L3. No convincing evidence for discitis/osteomyelitis in the left side. Left hip arthroplasty. Atrophy of left psoas muscle. The overall appearance of distal left psoas muscle is similar to CT of 2016. Head CT: No acute intracranial abnormalities. Chest CT: Mild infiltrate in the lingula. Mild atelectasis at the lung bases. Abdomen and pelvis CT: No acute findings. Cholelithiasis. Old pathologic fracture of L3 without convincing evidence for acute discitis/osteomyelitis.        Assessment :      Primary Problem  Sepsis [facility])    Active Hospital Problems    Diagnosis Date Noted    Osteomyelitis of lumbar spine [facility]) [M46.26] 06/25/2015     Priority: High    AVF (arteriovenous fistula) ([facility] [I77.0]     Sepsis ([facility] [A41.9] 12/29/2015    ESRD (end stage renal disease) ([facility] [N18.6] 06/26/2015    Protein-calorie malnutrition ([facility] [E46] 06/26/2015    Decubitus ulcer [L89.90] 06/10/2015    Hypertension [I10]        Plan:     Patient status Admit as inpatient in the  Medical ICU    Sepsis, present on admission, likely due to bacterial organism, etiology PNA vs. UTI  -Pulm Consulte   -Vanc and Zosyn currently  -f/u daily CBC, BMP, procal  -f/u blood cultures X2 (3 hours), urine cultures  -Wound Care; Doxycyline for chronic osteomyelitis, hx sacral ulcer  -CT - Mild infiltrate in the lingula, mild atelectasis at the lung bases, cholelithiasis, old pathologic fracture of L3 without convincing evidence for acute discitis/osteomyelitis. -CXR - left > right basilar airspace disease    ESRD on HD, Hyponatremia  -Nephrology Consulted    T2DM, on insulin  -Lantus 10U nightly, ISS medium corrective dose    Elevated troponin's, with EKG changes  -Cardiology consult, troponin's elevated (may be chronic)   -ECHO ordered  -last Cards notes 06/2015 - Hx CAD, CABG, normal EF ()    Disposition  -SW, PT/OT    Consultations:   PHARMACY TO DOSE VANCOMYCIN  PHARMACY TO DOSE MEDICATION  IP CONSULT TO INTERNAL MEDICINE  IP CONSULT TO NEPHROLOGY  IP CONSULT TO GENERAL SURGERY  IP CONSULT TO CARDIOLOGY  IP CONSULT TO PULMONOLOGY  IP CONSULT TO SOCIAL WORK  IP CONSULT TO DIETITIAN    Patient is admitted as inpatient status because of co-morbiditieslisted above, severity of signs and symptoms as outlined, requirement for current medical therapies and most importantly because of direct risk to patient if care not provided in a hospital setting. Kamilah Benson MD           Attestation and add on       I have discussed the care of Elsy Mitchell , including pertinent history and exam findings,   5/23/19  with the resident. I have seen and examined the patient and the key elements of all parts of the encounter have been performed by me . I agree with the assessment, plan and orders as documented by the resident. Principal Problem:    Sepsis ([address]  Active Problems:    Osteomyelitis of lumbar spine ([address]    Hypertension    Decubitus ulcer    ESRD (end stage renal disease) ([address]    Protein-calorie malnutrition ([address]    AVF (arteriovenous fistula) ([address]  Resolved Problems:    * No resolved hospital problems. *         Consultation  Reviewed ,  .    [] Cardiology           [x] Nephrology  []. Hemo onco    [] GI    [] ID      [] Pulmonary      [] Neuro                                  [] ---         Following input noted ;    1. ESRD -  will maintain MWF hemodialysis schedule.    Renal diet,i.e 2-gram sodium,2-gram potassium,1500 ml fluid restriction,1-gram phosphorus, 1800 KCal and 1.2 gram protein per day.     2. Sepsis, severe-clear source at this time for suspected urinary tract infection versus pneumonia. No evidence of acute osteomyelitis and lumbar thoracic spine     3.  Systemic hypertension - Continue current medications.     4.  Renal osteodystrophy - check serum phosphorus level.     5.  Anemia of end-stage renal disease - Patient has macrocytosis and will check G79 and folic acid level also.     6. Hyponatremia  Plan:  1. Continue IV fluid with 0.9 saline at 75 mL per hour  Start albumin IV 25 g every 6 hrly x 4 doses. 2.  Continue broad-spectrum IV antibiotics vancomycin and Zosyn. Panculture, Follow pro-calcitonin and check C-reactive protein  3. Since he he appears more hemodynamically stable this morning, we'll hold off on the central line  in order to preserve his upper extremity veins. Condition       [x] high risk ,   [x] septic ,   [] confused ,  [] delirium      [] ill ,   [x] critical        --Patient admitted to ICU with febrile illness sepsis  Patient was recently discharged and is known to have end-stage renal disease   Sepsis probably secondary to urinary source or pulmonary source not clear yet ---     Hemodynamic status  . ..... [x] stable    [] borderline -improving   [] unstable                             -----   Ventilatory  status  . Alison Quintero On   [x] oxygen Rx  ,    [] on bipap ,   [] on ventilator        NUTRITION:     [] NPO [] Tube Feeding  [] TPN  [x] PO                    Diet: Diet NPO Effective Now    . ........... Fluid , electrolyte therapy  ;  ....... Alison Quintero Continuous Infusions:      dextrose      sodium chloride 75 mL/hr at 05/23/19 1418    norepinephrine     . Alison Quintero   Drug therapy ;  Scheduled Meds:      sodium chloride flush  10 mL Intravenous 2 times per day    diclofenac sodium  2 g Topical TID    ipratropium-albuterol  1 ampule Inhalation Q4H WA    heparin (porcine) 5,000 Units Subcutaneous 3 times per day    insulin lispro  0-12 Units Subcutaneous TID WC    insulin lispro  0-6 Units Subcutaneous Nightly    albumin human  25 g Intravenous Q6H    piperacillin-tazobactam  2.25 g Intravenous Q12H    allopurinol  100 mg Oral Daily    aspirin  81 mg Oral QAM    insulin glargine  10 Units Subcutaneous Nightly    therapeutic multivitamin-minerals  1 tablet Oral Daily    sennosides-docusate sodium  2 tablet Oral Once per day on Sun Tue Thu Sat    sertraline  50 mg Oral Daily    sucralfate  1 g Oral TID AC    sevelamer  3,200 mg Oral TID WC    fluticasone  2 puff Inhalation Daily    midodrine  5 mg Oral TID    vancomycin (VANCOCIN) intermittent dosing (placeholder)   Other RX Placeholder          Reviewed ;    [] ---   Case mgmt.  ,  []  RN  ,     [] Therapy ---    , [] palliative care                   []  General Surgery          [] ---- surgical                              Andressa Norman input from last nite; Following input noted  ; The patient is a 78-year-old  male, who was sent to the ED from his ECF. According to the ED physician, after returning to the ECF from dialysis, patient was noted to have a temp of 104°F, tachycardia, and altered mental status. Code status was called in ED; admitted to ICU for sepsis and pneumonia. Patient seen and examined in the ICU at this time . Home medications reconciled and additional admission orders entered. ---  Clinical time spent on evaluation and caring for patient in critical care unit   45  minutes . --               To Antes    5/23/19    [facility]. [address], [address] [place] [address].    Phone [phone]   Fax: [phone]  Answering Service: [phone]                     .                5/23/2019  8:39 AM    Copy sent to Dr. Lucia Lee MD

## 2019-05-23 NOTE — PROGRESS NOTES
Pharmacy Consult      Shelia Zarco is a 75 y.o. male for whom pharmacy has been consulted to dose Piperacillin-tazobactam.    Patient Active Problem List   Diagnosis    Hypertension    CKD (chronic kidney disease) stage 4, GFR 15-29 ml/min    Decubitus ulcer    Osteomyelitis of lumbar spine (HCC)    Lumbar discitis    Discitis of lumbar region    ESRD (end stage renal disease) ([facility])    Protein-calorie malnutrition (HCC)    Thrombocytopenia (HCC)    Perianal abscess    Diabetes mellitus ([facility]    SIRS (systemic inflammatory response syndrome) ([facility]    Sepsis ([facility]    Abscess of anal or rectal region    Pneumonia of left lower lobe due to infectious organism [facility])    Acute exacerbation of chronic obstructive airways disease ([facility])    Hyponatremia    Cellulitis of forearm, right    AVF (arteriovenous fistula) ([facility])    Cellulitis of forearm    Influenza    ESRD on hemodialysis ([facility])       Allergies:  Oxycontin [oxycodone]; Avodart [dutasteride]; and Darvocet [propoxyphene n-acetaminophen]     Recent Labs     05/22/19 1956   CREATININE 8.00*       Ht/Wt:   Ht Readings from Last 1 Encounters:   04/09/19 5' 11\" (1.803 m)        Wt Readings from Last 1 Encounters:   05/22/19 175 lb (79.4 kg)         CrCl cannot be calculated (Unknown ideal weight. ). Assessment/Plan:    Patient will be started on Piperacillin-Tazobactam 2.25 g every 12 hours based on hemodialysis status. Thank you for the consult. Will continue to follow. Lola Gordon    The student completed this medication consult under my supervision and I agree with the plan.      Thank you,  Ethan Adames, PharmD  [phone]

## 2019-05-23 NOTE — PLAN OF CARE
Problem: Falls - Risk of:  Goal: Will remain free from falls  Description  Will remain free from falls  Outcome: Met This Shift  Note:   Bed alarm on, pts call light within reach     Problem: Infection, Septic Shock:  Goal: Will show no infection signs and symptoms  Description  Will show no infection signs and symptoms  Outcome: Ongoing  Note:   Pt's temp elevated in ER, low grade fever since in ER, antibiotics initiated     Problem: Risk for Impaired Skin Integrity  Goal: Tissue integrity - skin and mucous membranes  Description  Structural intactness and normal physiological function of skin and  mucous membranes.   Outcome: Ongoing  Note:   Air mattress applied to bed due to perirectal area wound, barrier cream applied prn, wound care to see pt     Problem: Fluid Volume - Imbalance:  Goal: Absence of imbalanced fluid volume signs and symptoms  Description  Absence of imbalanced fluid volume signs and symptoms  Outcome: Ongoing  Note:   Pt received dialysis treatment 5/22, pt has not voided this shift, bladder scan shows minimal urine in bladder

## 2019-05-23 NOTE — PROGRESS NOTES
Medication History completed:    New medications: doxycycline, fluticasone nasal spray, hydromorphone, Percocet    Medications discontinued: darbepoetin, Emla cream, Tamiflu    Changes to dosing:   Added sliding scale to humalog  Hydralazine changed to 10 mg 3 times daily    Stated allergies: As listed    Other pertinent information: Medications confirmed with patient's facility list. Doxycycline is a chronic medication for osteomyelitis. Midodrine has both a scheduled dose before dialysis and an as needed dose during dialysis.      Thank you,  Be Reese, PharmD  [phone]

## 2019-05-23 NOTE — PROGRESS NOTES
[id number] [facility] Wound Care Nurse  Consult Note       NAME:  Lane Lowe  MEDICAL RECORD NUMBER:  [id number]  AGE: 75 y.o. GENDER: male  : 1945  TODAY'S DATE:  2019    Subjective:     Reason for Wound Mele Finders Care and Assessment: \"History of sacral decubitus\"      Lane Lowe is a 75 y.o. male referred by:   [] Physician  [x] Nursing  [] Other:     The patient has multiple open small clustered areas of denuded skin on buttocks/perianal area that appear to be from moisture. Per the patient, the area has been treated successfully in the past with cream.   He also has an open area on his right fifth medial aspect of his toe. The patient states that he has been told by a \"shoe doctor\" to keep this area dry, but it always gets wet after he takes a shower. A small ulcer with a pink wound bed is present with periwound maceration from moisture. He also has moisture noted between all other toes. The toes were all cleansed and dried well. A foam dressing was placed over the wound and dry gauze was weaved between all toes. Education was provided to the patient on importance of keeping area between toes dry and taught to dry areas between toes after shower. He states understanding.     Risk factors[de-identified] chronic pressure, decreased mobility, shear force, decreased tissue oxygenation, incontinence of stool and incontinence of urine        PAST MEDICAL HISTORY        Diagnosis Date    Asthma     CAD (coronary artery disease)     CKD (chronic kidney disease) stage 4, GFR 15-29 ml/min ([facility])     [facility]     Depression     History of kidney stones     Hyperlipidemia     Hypertension     Osteoarthritis     Proteinuria     Type II or unspecified type diabetes mellitus without mention of complication, not stated as uncontrolled        PAST SURGICAL HISTORY    Past Surgical History:   Procedure Laterality Date    BONE BIOPSY  6/29/15    L2-3    CARPAL TUNNEL RELEASE      CORONARY ARTERY BYPASS GRAFT      HIP SURGERY      JOINT REPLACEMENT      OTHER SURGICAL HISTORY  6/11/15    I&D coccyx wound    OTHER SURGICAL HISTORY  12/30/15    diverting loop colostomy; perirectal incision and drainage    SHOULDER SURGERY      left and right       FAMILY HISTORY    Family History   Problem Relation Age of Onset    Stroke Mother     Heart Attack Mother     Heart Attack Father     Diabetes Sister     Diabetes Brother        SOCIAL HISTORY    Social History     Tobacco Use    Smoking status: Never Smoker    Smokeless tobacco: Never Used   Substance Use Topics    Alcohol use: No     Alcohol/week: 0.0 oz    Drug use: No       ALLERGIES    Allergies   Allergen Reactions    Oxycontin [Oxycodone] Shortness Of Breath and Other (See Comments)     Patient had an overdose on Oxycontin before and does NOT want this ever again. He can take Oxycodone/acetaminophen as needed.     Avodart [Dutasteride]     Darvocet [Propoxyphene N-Acetaminophen]            Objective:      BP (!) 126/50   Pulse 81   Temp 99 °F (37.2 °C) (Oral)   Resp 17   Ht 5' 10\" (1.778 m)   Wt 189 lb 9.5 oz (86 kg)   SpO2 97%   BMI 27.20 kg/m²   Sharif Risk Score: Sharif Scale Score: 12    LABS    CBC:   Lab Results   Component Value Date    WBC 10.9 05/23/2019    RBC 3.25 05/23/2019    HGB 10.3 05/23/2019     CMP:  Albumin:    Lab Results   Component Value Date    LABALBU 3.8 05/22/2019     PT/INR:    Lab Results   Component Value Date    PROTIME 13.5 05/22/2019    INR 1.0 05/22/2019     HgBA1c:    Lab Results   Component Value Date    LABA1C 6.2 12/10/2018     PTT: No components found for: LABPTT      Assessment:     Patient Active Problem List   Diagnosis    Hypertension    CKD (chronic kidney disease) stage 4, GFR 15-29 ml/min    Decubitus ulcer    Osteomyelitis of lumbar spine (HCC)    Lumbar discitis    Discitis of lumbar region    [facility] stage renal disease) [facility]    Protein-calorie malnutrition ([facility]    Thrombocytopenia ([facility]    Perianal abscess    Diabetes mellitus ([address]    SIRS (systemic inflammatory response syndrome) (HCC)    Sepsis (HCC)    Abscess of anal or rectal region    Pneumonia of left lower lobe due to infectious organism [facility])    Acute exacerbation of chronic obstructive airways disease (HCC)    Hyponatremia    Cellulitis of forearm, right    AVF (arteriovenous fistula) (HCC)    Cellulitis of forearm    Influenza    ESRD on hemodialysis ([facility]       Measurements:  Wound 01/25/19 Toe (Comment  which one) Medial;Lateral;Right round, open, dry (Active)   Wound Image    5/23/2019  4:15 PM   Wound Diabetic 5/23/2019  4:15 PM   Dressing Status Old drainage 5/23/2019  4:15 PM   Dressing Changed Changed/New 5/23/2019  4:15 PM   Dressing/Treatment Foam 5/23/2019  4:15 PM   Wound Cleansed Rinsed/Irrigated with saline 5/23/2019  4:15 PM   Wound Length (cm) 0.3 cm 5/23/2019  4:15 PM   Wound Width (cm) 0.3 cm 5/23/2019  4:15 PM   Wound Depth (cm) 0.2 cm 5/23/2019  4:15 PM   Wound Surface Area (cm^2) 0.09 cm^2 5/23/2019  4:15 PM   Change in Wound Size % (l*w) 64 5/23/2019  4:15 PM   Wound Volume (cm^3) 0.02 cm^3 5/23/2019  4:15 PM   Wound Assessment [place];Drainage 5/23/2019  4:15 PM   Drainage Amount Small 5/23/2019  4:15 PM   Drainage Description Serosanguinous 5/23/2019  4:15 PM   Odor None 5/23/2019  4:15 PM   Margins Defined edges 5/23/2019  4:15 PM   Chana-wound Assessment Maceration 5/23/2019  4:15 PM   [place]%Wound Bed 100 5/23/2019  4:15 PM   Number of days: 118       Wound 05/23/19 Buttocks (Active)   Wound Other 5/23/2019  4:15 PM   Wound Cleansed Other (Comment) 5/23/2019  4:15 PM   Wound Assessment Drainage 5/23/2019  4:15 PM   Drainage Amount Small 5/23/2019  4:15 PM   Drainage Description Serosanguinous 5/23/2019  4:15 PM   Odor None 5/23/2019  4:15 PM   Chana-wound Assessment Denuded 5/23/2019  4:15 PM   Number of days: 0       Response to treatment:  Well tolerated by patient.      Pain Assessment:  Severity:  4 / 10  Quality of pain: aching  Wound Pain Timing/Severity: constant  Premedicated: No      Plan:     Plan of Care: Wound 01/25/19 Toe (Comment  which one) Medial;Lateral;Right round, open, dry-Dressing/Treatment: (P) Foam  Wound 05/23/19 Buttocks-Dressing/Treatment: (P) (Zinc cream)     Buttock moisture associated skin damage- keep clean and dry and use zinc barrier cream twice daily. 5th toe medial wound- cover with Mepilex foam changed 3 times weekly.  Interweave dry gauze between toes to keep dry.  -Turn every 2 hours  -Float heels of of bed with pillows under calves  -Routine incontinence care with Thania Tonie barrier cloths and zinc oxide cream. Apply zinc oxide cream BID and prn incontinence.   -Covidien moisture wicking under pads vs cloth backed briefs    Specialty Bed Required : Yes   [x] Low Air Loss   [] Pressure Redistribution  [] Fluid Immersion  [] Bariatric  [] Total Pressure Relief  [] Other:     Discharge Plan:  Placement for patient upon discharge: skilled nursing   Hospice Care: No   Patient appropriate for Outpatient [address]: No    Patient/Caregiver Teaching:  Level of patientunderstanding able to:      [x] Indicates understanding       [] Needs reinforcement  [] Unsuccessful      [x] Verbal Understanding  [] Demonstrated understanding       [] No evidence of learning  [] Refused teaching         [] N/A       Electronically signed by Natanael Contreras RN on  5/23/2019 at 4:21 PM

## 2019-05-23 NOTE — PROGRESS NOTES
10\" (177.8 cm)   · Current Body Wt: 189 lb 9.5 oz (86 kg)  · Admission Body Wt: 189 lb 9.5 oz (86 kg)  · Ideal Body Wt: 166 lb (75.3 kg), % Ideal Body 114%  · BMI Classification: BMI 25.0 - 29.9 Overweight    Nutrition Interventions:   Modify current diet  Continued Inpatient Monitoring, Education not appropriate at this time    Nutrition Evaluation:   · Evaluation: Goals set   · Goals: PO intake % of most meals    · Monitoring: Meal Intake, Diet Tolerance, Wound Healing, Weight, Pertinent Labs    Trixie Saldana R.D., L.D.   Phone: [phone]

## 2019-05-23 NOTE — ED PROVIDER NOTES
[facility] AND [facility] ICU  Emergency Department Encounter  EmergencyMedicine Resident     Pt Pawel Rhodes  MRN: [id number]  Birthdate 1945  Date of evaluation: 5/22/19  PCP:  Deandre Braswell MD    [address] [place] [address]       Chief Complaint   Patient presents with    Altered Mental Status       HISTORY OF PRESENT ILLNESS  (Location/Symptom, Timing/Onset, Context/Setting, Quality, Duration, Modifying Factors, Severity.)      Cholo Nieves is a 75 y.o. male who presents from his nursing home with altered mental status and fever. Patient reportedly went to dialysis as usual today and was in his usual state of health this morning. When he returned from dialysis, he was attended, minimally responsive and interactive, and is significantly febrile to 104°F.  No other information is available about his recent health at the nursing facility. Patient has numerous comorbidities as below. He is reportedly usually quite energetic and interactive. PAST MEDICAL / SURGICAL / SOCIAL / FAMILY HISTORY      has a past medical history of Asthma, CAD (coronary artery disease), CKD (chronic kidney disease) stage 4, GFR 15-29 ml/min ([facility]), CVA (cerebral vascular accident) ([address] Depression, History of kidney stones, Hyperlipidemia, Hypertension, Osteoarthritis, Proteinuria, and Type II or unspecified type diabetes mellitus without mention of complication, not stated as uncontrolled. has a past surgical history that includes Coronary artery bypass graft; shoulder surgery; hip surgery; joint replacement; other surgical history (6/11/15); bone biopsy (6/29/15); other surgical history (12/30/15); and Carpal tunnel release.     Social History     Socioeconomic History    Marital status:      Spouse name: Not on file    Number of children: Not on file    Years of education: Not on file    Highest education level: Not on file   Occupational History    Not on file   Social Needs    Financial resource strain: Not on file   Sathish Gonzalez Food insecurity:     Worry: Not on file     Inability: Not on file    Transportation needs:     Medical: Not on file     Non-medical: Not on file   Tobacco Use    Smoking status: Never Smoker    Smokeless tobacco: Never Used   Substance and Sexual Activity    Alcohol use: No     Alcohol/week: 0.0 oz    Drug use: No    Sexual activity: Never   Lifestyle    Physical activity:     Days per week: Not on file     Minutes per session: Not on file    Stress: Not on file   Relationships    Social connections:     Talks on phone: Not on file     Gets together: Not on file     Attends [employer] service: Not on file     Active member of club or organization: Not on file     Attends meetings of clubs or organizations: Not on file     Relationship status: Not on file    Intimate partner violence:     Fear of current or ex partner: Not on file     Emotionally abused: Not on file     Physically abused: Not on file     Forced sexual activity: Not on file   Other Topics Concern    Not on file   Social History Narrative    Not on file       Family History   Problem Relation Age of Onset    Stroke Mother     Heart Attack Mother     Heart Attack Father     Diabetes Sister     Diabetes Brother        Allergies:  Oxycontin [oxycodone]; Avodart [dutasteride]; and Darvocet [propoxyphene n-acetaminophen]    Home Medications:  Prior to Admission medications    Medication Sig Start Date End Date Taking? Authorizing Provider   doxycycline hyclate (VIBRA-TABS) 100 MG tablet Take 100 mg by mouth 2 times daily Until 7/15/19 for osteomyelitis 1/31/19 7/15/19 Yes Historical Provider, MD   fluticasone (FLONASE) 50 MCG/ACT nasal spray 2 sprays by Each Nostril route daily   Yes Historical Provider, MD   Pentafluoroprop-Tetrafluoroeth (PAIN EASE) AERO Apply topically three times a week On MWF before dialysys   Yes Historical Provider, MD   HYDROmorphone (DILAUDID) 2 MG tablet Take 2 mg by mouth every 12 hours as needed for Pain.    Yes Historical Provider, MD   midodrine (PROAMATINE) 5 MG tablet Take 10 mg by mouth three times a week If needed during dialysis   Yes Historical Provider, MD   oxyCODONE-acetaminophen (PERCOCET) 7.5-325 MG per tablet Take 1 tablet by mouth every 6 hours as needed for Pain.    Yes Historical Provider, MD   ipratropium (ATROVENT) 0.02 % nebulizer solution Take 0.5 mg by nebulization every 8 hours   Yes Historical Provider, MD   Multiple Vitamins-Minerals (THERAPEUTIC MULTIVITAMIN-MINERALS) tablet Take 1 tablet by mouth daily   Yes Historical Provider, MD   HUMALOG 100 UNIT/ML injection vial Inject into the skin 4 times daily (after meals and at bedtime) Per Sliding Scale as follows:  151-200 = 1 unit  201-250 = 2 units  251-300 = 3 units  301-350 = 4 units  351-400 = 5 units  >400 = notify physician   Yes Historical Provider, MD   allopurinol (ZYLOPRIM) 100 MG tablet Take 100 mg by mouth daily   Yes Historical Provider, MD   midodrine (PROAMATINE) 5 MG tablet Take 5 mg by mouth three times a week One time per day every Mon, Wed, Fri for low pressure    Yes Historical Provider, MD   senna (SENOKOT) 8.6 MG tablet Take 2 tablets by mouth daily as needed for Constipation   Yes Historical Provider, MD   metoprolol succinate (TOPROL XL) 25 MG extended release tablet Take 1 tablet by mouth daily 9/11/18  Yes Ji Batista MD   carboxymethylcellulose 1 % ophthalmic solution Place 1 drop into both eyes 3 times daily as needed for Dry Eyes   Yes Historical Provider, MD   sevelamer (RENVELA) 800 MG tablet Take 2 tablets by mouth every 8 hours as needed For snacks   Yes Historical Provider, MD   sevelamer (RENVELA) 800 MG tablet Take 4 tablets by mouth 3 times daily (with meals)   Yes Historical Provider, MD   sertraline (ZOLOFT) 50 MG tablet Take 50 mg by mouth daily   Yes Historical Provider, MD   sucralfate (CARAFATE) 1 GM tablet Take 1 g by mouth 3 times daily (before meals)   Yes Historical Provider, MD   calcium carbonate (TUMS) 500 MG chewable tablet Take 2 tablets by mouth every 6 hours as needed for Heartburn   Yes Historical Provider, MD   tetrahydrozoline 0.05 % ophthalmic solution Place 1 drop into both eyes 2 times daily as needed (irritation/dryness)   Yes Historical Provider, MD   diclofenac sodium 1 % GEL Apply topically 3 times daily Apply between last two toes and to arch of right foot   Yes Historical Provider, MD   fluticasone propionate (FLOVENT DISKUS) 100 MCG/BLIST AEPB inhaler Inhale 2 puffs into the lungs 2 times daily    Yes Historical Provider, MD   insulin glargine (LANTUS) 100 UNIT/ML injection pen Inject 10 Units into the skin nightly    Yes Historical Provider, MD   senna-docusate (PERICOLACE) 8.6-50 MG per tablet Take 2 tablets by mouth four times a week On non-dialysis days (Sun, Tues, Thurs, Sat)   Yes Historical Provider, MD   amLODIPine (NORVASC) 2.5 MG tablet Take 1 tablet by mouth daily 6/17/15  Yes Charlene Vee MD   gabapentin (NEURONTIN) 100 MG capsule Take 1 capsule by mouth 3 times daily 6/17/15  Yes Charlene Vee MD   polyethylene glycol (GLYCOLAX) powder Take 17 g by mouth daily as needed (constipation)    Yes Historical Provider, MD   hydrALAZINE (APRESOLINE) 10 MG tablet Take 10 mg by mouth 3 times daily  2/13/14  Yes Historical Provider, MD   aspirin 81 MG chewable tablet Take 81 mg by mouth every morning    Yes Historical Provider, MD Alem Mike 30G X 8 MM [id number] [facility]  1/17/17   Historical Provider, MD   Nutritional Supplements (BOOST GLUCOSE CONTROL) LIQD Take 8 oz by mouth nightly     Historical Provider, MD       REVIEW OF SYSTEMS    (2-9 systems for level 4, 10 or more for level 5)      Review of Systems - unable to obtain due to patient's condition    PHYSICAL EXAM   (up to 7 for level 4, 8 or more for level 5)      INITIAL VITALS:   BP (!) 98/33   Pulse 75   Temp 100.4 °F (38 °C)   Resp 21   Ht 5' 10\" (1.778 m)   Wt 189 lb 9.5 oz (86 kg)   SpO2 97%   BMI 27.20 kg/m²     Physical Exam   Gen. Appearance: Toxic appearing male patient, obtunded  HEENT: head atraumatic, normocephalic. Pupils equal and reactive to light. Oropharynx clear and moist.  Neck: Supple, normal range of motion. No lymphadenopathy. Pulmonary: Normal respiratory effort. Maintaining his airway. Lungs rhonchorous bilaterally. Coarse breath sounds in the right lower lung. Equal breath sounds. .   Cardiovascular:  Heart sounds normal, no murmurs. Peripheral pulses strong, regular, equal.   Abdomen: Soft, somewhat distended abdomen. Apparently nontender although patient is minimally interactive. Bowel sounds normal. No palpable masses. Neurology: Obtunded. His eyes are open spontaneously and he is tracking. He attempts to mouth responses to questions but is not able to answer or have conversation. Not following commands. Normal tone. No lateralizing signs. Skin: Warm, dry, well perfused      DIFFERENTIAL  DIAGNOSIS     PLAN (LABS / IMAGING / EKG):  Orders Placed This Encounter   Procedures    Culture Blood #1    Culture Blood #1    Urine Culture    CT Head WO Contrast    XR CHEST PORTABLE    CT Lumbar Spine WO Contrast    CT Thoracic Spine WO Contrast    CT CHEST ABDOMEN PELVIS W CONTRAST    CBC Auto Differential    Comprehensive Metabolic Panel    Protime-INR    APTT    Urinalysis with Microscopic    Ammonia    Troponin    Brain Natriuretic Peptide    Lactic Acid    ICTOTEST, URINE    Troponin    Lactic Acid    Diet NPO Effective Now    Straight cath    Vital signs per unit routine    Notify physician    Notify physician    Up with assistance    Elevate heels off of bed at all times if patient is not able to move lower extremities    Turn or assist with turn every 2 hours if patient is unable to turn self. Remind patient to turn if necessary.     Inspect skin per unit guidelines    Maintain HOB at the lowest elevation consistent with medical plan of care    Full Code    Pharmacy to Dose: Vancomycin    Pharmacy to Dose: zosyn    Inpatient consult to Internal Medicine    POC Glucose Fingerstick    EKG 12 Lead    Wound ostomy eval and treat    PATIENT STATUS (FROM ED OR OR/PROCEDURAL) Inpatient       MEDICATIONS ORDERED:  Orders Placed This Encounter   Medications    0.9 % sodium chloride bolus    acetaminophen (TYLENOL) suppository 650 mg    vancomycin (VANCOCIN) intermittent dosing (placeholder)    vancomycin (VANCOCIN) 1,250 mg in dextrose 5 % 250 mL IVPB    piperacillin-tazobactam (ZOSYN) 2.25 g in dextrose 5 % 50 mL IVPB (mini-bag)    ioversol (OPTIRAY) 74 % injection 75 mL    0.9 % sodium chloride bolus    sodium chloride flush 0.9 % injection 10 mL    sodium chloride flush 0.9 % injection 10 mL    sodium chloride flush 0.9 % injection 10 mL    acetaminophen (TYLENOL) tablet 650 mg       DDX: Sepsis, pneumonia, urinary tract infection, bacteremia, electrolyte disturbance, glucose derangement, intracranial abnormality    DIAGNOSTIC RESULTS / EMERGENCY DEPARTMENT COURSE / MDM     LABS:  Results for orders placed or performed during the hospital encounter of 05/22/19   Culture Blood #1   Result Value Ref Range    Specimen Description . BLOOD LEFT FOREARM 10ML EACH     Special Requests NOT REPORTED     Culture NO GROWTH 3 HOURS    Culture Blood #1   Result Value Ref Range    Specimen Description . BLOOD LEFT HAND 10ML EACH     Special Requests NOT REPORTED     Culture NO GROWTH 3 HOURS    CBC Auto Differential   Result Value Ref Range    WBC 8.0 3.5 - 11.0 k/uL    RBC 3.81 (L) 4.5 - 5.9 m/uL    Hemoglobin 13.0 (L) 13.5 - 17.5 g/dL    Hematocrit 38.2 (L) 41 - 53 %    .1 (H) 80 - 100 fL    MCH 34.1 (H) 26 - 34 pg    MCHC 34.1 31 - 37 g/dL    RDW 14.0 11.5 - 14.9 %    Platelets 772 (L) 932 - 450 k/uL    MPV 9.4 6.0 - 12.0 fL    NRBC Automated NOT REPORTED per 100 WBC    Differential Type NOT REPORTED     Seg Neutrophils 85 (H) 36 - 66 % Lymphocytes 7 (L) 24 - 44 %    Monocytes 6 1 - 7 %    Eosinophils % 1 0 - 4 %    Basophils 1 0 - 2 %    Immature Granulocytes NOT REPORTED 0 %    Segs Absolute 6.80 1.3 - 9.1 k/uL    Absolute Lymph # 0.50 (L) 1.0 - 4.8 k/uL    Absolute Mono # 0.50 0.1 - 1.3 k/uL    Absolute Eos # 0.10 0.0 - 0.4 k/uL    Basophils # 0.00 0.0 - 0.2 k/uL    Absolute Immature Granulocyte NOT REPORTED 0.00 - 0.30 k/uL    WBC Morphology NOT REPORTED     RBC Morphology NOT REPORTED     Platelet Estimate NOT REPORTED    Comprehensive Metabolic Panel   Result Value Ref Range    Glucose 275 (H) 70 - 99 mg/dL    BUN 41 (H) 8 - 23 mg/dL    CREATININE 8.00 (HH) 0.70 - 1.20 mg/dL    Bun/Cre Ratio NOT REPORTED 9 - 20    Calcium 9.3 8.6 - 10.4 mg/dL    Sodium 132 (L) 135 - 144 mmol/L    Potassium 5.0 3.7 - 5.3 mmol/L    Chloride 91 (L) 98 - 107 mmol/L    CO2 22 20 - 31 mmol/L    Anion Gap 19 (H) 9 - 17 mmol/L    Alkaline Phosphatase 308 (H) 40 - 129 U/L    ALT 32 5 - 41 U/L    AST 29 <40 U/L    Total Bilirubin 0.44 0.3 - 1.2 mg/dL    Total Protein 7.6 6.4 - 8.3 g/dL    Alb 3.8 3.5 - 5.2 g/dL    Albumin/Globulin Ratio NOT REPORTED 1.0 - 2.5    GFR Non-African American 7 (L) >60 mL/min    GFR  8 (L) >60 mL/min    GFR Comment          GFR Staging NOT REPORTED    Protime-INR   Result Value Ref Range    Protime 13.5 11.8 - 14.6 sec    INR 1.0    APTT   Result Value Ref Range    PTT 53.6 (H) 24.0 - 36.0 sec   Urinalysis with Microscopic   Result Value Ref Range    Color, UA DARK YELLOW (A) YELLOW    Turbidity UA CLOUDY (A) CLEAR    Glucose, Ur TRACE (A) NEGATIVE    Bilirubin Urine (A) NEGATIVE     Presumptive positive. Unable to confirm due to unavailability of reagent.     Ketones, Urine NEGATIVE NEGATIVE    Specific [place], UA 1.016 1.000 - 1.030    Urine Hgb SMALL (A) NEGATIVE    pH, UA 5.5 5.0 - 8.0    Protein, UA 4+ (A) NEGATIVE    Urobilinogen, Urine Normal Normal    Nitrite, Urine NEGATIVE NEGATIVE    Leukocyte Esterase, Urine LARGE (A) NEGATIVE    Urinalysis Comments NOT REPORTED     -          WBC, UA 10 TO 20 /HPF    RBC, UA 2 TO 5 /HPF    Casts UA NOT REPORTED /LPF    Crystals UA NOT REPORTED None /HPF    Epithelial Cells UA 5 TO 10 /HPF    Renal Epithelial, Urine NOT REPORTED 0 /HPF    Bacteria, UA FEW (A) None    Mucus, UA NOT REPORTED None    Trichomonas, UA NOT REPORTED None    Amorphous, UA 1+ (A) None    Other Observations UA NOT REPORTED NOT REQ. Yeast, UA NOT REPORTED None   Ammonia   Result Value Ref Range    Ammonia 32 16 - 60 umol/L   Troponin   Result Value Ref Range    Troponin, High Sensitivity 223 (HH) 0 - 22 ng/L    Troponin T NOT REPORTED <0.03 ng/mL    Troponin Interp NOT REPORTED    Brain Natriuretic Peptide   Result Value Ref Range    Pro-BNP 2,717 (H) <300 pg/mL    BNP Interpretation Pro-BNP Reference Range:    Lactic Acid   Result Value Ref Range    Lactic Acid 2.3 (H) 0.5 - 2.2 mmol/L   Troponin   Result Value Ref Range    Troponin, High Sensitivity 242 (HH) 0 - 22 ng/L    Troponin T NOT REPORTED <0.03 ng/mL    Troponin Interp NOT REPORTED    Lactic Acid   Result Value Ref Range    Lactic Acid 2.0 0.5 - 2.2 mmol/L   POC Glucose Fingerstick   Result Value Ref Range    POC Glucose 278 (H) 75 - 110 mg/dL   EKG 12 Lead   Result Value Ref Range    Ventricular Rate 113 BPM    Atrial Rate 113 BPM    P-R Interval 200 ms    QRS Duration 138 ms    Q-T Interval 342 ms    QTc Calculation (Bazett) 469 ms    P Axis 62 degrees    R Axis -65 degrees    T Axis 75 degrees       IMPRESSION: 75year old patient presents with altered mental status and fever. Patient is febrile and tachycardic and does appear ill. He is normotensive. He is maintaining his airway and has normal respiratory effort. He is obtunded and not following commands. He is opening his eyes and tracking spontaneously. He attempts to mouth words.   No lateralizing signs on physical examination; patient examined by both myself and the attending physician and we do not feel there is evidence of stroke at this time. Patient appears septic. He has coarse breath sounds on the right. No other focal findings on physical examination. Code sepsis called and appropriate labs ordered. CT thoracic and lumbar spine given history of osteomyelitis/discitis. CT abdomen ordered given abdominal distension. Fluids given. Vancomycin and Zosyn given. Frequent reassessment. Likely admission to ICU given mental status. RADIOLOGY:  CT CHEST ABDOMEN PELVIS W CONTRAST (Final result)   Result time 05/22/19 21:34:40   Procedure changed from [facility]   Final result by Bobby Tello MD (05/22/19 21:34:40)                Impression:    Head CT: No acute intracranial abnormalities. Chest CT: Mild infiltrate in the lingula.  Mild atelectasis at the lung bases. Abdomen and pelvis CT: No acute findings.  Cholelithiasis.  Old pathologic  fracture of L3 without convincing evidence for acute discitis/osteomyelitis. Narrative:    EXAMINATION:  CT OF THE HEAD WITHOUT CONTRAST; CT OF THE CHEST, ABDOMEN, AND PELVIS WITH  CONTRAST; CT OF THE THORACIC SPINE WITHOUT CONTRAST; CT OF THE LUMBAR SPINE  WITHOUT CONTRAST  5/22/2019 8:57 pm    TECHNIQUE:  CT of the head was performed without the administration of intravenous  contrast. Dose modulation, iterative reconstruction, and/or weight based  adjustment of the mA/kV was utilized to reduce the radiation dose to as low  as reasonably achievable.; CT of the chest, abdomen and pelvis was performed  with the administration of intravenous contrast. Multiplanar reformatted  images are provided for review. Dose modulation, iterative reconstruction,  and/or weight based adjustment of the mA/kV was utilized to reduce the  radiation dose to as low as reasonably achievable.; CT of the thoracic spine  was performed without the administration of intravenous contrast. Multiplanar  reformatted images are provided for review.  Dose modulation, iterative  reconstruction, and/or weight based adjustment of the mA/kV was utilized to  reduce the radiation dose to as low as reasonably achievable.; CT of the  lumbar spine was performed without the administration of intravenous  contrast. Multiplanar reformatted images are provided for review. Dose  modulation, iterative reconstruction, and/or weight based adjustment of the  mA/kV was utilized to reduce the radiation dose to as low as reasonably  achievable. COMPARISON:  Head CT dated 11/30/2015. abdomen and pelvis CT dated 03/27/2016    HISTORY:  ORDERING SYSTEM PROVIDED HISTORY: AMS  TECHNOLOGIST PROVIDED HISTORY:    Ordering Physician Provided Reason for Exam: patient here for ams and fever  after dialysis today    FINDINGS:  Head CT:    BRAIN/VENTRICLES: There is no acute intracranial hemorrhage, mass effect or  midline shift.  No abnormal extra-axial fluid collection.  The gray-white  differentiation is maintained without evidence of an acute infarct.  There is  no evidence of hydrocephalus.  Chronic microvascular ischemic changes in  periventricular white matter distribution. ORBITS: The visualized portion of the orbits demonstrate no acute abnormality. SINUSES: The visualized paranasal sinuses and mastoid air cells demonstrate  no acute abnormality. SOFT TISSUES/SKULL:  No acute abnormality of the visualized skull or soft  tissues. Chest CT:    Mediastinum: No thoracic aortic aneurysm .  Mild cardiomegaly.  No  pericardial effusion.  No abnormal lymph node enlargement. Lungs/pleura: Mild infiltrate in the lingula.  Mild atelectasis at the lung  bases.  No effusions.  No pneumothorax. Soft Tissues/Bones: No lytic or blastic lesions in all visualized osseous  structures. CT abdomen and pelvis:    Organs: Liver, pancreas, spleen, bilateral adrenal glands are unremarkable. Cholelithiasis.  Atrophic kidneys contains small cysts.     GI/Bowel: Stomach, small and large bowel loops are grossly unremarkable. Colonic diverticulosis without acute diverticulitis. Pelvis: Urinary bladder is unremarkable.    No lymphadenopathy.  No soft  tissue masses or abnormal fluid collections. Peritoneum/Retroperitoneum: No free intraperitoneal fluid or air. No  abdominal aortic aneurysm.  No retroperitoneal lymphadenopathy.  5 x 5 x 2 cm  well-circumscribed fluid collection superficial to the right lobe of the  liver is stable since 2016, therefore benign. Bones/Soft Tissues: No lytic or blastic lesions in all visualized osseous  structures.  Old pathologic fracture of L3.  No convincing evidence for  discitis/osteomyelitis in the left side. Left hip arthroplasty.  Atrophy of left psoas muscle.  The overall appearance  of distal left psoas muscle is similar to CT of 2016.                    CT Lumbar Spine WO Contrast (Final result)   Result time 05/22/19 21:34:40   Final result by Cindi Bear MD (05/22/19 21:34:40)                Impression:    Head CT: No acute intracranial abnormalities. Chest CT: Mild infiltrate in the lingula.  Mild atelectasis at the lung bases. Abdomen and pelvis CT: No acute findings.  Cholelithiasis.  Old pathologic  fracture of L3 without convincing evidence for acute discitis/osteomyelitis. Narrative:    EXAMINATION:  CT OF THE HEAD WITHOUT CONTRAST; CT OF THE CHEST, ABDOMEN, AND PELVIS WITH  CONTRAST; CT OF THE THORACIC SPINE WITHOUT CONTRAST; CT OF THE LUMBAR SPINE  WITHOUT CONTRAST  5/22/2019 8:57 pm    TECHNIQUE:  CT of the head was performed without the administration of intravenous  contrast. Dose modulation, iterative reconstruction, and/or weight based  adjustment of the mA/kV was utilized to reduce the radiation dose to as low  as reasonably achievable.; CT of the chest, abdomen and pelvis was performed  with the administration of intravenous contrast. Multiplanar reformatted  images are provided for review.  Dose modulation, iterative reconstruction,  and/or weight based adjustment of the mA/kV was utilized to reduce the  radiation dose to as low as reasonably achievable.; CT of the thoracic spine  was performed without the administration of intravenous contrast. Multiplanar  reformatted images are provided for review. Dose modulation, iterative  reconstruction, and/or weight based adjustment of the mA/kV was utilized to  reduce the radiation dose to as low as reasonably achievable.; CT of the  lumbar spine was performed without the administration of intravenous  contrast. Multiplanar reformatted images are provided for review. Dose  modulation, iterative reconstruction, and/or weight based adjustment of the  mA/kV was utilized to reduce the radiation dose to as low as reasonably  achievable. COMPARISON:  Head CT dated 11/30/2015. abdomen and pelvis CT dated 03/27/2016    HISTORY:  ORDERING SYSTEM PROVIDED HISTORY: AMS  TECHNOLOGIST PROVIDED HISTORY:    Ordering Physician Provided Reason for Exam: patient here for ams and fever  after dialysis today    FINDINGS:  Head CT:    BRAIN/VENTRICLES: There is no acute intracranial hemorrhage, mass effect or  midline shift.  No abnormal extra-axial fluid collection.  The gray-white  differentiation is maintained without evidence of an acute infarct.  There is  no evidence of hydrocephalus.  Chronic microvascular ischemic changes in  periventricular white matter distribution. ORBITS: The visualized portion of the orbits demonstrate no acute abnormality. SINUSES: The visualized paranasal sinuses and mastoid air cells demonstrate  no acute abnormality. SOFT TISSUES/SKULL:  No acute abnormality of the visualized skull or soft  tissues. Chest CT:    Mediastinum: No thoracic aortic aneurysm .  Mild cardiomegaly.  No  pericardial effusion.  No abnormal lymph node enlargement.     Lungs/pleura: Mild infiltrate in the lingula.  Mild atelectasis at the lung  bases.  No effusions.  No pneumothorax. Soft Tissues/Bones: No lytic or blastic lesions in all visualized osseous  structures. CT abdomen and pelvis:    Organs: Liver, pancreas, spleen, bilateral adrenal glands are unremarkable. Cholelithiasis.  Atrophic kidneys contains small cysts. GI/Bowel: Stomach, small and large bowel loops are grossly unremarkable. Colonic diverticulosis without acute diverticulitis. Pelvis: Urinary bladder is unremarkable.    No lymphadenopathy.  No soft  tissue masses or abnormal fluid collections. Peritoneum/Retroperitoneum: No free intraperitoneal fluid or air. No  abdominal aortic aneurysm.  No retroperitoneal lymphadenopathy.  5 x 5 x 2 cm  well-circumscribed fluid collection superficial to the right lobe of the  liver is stable since 2016, therefore benign. Bones/Soft Tissues: No lytic or blastic lesions in all visualized osseous  structures.  Old pathologic fracture of L3.  No convincing evidence for  discitis/osteomyelitis in the left side. Left hip arthroplasty.  Atrophy of left psoas muscle.  The overall appearance  of distal left psoas muscle is similar to CT of 2016.                    CT Thoracic Spine WO Contrast (Final result)   Result time 05/22/19 21:34:40   Final result by Irene Love MD (05/22/19 21:34:40)                Impression:    Head CT: No acute intracranial abnormalities. Chest CT: Mild infiltrate in the lingula.  Mild atelectasis at the lung bases. Abdomen and pelvis CT: No acute findings.  Cholelithiasis.  Old pathologic  fracture of L3 without convincing evidence for acute discitis/osteomyelitis.             Narrative:    EXAMINATION:  CT OF THE HEAD WITHOUT CONTRAST; CT OF THE CHEST, ABDOMEN, AND PELVIS WITH  CONTRAST; CT OF THE THORACIC SPINE WITHOUT CONTRAST; CT OF THE LUMBAR SPINE  WITHOUT CONTRAST  5/22/2019 8:57 pm    TECHNIQUE:  CT of the head was performed without the administration of intravenous  contrast. Dose modulation, iterative reconstruction, and/or weight based  adjustment of the mA/kV was utilized to reduce the radiation dose to as low  as reasonably achievable.; CT of the chest, abdomen and pelvis was performed  with the administration of intravenous contrast. Multiplanar reformatted  images are provided for review. Dose modulation, iterative reconstruction,  and/or weight based adjustment of the mA/kV was utilized to reduce the  radiation dose to as low as reasonably achievable.; CT of the thoracic spine  was performed without the administration of intravenous contrast. Multiplanar  reformatted images are provided for review. Dose modulation, iterative  reconstruction, and/or weight based adjustment of the mA/kV was utilized to  reduce the radiation dose to as low as reasonably achievable.; CT of the  lumbar spine was performed without the administration of intravenous  contrast. Multiplanar reformatted images are provided for review. Dose  modulation, iterative reconstruction, and/or weight based adjustment of the  mA/kV was utilized to reduce the radiation dose to as low as reasonably  achievable. COMPARISON:  Head CT dated 11/30/2015. abdomen and pelvis CT dated 03/27/2016    HISTORY:  ORDERING SYSTEM PROVIDED HISTORY: AMS  TECHNOLOGIST PROVIDED HISTORY:    Ordering Physician Provided Reason for Exam: patient here for ams and fever  after dialysis today    FINDINGS:  Head CT:    BRAIN/VENTRICLES: There is no acute intracranial hemorrhage, mass effect or  midline shift.  No abnormal extra-axial fluid collection.  The gray-white  differentiation is maintained without evidence of an acute infarct.  There is  no evidence of hydrocephalus.  Chronic microvascular ischemic changes in  periventricular white matter distribution. ORBITS: The visualized portion of the orbits demonstrate no acute abnormality. SINUSES: The visualized paranasal sinuses and mastoid air cells demonstrate  no acute abnormality.     SOFT TISSUES/SKULL:  No acute abnormality of the visualized skull or soft  tissues. Chest CT:    Mediastinum: No thoracic aortic aneurysm .  Mild cardiomegaly.  No  pericardial effusion.  No abnormal lymph node enlargement. Lungs/pleura: Mild infiltrate in the lingula.  Mild atelectasis at the lung  bases.  No effusions.  No pneumothorax. Soft Tissues/Bones: No lytic or blastic lesions in all visualized osseous  structures. CT abdomen and pelvis:    Organs: Liver, pancreas, spleen, bilateral adrenal glands are unremarkable. Cholelithiasis.  Atrophic kidneys contains small cysts. GI/Bowel: Stomach, small and large bowel loops are grossly unremarkable. Colonic diverticulosis without acute diverticulitis. Pelvis: Urinary bladder is unremarkable.    No lymphadenopathy.  No soft  tissue masses or abnormal fluid collections. Peritoneum/Retroperitoneum: No free intraperitoneal fluid or air. No  abdominal aortic aneurysm.  No retroperitoneal lymphadenopathy.  5 x 5 x 2 cm  well-circumscribed fluid collection superficial to the right lobe of the  liver is stable since 2016, therefore benign. Bones/Soft Tissues: No lytic or blastic lesions in all visualized osseous  structures.  Old pathologic fracture of L3.  No convincing evidence for  discitis/osteomyelitis in the left side. Left hip arthroplasty.  Atrophy of left psoas muscle.  The overall appearance  of distal left psoas muscle is similar to CT of 2016.                    CT Head WO Contrast (Final result)   Result time 05/22/19 21:34:40   Final result by Ford Boas, MD (05/22/19 21:34:40)                Impression:    Head CT: No acute intracranial abnormalities. Chest CT: Mild infiltrate in the lingula.  Mild atelectasis at the lung bases. Abdomen and pelvis CT: No acute findings.  Cholelithiasis.  Old pathologic  fracture of L3 without convincing evidence for acute discitis/osteomyelitis.             Narrative:    EXAMINATION:  CT OF THE HEAD WITHOUT CONTRAST; CT OF THE CHEST, ABDOMEN, AND PELVIS WITH  CONTRAST; CT OF THE THORACIC SPINE WITHOUT CONTRAST; CT OF THE LUMBAR SPINE  WITHOUT CONTRAST  5/22/2019 8:57 pm    TECHNIQUE:  CT of the head was performed without the administration of intravenous  contrast. Dose modulation, iterative reconstruction, and/or weight based  adjustment of the mA/kV was utilized to reduce the radiation dose to as low  as reasonably achievable.; CT of the chest, abdomen and pelvis was performed  with the administration of intravenous contrast. Multiplanar reformatted  images are provided for review. Dose modulation, iterative reconstruction,  and/or weight based adjustment of the mA/kV was utilized to reduce the  radiation dose to as low as reasonably achievable.; CT of the thoracic spine  was performed without the administration of intravenous contrast. Multiplanar  reformatted images are provided for review. Dose modulation, iterative  reconstruction, and/or weight based adjustment of the mA/kV was utilized to  reduce the radiation dose to as low as reasonably achievable.; CT of the  lumbar spine was performed without the administration of intravenous  contrast. Multiplanar reformatted images are provided for review. Dose  modulation, iterative reconstruction, and/or weight based adjustment of the  mA/kV was utilized to reduce the radiation dose to as low as reasonably  achievable.     COMPARISON:  Head CT dated 11/30/2015. abdomen and pelvis CT dated 03/27/2016    HISTORY:  ORDERING SYSTEM PROVIDED HISTORY: AMS  TECHNOLOGIST PROVIDED HISTORY:    Ordering Physician Provided Reason for Exam: patient here for ams and fever  after dialysis today    FINDINGS:  Head CT:    BRAIN/VENTRICLES: There is no acute intracranial hemorrhage, mass effect or  midline shift.  No abnormal extra-axial fluid collection.  The gray-white  differentiation is maintained without evidence of an acute infarct.  There is  no evidence of hydrocephalus.  Chronic microvascular ischemic changes in  periventricular white matter distribution. ORBITS: The visualized portion of the orbits demonstrate no acute abnormality. SINUSES: The visualized paranasal sinuses and mastoid air cells demonstrate  no acute abnormality. SOFT TISSUES/SKULL:  No acute abnormality of the visualized skull or soft  tissues. Chest CT:    Mediastinum: No thoracic aortic aneurysm .  Mild cardiomegaly.  No  pericardial effusion.  No abnormal lymph node enlargement. Lungs/pleura: Mild infiltrate in the lingula.  Mild atelectasis at the lung  bases.  No effusions.  No pneumothorax. Soft Tissues/Bones: No lytic or blastic lesions in all visualized osseous  structures. CT abdomen and pelvis:    Organs: Liver, pancreas, spleen, bilateral adrenal glands are unremarkable. Cholelithiasis.  Atrophic kidneys contains small cysts. GI/Bowel: Stomach, small and large bowel loops are grossly unremarkable. Colonic diverticulosis without acute diverticulitis. Pelvis: Urinary bladder is unremarkable.    No lymphadenopathy.  No soft  tissue masses or abnormal fluid collections. Peritoneum/Retroperitoneum: No free intraperitoneal fluid or air. No  abdominal aortic aneurysm.  No retroperitoneal lymphadenopathy.  5 x 5 x 2 cm  well-circumscribed fluid collection superficial to the right lobe of the  liver is stable since 2016, therefore benign. Bones/Soft Tissues: No lytic or blastic lesions in all visualized osseous  structures.  Old pathologic fracture of L3.  No convincing evidence for  discitis/osteomyelitis in the left side.     Left hip arthroplasty.  Atrophy of left psoas muscle.  The overall appearance  of distal left psoas muscle is similar to CT of 2016.                    XR CHEST PORTABLE (Final result)   Result time 05/22/19 20:50:31   Final result by Roxi Flores MD (05/22/19 20:50:31)                Impression:    Left greater than right basilar airspace disease.  This is

## 2019-05-23 NOTE — CONSULTS
Nephrology ESRD Consult Note    Reason for Consult:  End stage renal disease  Requesting Physician:        History of Present Illness: This is a 75 y. o. male with a significant past medical history of Type 2 DM, coronary artery disease [status post CABG], systemic hypertension and End-stage renal disease secondary to diabetic Nephropathy [on routine hemodialysis Monday/Wednesday/Friday at [facility] using a right radiocephalic AV fistula and not on active transplant list], who presented to the ER with altered mental status. He was at his dialysis yesterday and completed his treatments. I discussed with the  dialysis RN at his O/P centre who reported that the patient had no fever and no significant complaints during the dialysis treatment. Shortly after returning to [facility] nursing home, he complained of chills and had a fever reported at 104. He was also noted to have altered mental status and sent to the ER. No documented history of nausea, vomiting, diarrhea or sick contacts at the nursing home. No shortness of breath chest pain or headache. Notably he was hospitalized in general 2019 for influenza A and treated in November 2018 for cellulitis of the AV fistula site. He did have a UTI in September 2018 isolated strep beta-hemolytic. Patient was hypotensive in the ER and received 1 L bolus of saline. This morning he received an extra 500 mils cc of 0.9 saline and IV albumin was started per nephrology recommendations. His lactic acid was 2.3. He responsed to the fluid bolus and albumin with blood pressure in the 681 systolic this morning. His mentation is improved. He has been started on broad-spectrum IV antibiotics with vancomycin and Zosyn and blood cultures sent. He had a lumbar CT scan which showed no evidence for acute discitis/osteomyelitis.        Past Medical History:        Diagnosis Date    Asthma     CAD (coronary artery disease)     CKD (chronic kidney disease) stage 4, GFR 15-29 ml/min ([facility]     CVA (cerebral vascular accident) [facility] [address]     Depression     History of kidney stones     Hyperlipidemia     Hypertension     Osteoarthritis     Proteinuria     Type II or unspecified type diabetes mellitus without mention of complication, not stated as uncontrolled            Past Surgical History:        Procedure Laterality Date    BONE BIOPSY  6/29/15    L2-3    CARPAL TUNNEL RELEASE      CORONARY ARTERY BYPASS GRAFT      HIP SURGERY      JOINT REPLACEMENT      OTHER SURGICAL HISTORY  6/11/15    I&D coccyx wound    OTHER SURGICAL HISTORY  12/30/15    diverting loop colostomy; perirectal incision and drainage    SHOULDER SURGERY      left and right       Current Medications:      sodium chloride flush 0.9 % injection 10 mL 2 times per day   sodium chloride flush 0.9 % injection 10 mL PRN   acetaminophen (TYLENOL) suppository 650 mg Q4H PRN   polyvinyl alcohol (LIQUIFILM TEARS) 1.4 % ophthalmic solution 1 drop TID PRN   diclofenac sodium 1 % gel 2 g TID   ondansetron (ZOFRAN) injection 4 mg Q6H PRN   albuterol (PROVENTIL) nebulizer solution 2.5 mg Q2H PRN   ipratropium-albuterol (DUONEB) nebulizer solution 1 ampule Q4H WA   heparin (porcine) injection 5,000 Units 3 times per day   glucose (GLUTOSE) 40 % oral gel 15 g PRN   dextrose 50 % solution 12.5 g PRN   glucagon (rDNA) injection 1 mg PRN   dextrose 5 % solution PRN   insulin lispro (HUMALOG) injection vial 0-12 Units TID WC   insulin lispro (HUMALOG) injection vial 0-6 Units Nightly   albumin human 25 % solution 25 g Q6H   0.9 % sodium chloride infusion Continuous   norepinephrine (LEVOPHED) 16 mg in dextrose 5 % 250 mL infusion Continuous   piperacillin-tazobactam (ZOSYN) 2.25 g in dextrose 5 % 50 mL IVPB (mini-bag) Q12H   allopurinol (ZYLOPRIM) tablet 100 mg Daily   aspirin chewable tablet 81 mg QAM   calcium carbonate (TUMS) chewable tablet 1,000 mg Q6H PRN   doxycycline monohydrate (MONODOX) capsule 100 mg BID   insulin glargine (LANTUS) injection vial 10 Units Nightly   [START ON 5/24/2019] midodrine (PROAMATINE) tablet 5 mg Once per day on Mon Wed Fri   therapeutic multivitamin-minerals 1 tablet Daily   polyethylene glycol (GLYCOLAX) packet 17 g Daily PRN   sennosides-docusate sodium (SENOKOT-S) 8.6-50 MG tablet 2 tablet Once per day on Sun Tue Thu Sat   sertraline (ZOLOFT) tablet 50 mg Daily   sucralfate (CARAFATE) tablet 1 g TID AC   tetrahydrozoline 0.05 % ophthalmic solution 1 drop BID PRN   sevelamer (RENVELA) tablet 3,200 mg TID WC   perflutren lipid microspheres (DEFINITY) injection 2.2 mg ONCE PRN   fluticasone (FLOVENT HFA) 110 MCG/ACT inhaler 2 puff Daily   piperacillin-tazobactam (ZOSYN) 0.75 g in dextrose 5 % 50 mL IVPB PRN   vancomycin (VANCOCIN) intermittent dosing (placeholder) RX Placeholder   sodium chloride flush 0.9 % injection 10 mL PRN       Allergies:  Oxycontin [oxycodone];  Avodart [dutasteride]; and Darvocet [propoxyphene n-acetaminophen]    Social History:    Social History     Socioeconomic History    Marital status:      Spouse name: Not on file    Number of children: Not on file    Years of education: Not on file    Highest education level: Not on file   Occupational History    Not on file   Social Needs    Financial resource strain: Not on file    Food insecurity:     Worry: Not on file     Inability: Not on file    Transportation needs:     Medical: Not on file     Non-medical: Not on file   Tobacco Use    Smoking status: Never Smoker    Smokeless tobacco: Never Used   Substance and Sexual Activity    Alcohol use: No     Alcohol/week: 0.0 oz    Drug use: No    Sexual activity: Never   Lifestyle    Physical activity:     Days per week: Not on file     Minutes per session: Not on file    Stress: Not on file   Relationships    Social connections:     Talks on phone: Not on file     Gets together: Not on file     Attends Worship service: Not on file     Active RODRIGO  Nephrologist    Thank you for the consultation.

## 2019-05-23 NOTE — PROGRESS NOTES
Graduate Pharmacist    The student completed this medication consult under my supervision. I agree with this care plan.     Thank you,  Marcio Will, PharmD  [phone]

## 2019-05-23 NOTE — CARE COORDINATION
CASE MANAGEMENT NOTE:    Admission Date:  5/22/2019 Ghulam Leo is a 75 y.o.  male    Admitted for : Sepsis [facility]) [A41.9]    PCP:  Dr Uyen Treviño:  Marcell Jaramillo: Hilda Aldana             Discharge Plan:  5/23/2019 MEDICARE/ JOVI Berrios Show following for discharge planning as the patient is from [facility] and anticipate return; HD at [facility] MWF 1045am; Active orders for IV albumin, zosyn and vanco; AYDIN NEEDS SIGNED/COMPLETED//ANGELA                 Electronically signed by:  Nallely Marks, RN on 5/23/2019 at 12:37 PM

## 2019-05-23 NOTE — PROGRESS NOTES
Spoke with Dr. Lashell La regarding consult for line placement, Dr. Valdemar Flores not on call and not able to do it until the afternoon. Residents notified and new orders received.

## 2019-05-23 NOTE — PLAN OF CARE
Problem: Falls - Risk of:  Goal: Will remain free from falls  Description  Will remain free from falls  5/23/2019 1613 by Maria Esther Gardner RN  Outcome: Ongoing  Note:   Pt free from falls this shift     Problem: Infection, Septic Shock:  Goal: Will show no infection signs and symptoms  Description  Will show no infection signs and symptoms  5/23/2019 1613 by Maria Esther Gardner RN  Outcome: Ongoing  Note:   Pt free from new signs of infection, vitals improved throughout shift     Problem: Risk for Impaired Skin Integrity  Goal: Tissue integrity - skin and mucous membranes  Description  Structural intactness and normal physiological function of skin and  mucous membranes.   5/23/2019 1613 by Maria Esther Gardner RN  Outcome: Ongoing  Note:   Pt skin integrity maintained this shift

## 2019-05-23 NOTE — FLOWSHEET NOTE
05/23/19 0920   Encounter Summary   Services provided to: Patient   Referral/Consult From: Rounding   Continue Visiting   (5-23-19)   Complexity of Encounter Low   Length of Encounter 15 minutes   Routine   Type Initial   Spiritual/Buddhism   Type Ritual   Intervention Anointing   Sacraments   Sacrament of Sick-Anointing Anointed  (Garry Bhagat 5-23-19)

## 2019-05-23 NOTE — PLAN OF CARE
Nutrition Problem: Increased nutrient needs  Intervention: Food and/or Nutrient Delivery: Modify current diet  Nutritional Goals: PO intake % of most meals

## 2019-05-23 NOTE — PROGRESS NOTES
Patient admitted to ICU 3 from ER and transferred to bed. Monitors placed and patient assessed. Oriented to room and call light. Bed alarm in place.

## 2019-05-23 NOTE — PROGRESS NOTES
The patient is a 23-year-old  male, who was sent to the ED from his ECF. According to the ED physician, after returning to the ECF from dialysis, patient was noted to have a temp of 104°F, tachycardia, and altered mental status. Code status was called in ED; admitted to ICU for sepsis and pneumonia. Patient seen and examined in the ICU at this time . Home medications reconciled and additional admission orders entered.

## 2019-05-24 ENCOUNTER — APPOINTMENT (OUTPATIENT)
Dept: GENERAL RADIOLOGY | Age: 74
DRG: 871 | End: 2019-05-24
Payer: MEDICARE

## 2019-05-24 LAB
ABSOLUTE EOS #: 0.3 K/UL (ref 0–0.4)
ABSOLUTE IMMATURE GRANULOCYTE: ABNORMAL K/UL (ref 0–0.3)
ABSOLUTE LYMPH #: 1.2 K/UL (ref 1–4.8)
ABSOLUTE MONO #: 0.7 K/UL (ref 0.1–1.3)
ANION GAP SERPL CALCULATED.3IONS-SCNC: 18 MMOL/L (ref 9–17)
BASOPHILS # BLD: 1 % (ref 0–2)
BASOPHILS ABSOLUTE: 0 K/UL (ref 0–0.2)
BUN BLDV-MCNC: 58 MG/DL (ref 8–23)
BUN/CREAT BLD: ABNORMAL (ref 9–20)
C-REACTIVE PROTEIN: 91.5 MG/L (ref 0–5)
CALCIUM SERPL-MCNC: 8.6 MG/DL (ref 8.6–10.4)
CHLORIDE BLD-SCNC: 96 MMOL/L (ref 98–107)
CO2: 19 MMOL/L (ref 20–31)
CREAT SERPL-MCNC: 9.97 MG/DL (ref 0.7–1.2)
CULTURE: ABNORMAL
CULTURE: NO GROWTH
DIFFERENTIAL TYPE: ABNORMAL
EOSINOPHILS RELATIVE PERCENT: 5 % (ref 0–4)
GFR AFRICAN AMERICAN: 6 ML/MIN
GFR NON-AFRICAN AMERICAN: 5 ML/MIN
GFR SERPL CREATININE-BSD FRML MDRD: ABNORMAL ML/MIN/{1.73_M2}
GFR SERPL CREATININE-BSD FRML MDRD: ABNORMAL ML/MIN/{1.73_M2}
GLUCOSE BLD-MCNC: 139 MG/DL (ref 75–110)
GLUCOSE BLD-MCNC: 145 MG/DL (ref 75–110)
GLUCOSE BLD-MCNC: 146 MG/DL (ref 70–99)
GLUCOSE BLD-MCNC: 240 MG/DL (ref 75–110)
HCT VFR BLD CALC: 29.7 % (ref 41–53)
HEMOGLOBIN: 10.1 G/DL (ref 13.5–17.5)
IMMATURE GRANULOCYTES: ABNORMAL %
LYMPHOCYTES # BLD: 18 % (ref 24–44)
Lab: ABNORMAL
Lab: NORMAL
MAGNESIUM: 2.2 MG/DL (ref 1.6–2.6)
MCH RBC QN AUTO: 33.7 PG (ref 26–34)
MCHC RBC AUTO-ENTMCNC: 34.1 G/DL (ref 31–37)
MCV RBC AUTO: 98.7 FL (ref 80–100)
MONOCYTES # BLD: 10 % (ref 1–7)
MRSA, DNA, NASAL: NORMAL
NRBC AUTOMATED: ABNORMAL PER 100 WBC
PDW BLD-RTO: 14.4 % (ref 11.5–14.9)
PHOSPHORUS: 4.8 MG/DL (ref 2.5–4.5)
PLATELET # BLD: 81 K/UL (ref 150–450)
PLATELET ESTIMATE: ABNORMAL
PMV BLD AUTO: 8.5 FL (ref 6–12)
POTASSIUM SERPL-SCNC: 5 MMOL/L (ref 3.7–5.3)
PROCALCITONIN: 3.18 NG/ML
RBC # BLD: 3.01 M/UL (ref 4.5–5.9)
RBC # BLD: ABNORMAL 10*6/UL
SEG NEUTROPHILS: 66 % (ref 36–66)
SEGMENTED NEUTROPHILS ABSOLUTE COUNT: 4.3 K/UL (ref 1.3–9.1)
SODIUM BLD-SCNC: 133 MMOL/L (ref 135–144)
SPECIMEN DESCRIPTION: ABNORMAL
SPECIMEN DESCRIPTION: NORMAL
SPECIMEN DESCRIPTION: NORMAL
WBC # BLD: 6.5 K/UL (ref 3.5–11)
WBC # BLD: ABNORMAL 10*3/UL

## 2019-05-24 PROCEDURE — 2580000003 HC RX 258: Performed by: INTERNAL MEDICINE

## 2019-05-24 PROCEDURE — 6370000000 HC RX 637 (ALT 250 FOR IP): Performed by: INTERNAL MEDICINE

## 2019-05-24 PROCEDURE — 36415 COLL VENOUS BLD VENIPUNCTURE: CPT

## 2019-05-24 PROCEDURE — 2060000000 HC ICU INTERMEDIATE R&B

## 2019-05-24 PROCEDURE — 83735 ASSAY OF MAGNESIUM: CPT

## 2019-05-24 PROCEDURE — 6360000002 HC RX W HCPCS: Performed by: INTERNAL MEDICINE

## 2019-05-24 PROCEDURE — 6370000000 HC RX 637 (ALT 250 FOR IP): Performed by: NURSE PRACTITIONER

## 2019-05-24 PROCEDURE — P9047 ALBUMIN (HUMAN), 25%, 50ML: HCPCS | Performed by: INTERNAL MEDICINE

## 2019-05-24 PROCEDURE — 6360000002 HC RX W HCPCS: Performed by: STUDENT IN AN ORGANIZED HEALTH CARE EDUCATION/TRAINING PROGRAM

## 2019-05-24 PROCEDURE — 84100 ASSAY OF PHOSPHORUS: CPT

## 2019-05-24 PROCEDURE — 6360000002 HC RX W HCPCS: Performed by: NURSE PRACTITIONER

## 2019-05-24 PROCEDURE — 71045 X-RAY EXAM CHEST 1 VIEW: CPT

## 2019-05-24 PROCEDURE — 6370000000 HC RX 637 (ALT 250 FOR IP): Performed by: STUDENT IN AN ORGANIZED HEALTH CARE EDUCATION/TRAINING PROGRAM

## 2019-05-24 PROCEDURE — 90935 HEMODIALYSIS ONE EVALUATION: CPT

## 2019-05-24 PROCEDURE — 86140 C-REACTIVE PROTEIN: CPT

## 2019-05-24 PROCEDURE — 5A1D70Z PERFORMANCE OF URINARY FILTRATION, INTERMITTENT, LESS THAN 6 HOURS PER DAY: ICD-10-PCS | Performed by: INTERNAL MEDICINE

## 2019-05-24 PROCEDURE — 2580000003 HC RX 258: Performed by: STUDENT IN AN ORGANIZED HEALTH CARE EDUCATION/TRAINING PROGRAM

## 2019-05-24 PROCEDURE — 80048 BASIC METABOLIC PNL TOTAL CA: CPT

## 2019-05-24 PROCEDURE — 94761 N-INVAS EAR/PLS OXIMETRY MLT: CPT

## 2019-05-24 PROCEDURE — 82947 ASSAY GLUCOSE BLOOD QUANT: CPT

## 2019-05-24 PROCEDURE — 85025 COMPLETE CBC W/AUTO DIFF WBC: CPT

## 2019-05-24 PROCEDURE — 99233 SBSQ HOSP IP/OBS HIGH 50: CPT | Performed by: INTERNAL MEDICINE

## 2019-05-24 PROCEDURE — 84145 PROCALCITONIN (PCT): CPT

## 2019-05-24 PROCEDURE — 94640 AIRWAY INHALATION TREATMENT: CPT

## 2019-05-24 RX ORDER — SENNA PLUS 8.6 MG/1
2 TABLET ORAL 2 TIMES DAILY
Status: DISCONTINUED | OUTPATIENT
Start: 2019-05-24 | End: 2019-05-24

## 2019-05-24 RX ORDER — BENZONATATE 100 MG/1
200 CAPSULE ORAL 3 TIMES DAILY
Status: DISCONTINUED | OUTPATIENT
Start: 2019-05-24 | End: 2019-05-27 | Stop reason: HOSPADM

## 2019-05-24 RX ORDER — GUAIFENESIN 600 MG/1
1200 TABLET, EXTENDED RELEASE ORAL 2 TIMES DAILY
Status: DISCONTINUED | OUTPATIENT
Start: 2019-05-24 | End: 2019-05-27 | Stop reason: HOSPADM

## 2019-05-24 RX ORDER — OXYCODONE HYDROCHLORIDE 5 MG/1
2.5 TABLET ORAL EVERY 6 HOURS PRN
Status: DISCONTINUED | OUTPATIENT
Start: 2019-05-24 | End: 2019-05-27 | Stop reason: HOSPADM

## 2019-05-24 RX ORDER — OXYCODONE AND ACETAMINOPHEN 7.5; 325 MG/1; MG/1
1 TABLET ORAL EVERY 6 HOURS PRN
Status: DISCONTINUED | OUTPATIENT
Start: 2019-05-24 | End: 2019-05-24

## 2019-05-24 RX ORDER — HYDRALAZINE HYDROCHLORIDE 10 MG/1
10 TABLET, FILM COATED ORAL 3 TIMES DAILY
Status: DISCONTINUED | OUTPATIENT
Start: 2019-05-24 | End: 2019-05-27 | Stop reason: HOSPADM

## 2019-05-24 RX ORDER — MIDODRINE HYDROCHLORIDE 5 MG/1
5 TABLET ORAL 3 TIMES DAILY PRN
Status: DISCONTINUED | OUTPATIENT
Start: 2019-05-24 | End: 2019-05-27 | Stop reason: HOSPADM

## 2019-05-24 RX ORDER — PANTOPRAZOLE SODIUM 40 MG/1
40 TABLET, DELAYED RELEASE ORAL
Status: DISCONTINUED | OUTPATIENT
Start: 2019-05-25 | End: 2019-05-27 | Stop reason: HOSPADM

## 2019-05-24 RX ORDER — METOPROLOL SUCCINATE 25 MG/1
25 TABLET, EXTENDED RELEASE ORAL DAILY
Status: DISCONTINUED | OUTPATIENT
Start: 2019-05-24 | End: 2019-05-27 | Stop reason: HOSPADM

## 2019-05-24 RX ORDER — OXYCODONE HYDROCHLORIDE AND ACETAMINOPHEN 5; 325 MG/1; MG/1
1 TABLET ORAL EVERY 6 HOURS PRN
Status: DISCONTINUED | OUTPATIENT
Start: 2019-05-24 | End: 2019-05-27 | Stop reason: HOSPADM

## 2019-05-24 RX ORDER — MIDODRINE HYDROCHLORIDE 10 MG/1
10 TABLET ORAL
Status: DISCONTINUED | OUTPATIENT
Start: 2019-05-24 | End: 2019-05-24

## 2019-05-24 RX ORDER — HYDROMORPHONE HYDROCHLORIDE 2 MG/1
2 TABLET ORAL EVERY 12 HOURS PRN
Status: DISCONTINUED | OUTPATIENT
Start: 2019-05-24 | End: 2019-05-27 | Stop reason: HOSPADM

## 2019-05-24 RX ORDER — GABAPENTIN 100 MG/1
100 CAPSULE ORAL 3 TIMES DAILY
Status: DISCONTINUED | OUTPATIENT
Start: 2019-05-24 | End: 2019-05-27 | Stop reason: HOSPADM

## 2019-05-24 RX ORDER — AMLODIPINE BESYLATE 2.5 MG/1
2.5 TABLET ORAL DAILY
Status: DISCONTINUED | OUTPATIENT
Start: 2019-05-24 | End: 2019-05-27 | Stop reason: HOSPADM

## 2019-05-24 RX ORDER — MIDODRINE HYDROCHLORIDE 10 MG/1
10 TABLET ORAL
Status: DISCONTINUED | OUTPATIENT
Start: 2019-05-24 | End: 2019-05-26

## 2019-05-24 RX ADMIN — HYDROMORPHONE HYDROCHLORIDE 2 MG: 2 TABLET ORAL at 09:58

## 2019-05-24 RX ADMIN — SUCRALFATE 1 G: 1 TABLET ORAL at 12:14

## 2019-05-24 RX ADMIN — HEPARIN SODIUM 5000 UNITS: 5000 INJECTION INTRAVENOUS; SUBCUTANEOUS at 13:35

## 2019-05-24 RX ADMIN — SEVELAMER CARBONATE 3200 MG: 800 TABLET, FILM COATED ORAL at 12:15

## 2019-05-24 RX ADMIN — DICLOFENAC 2 G: 10 GEL TOPICAL at 08:36

## 2019-05-24 RX ADMIN — DICLOFENAC 2 G: 10 GEL TOPICAL at 21:30

## 2019-05-24 RX ADMIN — HYDRALAZINE HYDROCHLORIDE 10 MG: 10 TABLET, FILM COATED ORAL at 21:48

## 2019-05-24 RX ADMIN — INSULIN LISPRO 4 UNITS: 100 INJECTION, SOLUTION INTRAVENOUS; SUBCUTANEOUS at 12:14

## 2019-05-24 RX ADMIN — GABAPENTIN 100 MG: 100 CAPSULE ORAL at 21:48

## 2019-05-24 RX ADMIN — METOPROLOL SUCCINATE 25 MG: 25 TABLET, EXTENDED RELEASE ORAL at 09:58

## 2019-05-24 RX ADMIN — HYDRALAZINE HYDROCHLORIDE 10 MG: 10 TABLET, FILM COATED ORAL at 13:41

## 2019-05-24 RX ADMIN — AMLODIPINE BESYLATE 2.5 MG: 2.5 TABLET ORAL at 09:58

## 2019-05-24 RX ADMIN — PIPERACILLIN SODIUM,TAZOBACTAM SODIUM 2.25 G: 2; .25 INJECTION, POWDER, FOR SOLUTION INTRAVENOUS at 08:36

## 2019-05-24 RX ADMIN — INSULIN GLARGINE 10 UNITS: 100 INJECTION, SOLUTION SUBCUTANEOUS at 21:48

## 2019-05-24 RX ADMIN — SEVELAMER CARBONATE 3200 MG: 800 TABLET, FILM COATED ORAL at 08:34

## 2019-05-24 RX ADMIN — GUAIFENESIN 1200 MG: 600 TABLET, EXTENDED RELEASE ORAL at 21:51

## 2019-05-24 RX ADMIN — BENZONATATE 200 MG: 100 CAPSULE ORAL at 21:48

## 2019-05-24 RX ADMIN — Medication 10 ML: at 21:30

## 2019-05-24 RX ADMIN — ALLOPURINOL 100 MG: 100 TABLET ORAL at 08:35

## 2019-05-24 RX ADMIN — Medication 10 ML: at 21:49

## 2019-05-24 RX ADMIN — HEPARIN SODIUM 5000 UNITS: 5000 INJECTION INTRAVENOUS; SUBCUTANEOUS at 07:19

## 2019-05-24 RX ADMIN — PIPERACILLIN SODIUM,TAZOBACTAM SODIUM 2.25 G: 2; .25 INJECTION, POWDER, FOR SOLUTION INTRAVENOUS at 22:30

## 2019-05-24 RX ADMIN — DICLOFENAC 2 G: 10 GEL TOPICAL at 13:41

## 2019-05-24 RX ADMIN — ALBUMIN (HUMAN) 25 G: 0.25 INJECTION, SOLUTION INTRAVENOUS at 02:38

## 2019-05-24 RX ADMIN — SODIUM CHLORIDE: 9 INJECTION, SOLUTION INTRAVENOUS at 02:38

## 2019-05-24 RX ADMIN — GUAIFENESIN 1200 MG: 600 TABLET, EXTENDED RELEASE ORAL at 13:35

## 2019-05-24 RX ADMIN — ALBUTEROL SULFATE 2.5 MG: 2.5 SOLUTION RESPIRATORY (INHALATION) at 16:19

## 2019-05-24 RX ADMIN — ALBUTEROL SULFATE 2.5 MG: 2.5 SOLUTION RESPIRATORY (INHALATION) at 07:25

## 2019-05-24 RX ADMIN — FLUTICASONE PROPIONATE 2 PUFF: 110 AEROSOL, METERED RESPIRATORY (INHALATION) at 08:35

## 2019-05-24 RX ADMIN — GABAPENTIN 100 MG: 100 CAPSULE ORAL at 13:35

## 2019-05-24 RX ADMIN — MULTIPLE VITAMINS W/ MINERALS TAB 1 TABLET: TAB at 08:35

## 2019-05-24 RX ADMIN — BENZONATATE 200 MG: 100 CAPSULE ORAL at 13:35

## 2019-05-24 RX ADMIN — GABAPENTIN 100 MG: 100 CAPSULE ORAL at 09:58

## 2019-05-24 RX ADMIN — SUCRALFATE 1 G: 1 TABLET ORAL at 08:35

## 2019-05-24 RX ADMIN — VANCOMYCIN HYDROCHLORIDE 1000 MG: 1 INJECTION, POWDER, LYOPHILIZED, FOR SOLUTION INTRAVENOUS at 15:15

## 2019-05-24 RX ADMIN — SERTRALINE HYDROCHLORIDE 50 MG: 50 TABLET ORAL at 08:35

## 2019-05-24 RX ADMIN — ASPIRIN 81 MG 81 MG: 81 TABLET ORAL at 08:35

## 2019-05-24 RX ADMIN — ALBUTEROL SULFATE 2.5 MG: 2.5 SOLUTION RESPIRATORY (INHALATION) at 11:29

## 2019-05-24 ASSESSMENT — ENCOUNTER SYMPTOMS
BACK PAIN: 0
SHORTNESS OF BREATH: 0
VOMITING: 0
ABDOMINAL PAIN: 0
NAUSEA: 0
DIARRHEA: 0
SORE THROAT: 0
SINUS PRESSURE: 0
WHEEZING: 0
SINUS PAIN: 0
COUGH: 1

## 2019-05-24 ASSESSMENT — PAIN SCALES - GENERAL
PAINLEVEL_OUTOF10: 0
PAINLEVEL_OUTOF10: 5
PAINLEVEL_OUTOF10: 0

## 2019-05-24 ASSESSMENT — PAIN DESCRIPTION - LOCATION: LOCATION: HIP

## 2019-05-24 ASSESSMENT — PAIN DESCRIPTION - PAIN TYPE: TYPE: CHRONIC PAIN

## 2019-05-24 ASSESSMENT — PAIN DESCRIPTION - ORIENTATION: ORIENTATION: RIGHT

## 2019-05-24 NOTE — PROGRESS NOTES
ICU Progress Note (Non-Vent)  [facility]    Patient - Kizzy Shaikh,  Age - 75 y.o.    - 1945      Room Number -    N -  [id number]   [facility]  Date of Admission -  2019  7:55 PM    Events of Past 24 Hours   Alert, orientated feels much better today however still coughing    Vitals    height is 5' 10\" (1.778 m) and weight is 196 lb 3.4 oz (89 kg). His oral temperature is 97.9 °F (36.6 °C). His blood pressure is 162/65 (abnormal) and his pulse is 93. His respiration is 20 and oxygen saturation is 98%.        Temperature Range: Temp: 97.9 °F (36.6 °C) Temp  Av.4 °F (36.9 °C)  Min: 97.9 °F (36.6 °C)  Max: 99 °F (37.2 °C)  BP Range:  Systolic (54NJE), YSY:480 , Min:87 , AXY:066     Diastolic (41KPS), CIE:99, Min:37, Max:80    Pulse Range: Pulse  Av.6  Min: 72  Max: 113  Respiration Range: Resp  Av.5  Min: 10  Max: 27  Current Pulse Ox[de-identified]  SpO2: 98 %  24HR Pulse Ox Range:  SpO2  Av.5 %  Min: 94 %  Max: 100 %  Oxygen Amount and Delivery: O2 Flow Rate (L/min): 2 L/min    Wt Readings from Last 3 Encounters:   19 196 lb 3.4 oz (89 kg)   19 180 lb (81.6 kg)   19 180 lb 12.4 oz (82 kg)     I/O       Intake/Output Summary (Last 24 hours) at 2019 1250  Last data filed at 2019 0400  Gross per 24 hour   Intake 2722 ml   Output --   Net 2722 ml     DRAIN/TUBE OUTPUT       Invasive Lines   ICP PRESSURE RANGE  No data recorded  CVP PRESSURE RANGE  No data recorded      Medications      vancomycin  1,000 mg Intravenous Once    amLODIPine  2.5 mg Oral Daily    gabapentin  100 mg Oral TID    hydrALAZINE  10 mg Oral TID    metoprolol succinate  25 mg Oral Daily    midodrine  10 mg Oral Once per day on     Pentafluoroprop-Tetrafluoroeth   Apply externally Once per day on     [START ON 2019] pantoprazole  40 mg Oral QAM AC  sodium chloride flush  10 mL Intravenous 2 times per day    diclofenac sodium  2 g Topical TID    heparin (porcine)  5,000 Units Subcutaneous 3 times per day    insulin lispro  0-12 Units Subcutaneous TID WC    insulin lispro  0-6 Units Subcutaneous Nightly    piperacillin-tazobactam  2.25 g Intravenous Q12H    allopurinol  100 mg Oral Daily    aspirin  81 mg Oral QAM    insulin glargine  10 Units Subcutaneous Nightly    therapeutic multivitamin-minerals  1 tablet Oral Daily    sennosides-docusate sodium  2 tablet Oral Once per day on Sun Tue Thu Sat    sertraline  50 mg Oral Daily    sucralfate  1 g Oral TID AC    sevelamer  3,200 mg Oral TID WC    fluticasone  2 puff Inhalation Daily    albuterol  2.5 mg Nebulization 4x daily    vancomycin (VANCOCIN) intermittent dosing (placeholder)   Other RX Placeholder     HYDROmorphone, oxyCODONE **AND** oxyCODONE-acetaminophen, midodrine, sodium chloride flush, acetaminophen, polyvinyl alcohol, ondansetron, albuterol, glucose, dextrose, glucagon (rDNA), dextrose, calcium carbonate, polyethylene glycol, tetrahydrozoline, PIPERACILLIN-TAZOBACTAM (ZOSYN) 0.75 GM IVPB, sodium chloride flush  IV Drips/Infusions   dextrose         Diet/Nutrition   DIET RENAL; Carb Control: 5 carb choices (75 gms)/meal; Daily Fluid Restriction: 1500 ml    Exam      Constitutional - Alert, arousable  General Appearance  well developed, well nourished  HEENT -normocephalic, atraumatic. PERRLA, abnormal upper airway  Lungs - Chest expands equally, improved aeration rare rhonchi no wheezes Cardiovascular - Heart sounds are normal.  normal rate and rhythm regular, no murmur, gallop or rub. Abdomen - soft, nontender, nondistended, no masses or organomegaly  Neurologic - CN II-XII are grossly intact.  There are no focal motor deficits  Skin - no bruising or bleeding  Extremities - no cyanosis, clubbing or edema    Lab Results   CBC     Lab Results   Component Value Date    WBC 6.5 05/24/2019    RBC 3.01 05/24/2019    HGB 10.1 05/24/2019    HCT 29.7 05/24/2019    PLT 81 05/24/2019    MCV 98.7 05/24/2019    MCH 33.7 05/24/2019    MCHC 34.1 05/24/2019    RDW 14.4 05/24/2019    LYMPHOPCT 18 05/24/2019    MONOPCT 10 05/24/2019    BASOPCT 1 05/24/2019    MONOSABS 0.70 05/24/2019    LYMPHSABS 1.20 05/24/2019    EOSABS 0.30 05/24/2019    BASOSABS 0.00 05/24/2019    DIFFTYPE NOT REPORTED 05/24/2019       BMP   Lab Results   Component Value Date     05/24/2019    K 5.0 05/24/2019    CL 96 05/24/2019    CO2 19 05/24/2019    BUN 58 05/24/2019    CREATININE 9.97 05/24/2019    GLUCOSE 146 05/24/2019       LFTS  Lab Results   Component Value Date    ALKPHOS 308 05/22/2019    ALT 32 05/22/2019    AST 29 05/22/2019    PROT 7.6 05/22/2019    PROT 5.7 11/27/2012    BILITOT 0.44 05/22/2019    BILIDIR 0.18 06/30/2016    IBILI 0.27 06/30/2016    LABALBU 3.8 05/22/2019       ABG ABGs: No results found for: PHART, PO2ART, LXG5UBN    No results found for: IFIO2, MODE, SETTIDVOL, SETPEEP      INR  Recent Labs     05/22/19 1956   PROTIME 13.5   INR 1.0       APTT  Recent Labs     05/22/19 1956   APTT 53.6*       Lactic Acid  Lab Results   Component Value Date    LACTA 2.0 05/22/2019    LACTA 2.3 05/22/2019    LACTA 0.9 09/07/2018        BNP   No results for input(s): BNP in the last 72 hours. Cultures   Sputum cultures pending  Respiratory pathogen panel negative    Radiology     CXR      X-ray shows improvement in left lingular infiltrate and vascular congestion      SYSTEMS ASSESSMENT    Lingular pneumonia  End-stage renal disease on dialysis  Probable AMENA    Neuro   Mental Status has improved significantly with treatment of infection    Respiratory   Wean oxygen as tolerated.  Keep O2 sat > 88%  Continue albuterol aerosols 4 times a day  I encouraged him to use his flutter valve  Continue  Mucinex  Outpatient PFTs and sleep study    Cardiovascular   Echo shows normal EF  I suspect mild elevation RVSP probably secondary to sleep apnea     Gastrointestinal   He is eating well    Renal    dialysis per nephrology     Infectious Disease     Continuing Zosyn and vancomycin until cultures are back  Hematology/Oncology   On subcu heparin for DVT prophylaxis     Endocrine   blood sugars should be monitored     Social/Spiritual/DNR/Disposition/Other    can transfer out of intermediate unit to stepdown     Critical Care Time   0 min    Electronically signed by Jerrod Ford MD on 5/24/2019 at 12:50 PM

## 2019-05-24 NOTE — PROGRESS NOTES
Nephrology ESRD Consult Note    Reason for Consult:  End stage renal disease      Subjective/interval history:    Patient is more awake alert oriented, no fever no chills. Blood pressure stable. Patient is scheduled for dialysis today. History of Present Illness: This is a 75 y. o. male with a significant past medical history of Type 2 DM, coronary artery disease [status post CABG], systemic hypertension and End-stage renal disease secondary to diabetic Nephropathy [on routine hemodialysis Monday/Wednesday/Friday at [facility] using a right radiocephalic AV fistula and not on active transplant list], who presented to the ER with altered mental status. He was at his dialysis yesterday and completed his treatments. I discussed with the  dialysis RN at his O/P centre who reported that the patient had no fever and no significant complaints during the dialysis treatment. Shortly after returning to [facility] nursing home, he complained of chills and had a fever reported at 104. He was also noted to have altered mental status and sent to the ER. No documented history of nausea, vomiting, diarrhea or sick contacts at the nursing home. No shortness of breath chest pain or headache. Notably he was hospitalized in general 2019 for influenza A and treated in November 2018 for cellulitis of the AV fistula site. He did have a UTI in September 2018 isolated strep beta-hemolytic. Patient was hypotensive in the ER and received 1 L bolus of saline. This morning he received an extra 500 mils cc of 0.9 saline and IV albumin was started per nephrology recommendations. His lactic acid was 2.3. He responsed to the fluid bolus and albumin with blood pressure in the 427 systolic this morning. His mentation is improved. He has been started on broad-spectrum IV antibiotics with vancomycin and Zosyn and blood cultures sent. He had a lumbar CT scan which showed no evidence for acute discitis/osteomyelitis. Past Medical History:        Diagnosis Date    Asthma     CAD (coronary artery disease)     CKD (chronic kidney disease) stage 4, GFR 15-29 ml/min ([facility])     CV[facility] accident[facility] [address]     Depression     History of kidney stones     Hyperlipidemia     Hypertension     Osteoarthritis     Proteinuria     Type II or unspecified type diabetes mellitus without mention of complication, not stated as uncontrolled            Past Surgical History:        Procedure Laterality Date    BONE BIOPSY  6/29/15    L2-3    CARPAL TUNNEL RELEASE      CORONARY ARTERY BYPASS GRAFT      HIP SURGERY      JOINT REPLACEMENT      OTHER SURGICAL HISTORY  6/11/15    I&D coccyx wound    OTHER SURGICAL HISTORY  12/30/15    diverting loop colostomy; perirectal incision and drainage    SHOULDER SURGERY      left and right       Current Medications:      vancomycin 1000 mg IVPB in 250 mL D5W addavial Once   amLODIPine (NORVASC) tablet 2.5 mg Daily   gabapentin (NEURONTIN) capsule 100 mg TID   hydrALAZINE (APRESOLINE) tablet 10 mg TID   HYDROmorphone (DILAUDID) tablet 2 mg Q12H PRN   metoprolol succinate (TOPROL XL) extended release tablet 25 mg Daily   midodrine (PROAMATINE) tablet 10 mg Once per day on Mon Wed Fri   Pentafluoroprop-Tetrafluoroeth (PAIN EASE) spray Once per day on Mon Wed Fri   oxyCODONE (ROXICODONE) immediate release tablet 2.5 mg Q6H PRN   And    oxyCODONE-acetaminophen (PERCOCET) 5-325 MG per tablet 1 tablet Q6H PRN   [START ON 5/25/2019] pantoprazole (PROTONIX) tablet 40 mg QAM AC   midodrine (PROAMATINE) tablet 5 mg TID PRN   sodium chloride flush 0.9 % injection 10 mL 2 times per day   sodium chloride flush 0.9 % injection 10 mL PRN   acetaminophen (TYLENOL) suppository 650 mg Q4H PRN   polyvinyl alcohol (LIQUIFILM TEARS) 1.4 % ophthalmic solution 1 drop TID PRN   diclofenac sodium 1 % gel 2 g TID   ondansetron (ZOFRAN) injection 4 mg Q6H PRN   albuterol (PROVENTIL) nebulizer solution 2.5 mg Q2H PRN   heparin (porcine) injection 5,000 Units 3 times per day   glucose (GLUTOSE) 40 % oral gel 15 g PRN   dextrose 50 % solution 12.5 g PRN   glucagon (rDNA) injection 1 mg PRN   dextrose 5 % solution PRN   insulin lispro (HUMALOG) injection vial 0-12 Units TID WC   insulin lispro (HUMALOG) injection vial 0-6 Units Nightly   0.9 % sodium chloride infusion Continuous   piperacillin-tazobactam (ZOSYN) 2.25 g in dextrose 5 % 50 mL IVPB (mini-bag) Q12H   allopurinol (ZYLOPRIM) tablet 100 mg Daily   aspirin chewable tablet 81 mg QAM   calcium carbonate (TUMS) chewable tablet 1,000 mg Q6H PRN   insulin glargine (LANTUS) injection vial 10 Units Nightly   therapeutic multivitamin-minerals 1 tablet Daily   polyethylene glycol (GLYCOLAX) packet 17 g Daily PRN   sennosides-docusate sodium (SENOKOT-S) 8.6-50 MG tablet 2 tablet Once per day on Sun Tue Thu Sat   sertraline (ZOLOFT) tablet 50 mg Daily   sucralfate (CARAFATE) tablet 1 g TID AC   tetrahydrozoline 0.05 % ophthalmic solution 1 drop BID PRN   sevelamer (RENVELA) tablet 3,200 mg TID WC   fluticasone (FLOVENT HFA) 110 MCG/ACT inhaler 2 puff Daily   piperacillin-tazobactam (ZOSYN) 0.75 g in dextrose 5 % 50 mL IVPB PRN   albuterol (PROVENTIL) nebulizer solution 2.5 mg 4x daily   vancomycin (VANCOCIN) intermittent dosing (placeholder) RX Placeholder   sodium chloride flush 0.9 % injection 10 mL PRN       Allergies:  Oxycontin [oxycodone]; Avodart [dutasteride]; and Darvocet [propoxyphene n-acetaminophen]  .     Objective:  Constitutional:    CURRENT TEMPERATURE:  Temp: 97.9 °F (36.6 °C)  MAXIMUM TEMPERATURE OVER 24HRS:  Temp (24hrs), Av.4 °F (36.9 °C), Min:97.9 °F (36.6 °C), Max:99 °F (37.2 °C)    CURRENT RESPIRATORY RATE:  Resp: 20  CURRENT PULSE:  Pulse: 93  CURRENT BLOOD PRESSURE:  BP: (!) 162/65  24HR BLOOD PRESSURE RANGE:  Systolic (96RAU), JNX:939 , Min:87 , XLJ:637   ; Diastolic (01DNV), UCW:34, Min:37, Max:80    24HR INTAKE/OUTPUT:      Intake/Output Summary (Last 24 hours) at 5/24/2019 1241  Last data filed at 5/24/2019 0400  Gross per 24 hour   Intake 2722 ml   Output --   Net 2722 ml           Physical Exam:  GENERAL APPEARANCE: Alert and cooperative, and appears to be in no acute distress. HEAD: normocephalic  EYES: PERRL, EOMI. Not pale, anicteric   NOSE:  No nasal discharge. THROAT:  Oral cavity and pharynx normal. Moist  NECK: Neck supple, non-tender without lymphadenopathy, masses or thyromegaly. JVD-neg  CARDIAC: Normal S1 and S2. No S3, S4 or murmurs. Rhythm is regular. LUNGS: Clear to auscultation and percussion without rales, rhonchi, wheezing or diminished breath sounds. ABD-Soft non distended, BS+ Non tender no organomegally  BACK: Examination of the spine reveals  no spinal deformity, without tenderness,   MUSKULOSKELETAL: Adequately aligned spine. No joint erythema or tenderness. EXTREMITIES: No edema. Peripheral pulses intact. NEURO:Alert oriented x 3 ,power 5/5 in all extremities      Labs:  PTH:  No results found for: PTH  abs:   CBC:   Recent Labs     05/22/19 1956 05/23/19 0442 05/23/19  1135 05/24/19  0838   WBC 8.0 10.9  --  6.5   RBC 3.81* 3.25*  --  3.01*   HGB 13.0* 10.9* 10.3* 10.1*   HCT 38.2* 31.9* 30.7* 29.7*   .1* 98.4  --  98.7   MCH 34.1* 33.5  --  33.7   MCHC 34.1 34.1  --  34.1   RDW 14.0 14.0  --  14.4   * 92*  --  81*   MPV 9.4 9.0  --  8.5      BMP:   Recent Labs     05/22/19 1956 05/23/19 0442 05/24/19  0422   * 129* 133*   K 5.0 4.7 5.0   CL 91* 95* 96*   CO2 22 20 19*   BUN 41* 47* 58*   CREATININE 8.00* 8.89* 9.97*   GLUCOSE 275* 176* 146*   CALCIUM 9.3 8.0* 8.6        Phosphorus:    Recent Labs     05/24/19 0422   PHOS 4.8*     Magnesium:   Recent Labs     05/24/19 0422   MG 2.2     Albumin:   Recent Labs     05/22/19 1956   LABALBU 3.8      1. ESRD -  will maintain MWF hemodialysis schedule.    Renal diet,i.e 2-gram sodium,2-gram potassium,1500 ml fluid restriction,1-gram phosphorus,

## 2019-05-24 NOTE — DISCHARGE INSTR - COC
Continuity of Care Form    Patient Name: Kenisha Almazan   :  1945  MRN:  [id number]    Admit date:  2019  Discharge date:  19    Code Status Order: Full Code   Advance Directives:   [facility] Documentation     Date/Time Healthcare Directive Type of Healthcare Directive Copy in [facility] [address] Agent's Name Healthcare Agent's Phone Number    19 0025  Yes, patient has an advance directive for healthcare treatment  --  --  --  --  --          Admitting Physician:  Christen Tovar MD  PCP: Lieutenant Gaby MD    Discharging Nurse: [facility] Unit/Room#:   Discharging Unit Phone Number: [phone]    Emergency Contact:   Extended Emergency Contact Information  Primary Emergency Contact: Emelyn Oneill  Address: [address] [place] [address] Phone: [phone]  Mobile Phone: [phone]  Relation: Child  Hearing or visual needs: None  Other needs: None  Preferred language: English   needed? No  Secondary Emergency Contact: Nick Hubbard  Address: [address] Phone: [phone]  Relation: Child  Hearing or visual needs: None  Other needs: None  Preferred language: English   needed? No    Past Surgical History:  Past Surgical History:   Procedure Laterality Date    BONE BIOPSY  6/29/15    L2-3    CARPAL TUNNEL RELEASE      CORONARY ARTERY BYPASS GRAFT      HIP SURGERY      JOINT REPLACEMENT      OTHER SURGICAL HISTORY  6/11/15    I&D coccyx wound    OTHER SURGICAL HISTORY  12/30/15    diverting loop colostomy; perirectal incision and drainage    SHOULDER SURGERY      left and right       Immunization History: There is no immunization history on file for this patient.     Active Problems:  Patient Active Problem List   Diagnosis Code    Hypertension I10    CKD (chronic kidney disease) stage 4, GFR 15-29 ml/min N18.4    Decubitus ulcer L89.90    Osteomyelitis of lumbar spine ([facility]) M46.26    Lumbar discitis M46.46    Discitis of lumbar region M46.46    ESRD (end stage renal disease) ([facility] [address] N18.6    Protein-calorie malnutrition ([facility]) E46    Thrombocytopenia ([facility]) D69.6    Perianal abscess K61.0    Diabetes mellitus ([facility] [address] E11.9    SIRS (systemic inflammatory response syndrome) ([facility]) R65.10    Sepsis ([facility] [address] A41.9    Abscess of anal or rectal region K61.2    Pneumonia of left lower lobe due to infectious organism ([facility] J18.1    Acute exacerbation of chronic obstructive airways disease ([facility]) J44.1    Hyponatremia E87.1    Cellulitis of forearm, right L03. 80    AVF (arteriovenous fistula) ([facility]) I77.0    Cellulitis of forearm L03.119    Influenza J11.1    ESRD on hemodialysis ([facility]) N18.6, Z99.2       Isolation/Infection:   Isolation          Contact        Patient Infection Status     Infection Encounter Level?  Onset Date Added Added By Resolved Resolved By Review Date    MRSA No  01/01/16 José Solomon, GIO       Nasal screen - 1/2016          Nurse Assessment:  Last Vital Signs: BP (!) 144/77   Pulse 89   Temp 98.7 °F (37.1 °C) (Oral)   Resp 18   Ht 5' 10\" (1.778 m)   Wt 196 lb 3.4 oz (89 kg)   SpO2 96%   BMI 28.15 kg/m²     Last documented pain score (0-10 scale): Pain Level: 5  Last Weight:   Wt Readings from Last 1 Encounters:   05/24/19 196 lb 3.4 oz (89 kg)     Mental Status:  oriented, alert, coherent and thought processes intact    IV Access:  - AV Fistula    Nursing Mobility/ADLs:  Walking   Dependent  Transfer  Dependent  Bathing  Assisted  Dressing  Assisted  Toileting  Dependent  Feeding  Assisted  Med Admin  Assisted  Med Delivery   whole    Wound Care Documentation and Therapy:  Wound 01/25/19 Toe (Comment  which one) Medial;Lateral;Right round, open, dry (Active)   Wound Image    5/23/2019  4:15 PM   Wound Pressure Stage  1 5/23/2019  8:00 PM   Dressing Status Intact 5/24/2019  4:00 AM   Dressing Changed Changed/New 5/23/2019  4:15 PM   Dressing/Treatment Foam 5/23/2019  4:15 PM   Wound Cleansed Rinsed/Irrigated with saline 5/23/2019  4:15 PM   Wound Length (cm) 0.3 cm 5/23/2019  4:15 PM   Wound Width (cm) 0.3 cm 5/23/2019  4:15 PM   Wound Depth (cm) 0.2 cm 5/23/2019  4:15 PM   Wound Surface Area (cm^2) 0.09 cm^2 5/23/2019  4:15 PM   Change in Wound Size % (l*w) 64 5/23/2019  4:15 PM   Wound Volume (cm^3) 0.02 cm^3 5/23/2019  4:15 PM   Wound Assessment [place];Drainage 5/23/2019  4:15 PM   Drainage Amount Small 5/23/2019  4:15 PM   Drainage Description Other (Comment) 5/24/2019  4:00 AM   Odor None 5/23/2019  4:15 PM   Margins Defined edges 5/23/2019  4:15 PM   Chana-wound Assessment Maceration 5/23/2019  4:15 PM   [place]%Wound Bed 100 5/23/2019  4:15 PM   Number of days: 119       Wound 05/23/19 Buttocks (Active)   Wound Other 5/24/2019  4:00 AM   Dressing/Treatment Barrier Film 5/24/2019 12:00 AM   Wound Cleansed Other (Comment) 5/23/2019  4:15 PM   Wound Assessment Drainage 5/23/2019  4:15 PM   Drainage Amount None 5/24/2019  4:00 AM   Drainage Description Serosanguinous 5/23/2019  4:15 PM   Odor None 5/24/2019  4:00 AM   Chana-wound Assessment Denuded 5/23/2019  4:15 PM   Number of days: 0        Elimination:  Continence:   · Bowel: Yes  · Bladder: Yes  Urinary Catheter: None   Colostomy/Ileostomy/Ileal Conduit: No       Date of Last BM: 5/27/19    Intake/Output Summary (Last 24 hours) at 5/24/2019 1008  Last data filed at 5/24/2019 0400  Gross per 24 hour   Intake 2722 ml   Output --   Net 2722 ml     I/O last 3 completed shifts: In: 2722 [P.O.:360; I.V.:2362]  Out: -     Safety Concerns: At Risk for Falls    Impairments/Disabilities:      Vision and Hearing    Nutrition Therapy:  Current Nutrition Therapy:   - Oral Diet:  Renal    Routes of Feeding: Oral  Liquids:  Thin Liquids  Daily Fluid Restriction: no  Last Modified Barium Swallow with Video (Video Swallowing Test): not done    Treatments at the Time

## 2019-05-24 NOTE — FLOWSHEET NOTE
05/24/19 1112   Encounter Summary   Services provided to: Patient   Referral/Consult From: Nubia   Complexity of Encounter Low   Length of Encounter 15 minutes   Routine   Intervention [employer]   Spiritual/Yazidism   Type Spiritual support   Assessment Sleeping   Intervention Prayer

## 2019-05-24 NOTE — CONSULTS
[address] [facility]                 [address] [facility], [address]                                  CONSULTATION    PATIENT NAME: Cecilia Herzog                     :        1945  MED REC NO:   [id number]                              ROOM:       2003  ACCOUNT NO:   [de-identified]                           ADMIT DATE: 2019  PROVIDER:     Raffi Zamarripa    CONSULT DATE:  2019    CHIEF COMPLAINT  Dyspnea, cough, hypotension. HISTORY OF PRESENT ILLNESS:  The patient is a 69-year-old   gentleman with a history of longstanding end-stage renal disease on  dialysis. He is from [facility] and presented with mental status  changes after dialysis yesterday. The patient, apparently he had a  fever of 104 and when he came in to the emergency room there was concern  about where the source of fever is. He was scanned from the head to the  neck, to the abdomen and pelvis. The patient did have a left lingular  pneumonia, which was seen on the CAT scan and I do agree that there was  an infiltrate there. He was hospitalized in January for influenza A and then previous  hospitalization in September with beta-hemolytic strep in his urine and  also in 2018 with cellulitis of the fistulas. The patient says he coughs up some yellowish phlegm. He thinks that it  may have gotten worse recently. He is also mildly short of breath. When he came in, he was noted to be at dialysis, was hypertensive; when  he came to the ER, he was normotensive but when he came to the floor in  the ICU, he was hypotensive but responded to several fluid boluses. The  patient denies any symptoms of sleep-disordered breathing but he admits  that he is tired. Also, he does not know if he snores because no one is  with him in the room. ALLERGIES:  OXYCONTIN, AVODART, DARVOCET. PAST MEDICAL HISTORY:  Significant for end-stage renal disease on  dialysis, he says since . Type 2 diabetes, hypertension,  osteoarthritis, nephrolithiasis, history of CVA. He has got a history  of coronary artery disease. As far as I can tell, he has not had a  recent echo. PAST SURGICAL HISTORY:  He did have coronary artery bypass grafting  done. He has had diverting loop colostomy, perirectal incision and  drainage, left and right shoulder surgery, carpal tunnel surgery. MEDICATIONS:  His medication list was reviewed. Pulmonary wise, he is  on albuterol inhaler and he is also on fluticasone nasal spray and  fluticasone regular inhaler. FAMILY HISTORY:  Noncontributory. SOCIAL HISTORY:  He is a nonsmoker, no alcohol or illicit drug use. REVIEW OF SYSTEMS:  As in the present illness. All other systems were  reviewed and are negative. PHYSICAL EXAMINATION:  GENERAL APPEARANCE:  Elderly gentleman in no acute respiratory distress. Currently, alert, oriented and on room air, saturation is about 93%. VITAL SIGNS:  His temperature here currently is 99 but when he came in  the ER was 102.9. Respiratory rate is 23, pulse is 88, blood pressure  is 96/60. Oxygen saturation is normal on room air. HEENT:  Notable for micrognathia and retrognathia as well as  macroglossia and low overhanging soft palate. LUNGS:  Diminished bilaterally but no wheezes, no rhonchi can be heard. There is no tenderness to palpation or dullness to percussion. CARDIAC:  S1, S2.  ABDOMEN:  Exam is soft, nontender, nondistended. EXTREMITIES:  His fistula is in place. There is no significant lower  extremity edema. NEUROLOGICAL:  There are no focal deficits. He appears to me to be  slightly slow, when he is responding to questions. LABORATORY DATA:  Sodium 129, potassium 4.7, chloride 95, bicarb 20, BUN  of 47, creatinine 8.89. Blood sugars are in the 200s, white count is  7.9 with an H and H of 10.9 and 31.9 and a platelet count of 92.     IMPRESSION:  My impression is that the patient has evidence of left  lingular pneumonia, seen on CAT scan of the chest.  He also may have a  recurrent urinary infection. At this point, he has been started on  Zosyn and vancomycin appropriately. We will also get a sputum culture. I would like to make his albuterol aerosols four times a day. He is not  a smoker. We will add Acapella to his regimen as well. I think he  would benefit from Mucinex. I do believe he has sleep apnea and will  require a sleep study at some point but since he is at [facility], we  may be able to get him an auto CPAP machine. For now, we will just keep  him on oxygen at nighttime. We will await cultures and serologies and  we will get a respiratory viral pathogen panel as well. Further  recommendations to follow.         Glenn Rodríguez    D: 05/23/2019 15:13:50       T: 05/23/2019 15:22:34     [email or web address]  Job#: [id number]     Doc#: [id number]    CC:

## 2019-05-24 NOTE — PROGRESS NOTES
[address] [facility].    PROGRESS NOTE             5/24/2019    8:12 AM    Name:   Isaiah Martinez  MRN:     [id number]     Acct:      [de-identified]   Room:   2003/2003-01  IP Day:  2  Admit Date:  5/22/2019  7:55 PM    PCP:  Kaila Lassiter MD  Code Status:  Full Code    Subjective:     C/C:   Chief Complaint   Patient presents with    Altered Mental Status     Interval History Status: improved. Patient was seen and examined at the bedside this morning  No acute overnight events were noted  Vital signs are stable. T-max of 38 Celsius on May 23, 2019 at 0015, no fevers noted since. Patient is on room air, indicates that his breathing has improved since yesterday  No complaints of any chest pain, discomfort, pressure. Patient is alert and oriented to person and place. He is oriented to the year and the month but not the date. This is improved since yesterday and the patient feels that he is nearly back to his baseline at this time. He did not receive hemodialysis yesterday. Brief History:     Per Epic EMR H&P:    \"The patient is a 75 y.o. / male who presents withAltered Mental Status   and he is admitted to the hospital for medical management.     Per [employer] EMR, the patient presented to the ER with altered mental status after dialysis today. He had a fever during dialysis of 104F and then had altered mental status. His is noted to be alert and oriented at baseline. Code sepsis was called with etiology either pulmonary, abdominal and possibly meningitis. Patient currently lives at a nursing facility [facility]). Patient has ESRD on HD. He was started on Vanc + Zosyn. He is on chronic doxycycline for osteomyelitis.      Prior hospitalizations:   He was hospitalized in January 2019 for Influenza. Hospitalized in November 2018 for Cellulitis of the fistula site.    Hospitalized in September 2018 - for Urine indicating Strep Beta Hemolytic, tx with Vancomycin/Cefepime and transitioned to Levaquin. Outpatient ECHO was not completed per records\"    Review of Systems:     Review of Systems   Constitutional: Negative for fatigue, fever and unexpected weight change. HENT: Negative for sinus pressure, sinus pain and sore throat. Respiratory: Positive for cough. Negative for shortness of breath and wheezing. Cardiovascular: Negative for chest pain, palpitations and leg swelling. Gastrointestinal: Negative for abdominal pain, diarrhea, nausea and vomiting. Genitourinary: Positive for decreased urine volume (chronic). Negative for flank pain and scrotal swelling. Musculoskeletal: Negative for arthralgias, back pain and myalgias. Skin: Negative for pallor, rash and wound. Neurological: Negative for light-headedness, numbness and headaches. Medications: Allergies: Allergies   Allergen Reactions    Oxycontin [Oxycodone] Shortness Of Breath and Other (See Comments)     Patient had an overdose on Oxycontin before and does NOT want this ever again. He can take Oxycodone/acetaminophen as needed.     Avodart [Dutasteride]    [address] [place] [address] [Propoxyphene N-Acetaminophen]        Current Meds:   Scheduled Meds:    vancomycin  1,000 mg Intravenous Once    sodium chloride flush  10 mL Intravenous 2 times per day    diclofenac sodium  2 g Topical TID    heparin (porcine)  5,000 Units Subcutaneous 3 times per day    insulin lispro  0-12 Units Subcutaneous TID WC    insulin lispro  0-6 Units Subcutaneous Nightly    piperacillin-tazobactam  2.25 g Intravenous Q12H    allopurinol  100 mg Oral Daily    aspirin  81 mg Oral QAM    insulin glargine  10 Units Subcutaneous Nightly    therapeutic multivitamin-minerals  1 tablet Oral Daily    sennosides-docusate sodium  2 tablet Oral Once per day on Sun Tue Thu Sat    sertraline  50 mg Oral Daily    sucralfate  1 g Oral TID AC    sevelamer  3,200 mg Oral TID WC    fluticasone  2 puff Inhalation Daily    midodrine  5 mg Oral TID    albuterol  2.5 mg Nebulization 4x daily    vancomycin (VANCOCIN) intermittent dosing (placeholder)   Other RX Placeholder     Continuous Infusions:    dextrose      sodium chloride 75 mL/hr at 19 0238     PRN Meds: sodium chloride flush, acetaminophen, polyvinyl alcohol, ondansetron, albuterol, glucose, dextrose, glucagon (rDNA), dextrose, calcium carbonate, polyethylene glycol, tetrahydrozoline, PIPERACILLIN-TAZOBACTAM (ZOSYN) 0.75 GM IVPB, sodium chloride flush    Data:     Past Medical History:   has a past medical history of Asthma, CAD (coronary artery disease), CKD (chronic kidney disease) stage 4, GFR 15-29 ml/min ([facility] [address] CVA (cerebral vascular accident) ([facility] [address] Depression, History of kidney stones, Hyperlipidemia, Hypertension, Osteoarthritis, Proteinuria, and Type II or unspecified type diabetes mellitus without mention of complication, not stated as uncontrolled. Social History:   reports that he has never smoked. He has never used smokeless tobacco. He reports that he does not drink alcohol or use drugs. Family History:   Family History   Problem Relation Age of Onset    Stroke Mother     Heart Attack Mother     Heart Attack Father     Diabetes Sister     Diabetes Brother        Vitals:  BP (!) 159/76   Pulse 79   Temp 98 °F (36.7 °C) (Axillary)   Resp 24   Ht 5' 10\" (1.778 m)   Wt 196 lb 3.4 oz (89 kg)   SpO2 97%   BMI 28.15 kg/m²   Temp (24hrs), Av.5 °F (36.9 °C), Min:98 °F (36.7 °C), Max:99 °F (37.2 °C)    Recent Labs     19  1141 19  1713 19  2109 19  0742   POCGLU 152* 151* 200* 139*       I/O(24Hr):     Intake/Output Summary (Last 24 hours) at 2019 0812  Last data filed at 2019 0400  Gross per 24 hour   Intake 2722 ml   Output --   Net 2722 ml       Labs:    CBC:   Lab Results   Component Value Date    WBC 10.9 2019    RBC 3.25 2019    HGB 10.3 2019    HCT 30.7 05/23/2019    MCV 98.4 05/23/2019    MCH 33.5 05/23/2019    MCHC 34.1 05/23/2019    RDW 14.0 05/23/2019    PLT 92 05/23/2019    MPV 9.0 05/23/2019     CBC with Differential:    Lab Results   Component Value Date    WBC 10.9 05/23/2019    RBC 3.25 05/23/2019    HGB 10.3 05/23/2019    HCT 30.7 05/23/2019    PLT 92 05/23/2019    MCV 98.4 05/23/2019    MCH 33.5 05/23/2019    MCHC 34.1 05/23/2019    RDW 14.0 05/23/2019    LYMPHOPCT 9 05/23/2019    MONOPCT 6 05/23/2019    BASOPCT 0 05/23/2019    MONOSABS 0.70 05/23/2019    LYMPHSABS 1.00 05/23/2019    EOSABS 0.00 05/23/2019    BASOSABS 0.00 05/23/2019    DIFFTYPE NOT REPORTED 05/23/2019     CMP:    Lab Results   Component Value Date     05/24/2019    K 5.0 05/24/2019    CL 96 05/24/2019    CO2 19 05/24/2019    BUN 58 05/24/2019    CREATININE 9.97 05/24/2019    GFRAA 6 05/24/2019    LABGLOM 5 05/24/2019    GLUCOSE 146 05/24/2019    PROT 7.6 05/22/2019    PROT 5.7 11/27/2012    LABALBU 3.8 05/22/2019    CALCIUM 8.6 05/24/2019    BILITOT 0.44 05/22/2019    ALKPHOS 308 05/22/2019    AST 29 05/22/2019    ALT 32 05/22/2019     BMP:    Lab Results   Component Value Date     05/24/2019    K 5.0 05/24/2019    CL 96 05/24/2019    CO2 19 05/24/2019    BUN 58 05/24/2019    LABALBU 3.8 05/22/2019    CREATININE 9.97 05/24/2019    CALCIUM 8.6 05/24/2019    GFRAA 6 05/24/2019    LABGLOM 5 05/24/2019    GLUCOSE 146 05/24/2019     BUN/Creatinine:    Lab Results   Component Value Date    BUN 58 05/24/2019    CREATININE 9.97 05/24/2019     Hepatic Function Panel:    Lab Results   Component Value Date    ALKPHOS 308 05/22/2019    ALT 32 05/22/2019    AST 29 05/22/2019    PROT 7.6 05/22/2019    PROT 5.7 11/27/2012    BILITOT 0.44 05/22/2019    BILIDIR 0.18 06/30/2016    IBILI 0.27 06/30/2016    LABALBU 3.8 05/22/2019     Magnesium:    Lab Results   Component Value Date    MG 2.0 06/30/2016     Phosphorus:    Lab Results   Component Value Date    PHOS 3.4 01/25/2019       Lab Results   Component Value Date/Time    SPECIAL NOT REPORTED 05/23/2019 04:30 PM     Lab Results   Component Value Date/Time    CULTURE PENDING 05/23/2019 04:30 PM         Radiology:    Ct Head Wo Contrast    Result Date: 5/22/2019  EXAMINATION: CT OF THE HEAD WITHOUT CONTRAST; CT OF THE CHEST, ABDOMEN, AND PELVIS WITH CONTRAST; CT OF THE THORACIC SPINE WITHOUT CONTRAST; CT OF THE LUMBAR SPINE WITHOUT CONTRAST  5/22/2019 8:57 pm TECHNIQUE: CT of the head was performed without the administration of intravenous contrast. Dose modulation, iterative reconstruction, and/or weight based adjustment of the mA/kV was utilized to reduce the radiation dose to as low as reasonably achievable.; CT of the chest, abdomen and pelvis was performed with the administration of intravenous contrast. Multiplanar reformatted images are provided for review. Dose modulation, iterative reconstruction, and/or weight based adjustment of the mA/kV was utilized to reduce the radiation dose to as low as reasonably achievable.; CT of the thoracic spine was performed without the administration of intravenous contrast. Multiplanar reformatted images are provided for review. Dose modulation, iterative reconstruction, and/or weight based adjustment of the mA/kV was utilized to reduce the radiation dose to as low as reasonably achievable.; CT of the lumbar spine was performed without the administration of intravenous contrast. Multiplanar reformatted images are provided for review. Dose modulation, iterative reconstruction, and/or weight based adjustment of the mA/kV was utilized to reduce the radiation dose to as low as reasonably achievable.  COMPARISON: Head CT dated 11/30/2015. abdomen and pelvis CT dated 03/27/2016 HISTORY: ORDERING SYSTEM PROVIDED HISTORY: AMS TECHNOLOGIST PROVIDED HISTORY: Ordering Physician Provided Reason for Exam: patient here for ams and fever after dialysis today FINDINGS: Head CT: BRAIN/VENTRICLES: There is no acute Head CT: BRAIN/VENTRICLES: There is no acute intracranial hemorrhage, mass effect or midline shift. No abnormal extra-axial fluid collection. The gray-white differentiation is maintained without evidence of an acute infarct. There is no evidence of hydrocephalus. Chronic microvascular ischemic changes in periventricular white matter distribution. ORBITS: The visualized portion of the orbits demonstrate no acute abnormality. SINUSES: The visualized paranasal sinuses and mastoid air cells demonstrate no acute abnormality. SOFT TISSUES/SKULL:  No acute abnormality of the visualized skull or soft tissues. Chest CT: Mediastinum: No thoracic aortic aneurysm . Mild cardiomegaly. No pericardial effusion. No abnormal lymph node enlargement. Lungs/pleura: Mild infiltrate in the lingula. Mild atelectasis at the lung bases. No effusions. No pneumothorax. Soft Tissues/Bones: No lytic or blastic lesions in all visualized osseous structures. CT abdomen and pelvis: Organs: Liver, pancreas, spleen, bilateral adrenal glands are unremarkable. Cholelithiasis. Atrophic kidneys contains small cysts. GI/Bowel: Stomach, small and large bowel loops are grossly unremarkable. Colonic diverticulosis without acute diverticulitis. Pelvis: Urinary bladder is unremarkable. No lymphadenopathy. No soft tissue masses or abnormal fluid collections. Peritoneum/Retroperitoneum: No free intraperitoneal fluid or air. No abdominal aortic aneurysm. No retroperitoneal lymphadenopathy. 5 x 5 x 2 cm well-circumscribed fluid collection superficial to the right lobe of the liver is stable since 2016, therefore benign. Bones/Soft Tissues: No lytic or blastic lesions in all visualized osseous structures. Old pathologic fracture of L3. No convincing evidence for discitis/osteomyelitis in the left side. Left hip arthroplasty. Atrophy of left psoas muscle. The overall appearance of distal left psoas muscle is similar to CT of 2016.      Head CT: No acute intracranial abnormalities. Chest CT: Mild infiltrate in the lingula. Mild atelectasis at the lung bases. Abdomen and pelvis CT: No acute findings. Cholelithiasis. Old pathologic fracture of L3 without convincing evidence for acute discitis/osteomyelitis. Ct Lumbar Spine Wo Contrast    Result Date: 5/22/2019  EXAMINATION: CT OF THE HEAD WITHOUT CONTRAST; CT OF THE CHEST, ABDOMEN, AND PELVIS WITH CONTRAST; CT OF THE THORACIC SPINE WITHOUT CONTRAST; CT OF THE LUMBAR SPINE WITHOUT CONTRAST  5/22/2019 8:57 pm TECHNIQUE: CT of the head was performed without the administration of intravenous contrast. Dose modulation, iterative reconstruction, and/or weight based adjustment of the mA/kV was utilized to reduce the radiation dose to as low as reasonably achievable.; CT of the chest, abdomen and pelvis was performed with the administration of intravenous contrast. Multiplanar reformatted images are provided for review. Dose modulation, iterative reconstruction, and/or weight based adjustment of the mA/kV was utilized to reduce the radiation dose to as low as reasonably achievable.; CT of the thoracic spine was performed without the administration of intravenous contrast. Multiplanar reformatted images are provided for review. Dose modulation, iterative reconstruction, and/or weight based adjustment of the mA/kV was utilized to reduce the radiation dose to as low as reasonably achievable.; CT of the lumbar spine was performed without the administration of intravenous contrast. Multiplanar reformatted images are provided for review. Dose modulation, iterative reconstruction, and/or weight based adjustment of the mA/kV was utilized to reduce the radiation dose to as low as reasonably achievable.  COMPARISON: Head CT dated 11/30/2015. abdomen and pelvis CT dated 03/27/2016 HISTORY: ORDERING SYSTEM PROVIDED HISTORY: AMS TECHNOLOGIST PROVIDED HISTORY: Ordering Physician Provided Reason for Exam: patient here for ams and fever after dialysis today FINDINGS: Head CT: BRAIN/VENTRICLES: There is no acute intracranial hemorrhage, mass effect or midline shift. No abnormal extra-axial fluid collection. The gray-white differentiation is maintained without evidence of an acute infarct. There is no evidence of hydrocephalus. Chronic microvascular ischemic changes in periventricular white matter distribution. ORBITS: The visualized portion of the orbits demonstrate no acute abnormality. SINUSES: The visualized paranasal sinuses and mastoid air cells demonstrate no acute abnormality. SOFT TISSUES/SKULL:  No acute abnormality of the visualized skull or soft tissues. Chest CT: Mediastinum: No thoracic aortic aneurysm . Mild cardiomegaly. No pericardial effusion. No abnormal lymph node enlargement. Lungs/pleura: Mild infiltrate in the lingula. Mild atelectasis at the lung bases. No effusions. No pneumothorax. Soft Tissues/Bones: No lytic or blastic lesions in all visualized osseous structures. CT abdomen and pelvis: Organs: Liver, pancreas, spleen, bilateral adrenal glands are unremarkable. Cholelithiasis. Atrophic kidneys contains small cysts. GI/Bowel: Stomach, small and large bowel loops are grossly unremarkable. Colonic diverticulosis without acute diverticulitis. Pelvis: Urinary bladder is unremarkable. No lymphadenopathy. No soft tissue masses or abnormal fluid collections. Peritoneum/Retroperitoneum: No free intraperitoneal fluid or air. No abdominal aortic aneurysm. No retroperitoneal lymphadenopathy. 5 x 5 x 2 cm well-circumscribed fluid collection superficial to the right lobe of the liver is stable since 2016, therefore benign. Bones/Soft Tissues: No lytic or blastic lesions in all visualized osseous structures. Old pathologic fracture of L3. No convincing evidence for discitis/osteomyelitis in the left side. Left hip arthroplasty. Atrophy of left psoas muscle.   The overall appearance of distal left psoas muscle is similar to CT of 2016. Head CT: No acute intracranial abnormalities. Chest CT: Mild infiltrate in the lingula. Mild atelectasis at the lung bases. Abdomen and pelvis CT: No acute findings. Cholelithiasis. Old pathologic fracture of L3 without convincing evidence for acute discitis/osteomyelitis. Xr Chest Portable    Result Date: 5/22/2019  EXAMINATION: ONE XRAY VIEW OF THE CHEST 5/22/2019 8:31 pm COMPARISON: January 24 HISTORY: ORDERING SYSTEM PROVIDED HISTORY: AMS, sepsis. ? pneumonia TECHNOLOGIST PROVIDED HISTORY: AMS, sepsis. ? pneumonia Ordering Physician Provided Reason for Exam: AMS, sepsis. ? pneumonia Acuity: Acute Type of Exam: Initial FINDINGS: Sternotomy wires and mediastinal clips are noted. Heart size is stable. Patchy multifocal airspace disease is noted bilaterally, greatest in the left lower lung zone. There is no pneumothorax. Elevation of the right hemidiaphragm is noted     Left greater than right basilar airspace disease. This is nonspecific however no Bernida Somali should be considered. Upright two view chest would be more helpful     Ct Chest Abdomen Pelvis W Contrast    Result Date: 5/22/2019  EXAMINATION: CT OF THE HEAD WITHOUT CONTRAST; CT OF THE CHEST, ABDOMEN, AND PELVIS WITH CONTRAST; CT OF THE THORACIC SPINE WITHOUT CONTRAST; CT OF THE LUMBAR SPINE WITHOUT CONTRAST  5/22/2019 8:57 pm TECHNIQUE: CT of the head was performed without the administration of intravenous contrast. Dose modulation, iterative reconstruction, and/or weight based adjustment of the mA/kV was utilized to reduce the radiation dose to as low as reasonably achievable.; CT of the chest, abdomen and pelvis was performed with the administration of intravenous contrast. Multiplanar reformatted images are provided for review.  Dose modulation, iterative reconstruction, and/or weight based adjustment of the mA/kV was utilized to reduce the radiation dose to as low as reasonably achievable.; CT of the thoracic spine was small cysts. GI/Bowel: Stomach, small and large bowel loops are grossly unremarkable. Colonic diverticulosis without acute diverticulitis. Pelvis: Urinary bladder is unremarkable. No lymphadenopathy. No soft tissue masses or abnormal fluid collections. Peritoneum/Retroperitoneum: No free intraperitoneal fluid or air. No abdominal aortic aneurysm. No retroperitoneal lymphadenopathy. 5 x 5 x 2 cm well-circumscribed fluid collection superficial to the right lobe of the liver is stable since 2016, therefore benign. Bones/Soft Tissues: No lytic or blastic lesions in all visualized osseous structures. Old pathologic fracture of L3. No convincing evidence for discitis/osteomyelitis in the left side. Left hip arthroplasty. Atrophy of left psoas muscle. The overall appearance of distal left psoas muscle is similar to CT of 2016. Head CT: No acute intracranial abnormalities. Chest CT: Mild infiltrate in the lingula. Mild atelectasis at the lung bases. Abdomen and pelvis CT: No acute findings. Cholelithiasis. Old pathologic fracture of L3 without convincing evidence for acute discitis/osteomyelitis. Physical Examination:        Physical Exam   Constitutional: He is oriented to person, place, and time. He appears well-developed. No distress. HENT:   Head: Normocephalic and atraumatic. Mouth/Throat: Oropharynx is clear and moist. No oropharyngeal exudate. Eyes: Pupils are equal, round, and reactive to light. Conjunctivae are normal. Right eye exhibits no discharge. Left eye exhibits no discharge. Neck: Normal range of motion. Neck supple. No thyromegaly present. Cardiovascular: Normal rate, regular rhythm and normal heart sounds. Pulmonary/Chest: Effort normal and breath sounds normal. No respiratory distress. He has no wheezes. Abdominal: Soft. Bowel sounds are normal. He exhibits no distension. There is no tenderness.    Musculoskeletal: He exhibits deformity (bilateral feet deformity the resident. I have seen and examined the patient and the key elements of all parts of the encounter have been performed by me . I agree with the assessment, plan and orders as documented by the resident. Principal Problem:    Sepsis ([facility] [address]  Active Problems:    Osteomyelitis of lumbar spine ([facility] [address]    Hypertension    Decubitus ulcer    ESRD (end stage renal disease) ([facility] [address]    Protein-calorie malnutrition ([facility] [address]    AVF (arteriovenous fistula) ([facility] [address]  Resolved Problems:    * No resolved hospital problems. *          improving , ill and high risk for complications         ---- ;     [address] [facility] [address] [place] [address], [address] [place] [address].    Phone [phone]   Fax: [phone]  Answering Service: [phone]

## 2019-05-24 NOTE — CARE COORDINATION
DISCHARGE PLANNING NOTE:    Patient is from [facility] and anticipates returning there. LSW is following. Active order for IV vanco and IV zosyn. Wound care is following. AYDIN NEEDS SIGNED AND COMPLETED.

## 2019-05-24 NOTE — PROGRESS NOTES
hydrALAZINE  10 mg Oral TID    metoprolol succinate  25 mg Oral Daily    midodrine  10 mg Oral Once per day on Mon Wed Fri    Pentafluoroprop-Tetrafluoroeth   Apply externally Once per day on Mon Wed Fri    [START ON 5/25/2019] pantoprazole  40 mg Oral QAM AC    guaiFENesin  1,200 mg Oral BID    benzonatate  200 mg Oral TID    sodium chloride flush  10 mL Intravenous 2 times per day    diclofenac sodium  2 g Topical TID    heparin (porcine)  5,000 Units Subcutaneous 3 times per day    insulin lispro  0-12 Units Subcutaneous TID WC    insulin lispro  0-6 Units Subcutaneous Nightly    piperacillin-tazobactam  2.25 g Intravenous Q12H    allopurinol  100 mg Oral Daily    aspirin  81 mg Oral QAM    insulin glargine  10 Units Subcutaneous Nightly    therapeutic multivitamin-minerals  1 tablet Oral Daily    sennosides-docusate sodium  2 tablet Oral Once per day on Sun Tue Thu Sat    sertraline  50 mg Oral Daily    sucralfate  1 g Oral TID AC    sevelamer  3,200 mg Oral TID WC    fluticasone  2 puff Inhalation Daily    albuterol  2.5 mg Nebulization 4x daily    vancomycin (VANCOCIN) intermittent dosing (placeholder)   Other RX Placeholder     Continuous Infusions:   dextrose       CBC:   Recent Labs     05/22/19 1956 05/23/19 0442 05/23/19  1135 05/24/19  0838   WBC 8.0 10.9  --  6.5   HGB 13.0* 10.9* 10.3* 10.1*   * 92*  --  81*     BMP:    Recent Labs     05/22/19 1956 05/23/19 0442 05/24/19  0422   * 129* 133*   K 5.0 4.7 5.0   CL 91* 95* 96*   CO2 22 20 19*   BUN 41* 47* 58*   CREATININE 8.00* 8.89* 9.97*   GLUCOSE 275* 176* 146*     Hepatic:   Recent Labs     05/22/19 1956   AST 29   ALT 32   BILITOT 0.44   ALKPHOS 308*     Troponin: No results for input(s): TROPONINI in the last 72 hours. BNP: No results for input(s): BNP in the last 72 hours. Lipids: No results for input(s): CHOL, HDL in the last 72 hours.     Invalid input(s): LDLCALCU  INR:   Recent Labs     05/22/19 1956 spine ([address]     Lumbar discitis     Discitis of lumbar region     ESRD (end stage renal disease) ([address]     Protein-calorie malnutrition (HCC)     Thrombocytopenia (HCC)     Perianal abscess     Diabetes mellitus ([address]     SIRS (systemic inflammatory response syndrome) (HCC)     Sepsis ([address]     Abscess of anal or rectal region     Pneumonia of left lower lobe due to infectious organism [facility])     Acute exacerbation of chronic obstructive airways disease (HCC)     Hyponatremia     Cellulitis of forearm, right     AVF (arteriovenous fistula) ([address]     Cellulitis of forearm     Influenza     ESRD on hemodialysis ([address]      Plan of Treatment:   Medications checked agree with current management    Electronically signed by Brina Vallejo MD on 5/24/2019 at 4:40 PM

## 2019-05-25 LAB
ABSOLUTE EOS #: 0.4 K/UL (ref 0–0.4)
ABSOLUTE IMMATURE GRANULOCYTE: ABNORMAL K/UL (ref 0–0.3)
ABSOLUTE LYMPH #: 0.7 K/UL (ref 1–4.8)
ABSOLUTE MONO #: 0.5 K/UL (ref 0.1–1.3)
ANION GAP SERPL CALCULATED.3IONS-SCNC: 13 MMOL/L (ref 9–17)
BASOPHILS # BLD: 1 % (ref 0–2)
BASOPHILS ABSOLUTE: 0 K/UL (ref 0–0.2)
BUN BLDV-MCNC: 32 MG/DL (ref 8–23)
BUN/CREAT BLD: ABNORMAL (ref 9–20)
CALCIUM SERPL-MCNC: 8.6 MG/DL (ref 8.6–10.4)
CHLORIDE BLD-SCNC: 98 MMOL/L (ref 98–107)
CO2: 26 MMOL/L (ref 20–31)
CREAT SERPL-MCNC: 6.71 MG/DL (ref 0.7–1.2)
DIFFERENTIAL TYPE: ABNORMAL
EOSINOPHILS RELATIVE PERCENT: 8 % (ref 0–4)
GFR AFRICAN AMERICAN: 10 ML/MIN
GFR NON-AFRICAN AMERICAN: 8 ML/MIN
GFR SERPL CREATININE-BSD FRML MDRD: ABNORMAL ML/MIN/{1.73_M2}
GFR SERPL CREATININE-BSD FRML MDRD: ABNORMAL ML/MIN/{1.73_M2}
GLUCOSE BLD-MCNC: 129 MG/DL (ref 75–110)
GLUCOSE BLD-MCNC: 153 MG/DL (ref 75–110)
GLUCOSE BLD-MCNC: 182 MG/DL (ref 75–110)
GLUCOSE BLD-MCNC: 206 MG/DL (ref 70–99)
GLUCOSE BLD-MCNC: 225 MG/DL (ref 75–110)
HCT VFR BLD CALC: 29.7 % (ref 41–53)
HEMOGLOBIN: 10.1 G/DL (ref 13.5–17.5)
IMMATURE GRANULOCYTES: ABNORMAL %
LYMPHOCYTES # BLD: 14 % (ref 24–44)
MCH RBC QN AUTO: 33.2 PG (ref 26–34)
MCHC RBC AUTO-ENTMCNC: 34 G/DL (ref 31–37)
MCV RBC AUTO: 97.5 FL (ref 80–100)
MONOCYTES # BLD: 10 % (ref 1–7)
NRBC AUTOMATED: ABNORMAL PER 100 WBC
PDW BLD-RTO: 14 % (ref 11.5–14.9)
PHOSPHORUS: 2.7 MG/DL (ref 2.5–4.5)
PLATELET # BLD: 84 K/UL (ref 150–450)
PLATELET ESTIMATE: ABNORMAL
PMV BLD AUTO: 9.7 FL (ref 6–12)
POTASSIUM SERPL-SCNC: 4.2 MMOL/L (ref 3.7–5.3)
PROCALCITONIN: 3.29 NG/ML
RBC # BLD: 3.05 M/UL (ref 4.5–5.9)
RBC # BLD: ABNORMAL 10*6/UL
SEG NEUTROPHILS: 67 % (ref 36–66)
SEGMENTED NEUTROPHILS ABSOLUTE COUNT: 3.2 K/UL (ref 1.3–9.1)
SODIUM BLD-SCNC: 137 MMOL/L (ref 135–144)
WBC # BLD: 4.7 K/UL (ref 3.5–11)
WBC # BLD: ABNORMAL 10*3/UL

## 2019-05-25 PROCEDURE — 99233 SBSQ HOSP IP/OBS HIGH 50: CPT | Performed by: INTERNAL MEDICINE

## 2019-05-25 PROCEDURE — 84145 PROCALCITONIN (PCT): CPT

## 2019-05-25 PROCEDURE — 84100 ASSAY OF PHOSPHORUS: CPT

## 2019-05-25 PROCEDURE — 94761 N-INVAS EAR/PLS OXIMETRY MLT: CPT

## 2019-05-25 PROCEDURE — 97530 THERAPEUTIC ACTIVITIES: CPT

## 2019-05-25 PROCEDURE — 6370000000 HC RX 637 (ALT 250 FOR IP): Performed by: INTERNAL MEDICINE

## 2019-05-25 PROCEDURE — 2580000003 HC RX 258: Performed by: STUDENT IN AN ORGANIZED HEALTH CARE EDUCATION/TRAINING PROGRAM

## 2019-05-25 PROCEDURE — 82947 ASSAY GLUCOSE BLOOD QUANT: CPT

## 2019-05-25 PROCEDURE — 80048 BASIC METABOLIC PNL TOTAL CA: CPT

## 2019-05-25 PROCEDURE — 6370000000 HC RX 637 (ALT 250 FOR IP): Performed by: STUDENT IN AN ORGANIZED HEALTH CARE EDUCATION/TRAINING PROGRAM

## 2019-05-25 PROCEDURE — 6360000002 HC RX W HCPCS: Performed by: STUDENT IN AN ORGANIZED HEALTH CARE EDUCATION/TRAINING PROGRAM

## 2019-05-25 PROCEDURE — 2580000003 HC RX 258: Performed by: INTERNAL MEDICINE

## 2019-05-25 PROCEDURE — 6370000000 HC RX 637 (ALT 250 FOR IP): Performed by: NURSE PRACTITIONER

## 2019-05-25 PROCEDURE — 36415 COLL VENOUS BLD VENIPUNCTURE: CPT

## 2019-05-25 PROCEDURE — 85025 COMPLETE CBC W/AUTO DIFF WBC: CPT

## 2019-05-25 PROCEDURE — 94640 AIRWAY INHALATION TREATMENT: CPT

## 2019-05-25 PROCEDURE — 6360000002 HC RX W HCPCS: Performed by: INTERNAL MEDICINE

## 2019-05-25 PROCEDURE — 97162 PT EVAL MOD COMPLEX 30 MIN: CPT

## 2019-05-25 PROCEDURE — 2060000000 HC ICU INTERMEDIATE R&B

## 2019-05-25 RX ORDER — ACETAMINOPHEN 325 MG/1
650 TABLET ORAL EVERY 6 HOURS PRN
Status: DISCONTINUED | OUTPATIENT
Start: 2019-05-25 | End: 2019-05-27 | Stop reason: HOSPADM

## 2019-05-25 RX ADMIN — SEVELAMER CARBONATE 3200 MG: 800 TABLET, FILM COATED ORAL at 17:16

## 2019-05-25 RX ADMIN — PIPERACILLIN SODIUM,TAZOBACTAM SODIUM 2.25 G: 2; .25 INJECTION, POWDER, FOR SOLUTION INTRAVENOUS at 09:51

## 2019-05-25 RX ADMIN — INSULIN LISPRO 2 UNITS: 100 INJECTION, SOLUTION INTRAVENOUS; SUBCUTANEOUS at 17:19

## 2019-05-25 RX ADMIN — GABAPENTIN 100 MG: 100 CAPSULE ORAL at 09:44

## 2019-05-25 RX ADMIN — HYDRALAZINE HYDROCHLORIDE 10 MG: 10 TABLET, FILM COATED ORAL at 21:13

## 2019-05-25 RX ADMIN — METOPROLOL SUCCINATE 25 MG: 25 TABLET, EXTENDED RELEASE ORAL at 09:44

## 2019-05-25 RX ADMIN — DICLOFENAC 2 G: 10 GEL TOPICAL at 14:26

## 2019-05-25 RX ADMIN — SEVELAMER CARBONATE 3200 MG: 800 TABLET, FILM COATED ORAL at 12:03

## 2019-05-25 RX ADMIN — HYDROMORPHONE HYDROCHLORIDE 2 MG: 2 TABLET ORAL at 22:45

## 2019-05-25 RX ADMIN — HYDRALAZINE HYDROCHLORIDE 10 MG: 10 TABLET, FILM COATED ORAL at 14:23

## 2019-05-25 RX ADMIN — INSULIN GLARGINE 10 UNITS: 100 INJECTION, SOLUTION SUBCUTANEOUS at 22:46

## 2019-05-25 RX ADMIN — SUCRALFATE 1 G: 1 TABLET ORAL at 17:16

## 2019-05-25 RX ADMIN — PIPERACILLIN SODIUM,TAZOBACTAM SODIUM 2.25 G: 2; .25 INJECTION, POWDER, FOR SOLUTION INTRAVENOUS at 21:13

## 2019-05-25 RX ADMIN — SUCRALFATE 1 G: 1 TABLET ORAL at 11:28

## 2019-05-25 RX ADMIN — SERTRALINE HYDROCHLORIDE 50 MG: 50 TABLET ORAL at 09:44

## 2019-05-25 RX ADMIN — INSULIN LISPRO 1 UNITS: 100 INJECTION, SOLUTION INTRAVENOUS; SUBCUTANEOUS at 22:46

## 2019-05-25 RX ADMIN — DOCUSATE SODIUM AND SENNOSIDES 2 TABLET: 8.6; 5 TABLET, FILM COATED ORAL at 09:43

## 2019-05-25 RX ADMIN — MULTIPLE VITAMINS W/ MINERALS TAB 1 TABLET: TAB at 09:44

## 2019-05-25 RX ADMIN — GABAPENTIN 100 MG: 100 CAPSULE ORAL at 21:13

## 2019-05-25 RX ADMIN — GUAIFENESIN 1200 MG: 600 TABLET, EXTENDED RELEASE ORAL at 09:44

## 2019-05-25 RX ADMIN — INSULIN LISPRO 4 UNITS: 100 INJECTION, SOLUTION INTRAVENOUS; SUBCUTANEOUS at 12:03

## 2019-05-25 RX ADMIN — BENZONATATE 200 MG: 100 CAPSULE ORAL at 09:43

## 2019-05-25 RX ADMIN — PANTOPRAZOLE SODIUM 40 MG: 40 TABLET, DELAYED RELEASE ORAL at 05:27

## 2019-05-25 RX ADMIN — DICLOFENAC 2 G: 10 GEL TOPICAL at 21:13

## 2019-05-25 RX ADMIN — HYDRALAZINE HYDROCHLORIDE 10 MG: 10 TABLET, FILM COATED ORAL at 09:50

## 2019-05-25 RX ADMIN — GABAPENTIN 100 MG: 100 CAPSULE ORAL at 14:22

## 2019-05-25 RX ADMIN — ALBUTEROL SULFATE 2.5 MG: 2.5 SOLUTION RESPIRATORY (INHALATION) at 15:36

## 2019-05-25 RX ADMIN — ALBUTEROL SULFATE 2.5 MG: 2.5 SOLUTION RESPIRATORY (INHALATION) at 21:16

## 2019-05-25 RX ADMIN — ASPIRIN 81 MG 81 MG: 81 TABLET ORAL at 09:45

## 2019-05-25 RX ADMIN — AMLODIPINE BESYLATE 2.5 MG: 2.5 TABLET ORAL at 09:45

## 2019-05-25 RX ADMIN — ALBUTEROL SULFATE 2.5 MG: 2.5 SOLUTION RESPIRATORY (INHALATION) at 12:11

## 2019-05-25 RX ADMIN — Medication 10 ML: at 09:51

## 2019-05-25 RX ADMIN — BENZONATATE 200 MG: 100 CAPSULE ORAL at 14:22

## 2019-05-25 RX ADMIN — GUAIFENESIN 1200 MG: 600 TABLET, EXTENDED RELEASE ORAL at 21:15

## 2019-05-25 RX ADMIN — BENZONATATE 200 MG: 100 CAPSULE ORAL at 21:13

## 2019-05-25 RX ADMIN — SUCRALFATE 1 G: 1 TABLET ORAL at 05:27

## 2019-05-25 RX ADMIN — FLUTICASONE PROPIONATE 2 PUFF: 110 AEROSOL, METERED RESPIRATORY (INHALATION) at 09:45

## 2019-05-25 RX ADMIN — DICLOFENAC 2 G: 10 GEL TOPICAL at 09:50

## 2019-05-25 RX ADMIN — ALBUTEROL SULFATE 2.5 MG: 2.5 SOLUTION RESPIRATORY (INHALATION) at 07:36

## 2019-05-25 RX ADMIN — SEVELAMER CARBONATE 3200 MG: 800 TABLET, FILM COATED ORAL at 09:43

## 2019-05-25 RX ADMIN — Medication 10 ML: at 21:14

## 2019-05-25 RX ADMIN — ALLOPURINOL 100 MG: 100 TABLET ORAL at 09:45

## 2019-05-25 ASSESSMENT — PAIN DESCRIPTION - PAIN TYPE: TYPE: CHRONIC PAIN

## 2019-05-25 ASSESSMENT — PAIN DESCRIPTION - LOCATION: LOCATION: BACK

## 2019-05-25 ASSESSMENT — ENCOUNTER SYMPTOMS
NAUSEA: 0
SINUS PRESSURE: 0
SHORTNESS OF BREATH: 0
COUGH: 1
VOMITING: 0
DIARRHEA: 0
SORE THROAT: 0
ABDOMINAL PAIN: 0
CONSTIPATION: 1
SINUS PAIN: 0
WHEEZING: 1
BACK PAIN: 0

## 2019-05-25 ASSESSMENT — PAIN SCALES - GENERAL
PAINLEVEL_OUTOF10: 6
PAINLEVEL_OUTOF10: 2
PAINLEVEL_OUTOF10: 6
PAINLEVEL_OUTOF10: 4

## 2019-05-25 NOTE — CARE COORDINATION
DISCHARGE PLANNING NOTE:    Plan is for this patient to return to SNF [facility]. Has 3 midnight qualifying stay. LSW following. PT recs SNF. Remains on IV zosyn and IV vanco.     Will continue to follow along.      Electronically signed by Frida Jones RN on 5/25/2019 at 2:00 PM

## 2019-05-25 NOTE — PROGRESS NOTES
[address]    PROGRESS NOTE             5/25/2019    7:53 AM    Name:   Geovanna Saldivar  MRN:     [id number]     Acct:      [de-identified]   Room:   2125/2125-01  IP Day:  3  Admit Date:  5/22/2019  7:55 PM    PCP:  August Ochoa MD  Code Status:  Full Code    Subjective:     C/C:   Chief Complaint   Patient presents with    Altered Mental Status     Interval History Status: improved. Patient was seen and examined at the bedside this morning  No acute overnight events were noted in the chart. Patient notes he had HD yesterday. VSS, Afebrile. Patient continues to receive breathing treatments. He notes that his breathing has improved. Denies any chest pain, discomfort or pressure. Patient feels he is nearly back to his baseline. Brief History:     Per Epic EMR H&P:    \"The patient is a 75 y.o. / male who presents withAltered Mental Status   and he is admitted to the hospital for medical management.     Per [facility] EMR, the patient presented to the ER with altered mental status after dialysis today. He had a fever during dialysis of 104F and then had altered mental status. His is noted to be alert and oriented at baseline. Code sepsis was called with etiology either pulmonary, abdominal and possibly meningitis. Patient currently lives at a nursing facility [facility]). Patient has ESRD on HD. He was started on Vanc + Zosyn. He is on chronic doxycycline for osteomyelitis.      Prior hospitalizations:   He was hospitalized in January 2019 for Influenza. Hospitalized in November 2018 for Cellulitis of the fistula site. Hospitalized in September 2018 - for Urine indicating Strep Beta Hemolytic, tx with Vancomycin/Cefepime and transitioned to Levaquin. Outpatient ECHO was not completed per records\"    Review of Systems:     Review of Systems   Constitutional: Negative for fatigue, fever and unexpected weight change. HENT: Negative for sinus pressure, sinus pain and sore throat. Respiratory: Positive for cough (ongoing, unchanged) and wheezing. Negative for shortness of breath. Cardiovascular: Negative for chest pain, palpitations and leg swelling. Gastrointestinal: Positive for constipation (Patient feels like he has to have a bowel movement, but has not had one yet). Negative for abdominal pain, diarrhea, nausea and vomiting. Tolerating PO intake well     Genitourinary: Positive for decreased urine volume (chronic, on HD). Negative for flank pain and scrotal swelling. Musculoskeletal: Negative for arthralgias, back pain and myalgias. Skin: Negative for pallor, rash and wound. Neurological: Negative for light-headedness, numbness and headaches. Psychiatric/Behavioral: Positive for sleep disturbance (patient received minimal sleep last night because he felt like having to have a bowel movement). Negative for confusion. The patient is not nervous/anxious. Medications: Allergies: Allergies   Allergen Reactions    Oxycontin [Oxycodone] Shortness Of Breath and Other (See Comments)     Patient had an overdose on Oxycontin before and does NOT want this ever again. He can take Oxycodone/acetaminophen as needed.     Avodart [Dutasteride]    Mary Larch Darvocet [Propoxyphene N-Acetaminophen]        Current Meds:   Scheduled Meds:    amLODIPine  2.5 mg Oral Daily    gabapentin  100 mg Oral TID    hydrALAZINE  10 mg Oral TID    metoprolol succinate  25 mg Oral Daily    midodrine  10 mg Oral Once per day on Mon Wed Fri    Pentafluoroprop-Tetrafluoroeth   Apply externally Once per day on Mon Wed Fri    pantoprazole  40 mg Oral QAM AC    guaiFENesin  1,200 mg Oral BID    benzonatate  200 mg Oral TID    sodium chloride flush  10 mL Intravenous 2 times per day    diclofenac sodium  2 g Topical TID    heparin (porcine)  5,000 Units Subcutaneous 3 times per day    insulin lispro  0-12 Units Subcutaneous TID WC    insulin lispro  0-6 Units Subcutaneous Nightly    piperacillin-tazobactam  2.25 g Intravenous Q12H    allopurinol  100 mg Oral Daily    aspirin  81 mg Oral QAM    insulin glargine  10 Units Subcutaneous Nightly    therapeutic multivitamin-minerals  1 tablet Oral Daily    sennosides-docusate sodium  2 tablet Oral Once per day on Sun  Sat    sertraline  50 mg Oral Daily    sucralfate  1 g Oral TID AC    sevelamer  3,200 mg Oral TID WC    fluticasone  2 puff Inhalation Daily    albuterol  2.5 mg Nebulization 4x daily    vancomycin (VANCOCIN) intermittent dosing (placeholder)   Other RX Placeholder     Continuous Infusions:    dextrose       PRN Meds: HYDROmorphone, oxyCODONE **AND** oxyCODONE-acetaminophen, midodrine, sodium chloride flush, acetaminophen, polyvinyl alcohol, ondansetron, albuterol, glucose, dextrose, glucagon (rDNA), dextrose, calcium carbonate, polyethylene glycol, tetrahydrozoline, PIPERACILLIN-TAZOBACTAM (ZOSYN) 0.75 GM IVPB, sodium chloride flush    Data:     Past Medical History:   has a past medical history of Asthma, CAD (coronary artery disease), CKD (chronic kidney disease) stage 4, GFR 15-29 ml/min ([facility]), CVA (cerebral vascular accident) ([facility] [place] [address] Depression, History of kidney stones, Hyperlipidemia, Hypertension, Osteoarthritis, Proteinuria, and Type II or unspecified type diabetes mellitus without mention of complication, not stated as uncontrolled. Social History:   reports that he has never smoked. He has never used smokeless tobacco. He reports that he does not drink alcohol or use drugs.      Family History:   Family History   Problem Relation Age of Onset    Stroke Mother     Heart Attack Mother     Heart Attack Father     Diabetes Sister     Diabetes Brother        Vitals:  BP (!) 141/64   Pulse 79   Temp 99.1 °F (37.3 °C) (Oral)   Resp 20   Ht 5' 10\" (1.778 m)   Wt 203 lb 14.8 oz (92.5 kg)   SpO2 93%   BMI 29.26 kg/m²   Temp (24hrs), Av.2 °F (36.8 °C), Min:96.8 °F (36 °C), Max:99.1 °F (37.3 °C)    Recent Labs     05/23/19  2109 05/24/19  0742 05/24/19  1137 05/24/19  2145   POCGLU 200* 139* 240* 145*       I/O(24Hr):   No intake or output data in the 24 hours ending 05/25/19 0753    Labs:    CBC:   Lab Results   Component Value Date    WBC 4.7 05/25/2019    RBC 3.05 05/25/2019    HGB 10.1 05/25/2019    HCT 29.7 05/25/2019    MCV 97.5 05/25/2019    MCH 33.2 05/25/2019    MCHC 34.0 05/25/2019    RDW 14.0 05/25/2019    PLT 84 05/25/2019    MPV 9.7 05/25/2019     CBC with Differential:    Lab Results   Component Value Date    WBC 4.7 05/25/2019    RBC 3.05 05/25/2019    HGB 10.1 05/25/2019    HCT 29.7 05/25/2019    PLT 84 05/25/2019    MCV 97.5 05/25/2019    MCH 33.2 05/25/2019    MCHC 34.0 05/25/2019    RDW 14.0 05/25/2019    LYMPHOPCT 14 05/25/2019    MONOPCT 10 05/25/2019    BASOPCT 1 05/25/2019    MONOSABS 0.50 05/25/2019    LYMPHSABS 0.70 05/25/2019    EOSABS 0.40 05/25/2019    BASOSABS 0.00 05/25/2019    DIFFTYPE NOT REPORTED 05/25/2019     CMP:    Lab Results   Component Value Date     05/25/2019    K 4.2 05/25/2019    CL 98 05/25/2019    CO2 26 05/25/2019    BUN 32 05/25/2019    CREATININE 6.71 05/25/2019    GFRAA 10 05/25/2019    LABGLOM 8 05/25/2019    GLUCOSE 206 05/25/2019    PROT 7.6 05/22/2019    PROT 5.7 11/27/2012    LABALBU 3.8 05/22/2019    CALCIUM 8.6 05/25/2019    BILITOT 0.44 05/22/2019    ALKPHOS 308 05/22/2019    AST 29 05/22/2019    ALT 32 05/22/2019     BMP:    Lab Results   Component Value Date     05/25/2019    K 4.2 05/25/2019    CL 98 05/25/2019    CO2 26 05/25/2019    BUN 32 05/25/2019    LABALBU 3.8 05/22/2019    CREATININE 6.71 05/25/2019    CALCIUM 8.6 05/25/2019    GFRAA 10 05/25/2019    LABGLOM 8 05/25/2019    GLUCOSE 206 05/25/2019     BUN/Creatinine:    Lab Results   Component Value Date    BUN 32 05/25/2019    CREATININE 6.71 05/25/2019     Hepatic Function Panel:    Lab Results   Component Value Date ALKPHOS 308 05/22/2019    ALT 32 05/22/2019    AST 29 05/22/2019    PROT 7.6 05/22/2019    PROT 5.7 11/27/2012    BILITOT 0.44 05/22/2019    BILIDIR 0.18 06/30/2016    IBILI 0.27 06/30/2016    LABALBU 3.8 05/22/2019     Magnesium:    Lab Results   Component Value Date    MG 2.2 05/24/2019     Phosphorus:    Lab Results   Component Value Date    PHOS 2.7 05/25/2019       Lab Results   Component Value Date/Time    SPECIAL NOT REPORTED 05/23/2019 04:30 PM     Lab Results   Component Value Date/Time    CULTURE NORMAL RESPIRATORY ROBERT LIGHT GROWTH 05/23/2019 04:30 PM    CULTURE (A) 05/23/2019 04:30 PM     PSEUDOMONAS SPECIES LIGHT GROWTH Susceptibility to follow. Radiology:    Ct Head Wo Contrast    Result Date: 5/22/2019  EXAMINATION: CT OF THE HEAD WITHOUT CONTRAST; CT OF THE CHEST, ABDOMEN, AND PELVIS WITH CONTRAST; CT OF THE THORACIC SPINE WITHOUT CONTRAST; CT OF THE LUMBAR SPINE WITHOUT CONTRAST  5/22/2019 8:57 pm TECHNIQUE: CT of the head was performed without the administration of intravenous contrast. Dose modulation, iterative reconstruction, and/or weight based adjustment of the mA/kV was utilized to reduce the radiation dose to as low as reasonably achievable.; CT of the chest, abdomen and pelvis was performed with the administration of intravenous contrast. Multiplanar reformatted images are provided for review. Dose modulation, iterative reconstruction, and/or weight based adjustment of the mA/kV was utilized to reduce the radiation dose to as low as reasonably achievable.; CT of the thoracic spine was performed without the administration of intravenous contrast. Multiplanar reformatted images are provided for review.  Dose modulation, iterative reconstruction, and/or weight based adjustment of the mA/kV was utilized to reduce the radiation dose to as low as reasonably achievable.; CT of the lumbar spine was performed without the administration of intravenous contrast. Multiplanar reformatted images are provided for review. Dose modulation, iterative reconstruction, and/or weight based adjustment of the mA/kV was utilized to reduce the radiation dose to as low as reasonably achievable. COMPARISON: Head CT dated 11/30/2015. abdomen and pelvis CT dated 03/27/2016 HISTORY: ORDERING SYSTEM PROVIDED HISTORY: AMS TECHNOLOGIST PROVIDED HISTORY: Ordering Physician Provided Reason for Exam: patient here for ams and fever after dialysis today FINDINGS: Head CT: BRAIN/VENTRICLES: There is no acute intracranial hemorrhage, mass effect or midline shift. No abnormal extra-axial fluid collection. The gray-white differentiation is maintained without evidence of an acute infarct. There is no evidence of hydrocephalus. Chronic microvascular ischemic changes in periventricular white matter distribution. ORBITS: The visualized portion of the orbits demonstrate no acute abnormality. SINUSES: The visualized paranasal sinuses and mastoid air cells demonstrate no acute abnormality. SOFT TISSUES/SKULL:  No acute abnormality of the visualized skull or soft tissues. Chest CT: Mediastinum: No thoracic aortic aneurysm . Mild cardiomegaly. No pericardial effusion. No abnormal lymph node enlargement. Lungs/pleura: Mild infiltrate in the lingula. Mild atelectasis at the lung bases. No effusions. No pneumothorax. Soft Tissues/Bones: No lytic or blastic lesions in all visualized osseous structures. CT abdomen and pelvis: Organs: Liver, pancreas, spleen, bilateral adrenal glands are unremarkable. Cholelithiasis. Atrophic kidneys contains small cysts. GI/Bowel: Stomach, small and large bowel loops are grossly unremarkable. Colonic diverticulosis without acute diverticulitis. Pelvis: Urinary bladder is unremarkable. No lymphadenopathy. No soft tissue masses or abnormal fluid collections. Peritoneum/Retroperitoneum: No free intraperitoneal fluid or air. No abdominal aortic aneurysm. No retroperitoneal lymphadenopathy.   5 x 5 x 2 cm well-circumscribed fluid collection superficial to the right lobe of the liver is stable since 2016, therefore benign. Bones/Soft Tissues: No lytic or blastic lesions in all visualized osseous structures. Old pathologic fracture of L3. No convincing evidence for discitis/osteomyelitis in the left side. Left hip arthroplasty. Atrophy of left psoas muscle. The overall appearance of distal left psoas muscle is similar to CT of 2016. Head CT: No acute intracranial abnormalities. Chest CT: Mild infiltrate in the lingula. Mild atelectasis at the lung bases. Abdomen and pelvis CT: No acute findings. Cholelithiasis. Old pathologic fracture of L3 without convincing evidence for acute discitis/osteomyelitis. Ct Thoracic Spine Wo Contrast    Result Date: 5/22/2019  EXAMINATION: CT OF THE HEAD WITHOUT CONTRAST; CT OF THE CHEST, ABDOMEN, AND PELVIS WITH CONTRAST; CT OF THE THORACIC SPINE WITHOUT CONTRAST; CT OF THE LUMBAR SPINE WITHOUT CONTRAST  5/22/2019 8:57 pm TECHNIQUE: CT of the head was performed without the administration of intravenous contrast. Dose modulation, iterative reconstruction, and/or weight based adjustment of the mA/kV was utilized to reduce the radiation dose to as low as reasonably achievable.; CT of the chest, abdomen and pelvis was performed with the administration of intravenous contrast. Multiplanar reformatted images are provided for review. Dose modulation, iterative reconstruction, and/or weight based adjustment of the mA/kV was utilized to reduce the radiation dose to as low as reasonably achievable.; CT of the thoracic spine was performed without the administration of intravenous contrast. Multiplanar reformatted images are provided for review.  Dose modulation, iterative reconstruction, and/or weight based adjustment of the mA/kV was utilized to reduce the radiation dose to as low as reasonably achievable.; CT of the lumbar spine was performed without the administration of intravenous contrast. Multiplanar reformatted images are provided for review. Dose modulation, iterative reconstruction, and/or weight based adjustment of the mA/kV was utilized to reduce the radiation dose to as low as reasonably achievable. COMPARISON: Head CT dated 11/30/2015. abdomen and pelvis CT dated 03/27/2016 HISTORY: ORDERING SYSTEM PROVIDED HISTORY: AMS TECHNOLOGIST PROVIDED HISTORY: Ordering Physician Provided Reason for Exam: patient here for ams and fever after dialysis today FINDINGS: Head CT: BRAIN/VENTRICLES: There is no acute intracranial hemorrhage, mass effect or midline shift. No abnormal extra-axial fluid collection. The gray-white differentiation is maintained without evidence of an acute infarct. There is no evidence of hydrocephalus. Chronic microvascular ischemic changes in periventricular white matter distribution. ORBITS: The visualized portion of the orbits demonstrate no acute abnormality. SINUSES: The visualized paranasal sinuses and mastoid air cells demonstrate no acute abnormality. SOFT TISSUES/SKULL:  No acute abnormality of the visualized skull or soft tissues. Chest CT: Mediastinum: No thoracic aortic aneurysm . Mild cardiomegaly. No pericardial effusion. No abnormal lymph node enlargement. Lungs/pleura: Mild infiltrate in the lingula. Mild atelectasis at the lung bases. No effusions. No pneumothorax. Soft Tissues/Bones: No lytic or blastic lesions in all visualized osseous structures. CT abdomen and pelvis: Organs: Liver, pancreas, spleen, bilateral adrenal glands are unremarkable. Cholelithiasis. Atrophic kidneys contains small cysts. GI/Bowel: Stomach, small and large bowel loops are grossly unremarkable. Colonic diverticulosis without acute diverticulitis. Pelvis: Urinary bladder is unremarkable. No lymphadenopathy. No soft tissue masses or abnormal fluid collections. Peritoneum/Retroperitoneum: No free intraperitoneal fluid or air.  No abdominal aortic aneurysm. No retroperitoneal lymphadenopathy. 5 x 5 x 2 cm well-circumscribed fluid collection superficial to the right lobe of the liver is stable since 2016, therefore benign. Bones/Soft Tissues: No lytic or blastic lesions in all visualized osseous structures. Old pathologic fracture of L3. No convincing evidence for discitis/osteomyelitis in the left side. Left hip arthroplasty. Atrophy of left psoas muscle. The overall appearance of distal left psoas muscle is similar to CT of 2016. Head CT: No acute intracranial abnormalities. Chest CT: Mild infiltrate in the lingula. Mild atelectasis at the lung bases. Abdomen and pelvis CT: No acute findings. Cholelithiasis. Old pathologic fracture of L3 without convincing evidence for acute discitis/osteomyelitis. Ct Lumbar Spine Wo Contrast    Result Date: 5/22/2019  EXAMINATION: CT OF THE HEAD WITHOUT CONTRAST; CT OF THE CHEST, ABDOMEN, AND PELVIS WITH CONTRAST; CT OF THE THORACIC SPINE WITHOUT CONTRAST; CT OF THE LUMBAR SPINE WITHOUT CONTRAST  5/22/2019 8:57 pm TECHNIQUE: CT of the head was performed without the administration of intravenous contrast. Dose modulation, iterative reconstruction, and/or weight based adjustment of the mA/kV was utilized to reduce the radiation dose to as low as reasonably achievable.; CT of the chest, abdomen and pelvis was performed with the administration of intravenous contrast. Multiplanar reformatted images are provided for review. Dose modulation, iterative reconstruction, and/or weight based adjustment of the mA/kV was utilized to reduce the radiation dose to as low as reasonably achievable.; CT of the thoracic spine was performed without the administration of intravenous contrast. Multiplanar reformatted images are provided for review.  Dose modulation, iterative reconstruction, and/or weight based adjustment of the mA/kV was utilized to reduce the radiation dose to as low as reasonably achievable.; CT of the lumbar intraperitoneal fluid or air. No abdominal aortic aneurysm. No retroperitoneal lymphadenopathy. 5 x 5 x 2 cm well-circumscribed fluid collection superficial to the right lobe of the liver is stable since 2016, therefore benign. Bones/Soft Tissues: No lytic or blastic lesions in all visualized osseous structures. Old pathologic fracture of L3. No convincing evidence for discitis/osteomyelitis in the left side. Left hip arthroplasty. Atrophy of left psoas muscle. The overall appearance of distal left psoas muscle is similar to CT of 2016. Head CT: No acute intracranial abnormalities. Chest CT: Mild infiltrate in the lingula. Mild atelectasis at the lung bases. Abdomen and pelvis CT: No acute findings. Cholelithiasis. Old pathologic fracture of L3 without convincing evidence for acute discitis/osteomyelitis. Xr Chest Portable    Result Date: 5/22/2019  EXAMINATION: ONE XRAY VIEW OF THE CHEST 5/22/2019 8:31 pm COMPARISON: January 24 HISTORY: ORDERING SYSTEM PROVIDED HISTORY: AMS, sepsis. ? pneumonia TECHNOLOGIST PROVIDED HISTORY: AMS, sepsis. ? pneumonia Ordering Physician Provided Reason for Exam: AMS, sepsis. ? pneumonia Acuity: Acute Type of Exam: Initial FINDINGS: Sternotomy wires and mediastinal clips are noted. Heart size is stable. Patchy multifocal airspace disease is noted bilaterally, greatest in the left lower lung zone. There is no pneumothorax. Elevation of the right hemidiaphragm is noted     Left greater than right basilar airspace disease. This is nonspecific however no Cyndia Achilles should be considered.   Upright two view chest would be more helpful     Ct Chest Abdomen Pelvis W Contrast    Result Date: 5/22/2019  EXAMINATION: CT OF THE HEAD WITHOUT CONTRAST; CT OF THE CHEST, ABDOMEN, AND PELVIS WITH CONTRAST; CT OF THE THORACIC SPINE WITHOUT CONTRAST; CT OF THE LUMBAR SPINE WITHOUT CONTRAST  5/22/2019 8:57 pm TECHNIQUE: CT of the head was performed without the administration of intravenous abnormality of the visualized skull or soft tissues. Chest CT: Mediastinum: No thoracic aortic aneurysm . Mild cardiomegaly. No pericardial effusion. No abnormal lymph node enlargement. Lungs/pleura: Mild infiltrate in the lingula. Mild atelectasis at the lung bases. No effusions. No pneumothorax. Soft Tissues/Bones: No lytic or blastic lesions in all visualized osseous structures. CT abdomen and pelvis: Organs: Liver, pancreas, spleen, bilateral adrenal glands are unremarkable. Cholelithiasis. Atrophic kidneys contains small cysts. GI/Bowel: Stomach, small and large bowel loops are grossly unremarkable. Colonic diverticulosis without acute diverticulitis. Pelvis: Urinary bladder is unremarkable. No lymphadenopathy. No soft tissue masses or abnormal fluid collections. Peritoneum/Retroperitoneum: No free intraperitoneal fluid or air. No abdominal aortic aneurysm. No retroperitoneal lymphadenopathy. 5 x 5 x 2 cm well-circumscribed fluid collection superficial to the right lobe of the liver is stable since 2016, therefore benign. Bones/Soft Tissues: No lytic or blastic lesions in all visualized osseous structures. Old pathologic fracture of L3. No convincing evidence for discitis/osteomyelitis in the left side. Left hip arthroplasty. Atrophy of left psoas muscle. The overall appearance of distal left psoas muscle is similar to CT of 2016. Head CT: No acute intracranial abnormalities. Chest CT: Mild infiltrate in the lingula. Mild atelectasis at the lung bases. Abdomen and pelvis CT: No acute findings. Cholelithiasis. Old pathologic fracture of L3 without convincing evidence for acute discitis/osteomyelitis. Physical Examination:        Physical Exam   Constitutional: He is oriented to person, place, and time. He appears well-developed. No distress. HENT:   Head: Normocephalic and atraumatic. Mouth/Throat: Oropharynx is clear and moist. No oropharyngeal exudate.    Eyes: Pupils are equal, round, and reactive to light. Conjunctivae are normal. Right eye exhibits no discharge. Left eye exhibits no discharge. Neck: Normal range of motion. Neck supple. No thyromegaly present. Cardiovascular: Normal rate, regular rhythm and normal heart sounds. Pulmonary/Chest: Effort normal. No respiratory distress. He has wheezes (slight wheezing in anterior lung fields bilaterally). Abdominal: Soft. Bowel sounds are normal. He exhibits no distension. There is no tenderness. Musculoskeletal: He exhibits deformity (bilateral feet deformity present, dressings placed between toes). He exhibits no edema. Neurological: He is alert and oriented to person, place, and time. Patient has a history of a CVA in the past     Skin: Skin is warm and dry. He is not diaphoretic. Psychiatric: His speech is normal and behavior is normal. Thought content normal. His mood appears anxious. Vitals reviewed. Assessment:        Primary Problem  Sepsis [facility])    Active Hospital Problems    Diagnosis Date Noted    Osteomyelitis of lumbar spine [facility]) [M46.26] 06/25/2015     Priority: High    AVF (arteriovenous fistula) ([facility] [address] [I77.0]     Sepsis ([facility] [address] [A41.9] 12/29/2015    ESRD (end stage renal disease) ([facility] [address] [N18.6] 06/26/2015    Protein-calorie malnutrition ([facility] [address] [E46] 06/26/2015    Decubitus ulcer [L89.90] 06/10/2015    Hypertension [I10]        Plan:        Sepsis, present on admission, likely due to bacterial organism, left lingular PNA and/or less llikely UTI  -Pulm following              -Vanc and Zosyn currently   --> Procal rising 2.19, 3.18, 3.29 - may consider switching Zosyn (Pseudomonas coverage based on pending sensitivities) (during the last admission the patient was on Vanco and Cefepime), however, he remains afebrile and WBC is trending down.  Will await Pulm's rec's.  -f/u daily CBC, BMP  -f/u blood cultures X2 (3 hours), urine culture Neg.   -Resp culture - Rare Gram + cocci in pairs, Pseudomonas light growth   -blood culture#2 - Strep, Group B Hemolytic  -Wound Care  -Repeat CXR 5/24 - improvement in bibasilar airspace disease, right airspace disease resolved with minimal left airspace disease remaining.     ESRD on HD, Hyponatremia, resolving  -Nephrology following   -dialysis per [facility] schedule   -Management of fluid status     T2DM, on insulin, stable  -Lantus 10U nightly, ISS medium corrective dose     Elevated troponin's, with EKG changes, stable  -Cardiology following, continue with current management     Disposition  -SW, PT/OT      Sushila Mosley MD  5/25/2019  7:53 AM     Attestation and add on       I have discussed the care of Brandt Kern , I  ncluding pertinent history and exam findings,      5/25/19  with the resident. I have seen and examined the patient and the key elements of all parts of the encounter have been performed by me . I agree with the assessment, plan and orders as documented by the resident. Principal Problem:    Sepsis ([address]  Active Problems:    Osteomyelitis of lumbar spine ([address]    Hypertension    Decubitus ulcer    ESRD (end stage renal disease) ([address]    Protein-calorie malnutrition ([address]    AVF (arteriovenous fistula) ([address]  Resolved Problems:    * No resolved hospital problems. *        Patient is   ill and high risk for complications   Patient is in -   [x] pccu , [] icu , [] other unit    Symptoms or ailment  course ;                                   are improving SLOWLY over time. ---- ;            [address] [place] [address].    Phone [phone]   Fax: [phone]  Answering Service: [phone]

## 2019-05-25 NOTE — FLOWSHEET NOTE
05/25/19 0900   Encounter Summary   Services provided to: Patient   Referral/Consult From: [facility]   (5/25/19)   Volunteer Visit Yes   Complexity of Encounter Low   Length of Encounter 15 minutes   Spiritual/Temple   Type Ritual   Intervention Prayer;Communion   Sacraments   Communion Patient received communion

## 2019-05-25 NOTE — PROGRESS NOTES
Date:   5/25/2019  Patient name: Joseph Guzman  Date of admission:  5/22/2019  7:55 PM  MRN:   [id number]  YOB: 1945  PCP: Tin Grajeda MD    Reason for Admission: Sepsis [facility]) [A41.9]    Cardiology follow-up abnormal troponin        History of presenting illness: 75year-old male a nursing home resident admitted on 5/20/2019 with altered mental status, fever 104°F and elevated blood pressure generalized weakness.  On the day of admission earlier he had dialysis without any problem.     Labs 5/22/2019 sodium 132, potassium 5.0, BUN 41, creatinine 8.0, glucose 275, prolactin 2.19, proBNP 2717,  High sensitivity troponin 223  Hemoglobin 13.0, and before meals 8.0, platelets 998  Urine tests showed large amount of leukocyte Estrace, WBC more than 10     Chest x-ray 5/20/2019 left greater than right basilar airspace disease  ECG 5/22/2019 tachycardia, left anterior fascicular block, right bundle-branch block  CT head no acute intracranial hemorrhages, mass effect or midline shift, chronic microvascular ischemic changes in the periventricular white matter distribution   CT chest mild infiltrate in the lingula mild atelectasis at the lung bases  CT abdomen and pelvis no acute findings, or pathological fracture of L3 without convincing evidence for acute discitis or osteomyelitis     PMH and PSH:  ESRD on hemodialysis, Rt arm AV fistula  History of CVA, speech problem  Anemia  CAD, CABG   S/P Hip surgery  Decubitus Ulcer  Severely limited mobility     Interval  History: Blood culture positive beta hemolytic streptococci    Vitals: /86   Pulse 87   Temp 98.3 °F (36.8 °C) (Oral)   Resp 18   Ht 5' 10\" (1.778 m)   Wt 203 lb 14.8 oz (92.5 kg)   SpO2 99%   BMI 29.26 kg/m²     Patient seen and examined  Complaining of tiredness  Complaining of severe neck pain  Looks tired  No tachypnea  Afebrile      Medications:   Scheduled Meds:   amLODIPine  2.5 mg Oral Daily    gabapentin  100 mg Oral TID    hydrALAZINE  10 mg Oral TID    metoprolol succinate  25 mg Oral Daily    midodrine  10 mg Oral Once per day on Mon Wed Fri    Pentafluoroprop-Tetrafluoroeth   Apply externally Once per day on Mon Wed Fri    pantoprazole  40 mg Oral QAM AC    guaiFENesin  1,200 mg Oral BID    benzonatate  200 mg Oral TID    sodium chloride flush  10 mL Intravenous 2 times per day    diclofenac sodium  2 g Topical TID    heparin (porcine)  5,000 Units Subcutaneous 3 times per day    insulin lispro  0-12 Units Subcutaneous TID WC    insulin lispro  0-6 Units Subcutaneous Nightly    piperacillin-tazobactam  2.25 g Intravenous Q12H    allopurinol  100 mg Oral Daily    aspirin  81 mg Oral QAM    insulin glargine  10 Units Subcutaneous Nightly    therapeutic multivitamin-minerals  1 tablet Oral Daily    sennosides-docusate sodium  2 tablet Oral Once per day on Sun Tue Thu Sat    sertraline  50 mg Oral Daily    sucralfate  1 g Oral TID AC    sevelamer  3,200 mg Oral TID WC    fluticasone  2 puff Inhalation Daily    albuterol  2.5 mg Nebulization 4x daily    vancomycin (VANCOCIN) intermittent dosing (placeholder)   Other RX Placeholder     Continuous Infusions:   dextrose       CBC:   Recent Labs     05/23/19  0442 05/23/19  1135 05/24/19  0838 05/25/19  0413   WBC 10.9  --  6.5 4.7   HGB 10.9* 10.3* 10.1* 10.1*   PLT 92*  --  81* 84*     BMP:    Recent Labs     05/23/19  0442 05/24/19  0422 05/25/19  0413   * 133* 137   K 4.7 5.0 4.2   CL 95* 96* 98   CO2 20 19* 26   BUN 47* 58* 32*   CREATININE 8.89* 9.97* 6.71*   GLUCOSE 176* 146* 206*     Hepatic:   Recent Labs     05/22/19 1956   AST 29   ALT 32   BILITOT 0.44   ALKPHOS 308*     Troponin: No results for input(s): TROPONINI in the last 72 hours. BNP: No results for input(s): BNP in the last 72 hours. Lipids: No results for input(s): CHOL, HDL in the last 72 hours.     Invalid input(s): LDLCALCU  INR:   Recent Labs     05/22/19 1956   INR 1.0 Objective:   Vitals: /86   Pulse 87   Temp 98.3 °F (36.8 °C) (Oral)   Resp 18   Ht 5' 10\" (1.778 m)   Wt 203 lb 14.8 oz (92.5 kg)   SpO2 99%   BMI 29.26 kg/m²   General appearance: alert and cooperative with exam  HEENT: Head: Normal, normocephalic, atraumatic. Neck: no adenopathy, no carotid bruit, no JVD, supple, symmetrical, trachea midline and thyroid not enlarged, symmetric, no tenderness/mass/nodules  Lungs: Poor chest expansion, expiratory wheezing  Heart: regular rate and rhythm  Abdomen: Abdomen is obese, soft, bowel sounds present  Extremities: Homans sign is negative, no sign of DVT  Neurologic: Mental status: Communicating well    EKG: normal sinus rhythm. ECHO: reviewed. Ejection fraction: 60%  Stress Test: not obtained. Cardiac Angiography: not obtained. Assessment / Acute Cardiac Problems:   Patient admitted with the fever 104°F, altered mental status and elevated blood pressure  Blood culture positive for beta-hemolytic streptococci  Elevated high sensitivity troponin at 223, no ischemic ECG changes  ECG on admission sinus tachycardia, right bundle-branch block, left axis, no ischemic changes  Significance of elevated high sensitivity troponin, no ECG changes, significance not clear and patient with the renal failure on dialysis, patient had elevated troponin 2016 when he got admitted with altered mental status and fever.     2-D echo 5/23/2019 normal LV size, wall motion, ejection fraction 65%, no significant valvular abnormality, no obvious vegetation noted     ESRD on hemodialysis, Rt arm AV fistula  History of CVA, speech problem  Anemia  CAD, CABG   S/P Hip surgery  Decubitus Ulcer  Severely limited mobility        Patient Active Problem List:     Hypertension     CKD (chronic kidney disease) stage 4, GFR 15-29 ml/min     Decubitus ulcer     Osteomyelitis of lumbar spine (HCC)     Lumbar discitis     Discitis of lumbar region     ESRD (end stage renal disease) ([address]     Protein-calorie malnutrition ([address]     Thrombocytopenia ([address]     Perianal abscess     Diabetes mellitus ([address]     SIRS (systemic inflammatory response syndrome) ([address]     Sepsis ([address]     Abscess of anal or rectal region     Pneumonia of left lower lobe due to infectious organism [facility])     Acute exacerbation of chronic obstructive airways disease (HCC)     Hyponatremia     Cellulitis of forearm, right     AVF (arteriovenous fistula) ([facility]     Cellulitis of forearm     Influenza     ESRD on hemodialysis ([address]      Plan of Treatment:   Medications checked  Give Tylenol 650 mg every 6 hours when necessary  Discussed with the patient's nurse    Electronically signed by Maritza Laguna MD on 5/25/2019 at 6:14 PM

## 2019-05-25 NOTE — PROGRESS NOTES
Treatment time:210 mins  Net UF: 2900 ml    Pre weight: 89.9 kg  Post weight: 86.5 kg  EDW: 85.0 kg    Access used: right lower arm fistula  Access function: great flow    Medications or blood products given: none    Regular outpatient schedule: MWF    Summary of response to treatment: pt tolerated treatment very well. Removed approximately 2900 mls        * Intra-treatment documented Safety Checks include the followin) Access and face visible at all times. 2) All connections and blood lines are secure with no kinks. 3) NVL alarm engaged. 4) Hemosafe device applied (if applicable). 5) No collapse of Arterial or Venous blood chambers. 6) All blood lines / pump segments in the air detectors.

## 2019-05-25 NOTE — PLAN OF CARE
Problem: Falls - Risk of:  Goal: Will remain free from falls  Description  Will remain free from falls  Outcome: Met This Shift     Problem: Discharge Planning:  Goal: Discharged to appropriate level of care  Description  Discharged to appropriate level of care  Outcome: Met This Shift     Problem: Infection, Septic Shock:  Goal: Will show no infection signs and symptoms  Description  Will show no infection signs and symptoms  Outcome: Met This Shift     Problem: Fluid Volume - Imbalance:  Goal: Absence of imbalanced fluid volume signs and symptoms  Description  Absence of imbalanced fluid volume signs and symptoms  Outcome: Met This Shift     Problem: Risk for Impaired Skin Integrity  Goal: Tissue integrity - skin and mucous membranes  Description  Structural intactness and normal physiological function of skin and  mucous membranes.   Outcome: Ongoing

## 2019-05-25 NOTE — PROGRESS NOTES
Physical Therapy    Facility/Department: [facility] PROGRESSIVE CARE  Initial Assessment    NAME: Josie Kamara  : 1945  MRN: [id number]    Date of Service: 2019    Discharge Recommendations:  Subacute/Skilled Nursing Facility      Assessment   Body structures, Functions, Activity limitations: Decreased functional mobility ; Decreased strength;Decreased balance  Treatment Diagnosis: decreased function  Prognosis: Good  Decision Making: Medium Complexity  REQUIRES PT FOLLOW UP: Yes  Activity Tolerance  Activity Tolerance: Patient limited by fatigue       Patient Diagnosis(es): The primary encounter diagnosis was Sepsis, due to unspecified organism ([facility] Diagnoses of Pneumonia due to organism and Urinary tract infection without hematuria, site unspecified were also pertinent to this visit. has a past medical history of Asthma, CAD (coronary artery disease), CKD (chronic kidney disease) stage 4, GFR 15-29 ml/min ([facility]), CVA (cerebral vascular accident) ([facility] Depression, History of kidney stones, Hyperlipidemia, Hypertension, Osteoarthritis, Proteinuria, and Type II or unspecified type diabetes mellitus without mention of complication, not stated as uncontrolled. has a past surgical history that includes Coronary artery bypass graft; shoulder surgery; hip surgery; joint replacement; other surgical history (6/11/15); bone biopsy (6/29/15); other surgical history (12/30/15); and Carpal tunnel release.     Restrictions  Restrictions/Precautions  Required Braces or Orthoses?: No  Vision/Hearing  Vision: Impaired  Vision Exceptions: Wears glasses at all times  Hearing: Within functional limits     Subjective  Pain Screening  Patient Currently in Pain: Yes  Pain Assessment  Pain Assessment: 0-10  Pain Level: 6  Pain Type: Chronic pain  Pain Location: Back  Vital Signs  Patient Currently in Pain: Yes     Orientation  Orientation  Overall Orientation Status: Within Functional Limits  Social/Functional

## 2019-05-25 NOTE — PROGRESS NOTES
Nephrology daily progress note    Interval history: Patient was seen and examined today and mental statusInterval history: Patient was seen and examined today and mental status at baseline. He denies shortness of breath or chest pain and currently does not have fever. History of Present Illness: This is a 75 y. o. male with a significant past medical history of Type 2 DM, coronary artery disease [status post CABG], systemic hypertension and End-stage renal disease secondary to diabetic Nephropathy [on routine hemodialysis Monday/Wednesday/Friday at [facility] using a right radiocephalic AV fistula and not on active transplant list], who presented to the ER with altered mental status. Patient initially presented from [address] with complaints of fever [temperature 104 °F] associated with altered mental status and was admitted for evaluation and management of sepsis. Notably he was hospitalized in general 2019 for influenza A and treated in 2018 for cellulitis of the AV fistula site. He did have a UTI in 2018 isolated strep beta-hemolytic. Patient was hypotensive in the ER and received 1 L bolus of saline. This morning he received an extra 500 mils cc of 0.9 saline and IV albumin was started per nephrology recommendations. His lactic acid was 2.3. He responsed to the fluid bolus and albumin with blood pressure in the 588 systolic this morning. His mentation is improved. He has been started on broad-spectrum IV antibiotics with vancomycin and Zosyn and blood cultures sent. He had a lumbar CT scan which showed no evidence for acute discitis/osteomyelitis.      Objective:  Constitutional:    CURRENT TEMPERATURE:  Temp: 99.1 °F (37.3 °C)  MAXIMUM TEMPERATURE OVER 24HRS:  Temp (24hrs), Av.1 °F (36.7 °C), Min:96.8 °F (36 °C), Max:99.1 °F (37.3 °C)    CURRENT RESPIRATORY RATE:  Resp: 20  CURRENT PULSE:  Pulse: 79  CURRENT BLOOD PRESSURE:  BP: (!) 141/64  24HR BLOOD PRESSURE RANGE:  Systolic (93BUX), JCD:537 , Min:110 , JOAN:470   ; Diastolic (30OJQ), APH:16, Min:51, Max:83      Physical Exam:  GENERAL APPEARANCE: Alert and cooperative, and appears to be in no acute distress. HEAD: normocephalic  EYES: PERRL, EOMI. Not pale, anicteric   NOSE:  No nasal discharge. THROAT:  Oral cavity and pharynx normal. Moist  NECK: Neck supple, non-tender without lymphadenopathy, masses or thyromegaly. JVD-neg  CARDIAC: Normal S1 and S2. No S3, S4 or murmurs. Rhythm is regular. LUNGS: Clear to auscultation and percussion without rales, rhonchi, wheezing or diminished breath sounds. ABD-Soft non distended, BS+ Non tender no organomegally  BACK: Examination of the spine reveals  no spinal deformity, without tenderness,   MUSKULOSKELETAL: Adequately aligned spine. No joint erythema or tenderness. EXTREMITIES: No edema. Peripheral pulses intact. NEURO:Alert oriented x 3 ,power 5/5 in all extremities      Labs:  PTH:  No results found for: PTH  abs:   CBC:   Recent Labs     05/23/19 0442 05/23/19  1135 05/24/19  0838 05/25/19  0413   WBC 10.9  --  6.5 4.7   RBC 3.25*  --  3.01* 3.05*   HGB 10.9* 10.3* 10.1* 10.1*   HCT 31.9* 30.7* 29.7* 29.7*   MCV 98.4  --  98.7 97.5   MCH 33.5  --  33.7 33.2   MCHC 34.1  --  34.1 34.0   RDW 14.0  --  14.4 14.0   PLT 92*  --  81* 84*   MPV 9.0  --  8.5 9.7      BMP:   Recent Labs     05/23/19 0442 05/24/19  0422 05/25/19  0413   * 133* 137   K 4.7 5.0 4.2   CL 95* 96* 98   CO2 20 19* 26   BUN 47* 58* 32*   CREATININE 8.89* 9.97* 6.71*   GLUCOSE 176* 146* 206*   CALCIUM 8.0* 8.6 8.6        Phosphorus:    Recent Labs     05/24/19  0422 05/25/19  0413   PHOS 4.8* 2.7     Magnesium:   Recent Labs     05/24/19 0422   MG 2.2     Albumin:   Recent Labs     05/22/19 1956   LABALBU 3.8     Assessment/plan:    1. ESRD -  will maintain MWF hemodialysis schedule.   Renal diet,i.e 2-gram sodium,2-gram potassium,1500 ml fluid restriction,1-gram phosphorus, 1800 KCal and 1.2 gram protein per day.     2.  Gram-positive cocci bacteremia - On IV Zosyn and vancomycin.     3.  Systemic hypertension - Controlled.     4.  Renal osteodystrophy - Serum phosphorus 2.7 mg/dL is within target. Will decrease sevelamer to 800 mg p.o. 3 times daily with meals [currently on 3200 mg AC].    5.  Anemia of end-stage renal disease - Hemoglobin 10.1 g/dL is within target.     6. Hyponatremia - Resolved.       Mary Roberts  Attending Clinical Nephrologist

## 2019-05-25 NOTE — PROGRESS NOTES
Pulmonary Progress Note  Pulmonary and Critical Care Specialists      Patient - Selwyn Osler,  Age - 75 y.o.    - 1945      Room Number - 2125/2125-01   N -  [id number]   [facility]  Date of Admission -  2019  7:55 PM        Consulting James Lopes MD  Primary Care Physician - Kylah Fay MD     SUBJECTIVE   Feeling better, room air  Denies fever or chills  Not much short of breath, some cough    OBJECTIVE   VITALS    height is 5' 10\" (1.778 m) and weight is 203 lb 14.8 oz (92.5 kg). His oral temperature is 99 °F (37.2 °C). His blood pressure is 146/54 (abnormal) and his pulse is 84. His respiration is 18 and oxygen saturation is 99%. Body mass index is 29.26 kg/m². Temperature Range: Temp: 99 °F (37.2 °C) Temp  Av.4 °F (36.9 °C)  Min: 96.8 °F (36 °C)  Max: 99.1 °F (37.3 °C)  BP Range:  Systolic (21ZYI), GDR:740 , Min:117 , HZU:847     Diastolic (91YUZ), QPC:22, Min:54, Max:86    Pulse Range: Pulse  Av.7  Min: 79  Max: 90  Respiration Range: Resp  Av  Min: 16  Max: 20  Current Pulse Ox[de-identified]  SpO2: 99 %  24HR Pulse Ox Range:  SpO2  Av.6 %  Min: 91 %  Max: 100 %  Oxygen Amount and Delivery: O2 Flow Rate (L/min): 0 L/min    Wt Readings from Last 3 Encounters:   19 203 lb 14.8 oz (92.5 kg)   19 180 lb (81.6 kg)   19 180 lb 12.4 oz (82 kg)       I/O (24 Hours)  No intake or output data in the 24 hours ending 19    EXAM     General Appearance  Awake, alert,  in no acute distress  HEENT - normocephalic, atraumatic.    Neck - no JVD,  trachea midline   Lungs - coarse rhonchi, no wheezing or distress  Cardiovascular - Heart sounds are normal.  Regular rate and rhythm   Abdomen - Soft, nontender, nondistended, no guarding  Neurologic - dose comfortable, following commands  Skin - No bruising or bleeding  Extremities - No clubbing, cyanosis, edema    MEDS      amLODIPine  2.5 mg Oral Daily    gabapentin  100 mg Oral TID    hydrALAZINE  10 mg Oral TID    metoprolol succinate  25 mg Oral Daily    midodrine  10 mg Oral Once per day on Mon Wed Fri    Pentafluoroprop-Tetrafluoroeth   Apply externally Once per day on Mon Wed Fri    pantoprazole  40 mg Oral QAM AC    guaiFENesin  1,200 mg Oral BID    benzonatate  200 mg Oral TID    sodium chloride flush  10 mL Intravenous 2 times per day    diclofenac sodium  2 g Topical TID    heparin (porcine)  5,000 Units Subcutaneous 3 times per day    insulin lispro  0-12 Units Subcutaneous TID WC    insulin lispro  0-6 Units Subcutaneous Nightly    piperacillin-tazobactam  2.25 g Intravenous Q12H    allopurinol  100 mg Oral Daily    aspirin  81 mg Oral QAM    insulin glargine  10 Units Subcutaneous Nightly    therapeutic multivitamin-minerals  1 tablet Oral Daily    sennosides-docusate sodium  2 tablet Oral Once per day on Sun Tue Thu Sat    sertraline  50 mg Oral Daily    sucralfate  1 g Oral TID AC    sevelamer  3,200 mg Oral TID WC    fluticasone  2 puff Inhalation Daily    albuterol  2.5 mg Nebulization 4x daily    vancomycin (VANCOCIN) intermittent dosing (placeholder)   Other RX Placeholder      dextrose       acetaminophen, HYDROmorphone, oxyCODONE **AND** oxyCODONE-acetaminophen, midodrine, sodium chloride flush, polyvinyl alcohol, ondansetron, albuterol, glucose, dextrose, glucagon (rDNA), dextrose, calcium carbonate, polyethylene glycol, tetrahydrozoline, PIPERACILLIN-TAZOBACTAM (ZOSYN) 0.75 GM IVPB, sodium chloride flush    LABS   CBC   Recent Labs     05/25/19  0413   WBC 4.7   HGB 10.1*   HCT 29.7*   MCV 97.5   PLT 84*     BMP:   Lab Results   Component Value Date     05/25/2019    K 4.2 05/25/2019    CL 98 05/25/2019    CO2 26 05/25/2019    BUN 32 05/25/2019    LABALBU 3.8 05/22/2019    CREATININE 6.71 05/25/2019    CALCIUM 8.6 05/25/2019    GFRAA 10 05/25/2019    LABGLOM 8 05/25/2019     ABGs:No results found for: PHART, PO2ART, UKO2FHN No results found for: IFIO2, MODE, SETTIDVOL, SETPEEP  Ionized Calcium:  No results found for: IONCA  Magnesium:    Lab Results   Component Value Date    MG 2.2 05/24/2019      Phosphorus:    Lab Results   Component Value Date    PHOS 2.7 05/25/2019        LIVER PROFILE   Recent Labs     05/22/19 1956   AST 29   ALT 32   BILITOT 0.44   ALKPHOS 308*     INR   Recent Labs     05/22/19 1956   INR 1.0     PTT   Recent Labs     05/22/19 1956   APTT 53.6*     BNP No results for input(s): BNP in the last 72 hours.     RADIOLOGY     (See actual reports for details)    ASSESSMENT/PLAN   Principal Problem:    Sepsis ([address]  Active Problems:    Osteomyelitis of lumbar spine ([address]    Hypertension    Decubitus ulcer    ESRD (end stage renal disease) ([facility]    AVF (arteriovenous fistula) (HCC)  Lingular pneumonia  Sleep apnea) possible AMENA  Elevated right diaphragm  Linear atelectasis    Antibiotic  Bronchodilators, flutter valve  Heparin subcu      Electronically signed by Luisana Marcano MD on 5/25/2019 at 7:20 PM

## 2019-05-26 LAB
ABSOLUTE EOS #: 0.6 K/UL (ref 0–0.4)
ABSOLUTE IMMATURE GRANULOCYTE: ABNORMAL K/UL (ref 0–0.3)
ABSOLUTE LYMPH #: 1.1 K/UL (ref 1–4.8)
ABSOLUTE MONO #: 0.5 K/UL (ref 0.1–1.3)
ANION GAP SERPL CALCULATED.3IONS-SCNC: 12 MMOL/L (ref 9–17)
BASOPHILS # BLD: 1 % (ref 0–2)
BASOPHILS ABSOLUTE: 0 K/UL (ref 0–0.2)
BUN BLDV-MCNC: 44 MG/DL (ref 8–23)
BUN/CREAT BLD: ABNORMAL (ref 9–20)
CALCIUM SERPL-MCNC: 8.2 MG/DL (ref 8.6–10.4)
CHLORIDE BLD-SCNC: 99 MMOL/L (ref 98–107)
CO2: 25 MMOL/L (ref 20–31)
CREAT SERPL-MCNC: 8.68 MG/DL (ref 0.7–1.2)
CULTURE: ABNORMAL
CULTURE: ABNORMAL
DIFFERENTIAL TYPE: ABNORMAL
DIRECT EXAM: ABNORMAL
EOSINOPHILS RELATIVE PERCENT: 11 % (ref 0–4)
GFR AFRICAN AMERICAN: 7 ML/MIN
GFR NON-AFRICAN AMERICAN: 6 ML/MIN
GFR SERPL CREATININE-BSD FRML MDRD: ABNORMAL ML/MIN/{1.73_M2}
GFR SERPL CREATININE-BSD FRML MDRD: ABNORMAL ML/MIN/{1.73_M2}
GLUCOSE BLD-MCNC: 109 MG/DL (ref 70–99)
GLUCOSE BLD-MCNC: 109 MG/DL (ref 75–110)
GLUCOSE BLD-MCNC: 128 MG/DL (ref 75–110)
GLUCOSE BLD-MCNC: 145 MG/DL (ref 75–110)
GLUCOSE BLD-MCNC: 173 MG/DL (ref 75–110)
HCT VFR BLD CALC: 28.6 % (ref 41–53)
HEMOGLOBIN: 9.6 G/DL (ref 13.5–17.5)
IMMATURE GRANULOCYTES: ABNORMAL %
LYMPHOCYTES # BLD: 20 % (ref 24–44)
Lab: ABNORMAL
MCH RBC QN AUTO: 32.9 PG (ref 26–34)
MCHC RBC AUTO-ENTMCNC: 33.7 G/DL (ref 31–37)
MCV RBC AUTO: 97.7 FL (ref 80–100)
MONOCYTES # BLD: 10 % (ref 1–7)
NRBC AUTOMATED: ABNORMAL PER 100 WBC
PDW BLD-RTO: 14.1 % (ref 11.5–14.9)
PHOSPHORUS: 3.2 MG/DL (ref 2.5–4.5)
PLATELET # BLD: 92 K/UL (ref 150–450)
PLATELET ESTIMATE: ABNORMAL
PMV BLD AUTO: 9.6 FL (ref 6–12)
POTASSIUM SERPL-SCNC: 4.3 MMOL/L (ref 3.7–5.3)
RBC # BLD: 2.93 M/UL (ref 4.5–5.9)
RBC # BLD: ABNORMAL 10*6/UL
SEG NEUTROPHILS: 58 % (ref 36–66)
SEGMENTED NEUTROPHILS ABSOLUTE COUNT: 3.2 K/UL (ref 1.3–9.1)
SODIUM BLD-SCNC: 136 MMOL/L (ref 135–144)
SPECIMEN DESCRIPTION: ABNORMAL
WBC # BLD: 5.4 K/UL (ref 3.5–11)
WBC # BLD: ABNORMAL 10*3/UL

## 2019-05-26 PROCEDURE — 84100 ASSAY OF PHOSPHORUS: CPT

## 2019-05-26 PROCEDURE — 94640 AIRWAY INHALATION TREATMENT: CPT

## 2019-05-26 PROCEDURE — 6370000000 HC RX 637 (ALT 250 FOR IP): Performed by: INTERNAL MEDICINE

## 2019-05-26 PROCEDURE — 6370000000 HC RX 637 (ALT 250 FOR IP): Performed by: NURSE PRACTITIONER

## 2019-05-26 PROCEDURE — 36415 COLL VENOUS BLD VENIPUNCTURE: CPT

## 2019-05-26 PROCEDURE — 2060000000 HC ICU INTERMEDIATE R&B

## 2019-05-26 PROCEDURE — 6360000002 HC RX W HCPCS: Performed by: INTERNAL MEDICINE

## 2019-05-26 PROCEDURE — 82947 ASSAY GLUCOSE BLOOD QUANT: CPT

## 2019-05-26 PROCEDURE — 2580000003 HC RX 258: Performed by: STUDENT IN AN ORGANIZED HEALTH CARE EDUCATION/TRAINING PROGRAM

## 2019-05-26 PROCEDURE — 6360000002 HC RX W HCPCS: Performed by: STUDENT IN AN ORGANIZED HEALTH CARE EDUCATION/TRAINING PROGRAM

## 2019-05-26 PROCEDURE — 99233 SBSQ HOSP IP/OBS HIGH 50: CPT | Performed by: INTERNAL MEDICINE

## 2019-05-26 PROCEDURE — 80048 BASIC METABOLIC PNL TOTAL CA: CPT

## 2019-05-26 PROCEDURE — 6360000002 HC RX W HCPCS: Performed by: NURSE PRACTITIONER

## 2019-05-26 PROCEDURE — 94761 N-INVAS EAR/PLS OXIMETRY MLT: CPT

## 2019-05-26 PROCEDURE — 2580000003 HC RX 258: Performed by: INTERNAL MEDICINE

## 2019-05-26 PROCEDURE — 85025 COMPLETE CBC W/AUTO DIFF WBC: CPT

## 2019-05-26 PROCEDURE — 6370000000 HC RX 637 (ALT 250 FOR IP): Performed by: STUDENT IN AN ORGANIZED HEALTH CARE EDUCATION/TRAINING PROGRAM

## 2019-05-26 RX ORDER — MIDODRINE HYDROCHLORIDE 5 MG/1
5 TABLET ORAL
Status: DISCONTINUED | OUTPATIENT
Start: 2019-05-27 | End: 2019-05-27 | Stop reason: HOSPADM

## 2019-05-26 RX ADMIN — ALBUTEROL SULFATE 2.5 MG: 2.5 SOLUTION RESPIRATORY (INHALATION) at 07:03

## 2019-05-26 RX ADMIN — PIPERACILLIN SODIUM,TAZOBACTAM SODIUM 2.25 G: 2; .25 INJECTION, POWDER, FOR SOLUTION INTRAVENOUS at 08:46

## 2019-05-26 RX ADMIN — BENZONATATE 200 MG: 100 CAPSULE ORAL at 08:41

## 2019-05-26 RX ADMIN — PANTOPRAZOLE SODIUM 40 MG: 40 TABLET, DELAYED RELEASE ORAL at 06:43

## 2019-05-26 RX ADMIN — Medication 10 ML: at 08:47

## 2019-05-26 RX ADMIN — Medication 10 ML: at 22:44

## 2019-05-26 RX ADMIN — HEPARIN SODIUM 5000 UNITS: 5000 INJECTION INTRAVENOUS; SUBCUTANEOUS at 22:46

## 2019-05-26 RX ADMIN — ALBUTEROL SULFATE 2.5 MG: 2.5 SOLUTION RESPIRATORY (INHALATION) at 18:32

## 2019-05-26 RX ADMIN — GABAPENTIN 100 MG: 100 CAPSULE ORAL at 08:41

## 2019-05-26 RX ADMIN — OXYCODONE HYDROCHLORIDE 2.5 MG: 5 TABLET ORAL at 22:49

## 2019-05-26 RX ADMIN — SUCRALFATE 1 G: 1 TABLET ORAL at 06:43

## 2019-05-26 RX ADMIN — SEVELAMER CARBONATE 3200 MG: 800 TABLET, FILM COATED ORAL at 08:41

## 2019-05-26 RX ADMIN — BENZONATATE 200 MG: 100 CAPSULE ORAL at 22:43

## 2019-05-26 RX ADMIN — SERTRALINE HYDROCHLORIDE 50 MG: 50 TABLET ORAL at 08:42

## 2019-05-26 RX ADMIN — DICLOFENAC 2 G: 10 GEL TOPICAL at 22:44

## 2019-05-26 RX ADMIN — BENZONATATE 200 MG: 100 CAPSULE ORAL at 15:23

## 2019-05-26 RX ADMIN — HEPARIN SODIUM 5000 UNITS: 5000 INJECTION INTRAVENOUS; SUBCUTANEOUS at 15:25

## 2019-05-26 RX ADMIN — SEVELAMER CARBONATE 3200 MG: 800 TABLET, FILM COATED ORAL at 11:33

## 2019-05-26 RX ADMIN — ALLOPURINOL 100 MG: 100 TABLET ORAL at 08:42

## 2019-05-26 RX ADMIN — DOCUSATE SODIUM AND SENNOSIDES 2 TABLET: 8.6; 5 TABLET, FILM COATED ORAL at 09:01

## 2019-05-26 RX ADMIN — GUAIFENESIN 1200 MG: 600 TABLET, EXTENDED RELEASE ORAL at 08:42

## 2019-05-26 RX ADMIN — SUCRALFATE 1 G: 1 TABLET ORAL at 11:33

## 2019-05-26 RX ADMIN — AMLODIPINE BESYLATE 2.5 MG: 2.5 TABLET ORAL at 08:42

## 2019-05-26 RX ADMIN — INSULIN GLARGINE 10 UNITS: 100 INJECTION, SOLUTION SUBCUTANEOUS at 22:46

## 2019-05-26 RX ADMIN — DICLOFENAC 2 G: 10 GEL TOPICAL at 08:54

## 2019-05-26 RX ADMIN — INSULIN LISPRO 2 UNITS: 100 INJECTION, SOLUTION INTRAVENOUS; SUBCUTANEOUS at 16:44

## 2019-05-26 RX ADMIN — HYDRALAZINE HYDROCHLORIDE 10 MG: 10 TABLET, FILM COATED ORAL at 15:25

## 2019-05-26 RX ADMIN — METOPROLOL SUCCINATE 25 MG: 25 TABLET, EXTENDED RELEASE ORAL at 08:41

## 2019-05-26 RX ADMIN — HEPARIN SODIUM 5000 UNITS: 5000 INJECTION INTRAVENOUS; SUBCUTANEOUS at 06:43

## 2019-05-26 RX ADMIN — GABAPENTIN 100 MG: 100 CAPSULE ORAL at 22:43

## 2019-05-26 RX ADMIN — FLUTICASONE PROPIONATE 2 PUFF: 110 AEROSOL, METERED RESPIRATORY (INHALATION) at 08:55

## 2019-05-26 RX ADMIN — PIPERACILLIN SODIUM,TAZOBACTAM SODIUM 2.25 G: 2; .25 INJECTION, POWDER, FOR SOLUTION INTRAVENOUS at 22:43

## 2019-05-26 RX ADMIN — GABAPENTIN 100 MG: 100 CAPSULE ORAL at 15:23

## 2019-05-26 RX ADMIN — HYDRALAZINE HYDROCHLORIDE 10 MG: 10 TABLET, FILM COATED ORAL at 08:47

## 2019-05-26 RX ADMIN — SUCRALFATE 1 G: 1 TABLET ORAL at 15:23

## 2019-05-26 RX ADMIN — OXYCODONE HYDROCHLORIDE AND ACETAMINOPHEN 1 TABLET: 5; 325 TABLET ORAL at 22:49

## 2019-05-26 RX ADMIN — HYDRALAZINE HYDROCHLORIDE 10 MG: 10 TABLET, FILM COATED ORAL at 22:45

## 2019-05-26 RX ADMIN — SEVELAMER CARBONATE 3200 MG: 800 TABLET, FILM COATED ORAL at 16:48

## 2019-05-26 RX ADMIN — OXYCODONE HYDROCHLORIDE AND ACETAMINOPHEN 1 TABLET: 5; 325 TABLET ORAL at 15:37

## 2019-05-26 RX ADMIN — ASPIRIN 81 MG 81 MG: 81 TABLET ORAL at 08:42

## 2019-05-26 RX ADMIN — GUAIFENESIN 1200 MG: 600 TABLET, EXTENDED RELEASE ORAL at 22:43

## 2019-05-26 RX ADMIN — ALBUTEROL SULFATE 2.5 MG: 2.5 SOLUTION RESPIRATORY (INHALATION) at 14:08

## 2019-05-26 RX ADMIN — ALBUTEROL SULFATE 2.5 MG: 2.5 SOLUTION RESPIRATORY (INHALATION) at 10:35

## 2019-05-26 RX ADMIN — OXYCODONE HYDROCHLORIDE 2.5 MG: 5 TABLET ORAL at 15:37

## 2019-05-26 RX ADMIN — MULTIPLE VITAMINS W/ MINERALS TAB 1 TABLET: TAB at 08:42

## 2019-05-26 ASSESSMENT — ENCOUNTER SYMPTOMS
NAUSEA: 0
SINUS PRESSURE: 0
ABDOMINAL PAIN: 0
SORE THROAT: 0
WHEEZING: 1
COUGH: 1
SINUS PAIN: 0
SHORTNESS OF BREATH: 0
BACK PAIN: 0
CONSTIPATION: 1
VOMITING: 0
DIARRHEA: 0

## 2019-05-26 ASSESSMENT — PAIN SCALES - GENERAL
PAINLEVEL_OUTOF10: 5
PAINLEVEL_OUTOF10: 6
PAINLEVEL_OUTOF10: 4
PAINLEVEL_OUTOF10: 0

## 2019-05-26 NOTE — PROGRESS NOTES
Date:   5/26/2019  Patient name: Juan Pablo Freeman  Date of admission:  5/22/2019  7:55 PM  MRN:   [id number]  YOB: 1945  PCP: Krystle Rahman MD    Reason for Admission: Sepsis [facility]) [A41.9]    Cardiology follow-up : Abnormal troponin                                  History of presenting illness: 75year-old male a nursing home resident admitted on 5/20/2019 with altered mental status, fever 104°F and elevated blood pressure generalized weakness.  On the day of admission earlier he had dialysis without any problem.     Labs 5/22/2019 sodium 132, potassium 5.0, BUN 41, creatinine 8.0, glucose 275, prolactin 2.19, proBNP 2717,  High sensitivity troponin 223  Hemoglobin 13.0, and before meals 8.0, platelets 282  Urine tests showed large amount of leukocyte Estrace, WBC more than 10     Chest x-ray 5/20/2019 left greater than right basilar airspace disease  ECG 5/22/2019 tachycardia, left anterior fascicular block, right bundle-branch block  CT head no acute intracranial hemorrhages, mass effect or midline shift, chronic microvascular ischemic changes in the periventricular white matter distribution   CT chest mild infiltrate in the lingula mild atelectasis at the lung bases  CT abdomen and pelvis no acute findings, or pathological fracture of L3 without convincing evidence for acute discitis or osteomyelitis     PMH and PSH:  ESRD on hemodialysis, Rt arm AV fistula  History of CVA, speech problem  Anemia  CAD, CABG   S/P Hip surgery  Decubitus Ulcer  Severely limited mobility     Interval  History: Blood culture positive beta hemolytic streptococci    Vitals: /77   Pulse 73   Temp 97.2 °F (36.2 °C) (Oral)   Resp 18   Ht 5' 10\" (1.778 m)   Wt 205 lb 7.5 oz (93.2 kg)   SpO2 100%   BMI 29.48 kg/m²     Patient seen and examined  He was having his lunch  Neck pain or lower partner  No chest pain, no shortness of breath  No fever or chills      Medications:   Scheduled Meds:   [START ON 5/27/2019] Osteomyelitis of lumbar spine (HCC)     Lumbar discitis     Discitis of lumbar region     ESRD (end stage renal disease) (HCC)     Protein-calorie malnutrition (HCC)     Thrombocytopenia (HCC)     Perianal abscess     Diabetes mellitus ([address]     SIRS (systemic inflammatory response syndrome) (HCC)     Sepsis ([address]     Abscess of anal or rectal region     Pneumonia of left lower lobe due to infectious organism [facility])     Acute exacerbation of chronic obstructive airways disease (HCC)     Hyponatremia     Cellulitis of forearm, right     AVF (arteriovenous fistula) (HCC)     Cellulitis of forearm     Influenza     ESRD on hemodialysis ([address]      Plan of Treatment:   Medications checked  Continue with current cardiac medications  Check lipid profile    Electronically signed by Sandy Matthew MD on 5/26/2019 at 12:49 PM

## 2019-05-26 NOTE — FLOWSHEET NOTE
05/26/19 1404   Encounter Summary   Services provided to: Patient   Referral/Consult From: [facility]   (5-26-19)   Volunteer Visit Yes   Complexity of Encounter Low   Length of Encounter 15 minutes   Routine   Type Follow up   Spiritual/Church   Type Spiritual support   Intervention Prayer;Communion   Sacraments   Communion Patient received communion

## 2019-05-26 NOTE — PROGRESS NOTES
[address]    PROGRESS NOTE             5/26/2019    1:28 PM    Name:   Timothy Smith  MRN:     [id number]     Acct:      [de-identified]   Room:   [address] [place] [address] Day:  4  Admit Date:  5/22/2019  7:55 PM    PCP:  Lucia Lee MD  Code Status:  Full Code    Subjective:     C/C:   Chief Complaint   Patient presents with    Altered Mental Status     Interval History Status: improved. 5/26/19    · Patient is still and debilitated   · Having difficulty walking a few steps to bathroom with help   · Cardiology pulmonary nephrology notes noted   · Patient going to have dialysis tomorrow   · Is on antibiotics   · Is afebrile   · Sepsis symptoms have improved  1. Patient was seen and examined at the bedside this morning  No acute overnight events were noted in the chart. Patient notes he had HD yesterday. VSS, Afebrile. Patient continues to receive breathing treatments. He notes that his breathing has improved. Denies any chest pain, discomfort or pressure. Patient feels he is nearly back to his baseline. Brief History:     Per Epic EMR H&P:    \"The patient is a 75 y.o. / male who presents withAltered Mental Status   and he is admitted to the hospital for medical management.     Per [employer] EMR, the patient presented to the ER with altered mental status after dialysis today. He had a fever during dialysis of 104F and then had altered mental status. His is noted to be alert and oriented at baseline. Code sepsis was called with etiology either pulmonary, abdominal and possibly meningitis. Patient currently lives at a nursing facility [facility]). Patient has ESRD on HD. He was started on Vanc + Zosyn. He is on chronic doxycycline for osteomyelitis.      Prior hospitalizations:   He was hospitalized in January 2019 for Influenza. Hospitalized in November 2018 for Cellulitis of the fistula site.    Hospitalized in September 2018 - for Urine indicating Strep Beta Hemolytic, tx with Vancomycin/Cefepime and transitioned to Levaquin. Outpatient ECHO was not completed per records\"    Review of Systems:     Review of Systems   Constitutional: Negative for fatigue, fever and unexpected weight change. HENT: Negative for sinus pressure, sinus pain and sore throat. Respiratory: Positive for cough (ongoing, unchanged) and wheezing. Negative for shortness of breath. Cardiovascular: Negative for chest pain, palpitations and leg swelling. Gastrointestinal: Positive for constipation (Patient feels like he has to have a bowel movement, but has not had one yet). Negative for abdominal pain, diarrhea, nausea and vomiting. Tolerating PO intake well     Genitourinary: Positive for decreased urine volume (chronic, on HD). Negative for flank pain and scrotal swelling. Musculoskeletal: Negative for arthralgias, back pain and myalgias. Skin: Negative for pallor, rash and wound. Neurological: Negative for light-headedness, numbness and headaches. Psychiatric/Behavioral: Positive for sleep disturbance (patient received minimal sleep last night because he felt like having to have a bowel movement). Negative for confusion. The patient is not nervous/anxious. Medications: Allergies: Allergies   Allergen Reactions    Oxycontin [Oxycodone] Shortness Of Breath and Other (See Comments)     Patient had an overdose on Oxycontin before and does NOT want this ever again. He can take Oxycodone/acetaminophen as needed.     Avodart [Dutasteride]     Darvocet [Propoxyphene N-Acetaminophen]        Current Meds:   Scheduled Meds:    [START ON 5/27/2019] midodrine  5 mg Oral Once per day on Mon Wed Fri    amLODIPine  2.5 mg Oral Daily    gabapentin  100 mg Oral TID    hydrALAZINE  10 mg Oral TID    metoprolol succinate  25 mg Oral Daily    Pentafluoroprop-Tetrafluoroeth   Apply externally Once per day on Mon Wed Fri    pantoprazole  40 mg Oral QAM AC    guaiFENesin  1,200 mg Oral BID    benzonatate  200 mg Oral TID    sodium chloride flush  10 mL Intravenous 2 times per day    diclofenac sodium  2 g Topical TID    heparin (porcine)  5,000 Units Subcutaneous 3 times per day    insulin lispro  0-12 Units Subcutaneous TID WC    insulin lispro  0-6 Units Subcutaneous Nightly    piperacillin-tazobactam  2.25 g Intravenous Q12H    allopurinol  100 mg Oral Daily    aspirin  81 mg Oral QAM    insulin glargine  10 Units Subcutaneous Nightly    therapeutic multivitamin-minerals  1 tablet Oral Daily    sennosides-docusate sodium  2 tablet Oral Once per day on Sun Tue Thu Sat    sertraline  50 mg Oral Daily    sucralfate  1 g Oral TID AC    sevelamer  3,200 mg Oral TID WC    fluticasone  2 puff Inhalation Daily    albuterol  2.5 mg Nebulization 4x daily    vancomycin (VANCOCIN) intermittent dosing (placeholder)   Other RX Placeholder     Continuous Infusions:    dextrose       PRN Meds: acetaminophen, HYDROmorphone, oxyCODONE **AND** oxyCODONE-acetaminophen, midodrine, sodium chloride flush, polyvinyl alcohol, ondansetron, albuterol, glucose, dextrose, glucagon (rDNA), dextrose, calcium carbonate, polyethylene glycol, tetrahydrozoline, PIPERACILLIN-TAZOBACTAM (ZOSYN) 0.75 GM IVPB, sodium chloride flush    Data:     Past Medical History:   has a past medical history of Asthma, CAD (coronary artery disease), CKD (chronic kidney disease) stage 4, GFR 15-29 ml/min ([facility]), CVA (cerebral vascular accident) ([facility] Depression, History of kidney stones, Hyperlipidemia, Hypertension, Osteoarthritis, Proteinuria, and Type II or unspecified type diabetes mellitus without mention of complication, not stated as uncontrolled. Social History:   reports that he has never smoked. He has never used smokeless tobacco. He reports that he does not drink alcohol or use drugs.      Family History:   Family History   Problem Relation Age of Onset CL 99 05/26/2019    CO2 25 05/26/2019    BUN 44 05/26/2019    LABALBU 3.8 05/22/2019    CREATININE 8.68 05/26/2019    CALCIUM 8.2 05/26/2019    GFRAA 7 05/26/2019    LABGLOM 6 05/26/2019    GLUCOSE 109 05/26/2019     BUN/Creatinine:    Lab Results   Component Value Date    BUN 44 05/26/2019    CREATININE 8.68 05/26/2019     Hepatic Function Panel:    Lab Results   Component Value Date    ALKPHOS 308 05/22/2019    ALT 32 05/22/2019    AST 29 05/22/2019    PROT 7.6 05/22/2019    PROT 5.7 11/27/2012    BILITOT 0.44 05/22/2019    BILIDIR 0.18 06/30/2016    IBILI 0.27 06/30/2016    LABALBU 3.8 05/22/2019     Magnesium:    Lab Results   Component Value Date    MG 2.2 05/24/2019     Phosphorus:    Lab Results   Component Value Date    PHOS 3.2 05/26/2019       Lab Results   Component Value Date/Time    SPECIAL NOT REPORTED 05/23/2019 04:30 PM     Lab Results   Component Value Date/Time    CULTURE NORMAL RESPIRATORY ROBERT LIGHT GROWTH 05/23/2019 04:30 PM    CULTURE PSEUDOMONAS AERUGINOSA LIGHT GROWTH (A) 05/23/2019 04:30 PM         Radiology:    Ct Head Wo Contrast    Result Date: 5/22/2019  EXAMINATION: CT OF THE HEAD WITHOUT CONTRAST; CT OF THE CHEST, ABDOMEN, AND PELVIS WITH CONTRAST; CT OF THE THORACIC SPINE WITHOUT CONTRAST; CT OF THE LUMBAR SPINE WITHOUT CONTRAST  5/22/2019 8:57 pm TECHNIQUE: CT of the head was performed without the administration of intravenous contrast. Dose modulation, iterative reconstruction, and/or weight based adjustment of the mA/kV was utilized to reduce the radiation dose to as low as reasonably achievable.; CT of the chest, abdomen and pelvis was performed with the administration of intravenous contrast. Multiplanar reformatted images are provided for review.  Dose modulation, iterative reconstruction, and/or weight based adjustment of the mA/kV was utilized to reduce the radiation dose to as low as reasonably achievable.; CT of the thoracic spine was performed without the administration of intravenous contrast. Multiplanar reformatted images are provided for review. Dose modulation, iterative reconstruction, and/or weight based adjustment of the mA/kV was utilized to reduce the radiation dose to as low as reasonably achievable.; CT of the lumbar spine was performed without the administration of intravenous contrast. Multiplanar reformatted images are provided for review. Dose modulation, iterative reconstruction, and/or weight based adjustment of the mA/kV was utilized to reduce the radiation dose to as low as reasonably achievable. COMPARISON: Head CT dated 11/30/2015. abdomen and pelvis CT dated 03/27/2016 HISTORY: ORDERING SYSTEM PROVIDED HISTORY: AMS TECHNOLOGIST PROVIDED HISTORY: Ordering Physician Provided Reason for Exam: patient here for ams and fever after dialysis today FINDINGS: Head CT: BRAIN/VENTRICLES: There is no acute intracranial hemorrhage, mass effect or midline shift. No abnormal extra-axial fluid collection. The gray-white differentiation is maintained without evidence of an acute infarct. There is no evidence of hydrocephalus. Chronic microvascular ischemic changes in periventricular white matter distribution. ORBITS: The visualized portion of the orbits demonstrate no acute abnormality. SINUSES: The visualized paranasal sinuses and mastoid air cells demonstrate no acute abnormality. SOFT TISSUES/SKULL:  No acute abnormality of the visualized skull or soft tissues. Chest CT: Mediastinum: No thoracic aortic aneurysm . Mild cardiomegaly. No pericardial effusion. No abnormal lymph node enlargement. Lungs/pleura: Mild infiltrate in the lingula. Mild atelectasis at the lung bases. No effusions. No pneumothorax. Soft Tissues/Bones: No lytic or blastic lesions in all visualized osseous structures. CT abdomen and pelvis: Organs: Liver, pancreas, spleen, bilateral adrenal glands are unremarkable. Cholelithiasis. Atrophic kidneys contains small cysts.  GI/Bowel: Stomach, small and large bowel loops are grossly unremarkable. Colonic diverticulosis without acute diverticulitis. Pelvis: Urinary bladder is unremarkable. No lymphadenopathy. No soft tissue masses or abnormal fluid collections. Peritoneum/Retroperitoneum: No free intraperitoneal fluid or air. No abdominal aortic aneurysm. No retroperitoneal lymphadenopathy. 5 x 5 x 2 cm well-circumscribed fluid collection superficial to the right lobe of the liver is stable since 2016, therefore benign. Bones/Soft Tissues: No lytic or blastic lesions in all visualized osseous structures. Old pathologic fracture of L3. No convincing evidence for discitis/osteomyelitis in the left side. Left hip arthroplasty. Atrophy of left psoas muscle. The overall appearance of distal left psoas muscle is similar to CT of 2016. Head CT: No acute intracranial abnormalities. Chest CT: Mild infiltrate in the lingula. Mild atelectasis at the lung bases. Abdomen and pelvis CT: No acute findings. Cholelithiasis. Old pathologic fracture of L3 without convincing evidence for acute discitis/osteomyelitis. Ct Thoracic Spine Wo Contrast    Result Date: 5/22/2019  EXAMINATION: CT OF THE HEAD WITHOUT CONTRAST; CT OF THE CHEST, ABDOMEN, AND PELVIS WITH CONTRAST; CT OF THE THORACIC SPINE WITHOUT CONTRAST; CT OF THE LUMBAR SPINE WITHOUT CONTRAST  5/22/2019 8:57 pm TECHNIQUE: CT of the head was performed without the administration of intravenous contrast. Dose modulation, iterative reconstruction, and/or weight based adjustment of the mA/kV was utilized to reduce the radiation dose to as low as reasonably achievable.; CT of the chest, abdomen and pelvis was performed with the administration of intravenous contrast. Multiplanar reformatted images are provided for review.  Dose modulation, iterative reconstruction, and/or weight based adjustment of the mA/kV was utilized to reduce the radiation dose to as low as reasonably achievable.; CT of the thoracic spine was performed without the administration of intravenous contrast. Multiplanar reformatted images are provided for review. Dose modulation, iterative reconstruction, and/or weight based adjustment of the mA/kV was utilized to reduce the radiation dose to as low as reasonably achievable.; CT of the lumbar spine was performed without the administration of intravenous contrast. Multiplanar reformatted images are provided for review. Dose modulation, iterative reconstruction, and/or weight based adjustment of the mA/kV was utilized to reduce the radiation dose to as low as reasonably achievable. COMPARISON: Head CT dated 11/30/2015. abdomen and pelvis CT dated 03/27/2016 HISTORY: ORDERING SYSTEM PROVIDED HISTORY: AMS TECHNOLOGIST PROVIDED HISTORY: Ordering Physician Provided Reason for Exam: patient here for ams and fever after dialysis today FINDINGS: Head CT: BRAIN/VENTRICLES: There is no acute intracranial hemorrhage, mass effect or midline shift. No abnormal extra-axial fluid collection. The gray-white differentiation is maintained without evidence of an acute infarct. There is no evidence of hydrocephalus. Chronic microvascular ischemic changes in periventricular white matter distribution. ORBITS: The visualized portion of the orbits demonstrate no acute abnormality. SINUSES: The visualized paranasal sinuses and mastoid air cells demonstrate no acute abnormality. SOFT TISSUES/SKULL:  No acute abnormality of the visualized skull or soft tissues. Chest CT: Mediastinum: No thoracic aortic aneurysm . Mild cardiomegaly. No pericardial effusion. No abnormal lymph node enlargement. Lungs/pleura: Mild infiltrate in the lingula. Mild atelectasis at the lung bases. No effusions. No pneumothorax. Soft Tissues/Bones: No lytic or blastic lesions in all visualized osseous structures. CT abdomen and pelvis: Organs: Liver, pancreas, spleen, bilateral adrenal glands are unremarkable. Cholelithiasis.   Atrophic kidneys reasonably achievable.; CT of the thoracic spine was performed without the administration of intravenous contrast. Multiplanar reformatted images are provided for review. Dose modulation, iterative reconstruction, and/or weight based adjustment of the mA/kV was utilized to reduce the radiation dose to as low as reasonably achievable.; CT of the lumbar spine was performed without the administration of intravenous contrast. Multiplanar reformatted images are provided for review. Dose modulation, iterative reconstruction, and/or weight based adjustment of the mA/kV was utilized to reduce the radiation dose to as low as reasonably achievable. COMPARISON: Head CT dated 11/30/2015. abdomen and pelvis CT dated 03/27/2016 HISTORY: ORDERING SYSTEM PROVIDED HISTORY: AMS TECHNOLOGIST PROVIDED HISTORY: Ordering Physician Provided Reason for Exam: patient here for ams and fever after dialysis today FINDINGS: Head CT: BRAIN/VENTRICLES: There is no acute intracranial hemorrhage, mass effect or midline shift. No abnormal extra-axial fluid collection. The gray-white differentiation is maintained without evidence of an acute infarct. There is no evidence of hydrocephalus. Chronic microvascular ischemic changes in periventricular white matter distribution. ORBITS: The visualized portion of the orbits demonstrate no acute abnormality. SINUSES: The visualized paranasal sinuses and mastoid air cells demonstrate no acute abnormality. SOFT TISSUES/SKULL:  No acute abnormality of the visualized skull or soft tissues. Chest CT: Mediastinum: No thoracic aortic aneurysm . Mild cardiomegaly. No pericardial effusion. No abnormal lymph node enlargement. Lungs/pleura: Mild infiltrate in the lingula. Mild atelectasis at the lung bases. No effusions. No pneumothorax. Soft Tissues/Bones: No lytic or blastic lesions in all visualized osseous structures.  CT abdomen and pelvis: Organs: Liver, pancreas, spleen, bilateral adrenal glands are convincing evidence for acute discitis/osteomyelitis. Physical Examination:        Physical Exam   Constitutional: He is oriented to person, place, and time. He appears well-developed. No distress. HENT:   Head: Normocephalic and atraumatic. Mouth/Throat: Oropharynx is clear and moist. No oropharyngeal exudate. Eyes: Pupils are equal, round, and reactive to light. Conjunctivae are normal. Right eye exhibits no discharge. Left eye exhibits no discharge. Neck: Normal range of motion. Neck supple. No thyromegaly present. Cardiovascular: Normal rate, regular rhythm and normal heart sounds. Pulmonary/Chest: Effort normal. No respiratory distress. He has wheezes (slight wheezing in anterior lung fields bilaterally). Abdominal: Soft. Bowel sounds are normal. He exhibits no distension. There is no tenderness. Musculoskeletal: He exhibits deformity (bilateral feet deformity present, dressings placed between toes). He exhibits no edema. Neurological: He is alert and oriented to person, place, and time. Patient has a history of a CVA in the past     Skin: Skin is warm and dry. He is not diaphoretic. Psychiatric: His speech is normal and behavior is normal. Thought content normal. His mood appears anxious. Vitals reviewed.     Assessment:        Primary Problem  Sepsis [facility])    Active Hospital Problems    Diagnosis Date Noted    Osteomyelitis of lumbar spine ([facility] [M46.26] 06/25/2015     Priority: High    AVF (arteriovenous fistula) ([facility] [I77.0]     Sepsis ([facility] [A41.9] 12/29/2015    ESRD (end stage renal disease) ([facility] [N18.6] 06/26/2015    Protein-calorie malnutrition ([facility] [E46] 06/26/2015    Decubitus ulcer [L89.90] 06/10/2015    Hypertension [I10]        Plan:        Sepsis, present on admission, likely due to bacterial organism, left lingular PNA and/or less llikely UTI  -Pulm following              -Vanc and Zosyn currently   --> Procal rising 2.19, 3.18, 3.29 - may consider Pulmonary      [] Neuro                                  [] ---         Following input noted ;   ASSESSMENT/PLAN   Principal Problem:    Sepsis ([facility]  Active Problems:    Osteomyelitis of lumbar spine ([facility]    Hypertension    Decubitus ulcer    ESRD (end stage renal disease) ([facility]    Protein-calorie malnutrition ([facility]    AVF (arteriovenous fistula) ([facility]  Resolved Problems:    * No resolved hospital problems. *  Creatinine 8.7, hemoglobin 9.6        Continue antibiotics  Hemodialysis tomorrow  Continue vigorous RT-aerosol treatments, flutter valve  Increase activity                                  [address] [place] [address], [address] [place] [address].    Phone [phone]   Fax: [phone]  Answering Service: [phone]

## 2019-05-26 NOTE — PLAN OF CARE
Problem: Falls - Risk of:  Goal: Will remain free from falls  Description  Will remain free from falls  5/26/2019 1731 by Mine Devi RN  Outcome: Met This Shift  No falls this shift. Patient is alert and oriented. Call light within reach. Problem: Nutrition  Goal: Optimal nutrition therapy     Eating encouraged.

## 2019-05-26 NOTE — PLAN OF CARE
Problem: Falls - Risk of:  Goal: Will remain free from falls  Description  Will remain free from falls  Outcome: Met This Shift     Problem: Falls - Risk of:  Goal: Absence of physical injury  Description  Absence of physical injury  Outcome: Met This Shift     Problem: Infection, Septic Shock:  Goal: Will show no infection signs and symptoms  Description  Will show no infection signs and symptoms  Outcome: Ongoing     Problem: Risk for Impaired Skin Integrity  Goal: Tissue integrity - skin and mucous membranes  Description  Structural intactness and normal physiological function of skin and  mucous membranes.   Outcome: Ongoing     Problem: Fluid Volume - Imbalance:  Goal: Absence of imbalanced fluid volume signs and symptoms  Description  Absence of imbalanced fluid volume signs and symptoms  Outcome: Ongoing     Problem: Nutrition  Goal: Optimal nutrition therapy  Outcome: Ongoing

## 2019-05-26 NOTE — PROGRESS NOTES
Nephrology daily progress note    Interval history: Patient feels well today and does not have fever or chills. He has a previous history of CVA with expressive aphasia but does not appear to be in obvious distress. He is currently afebrile. History of Present Illness: This is a 75 y. o. 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 Patient initially presented from [address] with complaints of fever [temperature 104 °F] associated with altered mental status and was admitted for evaluation and management of sepsis. Notably he was hospitalized in general 2019 for influenza A and treated in 2018 for cellulitis of the AV fistula site. He did have a UTI in 2018 isolated strep beta-hemolytic. Patient was hypotensive in the ER and received 1 L bolus of saline. This morning he received an extra 500 mils cc of 0.9 saline and IV albumin was started per nephrology recommendations. His lactic acid was 2.3. He responsed to the fluid bolus and albumin with blood pressure in the 768 systolic this morning. His mentation is improved. He has been started on broad-spectrum IV antibiotics with vancomycin and Zosyn and blood cultures sent. He had a lumbar CT scan which showed no evidence for acute discitis/osteomyelitis.      Objective:  Constitutional:    CURRENT TEMPERATURE:  Temp: 97.2 °F (36.2 °C)  MAXIMUM TEMPERATURE OVER 24HRS:  Temp (24hrs), Av.4 °F (36.9 °C), Min:97.2 °F (36.2 °C), Max:99 °F (37.2 °C)    CURRENT RESPIRATORY RATE:  Resp: 18  CURRENT PULSE:  Pulse: 78  CURRENT BLOOD PRESSURE:  BP: (!) 167/72  24HR BLOOD PRESSURE RANGE:  Systolic (24hrs), Av , Min:122 , SKF:639   ; Diastolic (83OOD), TWR:80, Min:54, Max:86      Physical Exam:  GENERAL APPEARANCE: Alert and cooperative, and appears to be in no acute distress. HEAD: normocephalic  EYES: PERRL, EOMI. Not pale, anicteric   NOSE:  No nasal discharge. THROAT:  Oral cavity and pharynx normal. Moist  NECK: Neck supple, non-tender without lymphadenopathy, masses or thyromegaly. JVD-neg  CARDIAC: Normal S1 and S2. No S3, S4 or murmurs. Rhythm is regular. LUNGS: Clear to auscultation and percussion without rales, rhonchi, wheezing or diminished breath sounds. ABD-Soft non distended, BS+ Non tender no organomegally  BACK: Examination of the spine reveals  no spinal deformity, without tenderness,   MUSKULOSKELETAL: Adequately aligned spine. No joint erythema or tenderness. EXTREMITIES: No edema. Peripheral pulses intact. NEURO:Alert oriented x 3 ,power 5/5 in all extremities      Labs:  PTH:  No results found for: PTH  abs:   CBC:   Recent Labs     19  0838 19   WBC 6.5 4.7 5.4   RBC 3.01* 3.05* 2.93*   HGB 10.1* 10.1* 9.6*   HCT 29.7* 29.7* 28.6*   MCV 98.7 97.5 97.7   MCH 33.7 33.2 32.9   MCHC 34.1 34.0 33.7   RDW 14.4 14.0 14.1   PLT 81* 84* 92*   MPV 8.5 9.7 9.6      BMP:   Recent Labs     19   * 137 136   K 5.0 4.2 4.3   CL 96* 98 99   CO2 19* 26 25   BUN 58* 32* 44*   CREATININE 9.97* 6.71* 8.68*   GLUCOSE 146* 206* 109*   CALCIUM 8.6 8.6 8.2*        Phosphorus:    Recent Labs     19  0422 193 19   PHOS 4.8* 2.7 3.2     Magnesium:   Recent Labs     19  0422   MG 2.2     Albumin:   No results for input(s): LABALBU in the last 72 hours. Assessment/plan:    1. ESRD -  will maintain MWF hemodialysis schedule.   Renal diet,i.e 2-gram sodium,2-gram potassium,1500 ml fluid restriction,1-gram phosphorus, 1800 KCal and 1.2 gram protein per day.     2.  Gram-positive cocci bacteremia - On IV Zosyn and vancomycin. Sputum is growing light growth of Pseudomonas.     3.  Systemic hypertension - Blood pressure control is suboptimal and would decrease midodrine to 5 mg p.o. 3 times weekly with dialysis.     4.  Anemia of end-stage renal disease - Hemoglobin 9.6 g/dL. 5.  Renal osteodystrophy - serum phosphorus 3.2 mg/dL is within target. Althia Peer was recently decreased to 800 mg p.o. 3 times daily with meals.      Michael Garner  Attending Clinical Nephrologist

## 2019-05-26 NOTE — PROGRESS NOTES
Pulmonary Progress Note  P[facility] Care Specialists      Patient - Kenisha Almazan,  Age - 75 y.o.    - 1945      Room Number - 2125/2125-01   N -  [id number]   [facility]  Date of Admission -  2019  7:55 PM        Consulting Solo Romero MD  Primary Care Physician - Lieutenant Gaby MD     SUBJECTIVE   Comfortable in bed, oxygen, no complaints of shortness of breath  Discussed with RN-plan hemodialysis tomorrow, hopefully home in 1 or 2 days  Continues on IV antibiotics    OBJECTIVE   VITALS    height is 5' 10\" (1.778 m) and weight is 205 lb 7.5 oz (93.2 kg). His oral temperature is 97.2 °F (36.2 °C). His blood pressure is 167/72 (abnormal) and his pulse is 78. His respiration is 18 and oxygen saturation is 99%. Body mass index is 29.48 kg/m². Temperature Range: Temp: 97.2 °F (36.2 °C) Temp  Av.4 °F (36.9 °C)  Min: 97.2 °F (36.2 °C)  Max: 99 °F (37.2 °C)  BP Range:  Systolic (51PFL), KJY:567 , Min:122 , OIO:075     Diastolic (83UZJ), FOZ:94, Min:54, Max:86    Pulse Range: Pulse  Av.4  Min: 78  Max: 87  Respiration Range: Resp  Av.6  Min: 14  Max: 20  Current Pulse Ox[de-identified]  SpO2: 99 %  24HR Pulse Ox Range:  SpO2  Av.1 %  Min: 94 %  Max: 100 %  Oxygen Amount and Delivery: O2 Flow Rate (L/min): 0 L/min    Wt Readings from Last 3 Encounters:   19 205 lb 7.5 oz (93.2 kg)   19 180 lb (81.6 kg)   19 180 lb 12.4 oz (82 kg)       I/O (24 Hours)    Intake/Output Summary (Last 24 hours) at 2019 1157  Last data filed at 2019 0703  Gross per 24 hour   Intake 290 ml   Output 0 ml   Net 290 ml       EXAM     General Appearance  Awake, alert, oriented, in no acute distress  HEENT - normocephalic, atraumatic.  []  Mallampati  [] Crowded airway   [] Macroglossia  []  Retrognathia  [] Micrognathia  []  Normal tongue size []  Normal Bite  [] Waqas sign positive    Neck - Supple, trachea midline   Lungs - symmetric expansion, clear bilaterally  Cardiovascular - Heart sounds are normal.  Regular rate and rhythm   Abdomen - Soft, nontender, nondistended, no masses or organomegaly  Neurologic - There are no focal motor or sensory deficits  Skin - No bruising or bleeding  Extremities - No clubbing, cyanosis, edema    MEDS      [START ON 5/27/2019] midodrine  5 mg Oral Once per day on Mon Wed Fri    amLODIPine  2.5 mg Oral Daily    gabapentin  100 mg Oral TID    hydrALAZINE  10 mg Oral TID    metoprolol succinate  25 mg Oral Daily    Pentafluoroprop-Tetrafluoroeth   Apply externally Once per day on Mon Wed Fri    pantoprazole  40 mg Oral QAM AC    guaiFENesin  1,200 mg Oral BID    benzonatate  200 mg Oral TID    sodium chloride flush  10 mL Intravenous 2 times per day    diclofenac sodium  2 g Topical TID    heparin (porcine)  5,000 Units Subcutaneous 3 times per day    insulin lispro  0-12 Units Subcutaneous TID WC    insulin lispro  0-6 Units Subcutaneous Nightly    piperacillin-tazobactam  2.25 g Intravenous Q12H    allopurinol  100 mg Oral Daily    aspirin  81 mg Oral QAM    insulin glargine  10 Units Subcutaneous Nightly    therapeutic multivitamin-minerals  1 tablet Oral Daily    sennosides-docusate sodium  2 tablet Oral Once per day on Sun Tue Thu Sat    sertraline  50 mg Oral Daily    sucralfate  1 g Oral TID AC    sevelamer  3,200 mg Oral TID WC    fluticasone  2 puff Inhalation Daily    albuterol  2.5 mg Nebulization 4x daily    vancomycin (VANCOCIN) intermittent dosing (placeholder)   Other RX Placeholder      dextrose       acetaminophen, HYDROmorphone, oxyCODONE **AND** oxyCODONE-acetaminophen, midodrine, sodium chloride flush, polyvinyl alcohol, ondansetron, albuterol, glucose, dextrose, glucagon (rDNA), dextrose, calcium carbonate, polyethylene glycol, tetrahydrozoline, PIPERACILLIN-TAZOBACTAM (ZOSYN) 0.75 GM IVPB, sodium chloride flush    LABS   CBC Recent Labs     05/26/19  0449   WBC 5.4   HGB 9.6*   HCT 28.6*   MCV 97.7   PLT 92*     BMP:   Lab Results   Component Value Date     05/26/2019    K 4.3 05/26/2019    CL 99 05/26/2019    CO2 25 05/26/2019    BUN 44 05/26/2019    LABALBU 3.8 05/22/2019    CREATININE 8.68 05/26/2019    CALCIUM 8.2 05/26/2019    GFRAA 7 05/26/2019    LABGLOM 6 05/26/2019     ABGs:No results found for: PHART, PO2ART, VNS7JMJ No results found for: IFIO2, MODE, SETTIDVOL, SETPEEP  Ionized Calcium:  No results found for: IONCA  Magnesium:    Lab Results   Component Value Date    MG 2.2 05/24/2019     Phosphorus:    Lab Results   Component Value Date    PHOS 3.2 05/26/2019        LIVER PROFILE No results for input(s): AST, ALT, LIPASE, BILIDIR, BILITOT, ALKPHOS in the last 72 hours. Invalid input(s): AMYLASE,  ALB  INR No results for input(s): INR in the last 72 hours. PTT   Lab Results   Component Value Date    APTT 53.6 (H) 05/22/2019         RADIOLOGY     (See actual reports for details)    ASSESSMENT/PLAN   Principal Problem:    Sepsis ([address]  Active Problems:    Osteomyelitis of lumbar spine ([address]    Hypertension    Decubitus ulcer    ESRD (end stage renal disease) ([address]    Protein-calorie malnutrition ([address]    AVF (arteriovenous fistula) ([address]  Resolved Problems:    * No resolved hospital problems.  *  Creatinine 8.7, hemoglobin 9.6      Continue antibiotics  Hemodialysis tomorrow  Continue vigorous RT-aerosol treatments, flutter valve  Increase activity      Electronically signed by Stephanie Roche MD on 5/26/2019 at 11:57 AM

## 2019-05-27 VITALS
DIASTOLIC BLOOD PRESSURE: 45 MMHG | HEIGHT: 70 IN | BODY MASS INDEX: 28.09 KG/M2 | HEART RATE: 94 BPM | TEMPERATURE: 98.7 F | RESPIRATION RATE: 16 BRPM | SYSTOLIC BLOOD PRESSURE: 142 MMHG | OXYGEN SATURATION: 100 % | WEIGHT: 196.21 LBS

## 2019-05-27 LAB
ABSOLUTE EOS #: 0.7 K/UL (ref 0–0.4)
ABSOLUTE IMMATURE GRANULOCYTE: ABNORMAL K/UL (ref 0–0.3)
ABSOLUTE LYMPH #: 1.1 K/UL (ref 1–4.8)
ABSOLUTE MONO #: 0.5 K/UL (ref 0.1–1.3)
ANION GAP SERPL CALCULATED.3IONS-SCNC: 14 MMOL/L (ref 9–17)
BASOPHILS # BLD: 1 % (ref 0–2)
BASOPHILS ABSOLUTE: 0 K/UL (ref 0–0.2)
BUN BLDV-MCNC: 53 MG/DL (ref 8–23)
BUN/CREAT BLD: ABNORMAL (ref 9–20)
CALCIUM SERPL-MCNC: 8.2 MG/DL (ref 8.6–10.4)
CHLORIDE BLD-SCNC: 97 MMOL/L (ref 98–107)
CHOLESTEROL/HDL RATIO: 3.1
CHOLESTEROL: 88 MG/DL
CO2: 23 MMOL/L (ref 20–31)
CREAT SERPL-MCNC: 9.82 MG/DL (ref 0.7–1.2)
DIFFERENTIAL TYPE: ABNORMAL
EOSINOPHILS RELATIVE PERCENT: 11 % (ref 0–4)
GFR AFRICAN AMERICAN: 6 ML/MIN
GFR NON-AFRICAN AMERICAN: 5 ML/MIN
GFR SERPL CREATININE-BSD FRML MDRD: ABNORMAL ML/MIN/{1.73_M2}
GFR SERPL CREATININE-BSD FRML MDRD: ABNORMAL ML/MIN/{1.73_M2}
GLUCOSE BLD-MCNC: 119 MG/DL (ref 70–99)
GLUCOSE BLD-MCNC: 141 MG/DL (ref 75–110)
GLUCOSE BLD-MCNC: 83 MG/DL (ref 75–110)
HCT VFR BLD CALC: 29.2 % (ref 41–53)
HDLC SERPL-MCNC: 28 MG/DL
HEMOGLOBIN: 9.7 G/DL (ref 13.5–17.5)
IMMATURE GRANULOCYTES: ABNORMAL %
LDL CHOLESTEROL: 27 MG/DL (ref 0–130)
LYMPHOCYTES # BLD: 18 % (ref 24–44)
MCH RBC QN AUTO: 32.8 PG (ref 26–34)
MCHC RBC AUTO-ENTMCNC: 33.4 G/DL (ref 31–37)
MCV RBC AUTO: 98.1 FL (ref 80–100)
MONOCYTES # BLD: 8 % (ref 1–7)
NRBC AUTOMATED: ABNORMAL PER 100 WBC
PDW BLD-RTO: 14.2 % (ref 11.5–14.9)
PHOSPHORUS: 3.2 MG/DL (ref 2.5–4.5)
PLATELET # BLD: 101 K/UL (ref 150–450)
PLATELET ESTIMATE: ABNORMAL
PMV BLD AUTO: 9.3 FL (ref 6–12)
POTASSIUM SERPL-SCNC: 5 MMOL/L (ref 3.7–5.3)
RBC # BLD: 2.97 M/UL (ref 4.5–5.9)
RBC # BLD: ABNORMAL 10*6/UL
SEG NEUTROPHILS: 62 % (ref 36–66)
SEGMENTED NEUTROPHILS ABSOLUTE COUNT: 4 K/UL (ref 1.3–9.1)
SODIUM BLD-SCNC: 134 MMOL/L (ref 135–144)
TRIGL SERPL-MCNC: 163 MG/DL
VANCOMYCIN RANDOM DATE LAST DOSE: NORMAL
VANCOMYCIN RANDOM DOSE AMOUNT: 1000
VANCOMYCIN RANDOM TIME LAST DOSE: 1515
VANCOMYCIN RANDOM: 14.6 UG/ML
VLDLC SERPL CALC-MCNC: ABNORMAL MG/DL (ref 1–30)
WBC # BLD: 6.4 K/UL (ref 3.5–11)
WBC # BLD: ABNORMAL 10*3/UL

## 2019-05-27 PROCEDURE — 90935 HEMODIALYSIS ONE EVALUATION: CPT

## 2019-05-27 PROCEDURE — 84100 ASSAY OF PHOSPHORUS: CPT

## 2019-05-27 PROCEDURE — 6360000002 HC RX W HCPCS: Performed by: INTERNAL MEDICINE

## 2019-05-27 PROCEDURE — 6360000002 HC RX W HCPCS: Performed by: STUDENT IN AN ORGANIZED HEALTH CARE EDUCATION/TRAINING PROGRAM

## 2019-05-27 PROCEDURE — 2580000003 HC RX 258: Performed by: STUDENT IN AN ORGANIZED HEALTH CARE EDUCATION/TRAINING PROGRAM

## 2019-05-27 PROCEDURE — 80048 BASIC METABOLIC PNL TOTAL CA: CPT

## 2019-05-27 PROCEDURE — 6360000002 HC RX W HCPCS: Performed by: NURSE PRACTITIONER

## 2019-05-27 PROCEDURE — 94640 AIRWAY INHALATION TREATMENT: CPT

## 2019-05-27 PROCEDURE — 82947 ASSAY GLUCOSE BLOOD QUANT: CPT

## 2019-05-27 PROCEDURE — 94761 N-INVAS EAR/PLS OXIMETRY MLT: CPT

## 2019-05-27 PROCEDURE — 6370000000 HC RX 637 (ALT 250 FOR IP): Performed by: INTERNAL MEDICINE

## 2019-05-27 PROCEDURE — 6370000000 HC RX 637 (ALT 250 FOR IP): Performed by: STUDENT IN AN ORGANIZED HEALTH CARE EDUCATION/TRAINING PROGRAM

## 2019-05-27 PROCEDURE — 36415 COLL VENOUS BLD VENIPUNCTURE: CPT

## 2019-05-27 PROCEDURE — 85025 COMPLETE CBC W/AUTO DIFF WBC: CPT

## 2019-05-27 PROCEDURE — 80202 ASSAY OF VANCOMYCIN: CPT

## 2019-05-27 PROCEDURE — 80061 LIPID PANEL: CPT

## 2019-05-27 PROCEDURE — 2580000003 HC RX 258: Performed by: INTERNAL MEDICINE

## 2019-05-27 PROCEDURE — 99239 HOSP IP/OBS DSCHRG MGMT >30: CPT | Performed by: STUDENT IN AN ORGANIZED HEALTH CARE EDUCATION/TRAINING PROGRAM

## 2019-05-27 RX ORDER — CIPROFLOXACIN 500 MG/1
500 TABLET, FILM COATED ORAL DAILY
Qty: 2 TABLET | Refills: 0 | Status: SHIPPED | OUTPATIENT
Start: 2019-05-28 | End: 2019-05-30

## 2019-05-27 RX ORDER — GUAIFENESIN 600 MG/1
1200 TABLET, EXTENDED RELEASE ORAL 2 TIMES DAILY
Qty: 12 TABLET | Refills: 0 | Status: SHIPPED | OUTPATIENT
Start: 2019-05-27 | End: 2019-05-30

## 2019-05-27 RX ORDER — BENZONATATE 200 MG/1
200 CAPSULE ORAL 3 TIMES DAILY
Qty: 21 CAPSULE | Refills: 0 | Status: SHIPPED | OUTPATIENT
Start: 2019-05-27 | End: 2019-06-03

## 2019-05-27 RX ORDER — OXYCODONE AND ACETAMINOPHEN 7.5; 325 MG/1; MG/1
1 TABLET ORAL EVERY 6 HOURS PRN
Qty: 6 TABLET | Refills: 0 | Status: SHIPPED | OUTPATIENT
Start: 2019-05-27 | End: 2019-05-29

## 2019-05-27 RX ADMIN — BENZONATATE 200 MG: 100 CAPSULE ORAL at 08:09

## 2019-05-27 RX ADMIN — GUAIFENESIN 1200 MG: 600 TABLET, EXTENDED RELEASE ORAL at 08:09

## 2019-05-27 RX ADMIN — BENZONATATE 200 MG: 100 CAPSULE ORAL at 14:03

## 2019-05-27 RX ADMIN — SUCRALFATE 1 G: 1 TABLET ORAL at 14:03

## 2019-05-27 RX ADMIN — SEVELAMER CARBONATE 3200 MG: 800 TABLET, FILM COATED ORAL at 14:03

## 2019-05-27 RX ADMIN — GABAPENTIN 100 MG: 100 CAPSULE ORAL at 08:09

## 2019-05-27 RX ADMIN — MULTIPLE VITAMINS W/ MINERALS TAB 1 TABLET: TAB at 08:09

## 2019-05-27 RX ADMIN — PIPERACILLIN SODIUM,TAZOBACTAM SODIUM 2.25 G: 2; .25 INJECTION, POWDER, FOR SOLUTION INTRAVENOUS at 14:03

## 2019-05-27 RX ADMIN — PANTOPRAZOLE SODIUM 40 MG: 40 TABLET, DELAYED RELEASE ORAL at 08:09

## 2019-05-27 RX ADMIN — GABAPENTIN 100 MG: 100 CAPSULE ORAL at 17:35

## 2019-05-27 RX ADMIN — Medication: at 10:22

## 2019-05-27 RX ADMIN — HYDRALAZINE HYDROCHLORIDE 10 MG: 10 TABLET, FILM COATED ORAL at 17:35

## 2019-05-27 RX ADMIN — Medication 10 ML: at 08:00

## 2019-05-27 RX ADMIN — HEPARIN SODIUM 5000 UNITS: 5000 INJECTION INTRAVENOUS; SUBCUTANEOUS at 08:10

## 2019-05-27 RX ADMIN — ASPIRIN 81 MG 81 MG: 81 TABLET ORAL at 08:09

## 2019-05-27 RX ADMIN — SUCRALFATE 1 G: 1 TABLET ORAL at 08:09

## 2019-05-27 RX ADMIN — METOPROLOL SUCCINATE 25 MG: 25 TABLET, EXTENDED RELEASE ORAL at 14:03

## 2019-05-27 RX ADMIN — AMLODIPINE BESYLATE 2.5 MG: 2.5 TABLET ORAL at 14:03

## 2019-05-27 RX ADMIN — SUCRALFATE 1 G: 1 TABLET ORAL at 17:35

## 2019-05-27 RX ADMIN — ALBUTEROL SULFATE 2.5 MG: 2.5 SOLUTION RESPIRATORY (INHALATION) at 15:05

## 2019-05-27 RX ADMIN — ALLOPURINOL 100 MG: 100 TABLET ORAL at 08:10

## 2019-05-27 RX ADMIN — MIDODRINE HYDROCHLORIDE 5 MG: 5 TABLET ORAL at 08:09

## 2019-05-27 RX ADMIN — SERTRALINE HYDROCHLORIDE 50 MG: 50 TABLET ORAL at 08:09

## 2019-05-27 RX ADMIN — ALBUTEROL SULFATE 2.5 MG: 2.5 SOLUTION RESPIRATORY (INHALATION) at 07:51

## 2019-05-27 RX ADMIN — FLUTICASONE PROPIONATE 2 PUFF: 110 AEROSOL, METERED RESPIRATORY (INHALATION) at 08:10

## 2019-05-27 RX ADMIN — SEVELAMER CARBONATE 3200 MG: 800 TABLET, FILM COATED ORAL at 08:09

## 2019-05-27 ASSESSMENT — PAIN SCALES - GENERAL: PAINLEVEL_OUTOF10: 0

## 2019-05-27 NOTE — PROGRESS NOTES
Pharmacy Vancomycin Consult     Vancomycin Day: 6  Current Dosing: pulse dosing per dialysis protocol    Temp max:  98.7 F    Recent Labs     05/26/19  0449 05/27/19  0416   BUN 44* 53*       Recent Labs     05/26/19  0449 05/27/19  0416   CREATININE 8.68* 9.82*       Recent Labs     05/26/19  0449 05/27/19  0416   WBC 5.4 6.4         Intake/Output Summary (Last 24 hours) at 5/27/2019 0803  Last data filed at 5/26/2019 1927  Gross per 24 hour   Intake 640 ml   Output --   Net 640 ml       Culture Date      Source                       Results  See micro    Ht Readings from Last 1 Encounters:   05/23/19 5' 10\" (1.778 m)        Wt Readings from Last 1 Encounters:   05/26/19 205 lb 7.5 oz (93.2 kg)         Body mass index is 29.48 kg/m². Estimated Creatinine Clearance: 7 mL/min (A) (based on SCr of 9.82 mg/dL [facility])). Trough: 14.6 at 04:16 am    Assessment/Plan:  Trough within range, will give another 1 gm dose with dialysis today. Kathy Chadwick. Ph.  5/27/2019  8:05 AM

## 2019-05-27 NOTE — PROGRESS NOTES
Pt completed tx without incident, no s/s of distress of discomfort noted.  Pt bld return, tolerated tx well

## 2019-05-27 NOTE — PROGRESS NOTES
Av , Min:115 , GAZ:434   ; Diastolic (70RTF), EFY:46, Min:53, Max:83      Physical Exam:  GENERAL APPEARANCE: Alert and cooperative, and appears to be in no acute distress. HEAD: normocephalic  EYES: PERRL, EOMI. Not pale, anicteric   NOSE:  No nasal discharge. THROAT:  Oral cavity and pharynx normal. Moist  NECK: Neck supple, non-tender without lymphadenopathy, masses or thyromegaly. JVD-neg  CARDIAC: Normal S1 and S2. No S3, S4 or murmurs. Rhythm is regular. LUNGS: Clear to auscultation and percussion without rales, rhonchi, wheezing or diminished breath sounds. ABD-Soft non distended, BS+ Non tender no organomegally  BACK: Examination of the spine reveals  no spinal deformity, without tenderness,   MUSKULOSKELETAL: Adequately aligned spine. No joint erythema or tenderness. EXTREMITIES: No edema. Peripheral pulses intact. NEURO:Alert oriented x 3 ,power 5/5 in all extremities      Labs:  PTH:  No results found for: PTH  abs:   CBC:   Recent Labs     19   WBC 4.7 5.4 6.4   RBC 3.05* 2.93* 2.97*   HGB 10.1* 9.6* 9.7*   HCT 29.7* 28.6* 29.2*   MCV 97.5 97.7 98.1   MCH 33.2 32.9 32.8   MCHC 34.0 33.7 33.4   RDW 14.0 14.1 14.2   PLT 84* 92* 101*   MPV 9.7 9.6 9.3      BMP:   Recent Labs     19    136 134*   K 4.2 4.3 5.0   CL 98 99 97*   CO2 26 25 23   BUN 32* 44* 53*   CREATININE 6.71* 8.68* 9.82*   GLUCOSE 206* 109* 119*   CALCIUM 8.6 8.2* 8.2*        Phosphorus:    Recent Labs     19   PHOS 2.7 3.2 3.2     Assessment/plan:    1. ESRD -  Will schedule for acute hemodialysis today per chronic schedule. Renal diet,i.e 2-gram sodium,2-gram potassium,1500 ml fluid restriction,1-gram phosphorus, 1800 KCal and 1.2 gram protein per day.     2.  Gram-positive cocci bacteremia - On IV Zosyn and vancomycin.   Sputum is growing light growth of Pseudomonas.     3.  Systemic hypertension - Controlled.     4.  Anemia of end-stage renal disease - Hemoglobin 9.7 g/dL. 5.  Renal osteodystrophy - serum phosphorus 3.2 mg/dL is within target.       Vivian Morrison  Attending Clinical Nephrologist

## 2019-05-27 NOTE — CARE COORDINATION
DISCHARGE PLANNING NOTE:    I was informed that this patient can discharge to [facility] . I called [facility] at [phone] and spoke with GIO Rodney who informed me that this patient can return to them tonight. I called [facility] and scheduled transportation for 5:30 PM.     Vik Valera RN will call report to [facility] at [phone]. Completed AYDIN and discharge med list along with narcotic script was faxed to [facility] at [phone].      Electronically signed by Maxwell Bird RN on 5/27/2019 at 4:30 PM

## 2019-05-27 NOTE — PROGRESS NOTES
Pulmonary Progress Note  Pulmonary and Critical Care Specialists      Patient - Kizzy Shaikh,  Age - 75 y.o.    - 1945      Room Number - 2125/2125-01   N -  [id number]   [facility]  Date of Admission -  2019  7:55 PM        Consulting Murtaza Chi MD  Primary Care Physician - Sandor Payton MD     SUBJECTIVE   Patient in hemodialysis, appears comfortable, off oxygen, continues on antibiotics  Possible discharge today    OBJECTIVE   VITALS    height is 5' 10\" (1.778 m) and weight is 196 lb 3.4 oz (89 kg). His temperature is 98.6 °F (37 °C). His blood pressure is 124/53 (abnormal) and his pulse is 49 (abnormal). His respiration is 18 and oxygen saturation is 99%. Body mass index is 28.15 kg/m². Temperature Range: Temp: 98.6 °F (37 °C) Temp  Av.5 °F (36.9 °C)  Min: 97.2 °F (36.2 °C)  Max: 99.1 °F (37.3 °C)  BP Range:  Systolic (13GNX), ZBX:605 , Min:115 , WE     Diastolic (46GCX), YHM:59, Min:53, Max:83    Pulse Range: Pulse  Av.8  Min: 49  Max: 82  Respiration Range: Resp  Av.7  Min: 16  Max: 18  Current Pulse Ox[de-identified]  SpO2: 99 %  24HR Pulse Ox Range:  SpO2  Av %  Min: 93 %  Max: 100 %  Oxygen Amount and Delivery: O2 Flow Rate (L/min): 0 L/min    Wt Readings from Last 3 Encounters:   19 196 lb 3.4 oz (89 kg)   19 180 lb (81.6 kg)   19 180 lb 12.4 oz (82 kg)       I/O (24 Hours)    Intake/Output Summary (Last 24 hours) at 2019 1023  Last data filed at 2019 0751  Gross per 24 hour   Intake 680 ml   Output --   Net 680 ml       EXAM     General Appearance  Awake, alert, oriented, in no acute distress, off oxygen  HEENT - normocephalic, atraumatic.  []  Mallampati  [] Crowded airway   [] Macroglossia  []  Retrognathia  [] Micrognathia  []  Normal tongue size []  Normal Bite  [] [place] sign positive    Neck - Supple,  trachea midline   Lungs - clear bilaterally no wheezes no Labs     05/27/19  0416   WBC 6.4   HGB 9.7*   HCT 29.2*   MCV 98.1   *     BMP:   Lab Results   Component Value Date     05/27/2019    K 5.0 05/27/2019    CL 97 05/27/2019    CO2 23 05/27/2019    BUN 53 05/27/2019    LABALBU 3.8 05/22/2019    CREATININE 9.82 05/27/2019    CALCIUM 8.2 05/27/2019    GFRAA 6 05/27/2019    LABGLOM 5 05/27/2019     ABGs:No results found for: PHART, PO2ART, YIE8ZER No results found for: IFIO2, MODE, SETTIDVOL, SETPEEP  Ionized Calcium:  No results found for: IONCA  Magnesium:    Lab Results   Component Value Date    MG 2.2 05/24/2019     Phosphorus:    Lab Results   Component Value Date    PHOS 3.2 05/27/2019        LIVER PROFILE No results for input(s): AST, ALT, LIPASE, BILIDIR, BILITOT, ALKPHOS in the last 72 hours. Invalid input(s): AMYLASE,  ALB  INR No results for input(s): INR in the last 72 hours. PTT   Lab Results   Component Value Date    APTT 53.6 (H) 05/22/2019         RADIOLOGY     (See actual reports for details)    ASSESSMENT/PLAN   Principal Problem:    Sepsis ([address]  Active Problems:    Osteomyelitis of lumbar spine ([address]    Hypertension    Decubitus ulcer    ESRD (end stage renal disease) ([address]    Protein-calorie malnutrition ([address]    AVF (arteriovenous fistula) ([address]  Resolved Problems:    * No resolved hospital problems.  *  WBC 6400    Plan:  Okay for discharge from pulmonary standpoint  Finish course of oral antibiotics  Continue increased activity  Discussed with resident        Electronically signed by Sudhir Beltran MD on 5/27/2019 at 10:23 AM

## 2019-05-27 NOTE — FLOWSHEET NOTE
05/27/19 1244   Observations & Evaluations   Pulse 72   Resp 18   Vital Signs   /80   Temp 98 °F (36.7 °C)   During Hemodialysis Assessment   Blood Flow Rate (ml/min) 400 ml/min   Ultrafiltration Rate (ml/hr) 980 ml/hr   Ultrafiltration Total 3400 ml   Arterial Pressure (mmHg) 180 mmHg   Venous Pressure (mmHg) 160   TMP 60   Hemodialysis Conductivity 14.1      Comments pt off circ with no diff    Access Visible Yes   Post-Hemodialysis Assessment   Post-Treatment Procedures Blood returned; Access bleeding time < 10 minutes   Machine Disinfection Process Acid/Vinegar Clean;Heat Disinfect   Rinseback Volume (ml) 300 ml   Total Liters Processed (l/min) 71.2 l/min   Dialyzer Clearance Lightly streaked   Duration of Treatment (minutes) 210 minutes   Hemodialysis Intake (ml) 400 ml   Hemodialysis Output (ml) 3400 ml   NET Removed (ml) 3000 ml   Tolerated Treatment Good   Patient Response to Treatment pt tolerated tx well

## 2019-05-27 NOTE — PROGRESS NOTES
Nurse left message for [facility], patient daughter, informing her patient is being discharged to  today.

## 2019-05-27 NOTE — PROGRESS NOTES
[address]    PROGRESS NOTE             5/27/2019    8:30 AM    Name:   Timothy Smith  MRN:     [id number]     Acct:      [de-identified]   Room:   [facility]5/[facility]  IP Day:  5  Admit Date:  5/22/2019  7:55 PM    PCP:  Lucia Lee MD  Code Status:  Full Code    Subjective:     C/C:   Chief Complaint   Patient presents with    Altered Mental Status     Interval History Status: improved. Patient seen and chart reviewed, undergoing Hemodialysis this am, no acute events overnight, feels breathing is better, improvement in cough, feels more like his baseline. Brief History:     Per Epic EMR H&P:     \"The patient is a 75 y.o. / male who presents withAltered Mental Status   and he is admitted to the hospital for medical management.     Per [facility] EMR, the patient presented to the ER with altered mental status after dialysis today. He had a fever during dialysis of 104F and then had altered mental status. His is noted to be alert and oriented at baseline. Code sepsis was called with etiology either pulmonary, abdominal and possibly meningitis. Patient currently lives at a nursing facility [facility]). Patient has ESRD on HD. He was started on Vanc + Zosyn. He is on chronic doxycycline for osteomyelitis.      Prior hospitalizations:   He was hospitalized in January 2019 for Influenza. Hospitalized in November 2018 for Cellulitis of the fistula site. Hospitalized in September 2018 - for Urine indicating Strep Beta Hemolytic, tx with Vancomycin/Cefepime and transitioned to Levaquin. Outpatient ECHO was not completed per records\"      Review of Systems:     Review of Systems  Constitutional: Negative for fatigue, fever and unexpected weight change. HENT: Negative for sinus pressure, sinus pain and sore throat. Respiratory: Positive for cough (ongoing, improved) . Negative for wheezing, shortness of breath. Cardiovascular: Negative for chest pain, palpitations and leg swelling. Gastrointestinal: Positive for constipation, did have bowel movement yesterday. Negative for abdominal pain, diarrhea, nausea and vomiting. Tolerating PO intake well   Genitourinary: Positive for decreased urine volume (chronic, on HD). Negative for flank pain and scrotal swelling. Musculoskeletal: Negative for arthralgias, back pain and myalgias. Skin: Negative for pallor, rash and wound. Neurological: Negative for light-headedness, numbness and headaches. Psychiatric/Behavioral: Negative for confusion. The patient is not nervous/anxious. Medications: Allergies: Allergies   Allergen Reactions    Oxycontin [Oxycodone] Shortness Of Breath and Other (See Comments)     Patient had an overdose on Oxycontin before and does NOT want this ever again. He can take Oxycodone/acetaminophen as needed.     Avodart [Dutasteride]    Agusto Resendiz Darvocet [Propoxyphene N-Acetaminophen]        Current Meds:   Scheduled Meds:    vancomycin  1,000 mg Intravenous Once    midodrine  5 mg Oral Once per day on Mon Wed Fri    amLODIPine  2.5 mg Oral Daily    gabapentin  100 mg Oral TID    hydrALAZINE  10 mg Oral TID    metoprolol succinate  25 mg Oral Daily    Pentafluoroprop-Tetrafluoroeth   Apply externally Once per day on Mon Wed Fri    pantoprazole  40 mg Oral QAM AC    guaiFENesin  1,200 mg Oral BID    benzonatate  200 mg Oral TID    sodium chloride flush  10 mL Intravenous 2 times per day    diclofenac sodium  2 g Topical TID    heparin (porcine)  5,000 Units Subcutaneous 3 times per day    insulin lispro  0-12 Units Subcutaneous TID WC    insulin lispro  0-6 Units Subcutaneous Nightly    piperacillin-tazobactam  2.25 g Intravenous Q12H    allopurinol  100 mg Oral Daily    aspirin  81 mg Oral QAM    insulin glargine  10 Units Subcutaneous Nightly    therapeutic multivitamin-minerals  1 tablet Oral Daily    sennosides-docusate sodium  2 tablet Oral Once per day on Sun Tue Thu Sat    sertraline  50 mg Oral Daily    sucralfate  1 g Oral TID AC    sevelamer  3,200 mg Oral TID WC    fluticasone  2 puff Inhalation Daily    albuterol  2.5 mg Nebulization 4x daily    vancomycin (VANCOCIN) intermittent dosing (placeholder)   Other RX Placeholder     Continuous Infusions:    dextrose       PRN Meds: acetaminophen, HYDROmorphone, oxyCODONE **AND** oxyCODONE-acetaminophen, midodrine, sodium chloride flush, polyvinyl alcohol, ondansetron, albuterol, glucose, dextrose, glucagon (rDNA), dextrose, calcium carbonate, polyethylene glycol, tetrahydrozoline, PIPERACILLIN-TAZOBACTAM (ZOSYN) 0.75 GM IVPB, sodium chloride flush    Data:     Past Medical History:   has a past medical history of Asthma, CAD (coronary artery disease), CKD (chronic kidney disease) stage 4, GFR 15-29 ml/min ([facility]), CVA (cerebral vascular accident) ([facility] [address] Depression, History of kidney stones, Hyperlipidemia, Hypertension, Osteoarthritis, Proteinuria, and Type II or unspecified type diabetes mellitus without mention of complication, not stated as uncontrolled. Social History:   reports that he has never smoked. He has never used smokeless tobacco. He reports that he does not drink alcohol or use drugs. Family History:   Family History   Problem Relation Age of Onset    Stroke Mother     Heart Attack Mother     Heart Attack Father     Diabetes Sister     Diabetes Brother        Vitals:  /68   Pulse 73   Temp 98.7 °F (37.1 °C) (Oral)   Resp 16   Ht 5' 10\" (1.778 m)   Wt 205 lb 7.5 oz (93.2 kg)   SpO2 99%   BMI 29.48 kg/m²   Temp (24hrs), Av.5 °F (36.9 °C), Min:97.2 °F (36.2 °C), Max:99.1 °F (37.3 °C)    Recent Labs     19  1152 19  1631 19  0705   POCGLU 128* 173* 145* 83       I/O(24Hr):     Intake/Output Summary (Last 24 hours) at 2019 0830  Last data filed at 2019 1927  Gross per 24 hour   Intake 440 ml Output --   Net 440 ml       Labs:    [unfilled]    Lab Results   Component Value Date/Time    SPECIAL NOT REPORTED 05/23/2019 04:30 PM     Lab Results   Component Value Date/Time    CULTURE NORMAL RESPIRATORY ROBERT LIGHT GROWTH 05/23/2019 04:30 PM    CULTURE PSEUDOMONAS AERUGINOSA LIGHT GROWTH (A) 05/23/2019 04:30 PM       [unfilled]    Radiology:    Xr Chest (single View Frontal)    Result Date: 5/24/2019  EXAMINATION: ONE XRAY VIEW OF THE CHEST 5/24/2019 6:41 am COMPARISON: 22 May 2019 HISTORY: ORDERING SYSTEM PROVIDED HISTORY: Left lingular pneumonia TECHNOLOGIST PROVIDED HISTORY: Left lingular pneumonia Ordering Physician Provided Reason for Exam: left lingular pneumonia Acuity: Acute Type of Exam: Initial FINDINGS: AP portable view of the chest time stamped at 624 hours demonstrates overlying cardiac monitoring electrodes and prior median sternotomy and CABG procedure. Stable cardiomegaly is noted. Right hemidiaphragm is elevated. There is been improvement in bibasilar airspace disease, resolved on the right side and minimal on the left. Vascularity is within normal limits. No gross effusions are extrapleural air are noted. Osseous and mediastinal structures are stable. Improvement in bibasilar airspace disease. Right airspace disease has resolved. Minimal left airspace disease remains.      Ct Head Wo Contrast    Result Date: 5/22/2019  EXAMINATION: CT OF THE HEAD WITHOUT CONTRAST; CT OF THE CHEST, ABDOMEN, AND PELVIS WITH CONTRAST; CT OF THE THORACIC SPINE WITHOUT CONTRAST; CT OF THE LUMBAR SPINE WITHOUT CONTRAST  5/22/2019 8:57 pm TECHNIQUE: CT of the head was performed without the administration of intravenous contrast. Dose modulation, iterative reconstruction, and/or weight based adjustment of the mA/kV was utilized to reduce the radiation dose to as low as reasonably achievable.; CT of the chest, abdomen and pelvis was performed with the administration of intravenous contrast. Multiplanar reformatted images are provided for review. Dose modulation, iterative reconstruction, and/or weight based adjustment of the mA/kV was utilized to reduce the radiation dose to as low as reasonably achievable.; CT of the thoracic spine was performed without the administration of intravenous contrast. Multiplanar reformatted images are provided for review. Dose modulation, iterative reconstruction, and/or weight based adjustment of the mA/kV was utilized to reduce the radiation dose to as low as reasonably achievable.; CT of the lumbar spine was performed without the administration of intravenous contrast. Multiplanar reformatted images are provided for review. Dose modulation, iterative reconstruction, and/or weight based adjustment of the mA/kV was utilized to reduce the radiation dose to as low as reasonably achievable. COMPARISON: Head CT dated 11/30/2015. abdomen and pelvis CT dated 03/27/2016 HISTORY: ORDERING SYSTEM PROVIDED HISTORY: AMS TECHNOLOGIST PROVIDED HISTORY: Ordering Physician Provided Reason for Exam: patient here for ams and fever after dialysis today FINDINGS: Head CT: BRAIN/VENTRICLES: There is no acute intracranial hemorrhage, mass effect or midline shift. No abnormal extra-axial fluid collection. The gray-white differentiation is maintained without evidence of an acute infarct. There is no evidence of hydrocephalus. Chronic microvascular ischemic changes in periventricular white matter distribution. ORBITS: The visualized portion of the orbits demonstrate no acute abnormality. SINUSES: The visualized paranasal sinuses and mastoid air cells demonstrate no acute abnormality. SOFT TISSUES/SKULL:  No acute abnormality of the visualized skull or soft tissues. Chest CT: Mediastinum: No thoracic aortic aneurysm . Mild cardiomegaly. No pericardial effusion. No abnormal lymph node enlargement. Lungs/pleura: Mild infiltrate in the lingula. Mild atelectasis at the lung bases. No effusions.   No pneumothorax. Soft Tissues/Bones: No lytic or blastic lesions in all visualized osseous structures. CT abdomen and pelvis: Organs: Liver, pancreas, spleen, bilateral adrenal glands are unremarkable. Cholelithiasis. Atrophic kidneys contains small cysts. GI/Bowel: Stomach, small and large bowel loops are grossly unremarkable. Colonic diverticulosis without acute diverticulitis. Pelvis: Urinary bladder is unremarkable. No lymphadenopathy. No soft tissue masses or abnormal fluid collections. Peritoneum/Retroperitoneum: No free intraperitoneal fluid or air. No abdominal aortic aneurysm. No retroperitoneal lymphadenopathy. 5 x 5 x 2 cm well-circumscribed fluid collection superficial to the right lobe of the liver is stable since 2016, therefore benign. Bones/Soft Tissues: No lytic or blastic lesions in all visualized osseous structures. Old pathologic fracture of L3. No convincing evidence for discitis/osteomyelitis in the left side. Left hip arthroplasty. Atrophy of left psoas muscle. The overall appearance of distal left psoas muscle is similar to CT of 2016. Head CT: No acute intracranial abnormalities. Chest CT: Mild infiltrate in the lingula. Mild atelectasis at the lung bases. Abdomen and pelvis CT: No acute findings. Cholelithiasis. Old pathologic fracture of L3 without convincing evidence for acute discitis/osteomyelitis.      Ct Thoracic Spine Wo Contrast    Result Date: 5/22/2019  EXAMINATION: CT OF THE HEAD WITHOUT CONTRAST; CT OF THE CHEST, ABDOMEN, AND PELVIS WITH CONTRAST; CT OF THE THORACIC SPINE WITHOUT CONTRAST; CT OF THE LUMBAR SPINE WITHOUT CONTRAST  5/22/2019 8:57 pm TECHNIQUE: CT of the head was performed without the administration of intravenous contrast. Dose modulation, iterative reconstruction, and/or weight based adjustment of the mA/kV was utilized to reduce the radiation dose to as low as reasonably achievable.; CT of the chest, abdomen and pelvis was performed with the administration of intravenous contrast. Multiplanar reformatted images are provided for review. Dose modulation, iterative reconstruction, and/or weight based adjustment of the mA/kV was utilized to reduce the radiation dose to as low as reasonably achievable.; CT of the thoracic spine was performed without the administration of intravenous contrast. Multiplanar reformatted images are provided for review. Dose modulation, iterative reconstruction, and/or weight based adjustment of the mA/kV was utilized to reduce the radiation dose to as low as reasonably achievable.; CT of the lumbar spine was performed without the administration of intravenous contrast. Multiplanar reformatted images are provided for review. Dose modulation, iterative reconstruction, and/or weight based adjustment of the mA/kV was utilized to reduce the radiation dose to as low as reasonably achievable. COMPARISON: Head CT dated 11/30/2015. abdomen and pelvis CT dated 03/27/2016 HISTORY: ORDERING SYSTEM PROVIDED HISTORY: AMS TECHNOLOGIST PROVIDED HISTORY: Ordering Physician Provided Reason for Exam: patient here for ams and fever after dialysis today FINDINGS: Head CT: BRAIN/VENTRICLES: There is no acute intracranial hemorrhage, mass effect or midline shift. No abnormal extra-axial fluid collection. The gray-white differentiation is maintained without evidence of an acute infarct. There is no evidence of hydrocephalus. Chronic microvascular ischemic changes in periventricular white matter distribution. ORBITS: The visualized portion of the orbits demonstrate no acute abnormality. SINUSES: The visualized paranasal sinuses and mastoid air cells demonstrate no acute abnormality. SOFT TISSUES/SKULL:  No acute abnormality of the visualized skull or soft tissues. Chest CT: Mediastinum: No thoracic aortic aneurysm . Mild cardiomegaly. No pericardial effusion. No abnormal lymph node enlargement. Lungs/pleura: Mild infiltrate in the lingula.   Mild abdomen and pelvis was performed with the administration of intravenous contrast. Multiplanar reformatted images are provided for review. Dose modulation, iterative reconstruction, and/or weight based adjustment of the mA/kV was utilized to reduce the radiation dose to as low as reasonably achievable.; CT of the thoracic spine was performed without the administration of intravenous contrast. Multiplanar reformatted images are provided for review. Dose modulation, iterative reconstruction, and/or weight based adjustment of the mA/kV was utilized to reduce the radiation dose to as low as reasonably achievable.; CT of the lumbar spine was performed without the administration of intravenous contrast. Multiplanar reformatted images are provided for review. Dose modulation, iterative reconstruction, and/or weight based adjustment of the mA/kV was utilized to reduce the radiation dose to as low as reasonably achievable. COMPARISON: Head CT dated 11/30/2015. abdomen and pelvis CT dated 03/27/2016 HISTORY: ORDERING SYSTEM PROVIDED HISTORY: AMS TECHNOLOGIST PROVIDED HISTORY: Ordering Physician Provided Reason for Exam: patient here for ams and fever after dialysis today FINDINGS: Head CT: BRAIN/VENTRICLES: There is no acute intracranial hemorrhage, mass effect or midline shift. No abnormal extra-axial fluid collection. The gray-white differentiation is maintained without evidence of an acute infarct. There is no evidence of hydrocephalus. Chronic microvascular ischemic changes in periventricular white matter distribution. ORBITS: The visualized portion of the orbits demonstrate no acute abnormality. SINUSES: The visualized paranasal sinuses and mastoid air cells demonstrate no acute abnormality. SOFT TISSUES/SKULL:  No acute abnormality of the visualized skull or soft tissues. Chest CT: Mediastinum: No thoracic aortic aneurysm . Mild cardiomegaly. No pericardial effusion. No abnormal lymph node enlargement. Lungs/pleura: Mild infiltrate in the lingula. Mild atelectasis at the lung bases. No effusions. No pneumothorax. Soft Tissues/Bones: No lytic or blastic lesions in all visualized osseous structures. CT abdomen and pelvis: Organs: Liver, pancreas, spleen, bilateral adrenal glands are unremarkable. Cholelithiasis. Atrophic kidneys contains small cysts. GI/Bowel: Stomach, small and large bowel loops are grossly unremarkable. Colonic diverticulosis without acute diverticulitis. Pelvis: Urinary bladder is unremarkable. No lymphadenopathy. No soft tissue masses or abnormal fluid collections. Peritoneum/Retroperitoneum: No free intraperitoneal fluid or air. No abdominal aortic aneurysm. No retroperitoneal lymphadenopathy. 5 x 5 x 2 cm well-circumscribed fluid collection superficial to the right lobe of the liver is stable since 2016, therefore benign. Bones/Soft Tissues: No lytic or blastic lesions in all visualized osseous structures. Old pathologic fracture of L3. No convincing evidence for discitis/osteomyelitis in the left side. Left hip arthroplasty. Atrophy of left psoas muscle. The overall appearance of distal left psoas muscle is similar to CT of 2016. Head CT: No acute intracranial abnormalities. Chest CT: Mild infiltrate in the lingula. Mild atelectasis at the lung bases. Abdomen and pelvis CT: No acute findings. Cholelithiasis. Old pathologic fracture of L3 without convincing evidence for acute discitis/osteomyelitis. Xr Chest Portable    Result Date: 5/22/2019  EXAMINATION: ONE XRAY VIEW OF THE CHEST 5/22/2019 8:31 pm COMPARISON: January 24 HISTORY: ORDERING SYSTEM PROVIDED HISTORY: AMS, sepsis. ? pneumonia TECHNOLOGIST PROVIDED HISTORY: AMS, sepsis. ? pneumonia Ordering Physician Provided Reason for Exam: AMS, sepsis. ? pneumonia Acuity: Acute Type of Exam: Initial FINDINGS: Sternotomy wires and mediastinal clips are noted. Heart size is stable.  Patchy multifocal airspace disease is trending down.       -Resp culture -  Pseudomonas - likely dc on po cipro. MRSA dna swab -sandhya.  -Wound Care  -Repeat CXR 5/24 - improvement in bibasilar airspace disease, right airspace disease resolved with minimal left airspace disease remaining.     ESRD on HD, Hyponatremia, resolving  -Nephrology following              -dialysis per MWF schedule              -Management of fluid status     T2DM, on insulin, stable  -Lantus 10U nightly, ISS medium corrective dose     Elevated troponin's, with EKG changes, stable  -Cardiology following, continue with current management      Disposition  -likely discharge back to [facility] today. DVT prophylaxis: heparin (porcine) injection 5,000 TID  GI prophylaxis: Protonix 40 mg daily      Plan will be discussed with the attending, Dr Dejah Smith MD  PGY I Transitional Year Resident  5/27/2019 8:30 AM     Attending Physician Statement  I have discussed the care of Chanel Corbin and I have examined the patient myselft and taken ros and hpi , including pertinent history and exam findings,  with the resident. I have reviewed the key elements of all parts of the encounter with the resident. I agree with the assessment, plan and orders as documented by the resident.   Ok to [employer] signed by Shola Castrejon MD

## 2019-05-27 NOTE — CARE COORDINATION
DISCHARGE PLANNING NOTE:    Patient had dialysis treatment today. Plan is to discharge back to [facility]. No precert required.      Electronically signed by Kim Garcias RN on 5/27/2019 at 1:26 PM

## 2019-05-28 LAB
CULTURE: NORMAL
Lab: NORMAL
SPECIMEN DESCRIPTION: NORMAL

## 2019-05-30 LAB
EKG ATRIAL RATE: 113 BPM
EKG P AXIS: 62 DEGREES
EKG P-R INTERVAL: 200 MS
EKG Q-T INTERVAL: 342 MS
EKG QRS DURATION: 138 MS
EKG QTC CALCULATION (BAZETT): 469 MS
EKG R AXIS: -65 DEGREES
EKG T AXIS: 75 DEGREES
EKG VENTRICULAR RATE: 113 BPM

## 2019-05-30 NOTE — DISCHARGE SUMMARY
[address]    Discharge Summary     Patient ID: Viola Nugent  :  1945   MRN: [id number]     ACCOUNT:  [de-identified]   Patient's PCP: Angeli Claudio MD  Admit Date: 2019   Discharge Date: 2019     Length of Stay: 5  Code Status:  Prior  Admitting Physician: Ilda Madden MD  Discharge Physician: Samir Vargas MD     Active Discharge Diagnoses:  Pneumonia       Primary Problem  Sepsis [facility])      [place] Problems    Diagnosis Date Noted    Osteomyelitis of lumbar spine [facility]) [M46.26] 2015     Priority: High    AVF (arteriovenous fistula) ([facility] [I77.0]     ESRD (end stage renal disease) ([facility] [N18.6] 2015    Protein-calorie malnutrition ([facility] [E46] 2015    Decubitus ulcer [L89.90] 06/10/2015    Hypertension [I10]        Admission Condition:  poor     Discharged Condition: stable    Hospital Stay:       Hospital Course: Viola Nugent is a 75 y.o. male who was admitted for the management of   Sepsis [facility]) , presented to ER with Altered Mental Status,  fever during dialysis of 104F. CT scan chest showed left lingular pneumonia, was started on vancomycin and Zosyn, oxygen support, bronchodilators,  sputum cultures grew Pseudomonas, sensitive to Zosyn and Cipro. For hemodynamic support patient was given IV fluids and IV albumin 25 g every 6 hours. And midodrine 5 mg 3 times a day. Cardiology was consulted for elevated troponins, no indications for further ischemic workup determined. Repeat chest x-ray  showed improvement in bibasilar airspace disease, right a space disease resolved with minimal left airspace disease remaining, remained afebrile white blood cells trended down. Discharged to [facility] on oral ciprofloxacin. Significant therapeutic interventions: hemodynamic support with iv albumin and midodrine. Hemodialysis. Iv abx.      Significant Diagnostic Studies:   Labs / Micro:  CBC:   Lab Results   Component Value Date    WBC 6.4 05/27/2019    RBC 2.97 05/27/2019    HGB 9.7 05/27/2019    HCT 29.2 05/27/2019    MCV 98.1 05/27/2019    MCH 32.8 05/27/2019    MCHC 33.4 05/27/2019    RDW 14.2 05/27/2019     05/27/2019     BMP:    Lab Results   Component Value Date    GLUCOSE 119 05/27/2019     05/27/2019    K 5.0 05/27/2019    CL 97 05/27/2019    CO2 23 05/27/2019    ANIONGAP 14 05/27/2019    BUN 53 05/27/2019    CREATININE 9.82 05/27/2019    BUNCRER NOT REPORTED 05/27/2019    CALCIUM 8.2 05/27/2019    LABGLOM 5 05/27/2019    GFRAA 6 05/27/2019    GFR      05/27/2019    GFR NOT REPORTED 05/27/2019     PT/INR:  No results found for: PTINR  PTT:   Lab Results   Component Value Date    APTT 53.6 05/22/2019     U/A:    Lab Results   Component Value Date    COLORU DARK YELLOW 05/22/2019    TURBIDITY CLOUDY 05/22/2019    SPECGRAV 1.016 05/22/2019    HGBUR SMALL 05/22/2019    PHUR 5.5 05/22/2019    PROTEINU 4+ 05/22/2019    GLUCOSEU TRACE 05/22/2019    KETUA NEGATIVE 05/22/2019    BILIRUBINUR  05/22/2019     Presumptive positive. Unable to confirm due to unavailability of reagent. UROBILINOGEN Normal 05/22/2019    NITRU NEGATIVE 05/22/2019    LEUKOCYTESUR LARGE 05/22/2019     TSH:    Lab Results   Component Value Date    TSH 1.47 05/18/2018       ,   sputum culture: positive for pseudomonas. Radiology:    Xr Chest (single View Frontal)    Result Date: 5/24/2019  EXAMINATION: ONE XRAY VIEW OF THE CHEST 5/24/2019 6:41 am COMPARISON: 22 May 2019 HISTORY: ORDERING SYSTEM PROVIDED HISTORY: Left lingular pneumonia TECHNOLOGIST PROVIDED HISTORY: Left lingular pneumonia Ordering Physician Provided Reason for Exam: left lingular pneumonia Acuity: Acute Type of Exam: Initial FINDINGS: AP portable view of the chest time stamped at 624 hours demonstrates overlying cardiac monitoring electrodes and prior median sternotomy and CABG procedure.   Stable cardiomegaly is noted. Right hemidiaphragm is elevated. There is been improvement in bibasilar airspace disease, resolved on the right side and minimal on the left. Vascularity is within normal limits. No gross effusions are extrapleural air are noted. Osseous and mediastinal structures are stable. Improvement in bibasilar airspace disease. Right airspace disease has resolved. Minimal left airspace disease remains. Ct Head Wo Contrast    Result Date: 5/22/2019  EXAMINATION: CT OF THE HEAD WITHOUT CONTRAST; CT OF THE CHEST, ABDOMEN, AND PELVIS WITH CONTRAST; CT OF THE THORACIC SPINE WITHOUT CONTRAST; CT OF THE LUMBAR SPINE WITHOUT CONTRAST  5/22/2019 8:57 pm TECHNIQUE: CT of the head was performed without the administration of intravenous contrast. Dose modulation, iterative reconstruction, and/or weight based adjustment of the mA/kV was utilized to reduce the radiation dose to as low as reasonably achievable.; CT of the chest, abdomen and pelvis was performed with the administration of intravenous contrast. Multiplanar reformatted images are provided for review. Dose modulation, iterative reconstruction, and/or weight based adjustment of the mA/kV was utilized to reduce the radiation dose to as low as reasonably achievable.; CT of the thoracic spine was performed without the administration of intravenous contrast. Multiplanar reformatted images are provided for review. Dose modulation, iterative reconstruction, and/or weight based adjustment of the mA/kV was utilized to reduce the radiation dose to as low as reasonably achievable.; CT of the lumbar spine was performed without the administration of intravenous contrast. Multiplanar reformatted images are provided for review. Dose modulation, iterative reconstruction, and/or weight based adjustment of the mA/kV was utilized to reduce the radiation dose to as low as reasonably achievable.  COMPARISON: Head CT dated 11/30/2015. abdomen and pelvis CT dated 03/27/2016 HISTORY: ORDERING SYSTEM PROVIDED HISTORY: AMS TECHNOLOGIST PROVIDED HISTORY: Ordering Physician Provided Reason for Exam: patient here for ams and fever after dialysis today FINDINGS: Head CT: BRAIN/VENTRICLES: There is no acute intracranial hemorrhage, mass effect or midline shift. No abnormal extra-axial fluid collection. The gray-white differentiation is maintained without evidence of an acute infarct. There is no evidence of hydrocephalus. Chronic microvascular ischemic changes in periventricular white matter distribution. ORBITS: The visualized portion of the orbits demonstrate no acute abnormality. SINUSES: The visualized paranasal sinuses and mastoid air cells demonstrate no acute abnormality. SOFT TISSUES/SKULL:  No acute abnormality of the visualized skull or soft tissues. Chest CT: Mediastinum: No thoracic aortic aneurysm . Mild cardiomegaly. No pericardial effusion. No abnormal lymph node enlargement. Lungs/pleura: Mild infiltrate in the lingula. Mild atelectasis at the lung bases. No effusions. No pneumothorax. Soft Tissues/Bones: No lytic or blastic lesions in all visualized osseous structures. CT abdomen and pelvis: Organs: Liver, pancreas, spleen, bilateral adrenal glands are unremarkable. Cholelithiasis. Atrophic kidneys contains small cysts. GI/Bowel: Stomach, small and large bowel loops are grossly unremarkable. Colonic diverticulosis without acute diverticulitis. Pelvis: Urinary bladder is unremarkable. No lymphadenopathy. No soft tissue masses or abnormal fluid collections. Peritoneum/Retroperitoneum: No free intraperitoneal fluid or air. No abdominal aortic aneurysm. No retroperitoneal lymphadenopathy. 5 x 5 x 2 cm well-circumscribed fluid collection superficial to the right lobe of the liver is stable since 2016, therefore benign. Bones/Soft Tissues: No lytic or blastic lesions in all visualized osseous structures. Old pathologic fracture of L3.   No convincing evidence for discitis/osteomyelitis in the left side. Left hip arthroplasty. Atrophy of left psoas muscle. The overall appearance of distal left psoas muscle is similar to CT of 2016. Head CT: No acute intracranial abnormalities. Chest CT: Mild infiltrate in the lingula. Mild atelectasis at the lung bases. Abdomen and pelvis CT: No acute findings. Cholelithiasis. Old pathologic fracture of L3 without convincing evidence for acute discitis/osteomyelitis. Ct Thoracic Spine Wo Contrast    Result Date: 5/22/2019  EXAMINATION: CT OF THE HEAD WITHOUT CONTRAST; CT OF THE CHEST, ABDOMEN, AND PELVIS WITH CONTRAST; CT OF THE THORACIC SPINE WITHOUT CONTRAST; CT OF THE LUMBAR SPINE WITHOUT CONTRAST  5/22/2019 8:57 pm TECHNIQUE: CT of the head was performed without the administration of intravenous contrast. Dose modulation, iterative reconstruction, and/or weight based adjustment of the mA/kV was utilized to reduce the radiation dose to as low as reasonably achievable.; CT of the chest, abdomen and pelvis was performed with the administration of intravenous contrast. Multiplanar reformatted images are provided for review. Dose modulation, iterative reconstruction, and/or weight based adjustment of the mA/kV was utilized to reduce the radiation dose to as low as reasonably achievable.; CT of the thoracic spine was performed without the administration of intravenous contrast. Multiplanar reformatted images are provided for review. Dose modulation, iterative reconstruction, and/or weight based adjustment of the mA/kV was utilized to reduce the radiation dose to as low as reasonably achievable.; CT of the lumbar spine was performed without the administration of intravenous contrast. Multiplanar reformatted images are provided for review. Dose modulation, iterative reconstruction, and/or weight based adjustment of the mA/kV was utilized to reduce the radiation dose to as low as reasonably achievable.  COMPARISON: Head CT dated 11/30/2015. abdomen and pelvis CT dated 03/27/2016 HISTORY: ORDERING SYSTEM PROVIDED HISTORY: AMS TECHNOLOGIST PROVIDED HISTORY: Ordering Physician Provided Reason for Exam: patient here for ams and fever after dialysis today FINDINGS: Head CT: BRAIN/VENTRICLES: There is no acute intracranial hemorrhage, mass effect or midline shift. No abnormal extra-axial fluid collection. The gray-white differentiation is maintained without evidence of an acute infarct. There is no evidence of hydrocephalus. Chronic microvascular ischemic changes in periventricular white matter distribution. ORBITS: The visualized portion of the orbits demonstrate no acute abnormality. SINUSES: The visualized paranasal sinuses and mastoid air cells demonstrate no acute abnormality. SOFT TISSUES/SKULL:  No acute abnormality of the visualized skull or soft tissues. Chest CT: Mediastinum: No thoracic aortic aneurysm . Mild cardiomegaly. No pericardial effusion. No abnormal lymph node enlargement. Lungs/pleura: Mild infiltrate in the lingula. Mild atelectasis at the lung bases. No effusions. No pneumothorax. Soft Tissues/Bones: No lytic or blastic lesions in all visualized osseous structures. CT abdomen and pelvis: Organs: Liver, pancreas, spleen, bilateral adrenal glands are unremarkable. Cholelithiasis. Atrophic kidneys contains small cysts. GI/Bowel: Stomach, small and large bowel loops are grossly unremarkable. Colonic diverticulosis without acute diverticulitis. Pelvis: Urinary bladder is unremarkable. No lymphadenopathy. No soft tissue masses or abnormal fluid collections. Peritoneum/Retroperitoneum: No free intraperitoneal fluid or air. No abdominal aortic aneurysm. No retroperitoneal lymphadenopathy. 5 x 5 x 2 cm well-circumscribed fluid collection superficial to the right lobe of the liver is stable since 2016, therefore benign. Bones/Soft Tissues: No lytic or blastic lesions in all visualized osseous structures.   Old pathologic fracture of L3. No convincing evidence for discitis/osteomyelitis in the left side. Left hip arthroplasty. Atrophy of left psoas muscle. The overall appearance of distal left psoas muscle is similar to CT of 2016. Head CT: No acute intracranial abnormalities. Chest CT: Mild infiltrate in the lingula. Mild atelectasis at the lung bases. Abdomen and pelvis CT: No acute findings. Cholelithiasis. Old pathologic fracture of L3 without convincing evidence for acute discitis/osteomyelitis. Ct Lumbar Spine Wo Contrast    Result Date: 5/22/2019  EXAMINATION: CT OF THE HEAD WITHOUT CONTRAST; CT OF THE CHEST, ABDOMEN, AND PELVIS WITH CONTRAST; CT OF THE THORACIC SPINE WITHOUT CONTRAST; CT OF THE LUMBAR SPINE WITHOUT CONTRAST  5/22/2019 8:57 pm TECHNIQUE: CT of the head was performed without the administration of intravenous contrast. Dose modulation, iterative reconstruction, and/or weight based adjustment of the mA/kV was utilized to reduce the radiation dose to as low as reasonably achievable.; CT of the chest, abdomen and pelvis was performed with the administration of intravenous contrast. Multiplanar reformatted images are provided for review. Dose modulation, iterative reconstruction, and/or weight based adjustment of the mA/kV was utilized to reduce the radiation dose to as low as reasonably achievable.; CT of the thoracic spine was performed without the administration of intravenous contrast. Multiplanar reformatted images are provided for review. Dose modulation, iterative reconstruction, and/or weight based adjustment of the mA/kV was utilized to reduce the radiation dose to as low as reasonably achievable.; CT of the lumbar spine was performed without the administration of intravenous contrast. Multiplanar reformatted images are provided for review.  Dose modulation, iterative reconstruction, and/or weight based adjustment of the mA/kV was utilized to reduce the radiation dose to as low as reasonably achievable. COMPARISON: Head CT dated 11/30/2015. abdomen and pelvis CT dated 03/27/2016 HISTORY: ORDERING SYSTEM PROVIDED HISTORY: AMS TECHNOLOGIST PROVIDED HISTORY: Ordering Physician Provided Reason for Exam: patient here for ams and fever after dialysis today FINDINGS: Head CT: BRAIN/VENTRICLES: There is no acute intracranial hemorrhage, mass effect or midline shift. No abnormal extra-axial fluid collection. The gray-white differentiation is maintained without evidence of an acute infarct. There is no evidence of hydrocephalus. Chronic microvascular ischemic changes in periventricular white matter distribution. ORBITS: The visualized portion of the orbits demonstrate no acute abnormality. SINUSES: The visualized paranasal sinuses and mastoid air cells demonstrate no acute abnormality. SOFT TISSUES/SKULL:  No acute abnormality of the visualized skull or soft tissues. Chest CT: Mediastinum: No thoracic aortic aneurysm . Mild cardiomegaly. No pericardial effusion. No abnormal lymph node enlargement. Lungs/pleura: Mild infiltrate in the lingula. Mild atelectasis at the lung bases. No effusions. No pneumothorax. Soft Tissues/Bones: No lytic or blastic lesions in all visualized osseous structures. CT abdomen and pelvis: Organs: Liver, pancreas, spleen, bilateral adrenal glands are unremarkable. Cholelithiasis. Atrophic kidneys contains small cysts. GI/Bowel: Stomach, small and large bowel loops are grossly unremarkable. Colonic diverticulosis without acute diverticulitis. Pelvis: Urinary bladder is unremarkable. No lymphadenopathy. No soft tissue masses or abnormal fluid collections. Peritoneum/Retroperitoneum: No free intraperitoneal fluid or air. No abdominal aortic aneurysm. No retroperitoneal lymphadenopathy. 5 x 5 x 2 cm well-circumscribed fluid collection superficial to the right lobe of the liver is stable since 2016, therefore benign.  Bones/Soft Tissues: No lytic or blastic lesions in all visualized osseous structures. Old pathologic fracture of L3. No convincing evidence for discitis/osteomyelitis in the left side. Left hip arthroplasty. Atrophy of left psoas muscle. The overall appearance of distal left psoas muscle is similar to CT of 2016. Head CT: No acute intracranial abnormalities. Chest CT: Mild infiltrate in the lingula. Mild atelectasis at the lung bases. Abdomen and pelvis CT: No acute findings. Cholelithiasis. Old pathologic fracture of L3 without convincing evidence for acute discitis/osteomyelitis. Xr Chest Portable    Result Date: 5/22/2019  EXAMINATION: ONE XRAY VIEW OF THE CHEST 5/22/2019 8:31 pm COMPARISON: January 24 HISTORY: ORDERING SYSTEM PROVIDED HISTORY: AMS, sepsis. ? pneumonia TECHNOLOGIST PROVIDED HISTORY: AMS, sepsis. ? pneumonia Ordering Physician Provided Reason for Exam: AMS, sepsis. ? pneumonia Acuity: Acute Type of Exam: Initial FINDINGS: Sternotomy wires and mediastinal clips are noted. Heart size is stable. Patchy multifocal airspace disease is noted bilaterally, greatest in the left lower lung zone. There is no pneumothorax. Elevation of the right hemidiaphragm is noted     Left greater than right basilar airspace disease. This is nonspecific however no Dimitry Go should be considered.   Upright two view chest would be more helpful     Ct Chest Abdomen Pelvis W Contrast    Result Date: 5/22/2019  EXAMINATION: CT OF THE HEAD WITHOUT CONTRAST; CT OF THE CHEST, ABDOMEN, AND PELVIS WITH CONTRAST; CT OF THE THORACIC SPINE WITHOUT CONTRAST; CT OF THE LUMBAR SPINE WITHOUT CONTRAST  5/22/2019 8:57 pm TECHNIQUE: CT of the head was performed without the administration of intravenous contrast. Dose modulation, iterative reconstruction, and/or weight based adjustment of the mA/kV was utilized to reduce the radiation dose to as low as reasonably achievable.; CT of the chest, abdomen and pelvis was performed with the administration of intravenous contrast. Multiplanar reformatted images are provided for review. Dose modulation, iterative reconstruction, and/or weight based adjustment of the mA/kV was utilized to reduce the radiation dose to as low as reasonably achievable.; CT of the thoracic spine was performed without the administration of intravenous contrast. Multiplanar reformatted images are provided for review. Dose modulation, iterative reconstruction, and/or weight based adjustment of the mA/kV was utilized to reduce the radiation dose to as low as reasonably achievable.; CT of the lumbar spine was performed without the administration of intravenous contrast. Multiplanar reformatted images are provided for review. Dose modulation, iterative reconstruction, and/or weight based adjustment of the mA/kV was utilized to reduce the radiation dose to as low as reasonably achievable. COMPARISON: Head CT dated 11/30/2015. abdomen and pelvis CT dated 03/27/2016 HISTORY: ORDERING SYSTEM PROVIDED HISTORY: AMS TECHNOLOGIST PROVIDED HISTORY: Ordering Physician Provided Reason for Exam: patient here for ams and fever after dialysis today FINDINGS: Head CT: BRAIN/VENTRICLES: There is no acute intracranial hemorrhage, mass effect or midline shift. No abnormal extra-axial fluid collection. The gray-white differentiation is maintained without evidence of an acute infarct. There is no evidence of hydrocephalus. Chronic microvascular ischemic changes in periventricular white matter distribution. ORBITS: The visualized portion of the orbits demonstrate no acute abnormality. SINUSES: The visualized paranasal sinuses and mastoid air cells demonstrate no acute abnormality. SOFT TISSUES/SKULL:  No acute abnormality of the visualized skull or soft tissues. Chest CT: Mediastinum: No thoracic aortic aneurysm . Mild cardiomegaly. No pericardial effusion. No abnormal lymph node enlargement. Lungs/pleura: Mild infiltrate in the lingula. Mild atelectasis at the lung bases.   No effusions. No pneumothorax. Soft Tissues/Bones: No lytic or blastic lesions in all visualized osseous structures. CT abdomen and pelvis: Organs: Liver, pancreas, spleen, bilateral adrenal glands are unremarkable. Cholelithiasis. Atrophic kidneys contains small cysts. GI/Bowel: Stomach, small and large bowel loops are grossly unremarkable. Colonic diverticulosis without acute diverticulitis. Pelvis: Urinary bladder is unremarkable. No lymphadenopathy. No soft tissue masses or abnormal fluid collections. Peritoneum/Retroperitoneum: No free intraperitoneal fluid or air. No abdominal aortic aneurysm. No retroperitoneal lymphadenopathy. 5 x 5 x 2 cm well-circumscribed fluid collection superficial to the right lobe of the liver is stable since 2016, therefore benign. Bones/Soft Tissues: No lytic or blastic lesions in all visualized osseous structures. Old pathologic fracture of L3. No convincing evidence for discitis/osteomyelitis in the left side. Left hip arthroplasty. Atrophy of left psoas muscle. The overall appearance of distal left psoas muscle is similar to CT of 2016. Head CT: No acute intracranial abnormalities. Chest CT: Mild infiltrate in the lingula. Mild atelectasis at the lung bases. Abdomen and pelvis CT: No acute findings. Cholelithiasis. Old pathologic fracture of L3 without convincing evidence for acute discitis/osteomyelitis. Consultations:    Consults:     Final Specialist Recommendations/Findings:   PHARMACY TO DOSE VANCOMYCIN  PHARMACY TO DOSE MEDICATION  IP CONSULT TO INTERNAL MEDICINE  IP CONSULT TO NEPHROLOGY  IP CONSULT TO PULMONOLOGY  IP CONSULT TO SOCIAL WORK  IP CONSULT TO DIETITIAN  IP CONSULT TO CARDIOLOGY      The patient was seen and examined on day of discharge and this discharge summary is in conjunction with any daily progress note from day of discharge.     Discharge plan:       Disposition: Skilled Facility    Physician Follow Up:     [facility] MD  [address] [place] [address]  [phone]             Requiring Further Evaluation/Follow Up POST HOSPITALIZATION/Incidental Findings:     Diet: renal diet    Activity: As tolerated    Instructions to Patient:     Discharge Medications:      Medication List      START taking these medications    benzonatate 200 MG capsule  Commonly known as:  TESSALON  Take 1 capsule by mouth three times daily for 7 days     ciprofloxacin 500 MG tablet  Commonly known as:  CIPRO  Take 1 tablet by mouth daily for 2 days     guaiFENesin 600 MG extended release tablet  Commonly known as:  MUCINEX  Take 2 tablets by mouth 2 times daily for 3 days        CONTINUE taking these medications    allopurinol 100 MG tablet  Commonly known as:  ZYLOPRIM     amLODIPine 2.5 MG tablet  Commonly known as:  NORVASC  Take 1 tablet by mouth daily     aspirin 81 MG chewable tablet     BOOST GLUCOSE CONTROL Liqd     calcium carbonate 500 MG chewable tablet  Commonly known as:  TUMS     carboxymethylcellulose 1 % ophthalmic solution     diclofenac sodium 1 % Gel     doxycycline hyclate 100 MG tablet  Commonly known as:  VIBRA-TABS     FLOVENT DISKUS 100 MCG/BLIST Aepb inhaler  Generic drug:  fluticasone propionate     fluticasone 50 MCG/ACT nasal spray  Commonly known as:  FLONASE     gabapentin 100 MG capsule  Commonly known as:  NEURONTIN  Take 1 capsule by mouth 3 times daily     HUMALOG 100 UNIT/ML injection vial  Generic drug:  insulin lispro     hydrALAZINE 10 MG tablet  Commonly known as:  APRESOLINE     insulin glargine 100 UNIT/ML injection pen  Commonly known as:  LANTUS     ipratropium 0.02 % nebulizer solution  Commonly known as:  ATROVENT     metoprolol succinate 25 MG extended release tablet  Commonly known as:  TOPROL XL  Take 1 tablet by mouth daily     * midodrine 5 MG tablet  Commonly known as:  PROAMATINE     * midodrine 5 MG tablet  Commonly known as:  PROAMATINE     NOVOFINE AUTOCOVER 30G X 8 MM Misc  Generic drug: Insulin Pen Needle     Pentafluoroprop-Tetrafluoroeth Aero  Commonly known as:  PAIN EASE     polyethylene glycol powder  Commonly known as:  GLYCOLAX     senna 8.6 MG tablet  Commonly known as:  SENOKOT     senna-docusate 8.6-50 MG per tablet  Commonly known as:  PERICOLACE     sertraline 50 MG tablet  Commonly known as:  ZOLOFT     * sevelamer 800 MG tablet  Commonly known as:  RENVELA     * sevelamer 800 MG tablet  Commonly known as:  RENVELA     sucralfate 1 GM tablet  Commonly known as:  CARAFATE     tetrahydrozoline 0.05 % ophthalmic solution     therapeutic multivitamin-minerals tablet         * This list has 4 medication(s) that are the same as other medications prescribed for you. Read the directions carefully, and ask your doctor or other care provider to review them with you. STOP taking these medications    HYDROmorphone 2 MG tablet  Commonly known as:  DILAUDID     oxyCODONE-acetaminophen 7.5-325 MG per tablet  Commonly known as:  PERCOCET        ASK your doctor about these medications    oxyCODONE-acetaminophen 7.5-325 MG per tablet  Commonly known as:  PERCOCET  Take 1 tablet by mouth every 6 hours as needed for Pain for up to 6 doses. Ask about: Should I take this medication? Where to Get Your Medications      These medications were sent to [facility], [address], [address] [facility] [address]    Phone:  [phone]   · benzonatate 200 MG capsule  · ciprofloxacin 500 MG tablet  · guaiFENesin 600 MG extended release tablet     You can get these medications from any pharmacy    Bring a paper prescription for each of these medications  · oxyCODONE-acetaminophen 7.5-325 MG per tablet         Time Spent on discharge is  33 mins in patient examination, evaluation, counseling as well as medication reconciliation, prescriptions for required medications, discharge plan and follow up.     Electronically signed by   Gonzalo Wang MD  5/30/2019  6:58 PM      Thank you Dr. Adriana Pereira MD for the opportunity to be involved in this patient's care.

## 2019-06-11 ENCOUNTER — HOSPITAL ENCOUNTER (OUTPATIENT)
Age: 74
Setting detail: SPECIMEN
Discharge: HOME | End: 2019-06-11
Payer: MEDICARE

## 2019-06-11 LAB
ANION GAP SERPL CALCULATED.3IONS-SCNC: 19 MMOL/L (ref 9–17)
BUN BLDV-MCNC: 49 MG/DL (ref 8–23)
BUN/CREAT BLD: ABNORMAL (ref 9–20)
CALCIUM SERPL-MCNC: 8.6 MG/DL (ref 8.6–10.4)
CHLORIDE BLD-SCNC: 98 MMOL/L (ref 98–107)
CO2: 22 MMOL/L (ref 20–31)
CREAT SERPL-MCNC: 7.28 MG/DL (ref 0.7–1.2)
ESTIMATED AVERAGE GLUCOSE: 151 MG/DL
GFR AFRICAN AMERICAN: 9 ML/MIN
GFR NON-AFRICAN AMERICAN: 7 ML/MIN
GFR SERPL CREATININE-BSD FRML MDRD: ABNORMAL ML/MIN/{1.73_M2}
GFR SERPL CREATININE-BSD FRML MDRD: ABNORMAL ML/MIN/{1.73_M2}
GLUCOSE BLD-MCNC: 114 MG/DL (ref 70–99)
HBA1C MFR BLD: 6.9 % (ref 4–6)
POTASSIUM SERPL-SCNC: 4 MMOL/L (ref 3.7–5.3)
SODIUM BLD-SCNC: 139 MMOL/L (ref 135–144)

## 2019-06-11 PROCEDURE — P9603 ONE-WAY ALLOW PRORATED MILES: HCPCS

## 2019-06-11 PROCEDURE — 83036 HEMOGLOBIN GLYCOSYLATED A1C: CPT

## 2019-06-11 PROCEDURE — 36415 COLL VENOUS BLD VENIPUNCTURE: CPT

## 2019-06-11 PROCEDURE — 80048 BASIC METABOLIC PNL TOTAL CA: CPT

## 2019-07-09 NOTE — PROGRESS NOTES
Chief Complaint   Patient presents with    Osteomyelitis      of lumbar spine     3 Month Follow-Up     H/o  diverting loop colostomy and I&D for perianal abscess 12/30 /15   H/O  Discitis/osteomyelitis at L2/3 with destructive changes no MRI   Pt also had aspiration bone biopsy done on 6/29/15 with no growth  . CT-guided L3 biopsy 1/4/16 ,no growth,Tissue culture grew MRSA   Pt was treated with IV Vancomycin followed by oral Doxycycline  . He was  admitted 11/18/18 with cellulitis of the R forearm at the AVF site,blood cultures were negative ,was treated with IV Vancomycin ,tunnelled HD cath was placed and subsequently remved . He was admitted on May /2019 with sepsis /pneumonia sputum culture grew Pseudomonas   Today :  He had no complaints . No redness or pain at the fistula site . He denies fever , chill , headache, nausea or vomiting, denies abdomen pain, denies cough, shortness of breath  or sputum expectoration. He denied back pain today .                 Current Medications:      Current Outpatient Medications:     Pentafluoroprop-Tetrafluoroeth (PAIN EASE) AERO, Apply topically three times a week On MWF before dialysys, Disp: , Rfl:     senna (SENOKOT) 8.6 MG tablet, Take 2 tablets by mouth daily as needed for Constipation, Disp: , Rfl:     senna-docusate (PERICOLACE) 8.6-50 MG per tablet, Take 2 tablets by mouth four times a week On non-dialysis days (Sun, Tues, Thurs, Sat), Disp: , Rfl:     sevelamer hcl (RENAGEL) 800 MG tablet, , Disp: , Rfl:     doxycycline hyclate (VIBRA-TABS) 100 MG tablet, Take 100 mg by mouth 2 times daily Until 7/15/19 for osteomyelitis, Disp: , Rfl:     fluticasone (FLONASE) 50 MCG/ACT nasal spray, 2 sprays by Each Nostril route daily, Disp: , Rfl:     midodrine (PROAMATINE) 5 MG tablet, Take 10 mg by mouth three times a week If needed during dialysis, Disp: , Rfl:     ipratropium (ATROVENT) 0.02 % nebulizer solution, Take 0.5 mg by nebulization every 8 hours,

## 2020-01-01 ENCOUNTER — HOSPITAL ENCOUNTER (OUTPATIENT)
Age: 75
Setting detail: SPECIMEN
Discharge: HOME | End: 2020-04-28
Payer: MEDICARE

## 2020-01-01 ENCOUNTER — APPOINTMENT (OUTPATIENT)
Dept: GENERAL RADIOLOGY | Age: 75
DRG: 208 | End: 2020-01-01
Payer: MEDICARE

## 2020-01-01 ENCOUNTER — APPOINTMENT (OUTPATIENT)
Dept: DIALYSIS | Age: 75
DRG: 947 | End: 2020-01-01
Payer: MEDICARE

## 2020-01-01 ENCOUNTER — HOSPITAL ENCOUNTER (INPATIENT)
Age: 75
LOS: 5 days | Discharge: NURSING, REHAB OR LONG-TERM CARE | DRG: 177 | End: 2020-06-05
Attending: INTERNAL MEDICINE | Admitting: FAMILY MEDICINE
Payer: MEDICARE

## 2020-01-01 ENCOUNTER — HOSPITAL ENCOUNTER (OUTPATIENT)
Age: 75
Setting detail: SPECIMEN
Discharge: HOME | End: 2020-05-15
Payer: MEDICARE

## 2020-01-01 ENCOUNTER — APPOINTMENT (OUTPATIENT)
Dept: DIALYSIS | Age: 75
DRG: 208 | End: 2020-01-01
Payer: MEDICARE

## 2020-01-01 ENCOUNTER — HOSPITAL ENCOUNTER (OUTPATIENT)
Age: 75
Setting detail: SPECIMEN
Discharge: HOME | End: 2020-05-07
Payer: MEDICARE

## 2020-01-01 ENCOUNTER — APPOINTMENT (OUTPATIENT)
Dept: GENERAL RADIOLOGY | Age: 75
End: 2020-01-01
Payer: MEDICARE

## 2020-01-01 ENCOUNTER — HOSPITAL ENCOUNTER (OUTPATIENT)
Age: 75
Setting detail: SPECIMEN
Discharge: HOME | End: 2020-03-24
Payer: MEDICARE

## 2020-01-01 ENCOUNTER — APPOINTMENT (OUTPATIENT)
Dept: CT IMAGING | Age: 75
DRG: 177 | End: 2020-01-01
Attending: INTERNAL MEDICINE
Payer: MEDICARE

## 2020-01-01 ENCOUNTER — HOSPITAL ENCOUNTER (INPATIENT)
Age: 75
LOS: 5 days | Discharge: NURSING, REHAB OR LONG-TERM CARE | DRG: 947 | End: 2020-05-13
Attending: EMERGENCY MEDICINE | Admitting: INTERNAL MEDICINE
Payer: MEDICARE

## 2020-01-01 ENCOUNTER — HOSPITAL ENCOUNTER (OUTPATIENT)
Age: 75
Setting detail: SPECIMEN
Discharge: HOME | End: 2020-01-31
Payer: MEDICARE

## 2020-01-01 ENCOUNTER — HOSPITAL ENCOUNTER (OUTPATIENT)
Age: 75
Setting detail: SPECIMEN
Discharge: HOME | End: 2020-01-02
Payer: MEDICARE

## 2020-01-01 ENCOUNTER — APPOINTMENT (OUTPATIENT)
Dept: DIALYSIS | Age: 75
DRG: 177 | End: 2020-01-01
Attending: INTERNAL MEDICINE
Payer: MEDICARE

## 2020-01-01 ENCOUNTER — HOSPITAL ENCOUNTER (EMERGENCY)
Age: 75
Discharge: ANOTHER HOSPITAL | End: 2020-05-31
Attending: EMERGENCY MEDICINE
Payer: MEDICARE

## 2020-01-01 ENCOUNTER — HOSPITAL ENCOUNTER (INPATIENT)
Age: 75
LOS: 4 days | DRG: 208 | End: 2020-06-13
Attending: EMERGENCY MEDICINE | Admitting: INTERNAL MEDICINE
Payer: MEDICARE

## 2020-01-01 ENCOUNTER — OFFICE VISIT (OUTPATIENT)
Dept: INFECTIOUS DISEASES | Age: 75
End: 2020-01-01
Payer: MEDICARE

## 2020-01-01 ENCOUNTER — ANESTHESIA EVENT (OUTPATIENT)
Dept: ICU | Age: 75
DRG: 208 | End: 2020-01-01
Payer: MEDICARE

## 2020-01-01 ENCOUNTER — ANESTHESIA (OUTPATIENT)
Dept: ICU | Age: 75
DRG: 208 | End: 2020-01-01
Payer: MEDICARE

## 2020-01-01 ENCOUNTER — APPOINTMENT (OUTPATIENT)
Dept: GENERAL RADIOLOGY | Age: 75
DRG: 947 | End: 2020-01-01
Payer: MEDICARE

## 2020-01-01 ENCOUNTER — HOSPITAL ENCOUNTER (OUTPATIENT)
Age: 75
Setting detail: SPECIMEN
Discharge: HOME | End: 2020-03-30
Payer: MEDICARE

## 2020-01-01 VITALS
WEIGHT: 167.55 LBS | RESPIRATION RATE: 18 BRPM | SYSTOLIC BLOOD PRESSURE: 118 MMHG | HEART RATE: 74 BPM | HEIGHT: 71 IN | DIASTOLIC BLOOD PRESSURE: 5 MMHG | BODY MASS INDEX: 23.46 KG/M2 | TEMPERATURE: 97.9 F | OXYGEN SATURATION: 97 %

## 2020-01-01 VITALS
DIASTOLIC BLOOD PRESSURE: 74 MMHG | TEMPERATURE: 98.3 F | HEIGHT: 70 IN | HEART RATE: 75 BPM | BODY MASS INDEX: 28.09 KG/M2 | WEIGHT: 196.21 LBS | SYSTOLIC BLOOD PRESSURE: 139 MMHG

## 2020-01-01 VITALS
HEART RATE: 87 BPM | RESPIRATION RATE: 24 BRPM | WEIGHT: 149.25 LBS | SYSTOLIC BLOOD PRESSURE: 107 MMHG | DIASTOLIC BLOOD PRESSURE: 59 MMHG | BODY MASS INDEX: 20.9 KG/M2 | TEMPERATURE: 98.5 F | OXYGEN SATURATION: 92 % | HEIGHT: 71 IN

## 2020-01-01 VITALS
BODY MASS INDEX: 22.4 KG/M2 | OXYGEN SATURATION: 97 % | WEIGHT: 160 LBS | HEIGHT: 71 IN | HEART RATE: 63 BPM | SYSTOLIC BLOOD PRESSURE: 136 MMHG | TEMPERATURE: 99.1 F | DIASTOLIC BLOOD PRESSURE: 68 MMHG | RESPIRATION RATE: 16 BRPM

## 2020-01-01 VITALS
BODY MASS INDEX: 24.98 KG/M2 | OXYGEN SATURATION: 76 % | DIASTOLIC BLOOD PRESSURE: 73 MMHG | WEIGHT: 149.91 LBS | RESPIRATION RATE: 45 BRPM | HEART RATE: 142 BPM | HEIGHT: 65 IN | TEMPERATURE: 97.5 F | SYSTOLIC BLOOD PRESSURE: 150 MMHG

## 2020-01-01 LAB
-: ABNORMAL
ABO/RH: NORMAL
ABSOLUTE EOS #: 0.03 K/UL (ref 0–0.44)
ABSOLUTE EOS #: 0.03 K/UL (ref 0–0.44)
ABSOLUTE EOS #: 0.04 K/UL (ref 0–0.44)
ABSOLUTE EOS #: 0.1 K/UL (ref 0–0.4)
ABSOLUTE EOS #: 0.15 K/UL (ref 0–0.44)
ABSOLUTE EOS #: 1.58 K/UL (ref 0–0.44)
ABSOLUTE EOS #: <0.03 K/UL (ref 0–0.44)
ABSOLUTE IMMATURE GRANULOCYTE: 0.03 K/UL (ref 0–0.3)
ABSOLUTE IMMATURE GRANULOCYTE: 0.04 K/UL (ref 0–0.3)
ABSOLUTE IMMATURE GRANULOCYTE: 0.04 K/UL (ref 0–0.3)
ABSOLUTE IMMATURE GRANULOCYTE: 0.05 K/UL (ref 0–0.3)
ABSOLUTE IMMATURE GRANULOCYTE: 0.06 K/UL (ref 0–0.3)
ABSOLUTE IMMATURE GRANULOCYTE: 0.07 K/UL (ref 0–0.3)
ABSOLUTE IMMATURE GRANULOCYTE: 0.15 K/UL (ref 0–0.3)
ABSOLUTE IMMATURE GRANULOCYTE: 0.2 K/UL (ref 0–0.3)
ABSOLUTE IMMATURE GRANULOCYTE: 0.2 K/UL (ref 0–0.3)
ABSOLUTE IMMATURE GRANULOCYTE: 0.22 K/UL (ref 0–0.3)
ABSOLUTE IMMATURE GRANULOCYTE: 0.27 K/UL (ref 0–0.3)
ABSOLUTE IMMATURE GRANULOCYTE: ABNORMAL K/UL (ref 0–0.3)
ABSOLUTE LYMPH #: 0.8 K/UL (ref 1.1–3.7)
ABSOLUTE LYMPH #: 0.83 K/UL (ref 1.1–3.7)
ABSOLUTE LYMPH #: 0.87 K/UL (ref 1.1–3.7)
ABSOLUTE LYMPH #: 0.9 K/UL (ref 1–4.8)
ABSOLUTE LYMPH #: 0.91 K/UL (ref 1.1–3.7)
ABSOLUTE LYMPH #: 1 K/UL (ref 1.1–3.7)
ABSOLUTE LYMPH #: 1 K/UL (ref 1.1–3.7)
ABSOLUTE LYMPH #: 1.01 K/UL (ref 1.1–3.7)
ABSOLUTE LYMPH #: 1.02 K/UL (ref 1.1–3.7)
ABSOLUTE LYMPH #: 1.2 K/UL (ref 1.1–3.7)
ABSOLUTE LYMPH #: 1.35 K/UL (ref 1.1–3.7)
ABSOLUTE LYMPH #: 1.51 K/UL (ref 1.1–3.7)
ABSOLUTE MONO #: 0.31 K/UL (ref 0.1–1.2)
ABSOLUTE MONO #: 0.33 K/UL (ref 0.1–1.2)
ABSOLUTE MONO #: 0.33 K/UL (ref 0.1–1.2)
ABSOLUTE MONO #: 0.35 K/UL (ref 0.1–1.2)
ABSOLUTE MONO #: 0.35 K/UL (ref 0.1–1.2)
ABSOLUTE MONO #: 0.5 K/UL (ref 0.1–1.2)
ABSOLUTE MONO #: 0.5 K/UL (ref 0.1–1.3)
ABSOLUTE MONO #: 0.51 K/UL (ref 0.1–1.2)
ABSOLUTE MONO #: 0.54 K/UL (ref 0.1–1.2)
ABSOLUTE MONO #: 0.62 K/UL (ref 0.1–1.2)
ABSOLUTE MONO #: 0.67 K/UL (ref 0.1–1.2)
ABSOLUTE MONO #: 0.79 K/UL (ref 0.1–1.2)
ACTION: NORMAL
ALBUMIN SERPL-MCNC: 2.7 G/DL (ref 3.5–5.2)
ALBUMIN SERPL-MCNC: 3.2 G/DL (ref 3.5–5.2)
ALBUMIN SERPL-MCNC: 3.4 G/DL (ref 3.5–5.2)
ALBUMIN/GLOBULIN RATIO: 0.7 (ref 1–2.5)
ALBUMIN/GLOBULIN RATIO: 1.3 (ref 1–2.5)
ALBUMIN/GLOBULIN RATIO: ABNORMAL (ref 1–2.5)
ALLEN TEST: ABNORMAL
ALP BLD-CCNC: 110 U/L (ref 40–129)
ALP BLD-CCNC: 134 U/L (ref 40–129)
ALP BLD-CCNC: 221 U/L (ref 40–129)
ALT SERPL-CCNC: 18 U/L (ref 5–41)
ALT SERPL-CCNC: 37 U/L (ref 5–41)
ALT SERPL-CCNC: 61 U/L (ref 5–41)
ALUMINUM: 108 UG/L (ref 0–15)
ALUMINUM: 112.9 UG/L (ref 0–15)
ALUMINUM: 118.9 UG/L (ref 0–15)
ALUMINUM: 32.4 UG/L (ref 0–15)
AMMONIA: 66 UMOL/L (ref 16–60)
AMORPHOUS: ABNORMAL
ANION GAP SERPL CALCULATED.3IONS-SCNC: 12 MMOL/L (ref 9–17)
ANION GAP SERPL CALCULATED.3IONS-SCNC: 13 MMOL/L (ref 9–17)
ANION GAP SERPL CALCULATED.3IONS-SCNC: 13 MMOL/L (ref 9–17)
ANION GAP SERPL CALCULATED.3IONS-SCNC: 14 MMOL/L (ref 9–17)
ANION GAP SERPL CALCULATED.3IONS-SCNC: 14 MMOL/L (ref 9–17)
ANION GAP SERPL CALCULATED.3IONS-SCNC: 15 MMOL/L (ref 9–17)
ANION GAP SERPL CALCULATED.3IONS-SCNC: 15 MMOL/L (ref 9–17)
ANION GAP SERPL CALCULATED.3IONS-SCNC: 17 MMOL/L (ref 9–17)
ANION GAP SERPL CALCULATED.3IONS-SCNC: 18 MMOL/L (ref 9–17)
ANION GAP SERPL CALCULATED.3IONS-SCNC: 20 MMOL/L (ref 9–17)
ANION GAP SERPL CALCULATED.3IONS-SCNC: 23 MMOL/L (ref 9–17)
ANION GAP SERPL CALCULATED.3IONS-SCNC: 24 MMOL/L (ref 9–17)
ANION GAP SERPL CALCULATED.3IONS-SCNC: 25 MMOL/L (ref 9–17)
ANION GAP SERPL CALCULATED.3IONS-SCNC: 26 MMOL/L (ref 9–17)
ANION GAP SERPL CALCULATED.3IONS-SCNC: 26 MMOL/L (ref 9–17)
ANION GAP SERPL CALCULATED.3IONS-SCNC: 27 MMOL/L (ref 9–17)
ANION GAP SERPL CALCULATED.3IONS-SCNC: 28 MMOL/L (ref 9–17)
ANION GAP: 9 MMOL/L (ref 7–16)
AST SERPL-CCNC: 24 U/L
AST SERPL-CCNC: 47 U/L
AST SERPL-CCNC: 70 U/L
BACTERIA: ABNORMAL
BASOPHILS # BLD: 0 % (ref 0–2)
BASOPHILS # BLD: 1 % (ref 0–2)
BASOPHILS # BLD: 1 % (ref 0–2)
BASOPHILS ABSOLUTE: 0 K/UL (ref 0–0.2)
BASOPHILS ABSOLUTE: 0.03 K/UL (ref 0–0.2)
BASOPHILS ABSOLUTE: 0.03 K/UL (ref 0–0.2)
BASOPHILS ABSOLUTE: 0.04 K/UL (ref 0–0.2)
BASOPHILS ABSOLUTE: 0.05 K/UL (ref 0–0.2)
BASOPHILS ABSOLUTE: <0.03 K/UL (ref 0–0.2)
BILIRUB SERPL-MCNC: 0.36 MG/DL (ref 0.3–1.2)
BILIRUB SERPL-MCNC: 0.38 MG/DL (ref 0.3–1.2)
BILIRUB SERPL-MCNC: 0.39 MG/DL (ref 0.3–1.2)
BILIRUBIN URINE: NEGATIVE
BLD PROD TYP BPU: NORMAL
BUN BLDV-MCNC: 20 MG/DL (ref 8–23)
BUN BLDV-MCNC: 24 MG/DL (ref 8–23)
BUN BLDV-MCNC: 26 MG/DL (ref 8–23)
BUN BLDV-MCNC: 26 MG/DL (ref 8–23)
BUN BLDV-MCNC: 34 MG/DL (ref 8–23)
BUN BLDV-MCNC: 35 MG/DL (ref 8–23)
BUN BLDV-MCNC: 39 MG/DL (ref 8–23)
BUN BLDV-MCNC: 43 MG/DL (ref 8–23)
BUN BLDV-MCNC: 45 MG/DL (ref 8–23)
BUN BLDV-MCNC: 46 MG/DL (ref 8–23)
BUN BLDV-MCNC: 48 MG/DL (ref 8–23)
BUN BLDV-MCNC: 59 MG/DL (ref 8–23)
BUN BLDV-MCNC: 60 MG/DL (ref 8–23)
BUN BLDV-MCNC: 69 MG/DL (ref 8–23)
BUN BLDV-MCNC: 76 MG/DL (ref 8–23)
BUN BLDV-MCNC: 91 MG/DL (ref 8–23)
BUN/CREAT BLD: 11 (ref 9–20)
BUN/CREAT BLD: 3 (ref 9–20)
BUN/CREAT BLD: 4 (ref 9–20)
BUN/CREAT BLD: ABNORMAL (ref 9–20)
C-REACTIVE PROTEIN: 15.6 MG/L (ref 0–5)
C-REACTIVE PROTEIN: 335.9 MG/L (ref 0–5)
C-REACTIVE PROTEIN: 404.1 MG/L (ref 0–5)
C-REACTIVE PROTEIN: 49.5 MG/L (ref 0–5)
CALCIUM IONIZED: 0.97 MMOL/L (ref 1.13–1.33)
CALCIUM IONIZED: 1.02 MMOL/L (ref 1.13–1.33)
CALCIUM IONIZED: 1.03 MMOL/L (ref 1.13–1.33)
CALCIUM IONIZED: 1.07 MMOL/L (ref 1.13–1.33)
CALCIUM IONIZED: 1.07 MMOL/L (ref 1.13–1.33)
CALCIUM IONIZED: 1.23 MMOL/L (ref 1.13–1.33)
CALCIUM SERPL-MCNC: 7.6 MG/DL (ref 8.6–10.4)
CALCIUM SERPL-MCNC: 7.6 MG/DL (ref 8.6–10.4)
CALCIUM SERPL-MCNC: 7.7 MG/DL (ref 8.6–10.4)
CALCIUM SERPL-MCNC: 7.8 MG/DL (ref 8.6–10.4)
CALCIUM SERPL-MCNC: 7.9 MG/DL (ref 8.6–10.4)
CALCIUM SERPL-MCNC: 8 MG/DL (ref 8.6–10.4)
CALCIUM SERPL-MCNC: 8 MG/DL (ref 8.6–10.4)
CALCIUM SERPL-MCNC: 8.1 MG/DL (ref 8.6–10.4)
CALCIUM SERPL-MCNC: 8.2 MG/DL (ref 8.6–10.4)
CALCIUM SERPL-MCNC: 8.3 MG/DL (ref 8.6–10.4)
CALCIUM SERPL-MCNC: 8.4 MG/DL (ref 8.6–10.4)
CALCIUM SERPL-MCNC: 8.6 MG/DL (ref 8.6–10.4)
CALCIUM SERPL-MCNC: 8.7 MG/DL (ref 8.6–10.4)
CALCIUM SERPL-MCNC: 8.8 MG/DL (ref 8.6–10.4)
CALCIUM SERPL-MCNC: 8.8 MG/DL (ref 8.6–10.4)
CALCIUM SERPL-MCNC: 9.1 MG/DL (ref 8.6–10.4)
CARBOXYHEMOGLOBIN: 1 % (ref 0–5)
CASTS UA: ABNORMAL /LPF
CHLORIDE BLD-SCNC: 100 MMOL/L (ref 98–107)
CHLORIDE BLD-SCNC: 104 MMOL/L (ref 98–107)
CHLORIDE BLD-SCNC: 105 MMOL/L (ref 98–107)
CHLORIDE BLD-SCNC: 105 MMOL/L (ref 98–107)
CHLORIDE BLD-SCNC: 91 MMOL/L (ref 98–107)
CHLORIDE BLD-SCNC: 93 MMOL/L (ref 98–107)
CHLORIDE BLD-SCNC: 93 MMOL/L (ref 98–107)
CHLORIDE BLD-SCNC: 94 MMOL/L (ref 98–107)
CHLORIDE BLD-SCNC: 95 MMOL/L (ref 98–107)
CHLORIDE BLD-SCNC: 95 MMOL/L (ref 98–107)
CHLORIDE BLD-SCNC: 96 MMOL/L (ref 98–107)
CHLORIDE BLD-SCNC: 97 MMOL/L (ref 98–107)
CO2: 13 MMOL/L (ref 20–31)
CO2: 14 MMOL/L (ref 20–31)
CO2: 16 MMOL/L (ref 20–31)
CO2: 16 MMOL/L (ref 20–31)
CO2: 17 MMOL/L (ref 20–31)
CO2: 18 MMOL/L (ref 20–31)
CO2: 19 MMOL/L (ref 20–31)
CO2: 22 MMOL/L (ref 20–31)
CO2: 23 MMOL/L (ref 20–31)
CO2: 24 MMOL/L (ref 20–31)
CO2: 24 MMOL/L (ref 20–31)
CO2: 27 MMOL/L (ref 20–31)
CO2: 28 MMOL/L (ref 20–31)
CO2: 29 MMOL/L (ref 20–31)
COLOR: YELLOW
COMMENT UA: ABNORMAL
CREAT SERPL-MCNC: 10.06 MG/DL (ref 0.7–1.2)
CREAT SERPL-MCNC: 10.85 MG/DL (ref 0.7–1.2)
CREAT SERPL-MCNC: 12.74 MG/DL (ref 0.7–1.2)
CREAT SERPL-MCNC: 4.34 MG/DL (ref 0.7–1.2)
CREAT SERPL-MCNC: 6.27 MG/DL (ref 0.7–1.2)
CREAT SERPL-MCNC: 6.72 MG/DL (ref 0.7–1.2)
CREAT SERPL-MCNC: 6.81 MG/DL (ref 0.7–1.2)
CREAT SERPL-MCNC: 7.04 MG/DL (ref 0.7–1.2)
CREAT SERPL-MCNC: 7.23 MG/DL (ref 0.7–1.2)
CREAT SERPL-MCNC: 7.72 MG/DL (ref 0.7–1.2)
CREAT SERPL-MCNC: 7.82 MG/DL (ref 0.7–1.2)
CREAT SERPL-MCNC: 7.87 MG/DL (ref 0.7–1.2)
CREAT SERPL-MCNC: 7.88 MG/DL (ref 0.7–1.2)
CREAT SERPL-MCNC: 8.05 MG/DL (ref 0.7–1.2)
CREAT SERPL-MCNC: 8.16 MG/DL (ref 0.7–1.2)
CREAT SERPL-MCNC: 8.23 MG/DL (ref 0.7–1.2)
CREAT SERPL-MCNC: 8.52 MG/DL (ref 0.7–1.2)
CREAT SERPL-MCNC: 8.62 MG/DL (ref 0.7–1.2)
CREAT SERPL-MCNC: 8.73 MG/DL (ref 0.7–1.2)
CREAT SERPL-MCNC: 9.08 MG/DL (ref 0.7–1.2)
CREAT SERPL-MCNC: 9.99 MG/DL (ref 0.7–1.2)
CRYSTALS, UA: ABNORMAL /HPF
CULTURE: ABNORMAL
CULTURE: NORMAL
DATE AND TIME: NORMAL
DIFFERENTIAL TYPE: ABNORMAL
DIRECT EXAM: ABNORMAL
DIRECT EXAM: NORMAL
DISPENSE STATUS BLOOD BANK: NORMAL
EKG ATRIAL RATE: 105 BPM
EKG ATRIAL RATE: 119 BPM
EKG ATRIAL RATE: 147 BPM
EKG ATRIAL RATE: 153 BPM
EKG ATRIAL RATE: 159 BPM
EKG ATRIAL RATE: 72 BPM
EKG ATRIAL RATE: 76 BPM
EKG ATRIAL RATE: 79 BPM
EKG ATRIAL RATE: 86 BPM
EKG P AXIS: 17 DEGREES
EKG P AXIS: 18 DEGREES
EKG P AXIS: 26 DEGREES
EKG P AXIS: 31 DEGREES
EKG P AXIS: 44 DEGREES
EKG P AXIS: 44 DEGREES
EKG P AXIS: 51 DEGREES
EKG P-R INTERVAL: 140 MS
EKG P-R INTERVAL: 152 MS
EKG P-R INTERVAL: 154 MS
EKG P-R INTERVAL: 158 MS
EKG P-R INTERVAL: 166 MS
EKG P-R INTERVAL: 200 MS
EKG P-R INTERVAL: 224 MS
EKG Q-T INTERVAL: 308 MS
EKG Q-T INTERVAL: 344 MS
EKG Q-T INTERVAL: 362 MS
EKG Q-T INTERVAL: 368 MS
EKG Q-T INTERVAL: 380 MS
EKG Q-T INTERVAL: 402 MS
EKG Q-T INTERVAL: 416 MS
EKG Q-T INTERVAL: 422 MS
EKG Q-T INTERVAL: 436 MS
EKG QRS DURATION: 128 MS
EKG QRS DURATION: 130 MS
EKG QRS DURATION: 134 MS
EKG QRS DURATION: 136 MS
EKG QRS DURATION: 138 MS
EKG QRS DURATION: 138 MS
EKG QRS DURATION: 146 MS
EKG QRS DURATION: 146 MS
EKG QRS DURATION: 150 MS
EKG QTC CALCULATION (BAZETT): 474 MS
EKG QTC CALCULATION (BAZETT): 477 MS
EKG QTC CALCULATION (BAZETT): 477 MS
EKG QTC CALCULATION (BAZETT): 481 MS
EKG QTC CALCULATION (BAZETT): 502 MS
EKG QTC CALCULATION (BAZETT): 502 MS
EKG QTC CALCULATION (BAZETT): 510 MS
EKG QTC CALCULATION (BAZETT): 517 MS
EKG QTC CALCULATION (BAZETT): 520 MS
EKG R AXIS: -51 DEGREES
EKG R AXIS: -54 DEGREES
EKG R AXIS: -59 DEGREES
EKG R AXIS: -60 DEGREES
EKG R AXIS: -61 DEGREES
EKG R AXIS: -64 DEGREES
EKG T AXIS: 31 DEGREES
EKG T AXIS: 41 DEGREES
EKG T AXIS: 43 DEGREES
EKG T AXIS: 49 DEGREES
EKG T AXIS: 57 DEGREES
EKG T AXIS: 71 DEGREES
EKG T AXIS: 74 DEGREES
EKG T AXIS: 75 DEGREES
EKG T AXIS: 76 DEGREES
EKG VENTRICULAR RATE: 105 BPM
EKG VENTRICULAR RATE: 119 BPM
EKG VENTRICULAR RATE: 124 BPM
EKG VENTRICULAR RATE: 128 BPM
EKG VENTRICULAR RATE: 165 BPM
EKG VENTRICULAR RATE: 72 BPM
EKG VENTRICULAR RATE: 76 BPM
EKG VENTRICULAR RATE: 79 BPM
EKG VENTRICULAR RATE: 86 BPM
EOSINOPHILS RELATIVE PERCENT: 0 % (ref 1–4)
EOSINOPHILS RELATIVE PERCENT: 1 % (ref 0–4)
EOSINOPHILS RELATIVE PERCENT: 1 % (ref 1–4)
EOSINOPHILS RELATIVE PERCENT: 16 % (ref 1–4)
EOSINOPHILS RELATIVE PERCENT: 3 % (ref 1–4)
EPITHELIAL CELLS UA: ABNORMAL /HPF
FERRITIN: 3412 UG/L (ref 30–400)
FIO2: 100
FIO2: ABNORMAL
FIO2: ABNORMAL
GFR AFRICAN AMERICAN: 10 ML/MIN
GFR AFRICAN AMERICAN: 10 ML/MIN
GFR AFRICAN AMERICAN: 11 ML/MIN
GFR AFRICAN AMERICAN: 16 ML/MIN
GFR AFRICAN AMERICAN: 5 ML/MIN
GFR AFRICAN AMERICAN: 6 ML/MIN
GFR AFRICAN AMERICAN: 7 ML/MIN
GFR AFRICAN AMERICAN: 8 ML/MIN
GFR AFRICAN AMERICAN: 9 ML/MIN
GFR AFRICAN AMERICAN: 9 ML/MIN
GFR NON-AFRICAN AMERICAN: 13 ML/MIN
GFR NON-AFRICAN AMERICAN: 4 ML/MIN
GFR NON-AFRICAN AMERICAN: 4 ML/MIN
GFR NON-AFRICAN AMERICAN: 5 ML/MIN
GFR NON-AFRICAN AMERICAN: 6 ML/MIN
GFR NON-AFRICAN AMERICAN: 7 ML/MIN
GFR NON-AFRICAN AMERICAN: 8 ML/MIN
GFR NON-AFRICAN AMERICAN: 9 ML/MIN
GFR SERPL CREATININE-BSD FRML MDRD: 5 ML/MIN
GFR SERPL CREATININE-BSD FRML MDRD: ABNORMAL ML/MIN/{1.73_M2}
GLUCOSE BLD-MCNC: 102 MG/DL (ref 70–99)
GLUCOSE BLD-MCNC: 103 MG/DL (ref 75–110)
GLUCOSE BLD-MCNC: 104 MG/DL (ref 70–99)
GLUCOSE BLD-MCNC: 112 MG/DL (ref 75–110)
GLUCOSE BLD-MCNC: 116 MG/DL (ref 75–110)
GLUCOSE BLD-MCNC: 119 MG/DL (ref 70–99)
GLUCOSE BLD-MCNC: 124 MG/DL (ref 70–99)
GLUCOSE BLD-MCNC: 125 MG/DL (ref 75–110)
GLUCOSE BLD-MCNC: 129 MG/DL (ref 75–110)
GLUCOSE BLD-MCNC: 130 MG/DL (ref 75–110)
GLUCOSE BLD-MCNC: 131 MG/DL (ref 70–99)
GLUCOSE BLD-MCNC: 131 MG/DL (ref 75–110)
GLUCOSE BLD-MCNC: 134 MG/DL (ref 75–110)
GLUCOSE BLD-MCNC: 141 MG/DL (ref 70–99)
GLUCOSE BLD-MCNC: 143 MG/DL (ref 75–110)
GLUCOSE BLD-MCNC: 144 MG/DL (ref 75–110)
GLUCOSE BLD-MCNC: 145 MG/DL (ref 75–110)
GLUCOSE BLD-MCNC: 146 MG/DL (ref 75–110)
GLUCOSE BLD-MCNC: 147 MG/DL (ref 75–110)
GLUCOSE BLD-MCNC: 149 MG/DL (ref 75–110)
GLUCOSE BLD-MCNC: 160 MG/DL (ref 75–110)
GLUCOSE BLD-MCNC: 161 MG/DL (ref 70–99)
GLUCOSE BLD-MCNC: 162 MG/DL (ref 75–110)
GLUCOSE BLD-MCNC: 162 MG/DL (ref 75–110)
GLUCOSE BLD-MCNC: 167 MG/DL (ref 75–110)
GLUCOSE BLD-MCNC: 168 MG/DL (ref 70–99)
GLUCOSE BLD-MCNC: 169 MG/DL (ref 70–99)
GLUCOSE BLD-MCNC: 171 MG/DL (ref 74–100)
GLUCOSE BLD-MCNC: 173 MG/DL (ref 70–99)
GLUCOSE BLD-MCNC: 175 MG/DL (ref 75–110)
GLUCOSE BLD-MCNC: 177 MG/DL (ref 75–110)
GLUCOSE BLD-MCNC: 188 MG/DL (ref 75–110)
GLUCOSE BLD-MCNC: 192 MG/DL (ref 75–110)
GLUCOSE BLD-MCNC: 206 MG/DL (ref 75–110)
GLUCOSE BLD-MCNC: 207 MG/DL (ref 70–99)
GLUCOSE BLD-MCNC: 212 MG/DL (ref 75–110)
GLUCOSE BLD-MCNC: 214 MG/DL (ref 75–110)
GLUCOSE BLD-MCNC: 227 MG/DL (ref 75–110)
GLUCOSE BLD-MCNC: 249 MG/DL (ref 74–100)
GLUCOSE BLD-MCNC: 251 MG/DL (ref 70–99)
GLUCOSE BLD-MCNC: 258 MG/DL (ref 70–99)
GLUCOSE BLD-MCNC: 270 MG/DL (ref 75–110)
GLUCOSE BLD-MCNC: 276 MG/DL (ref 70–99)
GLUCOSE BLD-MCNC: 282 MG/DL (ref 75–110)
GLUCOSE BLD-MCNC: 285 MG/DL (ref 70–99)
GLUCOSE BLD-MCNC: 290 MG/DL (ref 75–110)
GLUCOSE BLD-MCNC: 297 MG/DL (ref 70–99)
GLUCOSE BLD-MCNC: 310 MG/DL (ref 75–110)
GLUCOSE BLD-MCNC: 58 MG/DL (ref 75–110)
GLUCOSE BLD-MCNC: 71 MG/DL (ref 70–99)
GLUCOSE BLD-MCNC: 71 MG/DL (ref 75–110)
GLUCOSE BLD-MCNC: 79 MG/DL (ref 70–99)
GLUCOSE BLD-MCNC: 85 MG/DL (ref 75–110)
GLUCOSE BLD-MCNC: 87 MG/DL (ref 75–110)
GLUCOSE BLD-MCNC: 91 MG/DL (ref 75–110)
GLUCOSE BLD-MCNC: 93 MG/DL (ref 70–99)
GLUCOSE BLD-MCNC: 94 MG/DL (ref 70–99)
GLUCOSE BLD-MCNC: 94 MG/DL (ref 75–110)
GLUCOSE BLD-MCNC: 97 MG/DL (ref 75–110)
GLUCOSE BLD-MCNC: 99 MG/DL (ref 75–110)
GLUCOSE URINE: ABNORMAL
HBV SURFACE AB TITR SER: 108.2 MIU/ML
HCO3 VENOUS: 12.5 MMOL/L (ref 24–30)
HCO3 VENOUS: 21 MMOL/L (ref 22–29)
HCT VFR BLD CALC: 31.9 % (ref 40.7–50.3)
HCT VFR BLD CALC: 32.7 % (ref 40.7–50.3)
HCT VFR BLD CALC: 34.2 % (ref 40.7–50.3)
HCT VFR BLD CALC: 34.5 % (ref 40.7–50.3)
HCT VFR BLD CALC: 34.8 % (ref 40.7–50.3)
HCT VFR BLD CALC: 35.2 % (ref 40.7–50.3)
HCT VFR BLD CALC: 36.6 % (ref 40.7–50.3)
HCT VFR BLD CALC: 36.9 % (ref 40.7–50.3)
HCT VFR BLD CALC: 36.9 % (ref 40.7–50.3)
HCT VFR BLD CALC: 37.4 % (ref 40.7–50.3)
HCT VFR BLD CALC: 37.6 % (ref 40.7–50.3)
HCT VFR BLD CALC: 37.8 % (ref 40.7–50.3)
HCT VFR BLD CALC: 37.9 % (ref 40.7–50.3)
HCT VFR BLD CALC: 38.8 % (ref 40.7–50.3)
HCT VFR BLD CALC: 39 % (ref 41–53)
HCT VFR BLD CALC: 40.8 % (ref 40.7–50.3)
HCT VFR BLD CALC: 41.3 % (ref 40.7–50.3)
HCT VFR BLD CALC: 43.2 % (ref 40.7–50.3)
HEMOGLOBIN: 10.4 G/DL (ref 13–17)
HEMOGLOBIN: 10.6 G/DL (ref 13–17)
HEMOGLOBIN: 10.8 G/DL (ref 13–17)
HEMOGLOBIN: 11 G/DL (ref 13–17)
HEMOGLOBIN: 11.3 G/DL (ref 13–17)
HEMOGLOBIN: 11.5 G/DL (ref 13–17)
HEMOGLOBIN: 11.6 G/DL (ref 13–17)
HEMOGLOBIN: 11.8 G/DL (ref 13–17)
HEMOGLOBIN: 12.1 G/DL (ref 13–17)
HEMOGLOBIN: 12.3 G/DL (ref 13–17)
HEMOGLOBIN: 12.5 G/DL (ref 13–17)
HEMOGLOBIN: 12.6 G/DL (ref 13–17)
HEMOGLOBIN: 12.7 G/DL (ref 13.5–17.5)
HEMOGLOBIN: 13.1 G/DL (ref 13–17)
HEMOGLOBIN: 13.3 G/DL (ref 13–17)
HEMOGLOBIN: 13.7 G/DL (ref 13–17)
HEPATITIS B CORE TOTAL ANTIBODY: NONREACTIVE
HEPATITIS B SURFACE ANTIGEN: NONREACTIVE
IMMATURE GRANULOCYTES: 1 %
IMMATURE GRANULOCYTES: 2 %
IMMATURE GRANULOCYTES: ABNORMAL %
INR BLD: 1
INR BLD: 1
INTERVENTION: NORMAL
IRON SATURATION: 34 % (ref 20–55)
IRON: 47 UG/DL (ref 59–158)
KETONES, URINE: NEGATIVE
LACTATE DEHYDROGENASE: 232 U/L (ref 135–225)
LACTATE DEHYDROGENASE: 365 U/L (ref 135–225)
LACTIC ACID, SEPSIS WHOLE BLOOD: 1.4 MMOL/L (ref 0.5–1.9)
LACTIC ACID, SEPSIS WHOLE BLOOD: 1.8 MMOL/L (ref 0.5–1.9)
LACTIC ACID, SEPSIS WHOLE BLOOD: ABNORMAL MMOL/L (ref 0.5–1.9)
LACTIC ACID, SEPSIS WHOLE BLOOD: NORMAL MMOL/L (ref 0.5–1.9)
LACTIC ACID, SEPSIS: 1.8 MMOL/L (ref 0.5–1.9)
LACTIC ACID, SEPSIS: 2 MMOL/L (ref 0.5–1.9)
LACTIC ACID, SEPSIS: NORMAL MMOL/L (ref 0.5–1.9)
LACTIC ACID, SEPSIS: NORMAL MMOL/L (ref 0.5–1.9)
LACTIC ACID, WHOLE BLOOD: 2.2 MMOL/L (ref 0.7–2.1)
LEUKOCYTE ESTERASE, URINE: NEGATIVE
LYMPHOCYTES # BLD: 11 % (ref 24–43)
LYMPHOCYTES # BLD: 15 % (ref 24–43)
LYMPHOCYTES # BLD: 15 % (ref 24–43)
LYMPHOCYTES # BLD: 15 % (ref 24–44)
LYMPHOCYTES # BLD: 16 % (ref 24–43)
LYMPHOCYTES # BLD: 17 % (ref 24–43)
LYMPHOCYTES # BLD: 19 % (ref 24–43)
LYMPHOCYTES # BLD: 20 % (ref 24–43)
LYMPHOCYTES # BLD: 24 % (ref 24–43)
LYMPHOCYTES # BLD: 8 % (ref 24–43)
LYMPHOCYTES # BLD: 8 % (ref 24–43)
LYMPHOCYTES # BLD: 9 % (ref 24–43)
Lab: ABNORMAL
Lab: ABNORMAL
Lab: NORMAL
MAGNESIUM: 1.8 MG/DL (ref 1.6–2.6)
MAGNESIUM: 1.9 MG/DL (ref 1.6–2.6)
MAGNESIUM: 1.9 MG/DL (ref 1.6–2.6)
MAGNESIUM: 2 MG/DL (ref 1.6–2.6)
MAGNESIUM: 2.1 MG/DL (ref 1.6–2.6)
MAGNESIUM: 2.6 MG/DL (ref 1.6–2.6)
MCH RBC QN AUTO: 29.1 PG (ref 25.2–33.5)
MCH RBC QN AUTO: 30.2 PG (ref 25.2–33.5)
MCH RBC QN AUTO: 30.3 PG (ref 25.2–33.5)
MCH RBC QN AUTO: 30.3 PG (ref 25.2–33.5)
MCH RBC QN AUTO: 30.4 PG (ref 25.2–33.5)
MCH RBC QN AUTO: 30.6 PG (ref 25.2–33.5)
MCH RBC QN AUTO: 30.7 PG (ref 25.2–33.5)
MCH RBC QN AUTO: 30.7 PG (ref 25.2–33.5)
MCH RBC QN AUTO: 30.7 PG (ref 26–34)
MCH RBC QN AUTO: 31 PG (ref 25.2–33.5)
MCH RBC QN AUTO: 31.3 PG (ref 25.2–33.5)
MCH RBC QN AUTO: 31.7 PG (ref 25.2–33.5)
MCH RBC QN AUTO: 32.2 PG (ref 25.2–33.5)
MCH RBC QN AUTO: 32.4 PG (ref 25.2–33.5)
MCHC RBC AUTO-ENTMCNC: 31.4 G/DL (ref 28.4–34.8)
MCHC RBC AUTO-ENTMCNC: 31.6 G/DL (ref 28.4–34.8)
MCHC RBC AUTO-ENTMCNC: 31.7 G/DL (ref 28.4–34.8)
MCHC RBC AUTO-ENTMCNC: 31.8 G/DL (ref 28.4–34.8)
MCHC RBC AUTO-ENTMCNC: 31.9 G/DL (ref 28.4–34.8)
MCHC RBC AUTO-ENTMCNC: 31.9 G/DL (ref 28.4–34.8)
MCHC RBC AUTO-ENTMCNC: 32 G/DL (ref 28.4–34.8)
MCHC RBC AUTO-ENTMCNC: 32.1 G/DL (ref 28.4–34.8)
MCHC RBC AUTO-ENTMCNC: 32.2 G/DL (ref 28.4–34.8)
MCHC RBC AUTO-ENTMCNC: 32.5 G/DL (ref 28.4–34.8)
MCHC RBC AUTO-ENTMCNC: 32.6 G/DL (ref 31–37)
MCHC RBC AUTO-ENTMCNC: 32.7 G/DL (ref 28.4–34.8)
MCHC RBC AUTO-ENTMCNC: 32.9 G/DL (ref 28.4–34.8)
MCHC RBC AUTO-ENTMCNC: 33.1 G/DL (ref 28.4–34.8)
MCHC RBC AUTO-ENTMCNC: 33.2 G/DL (ref 28.4–34.8)
MCHC RBC AUTO-ENTMCNC: 33.3 G/DL (ref 28.4–34.8)
MCV RBC AUTO: 101.2 FL (ref 82.6–102.9)
MCV RBC AUTO: 90.4 FL (ref 82.6–102.9)
MCV RBC AUTO: 91.3 FL (ref 82.6–102.9)
MCV RBC AUTO: 91.9 FL (ref 82.6–102.9)
MCV RBC AUTO: 92.4 FL (ref 82.6–102.9)
MCV RBC AUTO: 93.9 FL (ref 82.6–102.9)
MCV RBC AUTO: 94.1 FL (ref 80–100)
MCV RBC AUTO: 94.2 FL (ref 82.6–102.9)
MCV RBC AUTO: 94.6 FL (ref 82.6–102.9)
MCV RBC AUTO: 95 FL (ref 82.6–102.9)
MCV RBC AUTO: 95.1 FL (ref 82.6–102.9)
MCV RBC AUTO: 95.4 FL (ref 82.6–102.9)
MCV RBC AUTO: 95.9 FL (ref 82.6–102.9)
MCV RBC AUTO: 96.3 FL (ref 82.6–102.9)
MCV RBC AUTO: 96.9 FL (ref 82.6–102.9)
MCV RBC AUTO: 97.2 FL (ref 82.6–102.9)
MCV RBC AUTO: 97.6 FL (ref 82.6–102.9)
MCV RBC AUTO: 99.4 FL (ref 82.6–102.9)
METHEMOGLOBIN: ABNORMAL % (ref 0–1.5)
MODE: ABNORMAL
MONOCYTES # BLD: 4 % (ref 3–12)
MONOCYTES # BLD: 5 % (ref 3–12)
MONOCYTES # BLD: 6 % (ref 3–12)
MONOCYTES # BLD: 7 % (ref 3–12)
MONOCYTES # BLD: 8 % (ref 1–7)
MONOCYTES # BLD: 8 % (ref 3–12)
MUCUS: ABNORMAL
NEGATIVE BASE EXCESS, ART: 10 (ref 0–2)
NEGATIVE BASE EXCESS, ART: 2 (ref 0–2)
NEGATIVE BASE EXCESS, ART: 5 (ref 0–2)
NEGATIVE BASE EXCESS, ART: 6 (ref 0–2)
NEGATIVE BASE EXCESS, ART: 8 (ref 0–2)
NEGATIVE BASE EXCESS, VEN: 11.7 MMOL/L (ref 0–2)
NEGATIVE BASE EXCESS, VEN: 5 (ref 0–2)
NITRITE, URINE: NEGATIVE
NOTIFICATION TIME: ABNORMAL
NOTIFICATION: ABNORMAL
NOTIFY: NORMAL
NRBC AUTOMATED: 0 PER 100 WBC
NRBC AUTOMATED: 0.2 PER 100 WBC
NRBC AUTOMATED: ABNORMAL PER 100 WBC
O2 DEVICE/FLOW/%: ABNORMAL
O2 SAT, VEN: 48 % (ref 60–85)
O2 SAT, VEN: 98.1 % (ref 60–85)
OTHER OBSERVATIONS UA: ABNORMAL
OXYHEMOGLOBIN: ABNORMAL % (ref 95–98)
PARTIAL THROMBOPLASTIN TIME: 40.3 SEC (ref 20.5–30.5)
PATIENT TEMP: 37
PATIENT TEMP: ABNORMAL
PCO2, VEN, TEMP ADJ: ABNORMAL MMHG (ref 39–55)
PCO2, VEN: 24.3 (ref 39–55)
PCO2, VEN: 39.8 MM HG (ref 41–51)
PDW BLD-RTO: 13 % (ref 11.8–14.4)
PDW BLD-RTO: 13.2 % (ref 11.8–14.4)
PDW BLD-RTO: 13.7 % (ref 11.8–14.4)
PDW BLD-RTO: 13.7 % (ref 11.8–14.4)
PDW BLD-RTO: 13.8 % (ref 11.8–14.4)
PDW BLD-RTO: 13.9 % (ref 11.8–14.4)
PDW BLD-RTO: 14.4 % (ref 11.8–14.4)
PDW BLD-RTO: 14.4 % (ref 11.8–14.4)
PDW BLD-RTO: 14.5 % (ref 11.8–14.4)
PDW BLD-RTO: 14.6 % (ref 11.8–14.4)
PDW BLD-RTO: 14.7 % (ref 11.8–14.4)
PDW BLD-RTO: 14.9 % (ref 11.5–14.9)
PDW BLD-RTO: 14.9 % (ref 11.8–14.4)
PDW BLD-RTO: 15.3 % (ref 11.8–14.4)
PEEP/CPAP: ABNORMAL
PH UA: 8 (ref 5–8)
PH VENOUS: 7.33 (ref 7.32–7.42)
PH VENOUS: 7.33 (ref 7.32–7.43)
PH, VEN, TEMP ADJ: ABNORMAL (ref 7.32–7.42)
PLATELET # BLD: 102 K/UL (ref 138–453)
PLATELET # BLD: 105 K/UL (ref 138–453)
PLATELET # BLD: 111 K/UL (ref 138–453)
PLATELET # BLD: 112 K/UL (ref 138–453)
PLATELET # BLD: 141 K/UL (ref 138–453)
PLATELET # BLD: 190 K/UL (ref 138–453)
PLATELET # BLD: 202 K/UL (ref 138–453)
PLATELET # BLD: 234 K/UL (ref 138–453)
PLATELET # BLD: 245 K/UL (ref 138–453)
PLATELET # BLD: 58 K/UL (ref 138–453)
PLATELET # BLD: 65 K/UL (ref 138–453)
PLATELET # BLD: 67 K/UL (ref 138–453)
PLATELET # BLD: 70 K/UL (ref 138–453)
PLATELET # BLD: 81 K/UL (ref 138–453)
PLATELET # BLD: 82 K/UL (ref 138–453)
PLATELET # BLD: 91 K/UL (ref 150–450)
PLATELET # BLD: ABNORMAL K/UL (ref 138–453)
PLATELET # BLD: ABNORMAL K/UL (ref 138–453)
PLATELET ESTIMATE: ABNORMAL
PLATELET, FLUORESCENCE: 128 K/UL (ref 138–453)
PLATELET, FLUORESCENCE: 139 K/UL (ref 138–453)
PLATELET, IMMATURE FRACTION: 3.3 % (ref 1.1–10.3)
PLATELET, IMMATURE FRACTION: 3.7 % (ref 1.1–10.3)
PMV BLD AUTO: 10 FL (ref 6–12)
PMV BLD AUTO: 10.7 FL (ref 8.1–13.5)
PMV BLD AUTO: 10.7 FL (ref 8.1–13.5)
PMV BLD AUTO: 11 FL (ref 8.1–13.5)
PMV BLD AUTO: 11.2 FL (ref 8.1–13.5)
PMV BLD AUTO: 11.2 FL (ref 8.1–13.5)
PMV BLD AUTO: 11.6 FL (ref 8.1–13.5)
PMV BLD AUTO: 11.8 FL (ref 8.1–13.5)
PMV BLD AUTO: 11.9 FL (ref 8.1–13.5)
PMV BLD AUTO: 12 FL (ref 8.1–13.5)
PMV BLD AUTO: 12.1 FL (ref 8.1–13.5)
PMV BLD AUTO: 12.1 FL (ref 8.1–13.5)
PMV BLD AUTO: 12.3 FL (ref 8.1–13.5)
PMV BLD AUTO: 12.3 FL (ref 8.1–13.5)
PMV BLD AUTO: 12.5 FL (ref 8.1–13.5)
PMV BLD AUTO: 12.6 FL (ref 8.1–13.5)
PMV BLD AUTO: ABNORMAL FL (ref 8.1–13.5)
PMV BLD AUTO: ABNORMAL FL (ref 8.1–13.5)
PO2, VEN, TEMP ADJ: ABNORMAL MMHG (ref 30–50)
PO2, VEN: 149 (ref 30–50)
PO2, VEN: 27.8 MM HG (ref 30–50)
POC CHLORIDE: 104 MMOL/L (ref 98–107)
POC CREATININE: 12.28 MG/DL (ref 0.51–1.19)
POC HCO3: 13.6 MMOL/L (ref 21–28)
POC HCO3: 15.1 MMOL/L (ref 21–28)
POC HCO3: 19.3 MMOL/L (ref 21–28)
POC HCO3: 21.7 MMOL/L (ref 21–28)
POC HCO3: 24.4 MMOL/L (ref 21–28)
POC HEMATOCRIT: 47 % (ref 41–53)
POC HEMOGLOBIN: 16.1 G/DL (ref 13.5–17.5)
POC IONIZED CALCIUM: 0.94 MMOL/L (ref 1.15–1.33)
POC LACTIC ACID: 1.17 MMOL/L (ref 0.56–1.39)
POC O2 SATURATION: 87 % (ref 94–98)
POC O2 SATURATION: 90 % (ref 94–98)
POC O2 SATURATION: 93 % (ref 94–98)
POC O2 SATURATION: 97 % (ref 94–98)
POC O2 SATURATION: 99 % (ref 94–98)
POC PCO2 TEMP: ABNORMAL MM HG
POC PCO2: 23 MM HG (ref 35–48)
POC PCO2: 23 MM HG (ref 35–48)
POC PCO2: 24.7 MM HG (ref 35–48)
POC PCO2: 46.2 MM HG (ref 35–48)
POC PCO2: 66.9 MM HG (ref 35–48)
POC PH TEMP: ABNORMAL
POC PH: 7.17 (ref 7.35–7.45)
POC PH: 7.28 (ref 7.35–7.45)
POC PH: 7.38 (ref 7.35–7.45)
POC PH: 7.43 (ref 7.35–7.45)
POC PH: 7.5 (ref 7.35–7.45)
POC PO2 TEMP: ABNORMAL MM HG
POC PO2: 102.8 MM HG (ref 83–108)
POC PO2: 183.3 MM HG (ref 83–108)
POC PO2: 46.1 MM HG (ref 83–108)
POC PO2: 59.1 MM HG (ref 83–108)
POC PO2: 63.3 MM HG (ref 83–108)
POC POTASSIUM: 4.5 MMOL/L (ref 3.5–4.5)
POC SODIUM: 134 MMOL/L (ref 138–146)
POSITIVE BASE EXCESS, ART: ABNORMAL (ref 0–3)
POSITIVE BASE EXCESS, VEN: ABNORMAL (ref 0–3)
POSITIVE BASE EXCESS, VEN: ABNORMAL MMOL/L (ref 0–2)
POTASSIUM SERPL-SCNC: 3.2 MMOL/L (ref 3.7–5.3)
POTASSIUM SERPL-SCNC: 3.3 MMOL/L (ref 3.7–5.3)
POTASSIUM SERPL-SCNC: 3.4 MMOL/L (ref 3.7–5.3)
POTASSIUM SERPL-SCNC: 3.5 MMOL/L (ref 3.7–5.3)
POTASSIUM SERPL-SCNC: 3.9 MMOL/L (ref 3.7–5.3)
POTASSIUM SERPL-SCNC: 3.9 MMOL/L (ref 3.7–5.3)
POTASSIUM SERPL-SCNC: 4 MMOL/L (ref 3.7–5.3)
POTASSIUM SERPL-SCNC: 4.1 MMOL/L (ref 3.7–5.3)
POTASSIUM SERPL-SCNC: 4.1 MMOL/L (ref 3.7–5.3)
POTASSIUM SERPL-SCNC: 4.2 MMOL/L (ref 3.7–5.3)
POTASSIUM SERPL-SCNC: 4.4 MMOL/L (ref 3.7–5.3)
POTASSIUM SERPL-SCNC: 4.4 MMOL/L (ref 3.7–5.3)
POTASSIUM SERPL-SCNC: 4.5 MMOL/L (ref 3.7–5.3)
POTASSIUM SERPL-SCNC: 4.7 MMOL/L (ref 3.7–5.3)
POTASSIUM SERPL-SCNC: 4.8 MMOL/L (ref 3.7–5.3)
POTASSIUM SERPL-SCNC: 5.2 MMOL/L (ref 3.7–5.3)
PROCALCITONIN: 1.49 NG/ML
PROTEIN UA: ABNORMAL
PROTHROMBIN TIME: 10.2 SEC (ref 9–12)
PROTHROMBIN TIME: 10.4 SEC (ref 9–12)
PSV: ABNORMAL
PT. POSITION: ABNORMAL
PTH INTACT: 178.9 PG/ML (ref 15–65)
RBC # BLD: 3.23 M/UL (ref 4.21–5.77)
RBC # BLD: 3.27 M/UL (ref 4.21–5.77)
RBC # BLD: 3.47 M/UL (ref 4.21–5.77)
RBC # BLD: 3.52 M/UL (ref 4.21–5.77)
RBC # BLD: 3.67 M/UL (ref 4.21–5.77)
RBC # BLD: 3.68 M/UL (ref 4.21–5.77)
RBC # BLD: 3.81 M/UL (ref 4.21–5.77)
RBC # BLD: 3.88 M/UL (ref 4.21–5.77)
RBC # BLD: 3.96 M/UL (ref 4.21–5.77)
RBC # BLD: 3.99 M/UL (ref 4.21–5.77)
RBC # BLD: 4.03 M/UL (ref 4.21–5.77)
RBC # BLD: 4.07 M/UL (ref 4.21–5.77)
RBC # BLD: 4.14 M/UL (ref 4.21–5.77)
RBC # BLD: 4.15 M/UL (ref 4.5–5.9)
RBC # BLD: 4.16 M/UL (ref 4.21–5.77)
RBC # BLD: 4.33 M/UL (ref 4.21–5.77)
RBC # BLD: 4.4 M/UL (ref 4.21–5.77)
RBC # BLD: 4.53 M/UL (ref 4.21–5.77)
RBC # BLD: ABNORMAL 10*6/UL
RBC UA: ABNORMAL /HPF
READ BACK: NO
RENAL EPITHELIAL, UA: ABNORMAL /HPF
RESPIRATORY RATE: ABNORMAL
SAMPLE SITE: ABNORMAL
SARS-COV-2, PCR: ABNORMAL
SARS-COV-2, PCR: NORMAL
SARS-COV-2, RAPID: ABNORMAL
SARS-COV-2, RAPID: ABNORMAL
SARS-COV-2, RAPID: DETECTED
SARS-COV-2, RAPID: NORMAL
SARS-COV-2: ABNORMAL
SARS-COV-2: DETECTED
SARS-COV-2: DETECTED
SARS-COV-2: NOT DETECTED
SEDIMENTATION RATE, ERYTHROCYTE: 60 MM (ref 0–15)
SEG NEUTROPHILS: 59 % (ref 36–65)
SEG NEUTROPHILS: 67 % (ref 36–65)
SEG NEUTROPHILS: 69 % (ref 36–65)
SEG NEUTROPHILS: 73 % (ref 36–65)
SEG NEUTROPHILS: 75 % (ref 36–65)
SEG NEUTROPHILS: 75 % (ref 36–66)
SEG NEUTROPHILS: 77 % (ref 36–65)
SEG NEUTROPHILS: 77 % (ref 36–65)
SEG NEUTROPHILS: 83 % (ref 36–65)
SEG NEUTROPHILS: 83 % (ref 36–65)
SEG NEUTROPHILS: 84 % (ref 36–65)
SEG NEUTROPHILS: 85 % (ref 36–65)
SEGMENTED NEUTROPHILS ABSOLUTE COUNT: 10.47 K/UL (ref 1.5–8.1)
SEGMENTED NEUTROPHILS ABSOLUTE COUNT: 2.8 K/UL (ref 1.5–8.1)
SEGMENTED NEUTROPHILS ABSOLUTE COUNT: 3.46 K/UL (ref 1.5–8.1)
SEGMENTED NEUTROPHILS ABSOLUTE COUNT: 3.56 K/UL (ref 1.5–8.1)
SEGMENTED NEUTROPHILS ABSOLUTE COUNT: 3.79 K/UL (ref 1.5–8.1)
SEGMENTED NEUTROPHILS ABSOLUTE COUNT: 4.8 K/UL (ref 1.3–9.1)
SEGMENTED NEUTROPHILS ABSOLUTE COUNT: 4.84 K/UL (ref 1.5–8.1)
SEGMENTED NEUTROPHILS ABSOLUTE COUNT: 5.82 K/UL (ref 1.5–8.1)
SEGMENTED NEUTROPHILS ABSOLUTE COUNT: 6.34 K/UL (ref 1.5–8.1)
SEGMENTED NEUTROPHILS ABSOLUTE COUNT: 8.34 K/UL (ref 1.5–8.1)
SEGMENTED NEUTROPHILS ABSOLUTE COUNT: 8.95 K/UL (ref 1.5–8.1)
SEGMENTED NEUTROPHILS ABSOLUTE COUNT: 9.5 K/UL (ref 1.5–8.1)
SET RATE: ABNORMAL
SODIUM BLD-SCNC: 133 MMOL/L (ref 135–144)
SODIUM BLD-SCNC: 135 MMOL/L (ref 135–144)
SODIUM BLD-SCNC: 135 MMOL/L (ref 135–144)
SODIUM BLD-SCNC: 136 MMOL/L (ref 135–144)
SODIUM BLD-SCNC: 137 MMOL/L (ref 135–144)
SODIUM BLD-SCNC: 138 MMOL/L (ref 135–144)
SODIUM BLD-SCNC: 139 MMOL/L (ref 135–144)
SODIUM BLD-SCNC: 140 MMOL/L (ref 135–144)
SODIUM BLD-SCNC: 142 MMOL/L (ref 135–144)
SODIUM BLD-SCNC: 142 MMOL/L (ref 135–144)
SOURCE: ABNORMAL
SOURCE: NORMAL
SPECIFIC GRAVITY UA: 1.02 (ref 1–1.03)
SPECIMEN DESCRIPTION: ABNORMAL
SPECIMEN DESCRIPTION: ABNORMAL
SPECIMEN DESCRIPTION: NORMAL
TCO2 (CALC), ART: 14 MMOL/L (ref 22–29)
TCO2 (CALC), ART: 16 MMOL/L (ref 22–29)
TCO2 (CALC), ART: 20 MMOL/L (ref 22–29)
TCO2 (CALC), ART: 23 MMOL/L (ref 22–29)
TCO2 (CALC), ART: 26 MMOL/L (ref 22–29)
TEXT FOR RESPIRATORY: ABNORMAL
TOTAL CO2, VENOUS: 22 MMOL/L (ref 23–30)
TOTAL HB: ABNORMAL G/DL (ref 12–16)
TOTAL IRON BINDING CAPACITY: 137 UG/DL (ref 250–450)
TOTAL PROTEIN: 5.7 G/DL (ref 6.4–8.3)
TOTAL PROTEIN: 6.2 G/DL (ref 6.4–8.3)
TOTAL PROTEIN: 6.7 G/DL (ref 6.4–8.3)
TOTAL RATE: ABNORMAL
TRANSFUSION STATUS: NORMAL
TRICHOMONAS: ABNORMAL
TROPONIN INTERP: ABNORMAL
TROPONIN T: ABNORMAL NG/ML
TROPONIN, HIGH SENSITIVITY: 310 NG/L (ref 0–22)
TROPONIN, HIGH SENSITIVITY: 330 NG/L (ref 0–22)
TROPONIN, HIGH SENSITIVITY: 363 NG/L (ref 0–22)
TROPONIN, HIGH SENSITIVITY: 406 NG/L (ref 0–22)
TROPONIN, HIGH SENSITIVITY: 414 NG/L (ref 0–22)
TROPONIN, HIGH SENSITIVITY: 445 NG/L (ref 0–22)
TROPONIN, HIGH SENSITIVITY: 447 NG/L (ref 0–22)
TROPONIN, HIGH SENSITIVITY: 452 NG/L (ref 0–22)
TROPONIN, HIGH SENSITIVITY: 454 NG/L (ref 0–22)
TROPONIN, HIGH SENSITIVITY: 488 NG/L (ref 0–22)
TURBIDITY: ABNORMAL
UNIT DIVISION: 0
UNIT NUMBER: NORMAL
UNSATURATED IRON BINDING CAPACITY: 90 UG/DL (ref 112–347)
URINE HGB: ABNORMAL
UROBILINOGEN, URINE: NORMAL
VANCOMYCIN RANDOM DATE LAST DOSE: NORMAL
VANCOMYCIN RANDOM DOSE AMOUNT: NORMAL
VANCOMYCIN RANDOM TIME LAST DOSE: NORMAL
VANCOMYCIN RANDOM: 16.3 UG/ML
VT: ABNORMAL
WBC # BLD: 10 K/UL (ref 3.5–11.3)
WBC # BLD: 10.9 K/UL (ref 3.5–11.3)
WBC # BLD: 11.3 K/UL (ref 3.5–11.3)
WBC # BLD: 12.7 K/UL (ref 3.5–11.3)
WBC # BLD: 4.2 K/UL (ref 3.5–11.3)
WBC # BLD: 4.8 K/UL (ref 3.5–11.3)
WBC # BLD: 5 K/UL (ref 3.5–11.3)
WBC # BLD: 5.1 K/UL (ref 3.5–11.3)
WBC # BLD: 6.3 K/UL (ref 3.5–11)
WBC # BLD: 6.3 K/UL (ref 3.5–11.3)
WBC # BLD: 6.7 K/UL (ref 3.5–11.3)
WBC # BLD: 7 K/UL (ref 3.5–11.3)
WBC # BLD: 7.1 K/UL (ref 3.5–11.3)
WBC # BLD: 8 K/UL (ref 3.5–11.3)
WBC # BLD: 8.2 K/UL (ref 3.5–11.3)
WBC # BLD: 9.1 K/UL (ref 3.5–11.3)
WBC # BLD: 9.8 K/UL (ref 3.5–11.3)
WBC # BLD: 9.9 K/UL (ref 3.5–11.3)
WBC # BLD: ABNORMAL 10*3/UL
WBC UA: ABNORMAL /HPF
YEAST: ABNORMAL

## 2020-01-01 PROCEDURE — 99233 SBSQ HOSP IP/OBS HIGH 50: CPT | Performed by: INTERNAL MEDICINE

## 2020-01-01 PROCEDURE — 83605 ASSAY OF LACTIC ACID: CPT

## 2020-01-01 PROCEDURE — 82330 ASSAY OF CALCIUM: CPT

## 2020-01-01 PROCEDURE — 93005 ELECTROCARDIOGRAM TRACING: CPT | Performed by: STUDENT IN AN ORGANIZED HEALTH CARE EDUCATION/TRAINING PROGRAM

## 2020-01-01 PROCEDURE — G8484 FLU IMMUNIZE NO ADMIN: HCPCS | Performed by: INTERNAL MEDICINE

## 2020-01-01 PROCEDURE — 87086 URINE CULTURE/COLONY COUNT: CPT

## 2020-01-01 PROCEDURE — 86140 C-REACTIVE PROTEIN: CPT

## 2020-01-01 PROCEDURE — 36415 COLL VENOUS BLD VENIPUNCTURE: CPT

## 2020-01-01 PROCEDURE — 94640 AIRWAY INHALATION TREATMENT: CPT

## 2020-01-01 PROCEDURE — 6360000002 HC RX W HCPCS: Performed by: STUDENT IN AN ORGANIZED HEALTH CARE EDUCATION/TRAINING PROGRAM

## 2020-01-01 PROCEDURE — 90935 HEMODIALYSIS ONE EVALUATION: CPT

## 2020-01-01 PROCEDURE — 82140 ASSAY OF AMMONIA: CPT

## 2020-01-01 PROCEDURE — 99232 SBSQ HOSP IP/OBS MODERATE 35: CPT | Performed by: FAMILY MEDICINE

## 2020-01-01 PROCEDURE — 87070 CULTURE OTHR SPECIMN AEROBIC: CPT

## 2020-01-01 PROCEDURE — 6360000002 HC RX W HCPCS: Performed by: FAMILY MEDICINE

## 2020-01-01 PROCEDURE — 2580000003 HC RX 258: Performed by: NURSE PRACTITIONER

## 2020-01-01 PROCEDURE — 6360000002 HC RX W HCPCS: Performed by: INTERNAL MEDICINE

## 2020-01-01 PROCEDURE — P9603 ONE-WAY ALLOW PRORATED MILES: HCPCS

## 2020-01-01 PROCEDURE — 2580000003 HC RX 258: Performed by: INTERNAL MEDICINE

## 2020-01-01 PROCEDURE — 6360000002 HC RX W HCPCS: Performed by: NURSE PRACTITIONER

## 2020-01-01 PROCEDURE — 93010 ELECTROCARDIOGRAM REPORT: CPT | Performed by: INTERNAL MEDICINE

## 2020-01-01 PROCEDURE — 99213 OFFICE O/P EST LOW 20 MIN: CPT

## 2020-01-01 PROCEDURE — 80048 BASIC METABOLIC PNL TOTAL CA: CPT

## 2020-01-01 PROCEDURE — U0003 INFECTIOUS AGENT DETECTION BY NUCLEIC ACID (DNA OR RNA); SEVERE ACUTE RESPIRATORY SYNDROME CORONAVIRUS 2 (SARS-COV-2) (CORONAVIRUS DISEASE [COVID-19]), AMPLIFIED PROBE TECHNIQUE, MAKING USE OF HIGH THROUGHPUT TECHNOLOGIES AS DESCRIBED BY CMS-2020-01-R: HCPCS

## 2020-01-01 PROCEDURE — 85025 COMPLETE CBC W/AUTO DIFF WBC: CPT

## 2020-01-01 PROCEDURE — 94761 N-INVAS EAR/PLS OXIMETRY MLT: CPT

## 2020-01-01 PROCEDURE — 93005 ELECTROCARDIOGRAM TRACING: CPT | Performed by: EMERGENCY MEDICINE

## 2020-01-01 PROCEDURE — 6370000000 HC RX 637 (ALT 250 FOR IP): Performed by: INTERNAL MEDICINE

## 2020-01-01 PROCEDURE — 36620 INSERTION CATHETER ARTERY: CPT

## 2020-01-01 PROCEDURE — 37799 UNLISTED PX VASCULAR SURGERY: CPT

## 2020-01-01 PROCEDURE — 85610 PROTHROMBIN TIME: CPT

## 2020-01-01 PROCEDURE — 82565 ASSAY OF CREATININE: CPT

## 2020-01-01 PROCEDURE — 85730 THROMBOPLASTIN TIME PARTIAL: CPT

## 2020-01-01 PROCEDURE — 82803 BLOOD GASES ANY COMBINATION: CPT

## 2020-01-01 PROCEDURE — 99223 1ST HOSP IP/OBS HIGH 75: CPT | Performed by: INTERNAL MEDICINE

## 2020-01-01 PROCEDURE — 83735 ASSAY OF MAGNESIUM: CPT

## 2020-01-01 PROCEDURE — 97162 PT EVAL MOD COMPLEX 30 MIN: CPT

## 2020-01-01 PROCEDURE — 86901 BLOOD TYPING SEROLOGIC RH(D): CPT

## 2020-01-01 PROCEDURE — 80053 COMPREHEN METABOLIC PANEL: CPT

## 2020-01-01 PROCEDURE — 99291 CRITICAL CARE FIRST HOUR: CPT | Performed by: INTERNAL MEDICINE

## 2020-01-01 PROCEDURE — 82947 ASSAY GLUCOSE BLOOD QUANT: CPT

## 2020-01-01 PROCEDURE — 84484 ASSAY OF TROPONIN QUANT: CPT

## 2020-01-01 PROCEDURE — 71045 X-RAY EXAM CHEST 1 VIEW: CPT

## 2020-01-01 PROCEDURE — 85027 COMPLETE CBC AUTOMATED: CPT

## 2020-01-01 PROCEDURE — 2580000003 HC RX 258: Performed by: STUDENT IN AN ORGANIZED HEALTH CARE EDUCATION/TRAINING PROGRAM

## 2020-01-01 PROCEDURE — 1200000000 HC SEMI PRIVATE

## 2020-01-01 PROCEDURE — 99232 SBSQ HOSP IP/OBS MODERATE 35: CPT | Performed by: INTERNAL MEDICINE

## 2020-01-01 PROCEDURE — 2500000003 HC RX 250 WO HCPCS: Performed by: STUDENT IN AN ORGANIZED HEALTH CARE EDUCATION/TRAINING PROGRAM

## 2020-01-01 PROCEDURE — 2000000000 HC ICU R&B

## 2020-01-01 PROCEDURE — 84145 PROCALCITONIN (PCT): CPT

## 2020-01-01 PROCEDURE — 83970 ASSAY OF PARATHORMONE: CPT

## 2020-01-01 PROCEDURE — 31500 INSERT EMERGENCY AIRWAY: CPT

## 2020-01-01 PROCEDURE — 94002 VENT MGMT INPAT INIT DAY: CPT

## 2020-01-01 PROCEDURE — 5A1D70Z PERFORMANCE OF URINARY FILTRATION, INTERMITTENT, LESS THAN 6 HOURS PER DAY: ICD-10-PCS | Performed by: INTERNAL MEDICINE

## 2020-01-01 PROCEDURE — 85055 RETICULATED PLATELET ASSAY: CPT

## 2020-01-01 PROCEDURE — 0BH18EZ INSERTION OF ENDOTRACHEAL AIRWAY INTO TRACHEA, VIA NATURAL OR ARTIFICIAL OPENING ENDOSCOPIC: ICD-10-PCS | Performed by: INTERNAL MEDICINE

## 2020-01-01 PROCEDURE — 86738 MYCOPLASMA ANTIBODY: CPT

## 2020-01-01 PROCEDURE — 86317 IMMUNOASSAY INFECTIOUS AGENT: CPT

## 2020-01-01 PROCEDURE — 6370000000 HC RX 637 (ALT 250 FOR IP): Performed by: STUDENT IN AN ORGANIZED HEALTH CARE EDUCATION/TRAINING PROGRAM

## 2020-01-01 PROCEDURE — P9047 ALBUMIN (HUMAN), 25%, 50ML: HCPCS | Performed by: INTERNAL MEDICINE

## 2020-01-01 PROCEDURE — 81001 URINALYSIS AUTO W/SCOPE: CPT

## 2020-01-01 PROCEDURE — 96367 TX/PROPH/DG ADDL SEQ IV INF: CPT

## 2020-01-01 PROCEDURE — 83540 ASSAY OF IRON: CPT

## 2020-01-01 PROCEDURE — 99239 HOSP IP/OBS DSCHRG MGMT >30: CPT | Performed by: FAMILY MEDICINE

## 2020-01-01 PROCEDURE — 36556 INSERT NON-TUNNEL CV CATH: CPT

## 2020-01-01 PROCEDURE — 6370000000 HC RX 637 (ALT 250 FOR IP): Performed by: NURSE PRACTITIONER

## 2020-01-01 PROCEDURE — 99222 1ST HOSP IP/OBS MODERATE 55: CPT | Performed by: INTERNAL MEDICINE

## 2020-01-01 PROCEDURE — 2500000003 HC RX 250 WO HCPCS

## 2020-01-01 PROCEDURE — 71250 CT THORAX DX C-: CPT

## 2020-01-01 PROCEDURE — 84295 ASSAY OF SERUM SODIUM: CPT

## 2020-01-01 PROCEDURE — 96366 THER/PROPH/DIAG IV INF ADDON: CPT

## 2020-01-01 PROCEDURE — 82435 ASSAY OF BLOOD CHLORIDE: CPT

## 2020-01-01 PROCEDURE — 4040F PNEUMOC VAC/ADMIN/RCVD: CPT | Performed by: INTERNAL MEDICINE

## 2020-01-01 PROCEDURE — 6360000002 HC RX W HCPCS

## 2020-01-01 PROCEDURE — G8417 CALC BMI ABV UP PARAM F/U: HCPCS | Performed by: INTERNAL MEDICINE

## 2020-01-01 PROCEDURE — 82108 ASSAY OF ALUMINUM: CPT

## 2020-01-01 PROCEDURE — 74018 RADEX ABDOMEN 1 VIEW: CPT

## 2020-01-01 PROCEDURE — 97161 PT EVAL LOW COMPLEX 20 MIN: CPT

## 2020-01-01 PROCEDURE — 86403 PARTICLE AGGLUT ANTBDY SCRN: CPT

## 2020-01-01 PROCEDURE — 2700000000 HC OXYGEN THERAPY PER DAY

## 2020-01-01 PROCEDURE — 97530 THERAPEUTIC ACTIVITIES: CPT

## 2020-01-01 PROCEDURE — 1123F ACP DISCUSS/DSCN MKR DOCD: CPT | Performed by: INTERNAL MEDICINE

## 2020-01-01 PROCEDURE — 87340 HEPATITIS B SURFACE AG IA: CPT

## 2020-01-01 PROCEDURE — 84132 ASSAY OF SERUM POTASSIUM: CPT

## 2020-01-01 PROCEDURE — 85014 HEMATOCRIT: CPT

## 2020-01-01 PROCEDURE — 83615 LACTATE (LD) (LDH) ENZYME: CPT

## 2020-01-01 PROCEDURE — 1036F TOBACCO NON-USER: CPT | Performed by: INTERNAL MEDICINE

## 2020-01-01 PROCEDURE — 2580000003 HC RX 258: Performed by: EMERGENCY MEDICINE

## 2020-01-01 PROCEDURE — 96374 THER/PROPH/DIAG INJ IV PUSH: CPT

## 2020-01-01 PROCEDURE — 87040 BLOOD CULTURE FOR BACTERIA: CPT

## 2020-01-01 PROCEDURE — 99231 SBSQ HOSP IP/OBS SF/LOW 25: CPT | Performed by: FAMILY MEDICINE

## 2020-01-01 PROCEDURE — 99239 HOSP IP/OBS DSCHRG MGMT >30: CPT | Performed by: INTERNAL MEDICINE

## 2020-01-01 PROCEDURE — APPSS45 APP SPLIT SHARED TIME 31-45 MINUTES: Performed by: NURSE PRACTITIONER

## 2020-01-01 PROCEDURE — 90935 HEMODIALYSIS ONE EVALUATION: CPT | Performed by: INTERNAL MEDICINE

## 2020-01-01 PROCEDURE — 80202 ASSAY OF VANCOMYCIN: CPT

## 2020-01-01 PROCEDURE — 99285 EMERGENCY DEPT VISIT HI MDM: CPT

## 2020-01-01 PROCEDURE — 93005 ELECTROCARDIOGRAM TRACING: CPT | Performed by: INTERNAL MEDICINE

## 2020-01-01 PROCEDURE — U0002 COVID-19 LAB TEST NON-CDC: HCPCS

## 2020-01-01 PROCEDURE — 3017F COLORECTAL CA SCREEN DOC REV: CPT | Performed by: INTERNAL MEDICINE

## 2020-01-01 PROCEDURE — 94770 HC ETCO2 MONITOR DAILY: CPT

## 2020-01-01 PROCEDURE — 5A2204Z RESTORATION OF CARDIAC RHYTHM, SINGLE: ICD-10-PCS | Performed by: INTERNAL MEDICINE

## 2020-01-01 PROCEDURE — 99211 OFF/OP EST MAY X REQ PHY/QHP: CPT

## 2020-01-01 PROCEDURE — 96365 THER/PROPH/DIAG IV INF INIT: CPT

## 2020-01-01 PROCEDURE — 5A1935Z RESPIRATORY VENTILATION, LESS THAN 24 CONSECUTIVE HOURS: ICD-10-PCS | Performed by: INTERNAL MEDICINE

## 2020-01-01 PROCEDURE — 87205 SMEAR GRAM STAIN: CPT

## 2020-01-01 PROCEDURE — 82728 ASSAY OF FERRITIN: CPT

## 2020-01-01 PROCEDURE — 87804 INFLUENZA ASSAY W/OPTIC: CPT

## 2020-01-01 PROCEDURE — 6360000002 HC RX W HCPCS: Performed by: EMERGENCY MEDICINE

## 2020-01-01 PROCEDURE — 99222 1ST HOSP IP/OBS MODERATE 55: CPT | Performed by: NURSE PRACTITIONER

## 2020-01-01 PROCEDURE — 97166 OT EVAL MOD COMPLEX 45 MIN: CPT

## 2020-01-01 PROCEDURE — 83550 IRON BINDING TEST: CPT

## 2020-01-01 PROCEDURE — 87077 CULTURE AEROBIC IDENTIFY: CPT

## 2020-01-01 PROCEDURE — 97165 OT EVAL LOW COMPLEX 30 MIN: CPT

## 2020-01-01 PROCEDURE — 97535 SELF CARE MNGMENT TRAINING: CPT

## 2020-01-01 PROCEDURE — 99284 EMERGENCY DEPT VISIT MOD MDM: CPT

## 2020-01-01 PROCEDURE — 6370000000 HC RX 637 (ALT 250 FOR IP): Performed by: EMERGENCY MEDICINE

## 2020-01-01 PROCEDURE — 87186 SC STD MICRODIL/AGAR DIL: CPT

## 2020-01-01 PROCEDURE — 87807 RSV ASSAY W/OPTIC: CPT

## 2020-01-01 PROCEDURE — 06HM33Z INSERTION OF INFUSION DEVICE INTO RIGHT FEMORAL VEIN, PERCUTANEOUS APPROACH: ICD-10-PCS | Performed by: INTERNAL MEDICINE

## 2020-01-01 PROCEDURE — G8427 DOCREV CUR MEDS BY ELIG CLIN: HCPCS | Performed by: INTERNAL MEDICINE

## 2020-01-01 PROCEDURE — 85651 RBC SED RATE NONAUTOMATED: CPT

## 2020-01-01 PROCEDURE — 99214 OFFICE O/P EST MOD 30 MIN: CPT | Performed by: INTERNAL MEDICINE

## 2020-01-01 PROCEDURE — 86900 BLOOD TYPING SEROLOGIC ABO: CPT

## 2020-01-01 PROCEDURE — 31500 INSERT EMERGENCY AIRWAY: CPT | Performed by: NURSE ANESTHETIST, CERTIFIED REGISTERED

## 2020-01-01 PROCEDURE — 86704 HEP B CORE ANTIBODY TOTAL: CPT

## 2020-01-01 PROCEDURE — 82805 BLOOD GASES W/O2 SATURATION: CPT

## 2020-01-01 RX ORDER — HYDRALAZINE HYDROCHLORIDE 10 MG/1
10 TABLET, FILM COATED ORAL 3 TIMES DAILY
Status: DISCONTINUED | OUTPATIENT
Start: 2020-01-01 | End: 2020-01-01

## 2020-01-01 RX ORDER — MAGNESIUM SULFATE 1 G/100ML
2 INJECTION INTRAVENOUS ONCE
Status: COMPLETED | OUTPATIENT
Start: 2020-01-01 | End: 2020-01-01

## 2020-01-01 RX ORDER — ETOMIDATE 2 MG/ML
INJECTION INTRAVENOUS
Status: COMPLETED
Start: 2020-01-01 | End: 2020-01-01

## 2020-01-01 RX ORDER — DONEPEZIL HYDROCHLORIDE 5 MG/1
10 TABLET, FILM COATED ORAL NIGHTLY
Status: ON HOLD | COMMUNITY
End: 2020-01-01

## 2020-01-01 RX ORDER — POTASSIUM CHLORIDE 20 MEQ/1
20 TABLET, EXTENDED RELEASE ORAL ONCE
Status: COMPLETED | OUTPATIENT
Start: 2020-01-01 | End: 2020-01-01

## 2020-01-01 RX ORDER — ONDANSETRON 2 MG/ML
4 INJECTION INTRAMUSCULAR; INTRAVENOUS EVERY 6 HOURS PRN
Status: DISCONTINUED | OUTPATIENT
Start: 2020-01-01 | End: 2020-01-01 | Stop reason: HOSPADM

## 2020-01-01 RX ORDER — LEVOFLOXACIN 500 MG/1
500 TABLET, FILM COATED ORAL DAILY
Status: DISCONTINUED | OUTPATIENT
Start: 2020-01-01 | End: 2020-01-01

## 2020-01-01 RX ORDER — ALLOPURINOL 100 MG/1
100 TABLET ORAL DAILY
Status: DISCONTINUED | OUTPATIENT
Start: 2020-01-01 | End: 2020-01-01 | Stop reason: HOSPADM

## 2020-01-01 RX ORDER — POLYVINYL ALCOHOL 14 MG/ML
1 SOLUTION/ DROPS OPHTHALMIC 3 TIMES DAILY PRN
Status: DISCONTINUED | OUTPATIENT
Start: 2020-01-01 | End: 2020-01-01 | Stop reason: HOSPADM

## 2020-01-01 RX ORDER — FENTANYL CITRATE 50 UG/ML
50 INJECTION, SOLUTION INTRAMUSCULAR; INTRAVENOUS
Status: DISCONTINUED | OUTPATIENT
Start: 2020-01-01 | End: 2020-01-01 | Stop reason: HOSPADM

## 2020-01-01 RX ORDER — PROPOFOL 10 MG/ML
INJECTION, EMULSION INTRAVENOUS
Status: COMPLETED
Start: 2020-01-01 | End: 2020-01-01

## 2020-01-01 RX ORDER — MIDODRINE HYDROCHLORIDE 5 MG/1
10 TABLET ORAL PRN
Status: DISCONTINUED | OUTPATIENT
Start: 2020-01-01 | End: 2020-01-01 | Stop reason: HOSPADM

## 2020-01-01 RX ORDER — ONDANSETRON 4 MG/1
4 TABLET, ORALLY DISINTEGRATING ORAL EVERY 8 HOURS PRN
Status: DISCONTINUED | OUTPATIENT
Start: 2020-01-01 | End: 2020-01-01

## 2020-01-01 RX ORDER — LORAZEPAM 2 MG/ML
1 INJECTION INTRAMUSCULAR
Status: DISCONTINUED | OUTPATIENT
Start: 2020-01-01 | End: 2020-01-01 | Stop reason: HOSPADM

## 2020-01-01 RX ORDER — METOPROLOL TARTRATE 5 MG/5ML
5 INJECTION INTRAVENOUS ONCE
Status: COMPLETED | OUTPATIENT
Start: 2020-01-01 | End: 2020-01-01

## 2020-01-01 RX ORDER — ADENOSINE 3 MG/ML
INJECTION, SOLUTION INTRAVENOUS
Status: COMPLETED
Start: 2020-01-01 | End: 2020-01-01

## 2020-01-01 RX ORDER — TETRAHYDROZOLINE HCL 0.05 %
1 DROPS OPHTHALMIC (EYE) 2 TIMES DAILY PRN
Status: DISCONTINUED | OUTPATIENT
Start: 2020-01-01 | End: 2020-01-01 | Stop reason: HOSPADM

## 2020-01-01 RX ORDER — NICOTINE POLACRILEX 4 MG
15 LOZENGE BUCCAL PRN
Status: DISCONTINUED | OUTPATIENT
Start: 2020-01-01 | End: 2020-01-01

## 2020-01-01 RX ORDER — GABAPENTIN 100 MG/1
100 CAPSULE ORAL 3 TIMES DAILY
Status: DISCONTINUED | OUTPATIENT
Start: 2020-01-01 | End: 2020-01-01 | Stop reason: HOSPADM

## 2020-01-01 RX ORDER — ASPIRIN 81 MG/1
81 TABLET, CHEWABLE ORAL EVERY MORNING
Status: DISCONTINUED | OUTPATIENT
Start: 2020-01-01 | End: 2020-01-01 | Stop reason: HOSPADM

## 2020-01-01 RX ORDER — NICOTINE POLACRILEX 4 MG
15 LOZENGE BUCCAL PRN
Status: DISCONTINUED | OUTPATIENT
Start: 2020-01-01 | End: 2020-01-01 | Stop reason: HOSPADM

## 2020-01-01 RX ORDER — DEXTROSE MONOHYDRATE 50 MG/ML
100 INJECTION, SOLUTION INTRAVENOUS PRN
Status: DISCONTINUED | OUTPATIENT
Start: 2020-01-01 | End: 2020-01-01

## 2020-01-01 RX ORDER — LEVOFLOXACIN 500 MG/1
500 TABLET, FILM COATED ORAL DAILY
Qty: 7 TABLET | Refills: 0 | Status: SHIPPED | OUTPATIENT
Start: 2020-01-01 | End: 2020-01-01 | Stop reason: HOSPADM

## 2020-01-01 RX ORDER — ADENOSINE 3 MG/ML
6 INJECTION, SOLUTION INTRAVENOUS ONCE
Status: COMPLETED | OUTPATIENT
Start: 2020-01-01 | End: 2020-01-01

## 2020-01-01 RX ORDER — DEXTROSE MONOHYDRATE 50 MG/ML
100 INJECTION, SOLUTION INTRAVENOUS PRN
Status: DISCONTINUED | OUTPATIENT
Start: 2020-01-01 | End: 2020-01-01 | Stop reason: HOSPADM

## 2020-01-01 RX ORDER — ALBUMIN (HUMAN) 12.5 G/50ML
25 SOLUTION INTRAVENOUS
Status: COMPLETED | OUTPATIENT
Start: 2020-01-01 | End: 2020-01-01

## 2020-01-01 RX ORDER — ACETAMINOPHEN 325 MG/1
650 TABLET ORAL EVERY 6 HOURS PRN
Status: DISCONTINUED | OUTPATIENT
Start: 2020-01-01 | End: 2020-01-01 | Stop reason: HOSPADM

## 2020-01-01 RX ORDER — FENTANYL CITRATE 50 UG/ML
25 INJECTION, SOLUTION INTRAMUSCULAR; INTRAVENOUS ONCE
Status: COMPLETED | OUTPATIENT
Start: 2020-01-01 | End: 2020-01-01

## 2020-01-01 RX ORDER — LEVOFLOXACIN 500 MG/1
500 TABLET, FILM COATED ORAL ONCE
Status: COMPLETED | OUTPATIENT
Start: 2020-01-01 | End: 2020-01-01

## 2020-01-01 RX ORDER — SUCRALFATE 1 G/1
1 TABLET ORAL
Status: DISCONTINUED | OUTPATIENT
Start: 2020-01-01 | End: 2020-01-01

## 2020-01-01 RX ORDER — POLYVINYL ALCOHOL 14 MG/ML
1 SOLUTION/ DROPS OPHTHALMIC PRN
Status: DISCONTINUED | OUTPATIENT
Start: 2020-01-01 | End: 2020-01-01 | Stop reason: HOSPADM

## 2020-01-01 RX ORDER — NICOTINE 21 MG/24HR
1 PATCH, TRANSDERMAL 24 HOURS TRANSDERMAL DAILY PRN
Status: DISCONTINUED | OUTPATIENT
Start: 2020-01-01 | End: 2020-01-01 | Stop reason: HOSPADM

## 2020-01-01 RX ORDER — POLYETHYLENE GLYCOL 3350 17 G/17G
17 POWDER, FOR SOLUTION ORAL DAILY PRN
Status: DISCONTINUED | OUTPATIENT
Start: 2020-01-01 | End: 2020-01-01

## 2020-01-01 RX ORDER — SENNA AND DOCUSATE SODIUM 50; 8.6 MG/1; MG/1
2 TABLET, FILM COATED ORAL
Status: DISCONTINUED | OUTPATIENT
Start: 2020-01-01 | End: 2020-01-01 | Stop reason: HOSPADM

## 2020-01-01 RX ORDER — ACETAMINOPHEN 325 MG/1
650 TABLET ORAL EVERY 4 HOURS PRN
Status: DISCONTINUED | OUTPATIENT
Start: 2020-01-01 | End: 2020-01-01 | Stop reason: HOSPADM

## 2020-01-01 RX ORDER — MIDODRINE HYDROCHLORIDE 5 MG/1
10 TABLET ORAL 4 TIMES DAILY
Status: DISCONTINUED | OUTPATIENT
Start: 2020-01-01 | End: 2020-01-01

## 2020-01-01 RX ORDER — DOXYCYCLINE HYCLATE 100 MG
100 TABLET ORAL 2 TIMES DAILY
Status: ON HOLD | COMMUNITY
End: 2020-01-01

## 2020-01-01 RX ORDER — DEXTROSE MONOHYDRATE 25 G/50ML
12.5 INJECTION, SOLUTION INTRAVENOUS PRN
Status: DISCONTINUED | OUTPATIENT
Start: 2020-01-01 | End: 2020-01-01 | Stop reason: HOSPADM

## 2020-01-01 RX ORDER — ADENOSINE 3 MG/ML
12 INJECTION, SOLUTION INTRAVENOUS ONCE
Status: COMPLETED | OUTPATIENT
Start: 2020-01-01 | End: 2020-01-01

## 2020-01-01 RX ORDER — SODIUM CHLORIDE 0.9 % (FLUSH) 0.9 %
10 SYRINGE (ML) INJECTION PRN
Status: DISCONTINUED | OUTPATIENT
Start: 2020-01-01 | End: 2020-01-01 | Stop reason: HOSPADM

## 2020-01-01 RX ORDER — SODIUM CHLORIDE 0.9 % (FLUSH) 0.9 %
10 SYRINGE (ML) INJECTION EVERY 12 HOURS SCHEDULED
Status: DISCONTINUED | OUTPATIENT
Start: 2020-01-01 | End: 2020-01-01 | Stop reason: HOSPADM

## 2020-01-01 RX ORDER — LEVOFLOXACIN 250 MG/1
250 TABLET ORAL EVERY OTHER DAY
Status: DISCONTINUED | OUTPATIENT
Start: 2020-01-01 | End: 2020-01-01

## 2020-01-01 RX ORDER — ONDANSETRON 2 MG/ML
4 INJECTION INTRAMUSCULAR; INTRAVENOUS EVERY 6 HOURS PRN
Status: DISCONTINUED | OUTPATIENT
Start: 2020-01-01 | End: 2020-01-01

## 2020-01-01 RX ORDER — AMLODIPINE BESYLATE 2.5 MG/1
2.5 TABLET ORAL DAILY
Status: DISCONTINUED | OUTPATIENT
Start: 2020-01-01 | End: 2020-01-01 | Stop reason: HOSPADM

## 2020-01-01 RX ORDER — DONEPEZIL HYDROCHLORIDE 10 MG/1
10 TABLET, FILM COATED ORAL NIGHTLY
Status: DISCONTINUED | OUTPATIENT
Start: 2020-01-01 | End: 2020-01-01 | Stop reason: HOSPADM

## 2020-01-01 RX ORDER — SODIUM BICARBONATE 650 MG/1
1300 TABLET ORAL 2 TIMES DAILY
Status: DISCONTINUED | OUTPATIENT
Start: 2020-01-01 | End: 2020-01-01

## 2020-01-01 RX ORDER — MIDODRINE HYDROCHLORIDE 5 MG/1
TABLET ORAL
Status: DISCONTINUED
Start: 2020-01-01 | End: 2020-01-01 | Stop reason: WASHOUT

## 2020-01-01 RX ORDER — SODIUM CHLORIDE 0.9 % (FLUSH) 0.9 %
10 SYRINGE (ML) INJECTION PRN
Status: DISCONTINUED | OUTPATIENT
Start: 2020-01-01 | End: 2020-01-01

## 2020-01-01 RX ORDER — POLYETHYLENE GLYCOL 3350 17 G/17G
17 POWDER, FOR SOLUTION ORAL DAILY PRN
Status: DISCONTINUED | OUTPATIENT
Start: 2020-01-01 | End: 2020-01-01 | Stop reason: HOSPADM

## 2020-01-01 RX ORDER — SODIUM CHLORIDE, SODIUM LACTATE, POTASSIUM CHLORIDE, AND CALCIUM CHLORIDE .6; .31; .03; .02 G/100ML; G/100ML; G/100ML; G/100ML
250 INJECTION, SOLUTION INTRAVENOUS ONCE
Status: COMPLETED | OUTPATIENT
Start: 2020-01-01 | End: 2020-01-01

## 2020-01-01 RX ORDER — ALBUMIN (HUMAN) 12.5 G/50ML
SOLUTION INTRAVENOUS
Status: DISPENSED
Start: 2020-01-01 | End: 2020-01-01

## 2020-01-01 RX ORDER — OXYCODONE AND ACETAMINOPHEN 7.5; 325 MG/1; MG/1
1 TABLET ORAL EVERY 6 HOURS PRN
Status: ON HOLD | COMMUNITY
End: 2020-01-01 | Stop reason: SDUPTHER

## 2020-01-01 RX ORDER — OXYCODONE HYDROCHLORIDE 5 MG/1
2.5 TABLET ORAL EVERY 6 HOURS PRN
Status: DISCONTINUED | OUTPATIENT
Start: 2020-01-01 | End: 2020-01-01 | Stop reason: HOSPADM

## 2020-01-01 RX ORDER — INSULIN GLARGINE 100 [IU]/ML
10 INJECTION, SOLUTION SUBCUTANEOUS NIGHTLY
Status: DISCONTINUED | OUTPATIENT
Start: 2020-01-01 | End: 2020-01-01

## 2020-01-01 RX ORDER — GLYCOPYRROLATE 0.2 MG/ML
0.2 INJECTION INTRAMUSCULAR; INTRAVENOUS EVERY 4 HOURS
Status: DISCONTINUED | OUTPATIENT
Start: 2020-01-01 | End: 2020-01-01 | Stop reason: HOSPADM

## 2020-01-01 RX ORDER — HEPARIN SODIUM 5000 [USP'U]/ML
5000 INJECTION, SOLUTION INTRAVENOUS; SUBCUTANEOUS EVERY 8 HOURS SCHEDULED
Status: DISCONTINUED | OUTPATIENT
Start: 2020-01-01 | End: 2020-01-01 | Stop reason: HOSPADM

## 2020-01-01 RX ORDER — LACTOSE-REDUCED FOOD/FIBER
8 LIQUID (ML) ORAL NIGHTLY
Status: DISCONTINUED | OUTPATIENT
Start: 2020-01-01 | End: 2020-01-01

## 2020-01-01 RX ORDER — FENTANYL CITRATE 50 UG/ML
INJECTION, SOLUTION INTRAMUSCULAR; INTRAVENOUS
Status: DISPENSED
Start: 2020-01-01 | End: 2020-01-01

## 2020-01-01 RX ORDER — 0.9 % SODIUM CHLORIDE 0.9 %
500 INTRAVENOUS SOLUTION INTRAVENOUS ONCE
Status: COMPLETED | OUTPATIENT
Start: 2020-01-01 | End: 2020-01-01

## 2020-01-01 RX ORDER — ACETAMINOPHEN 650 MG/1
650 SUPPOSITORY RECTAL EVERY 6 HOURS PRN
Status: DISCONTINUED | OUTPATIENT
Start: 2020-01-01 | End: 2020-01-01 | Stop reason: HOSPADM

## 2020-01-01 RX ORDER — METOPROLOL TARTRATE 5 MG/5ML
5 INJECTION INTRAVENOUS EVERY 6 HOURS PRN
Status: DISCONTINUED | OUTPATIENT
Start: 2020-01-01 | End: 2020-01-01

## 2020-01-01 RX ORDER — INSULIN GLARGINE 100 [IU]/ML
15 INJECTION, SOLUTION SUBCUTANEOUS NIGHTLY
Status: DISCONTINUED | OUTPATIENT
Start: 2020-01-01 | End: 2020-01-01

## 2020-01-01 RX ORDER — DONEPEZIL HYDROCHLORIDE 10 MG/1
10 TABLET, FILM COATED ORAL NIGHTLY
COMMUNITY

## 2020-01-01 RX ORDER — ASPIRIN 81 MG/1
324 TABLET, CHEWABLE ORAL ONCE
Status: COMPLETED | OUTPATIENT
Start: 2020-01-01 | End: 2020-01-01

## 2020-01-01 RX ORDER — POLYVINYL ALCOHOL 14 MG/ML
1 SOLUTION/ DROPS OPHTHALMIC PRN
COMMUNITY

## 2020-01-01 RX ORDER — ALBUTEROL SULFATE 2.5 MG/3ML
2.5 SOLUTION RESPIRATORY (INHALATION) EVERY 4 HOURS PRN
Status: DISCONTINUED | OUTPATIENT
Start: 2020-01-01 | End: 2020-01-01 | Stop reason: HOSPADM

## 2020-01-01 RX ORDER — PROMETHAZINE HYDROCHLORIDE 25 MG/1
12.5 TABLET ORAL EVERY 6 HOURS PRN
Status: DISCONTINUED | OUTPATIENT
Start: 2020-01-01 | End: 2020-01-01 | Stop reason: HOSPADM

## 2020-01-01 RX ORDER — FENTANYL CITRATE 50 UG/ML
50 INJECTION, SOLUTION INTRAMUSCULAR; INTRAVENOUS
Status: DISCONTINUED | OUTPATIENT
Start: 2020-01-01 | End: 2020-01-01

## 2020-01-01 RX ORDER — SODIUM CHLORIDE 0.9 % (FLUSH) 0.9 %
10 SYRINGE (ML) INJECTION EVERY 12 HOURS SCHEDULED
Status: DISCONTINUED | OUTPATIENT
Start: 2020-01-01 | End: 2020-01-01

## 2020-01-01 RX ORDER — 0.9 % SODIUM CHLORIDE 0.9 %
250 INTRAVENOUS SOLUTION INTRAVENOUS PRN
Status: DISCONTINUED | OUTPATIENT
Start: 2020-01-01 | End: 2020-01-01 | Stop reason: HOSPADM

## 2020-01-01 RX ORDER — HYDROMORPHONE HYDROCHLORIDE 2 MG/1
2 TABLET ORAL EVERY 12 HOURS PRN
Status: ON HOLD | COMMUNITY
End: 2020-01-01 | Stop reason: HOSPADM

## 2020-01-01 RX ORDER — METOPROLOL SUCCINATE 25 MG/1
25 TABLET, EXTENDED RELEASE ORAL DAILY
Status: DISCONTINUED | OUTPATIENT
Start: 2020-01-01 | End: 2020-01-01 | Stop reason: HOSPADM

## 2020-01-01 RX ORDER — CHLORHEXIDINE GLUCONATE 0.12 MG/ML
15 RINSE ORAL 2 TIMES DAILY
Status: DISCONTINUED | OUTPATIENT
Start: 2020-01-01 | End: 2020-01-01

## 2020-01-01 RX ORDER — ROCURONIUM BROMIDE 10 MG/ML
INJECTION, SOLUTION INTRAVENOUS
Status: COMPLETED
Start: 2020-01-01 | End: 2020-01-01

## 2020-01-01 RX ORDER — DEXTROSE MONOHYDRATE 25 G/50ML
12.5 INJECTION, SOLUTION INTRAVENOUS PRN
Status: DISCONTINUED | OUTPATIENT
Start: 2020-01-01 | End: 2020-01-01

## 2020-01-01 RX ORDER — MIDODRINE HYDROCHLORIDE 5 MG/1
10 TABLET ORAL
Status: DISCONTINUED | OUTPATIENT
Start: 2020-01-01 | End: 2020-01-01

## 2020-01-01 RX ORDER — SENNA PLUS 8.6 MG/1
2 TABLET ORAL NIGHTLY
Status: DISCONTINUED | OUTPATIENT
Start: 2020-01-01 | End: 2020-01-01 | Stop reason: HOSPADM

## 2020-01-01 RX ORDER — SEVELAMER CARBONATE 800 MG/1
800 TABLET, FILM COATED ORAL
Status: DISCONTINUED | OUTPATIENT
Start: 2020-01-01 | End: 2020-01-01 | Stop reason: HOSPADM

## 2020-01-01 RX ORDER — OLANZAPINE 10 MG/1
5 INJECTION, POWDER, LYOPHILIZED, FOR SOLUTION INTRAMUSCULAR ONCE
Status: COMPLETED | OUTPATIENT
Start: 2020-01-01 | End: 2020-01-01

## 2020-01-01 RX ORDER — INSULIN GLARGINE 100 [IU]/ML
10 INJECTION, SOLUTION SUBCUTANEOUS ONCE
Status: DISCONTINUED | OUTPATIENT
Start: 2020-01-01 | End: 2020-01-01 | Stop reason: HOSPADM

## 2020-01-01 RX ORDER — FLUTICASONE PROPIONATE 50 MCG
2 SPRAY, SUSPENSION (ML) NASAL DAILY
Status: DISCONTINUED | OUTPATIENT
Start: 2020-01-01 | End: 2020-01-01 | Stop reason: HOSPADM

## 2020-01-01 RX ORDER — HEPARIN SODIUM 5000 [USP'U]/ML
5000 INJECTION, SOLUTION INTRAVENOUS; SUBCUTANEOUS EVERY 8 HOURS SCHEDULED
Status: DISCONTINUED | OUTPATIENT
Start: 2020-01-01 | End: 2020-01-01

## 2020-01-01 RX ORDER — CALCIUM CARBONATE 200(500)MG
2 TABLET,CHEWABLE ORAL EVERY 6 HOURS PRN
Status: CANCELLED | OUTPATIENT
Start: 2020-01-01

## 2020-01-01 RX ORDER — FENTANYL CITRATE 50 UG/ML
INJECTION, SOLUTION INTRAMUSCULAR; INTRAVENOUS
Status: COMPLETED
Start: 2020-01-01 | End: 2020-01-01

## 2020-01-01 RX ORDER — NOREPINEPHRINE BIT/0.9 % NACL 16MG/250ML
2 INFUSION BOTTLE (ML) INTRAVENOUS CONTINUOUS
Status: DISCONTINUED | OUTPATIENT
Start: 2020-01-01 | End: 2020-01-01

## 2020-01-01 RX ORDER — DONEPEZIL HYDROCHLORIDE 5 MG/1
5 TABLET, FILM COATED ORAL NIGHTLY
Status: DISCONTINUED | OUTPATIENT
Start: 2020-01-01 | End: 2020-01-01 | Stop reason: HOSPADM

## 2020-01-01 RX ORDER — METOPROLOL TARTRATE 5 MG/5ML
5 INJECTION INTRAVENOUS EVERY 6 HOURS
Status: DISCONTINUED | OUTPATIENT
Start: 2020-01-01 | End: 2020-01-01

## 2020-01-01 RX ORDER — FLUTICASONE PROPIONATE 110 UG/1
2 AEROSOL, METERED RESPIRATORY (INHALATION) 2 TIMES DAILY
Status: DISCONTINUED | OUTPATIENT
Start: 2020-01-01 | End: 2020-01-01 | Stop reason: HOSPADM

## 2020-01-01 RX ORDER — OXYCODONE HYDROCHLORIDE AND ACETAMINOPHEN 5; 325 MG/1; MG/1
1 TABLET ORAL EVERY 6 HOURS PRN
Status: DISCONTINUED | OUTPATIENT
Start: 2020-01-01 | End: 2020-01-01 | Stop reason: HOSPADM

## 2020-01-01 RX ORDER — NOREPINEPHRINE BIT/0.9 % NACL 16MG/250ML
INFUSION BOTTLE (ML) INTRAVENOUS
Status: COMPLETED
Start: 2020-01-01 | End: 2020-01-01

## 2020-01-01 RX ORDER — 0.9 % SODIUM CHLORIDE 0.9 %
150 INTRAVENOUS SOLUTION INTRAVENOUS PRN
Status: DISCONTINUED | OUTPATIENT
Start: 2020-01-01 | End: 2020-01-01 | Stop reason: HOSPADM

## 2020-01-01 RX ORDER — HYDROMORPHONE HYDROCHLORIDE 2 MG/1
2 TABLET ORAL EVERY 12 HOURS PRN
Status: DISCONTINUED | OUTPATIENT
Start: 2020-01-01 | End: 2020-01-01

## 2020-01-01 RX ORDER — FOLIC ACID/VIT B COMPLEX AND C 0.8 MG
TABLET ORAL
Status: ON HOLD | COMMUNITY
End: 2020-01-01

## 2020-01-01 RX ORDER — VANCOMYCIN HYDROCHLORIDE 1 G/200ML
1000 INJECTION, SOLUTION INTRAVENOUS ONCE
Status: COMPLETED | OUTPATIENT
Start: 2020-01-01 | End: 2020-01-01

## 2020-01-01 RX ORDER — OXYCODONE AND ACETAMINOPHEN 7.5; 325 MG/1; MG/1
1 TABLET ORAL EVERY 8 HOURS PRN
Qty: 6 TABLET | Refills: 0 | Status: SHIPPED | OUTPATIENT
Start: 2020-01-01 | End: 2020-01-01

## 2020-01-01 RX ORDER — CALCIUM CARBONATE 200(500)MG
2 TABLET,CHEWABLE ORAL EVERY 6 HOURS PRN
Status: DISCONTINUED | OUTPATIENT
Start: 2020-01-01 | End: 2020-01-01 | Stop reason: HOSPADM

## 2020-01-01 RX ORDER — OXYCODONE AND ACETAMINOPHEN 7.5; 325 MG/1; MG/1
1 TABLET ORAL EVERY 6 HOURS PRN
Status: DISCONTINUED | OUTPATIENT
Start: 2020-01-01 | End: 2020-01-01

## 2020-01-01 RX ORDER — METOPROLOL TARTRATE 5 MG/5ML
INJECTION INTRAVENOUS
Status: DISPENSED
Start: 2020-01-01 | End: 2020-01-01

## 2020-01-01 RX ORDER — PROPOFOL 10 MG/ML
10 INJECTION, EMULSION INTRAVENOUS
Status: DISCONTINUED | OUTPATIENT
Start: 2020-01-01 | End: 2020-01-01

## 2020-01-01 RX ORDER — ALBUTEROL SULFATE 2.5 MG/3ML
2.5 SOLUTION RESPIRATORY (INHALATION) EVERY 4 HOURS PRN
COMMUNITY

## 2020-01-01 RX ORDER — INSULIN GLARGINE 100 [IU]/ML
10 INJECTION, SOLUTION SUBCUTANEOUS NIGHTLY
Status: DISCONTINUED | OUTPATIENT
Start: 2020-01-01 | End: 2020-01-01 | Stop reason: HOSPADM

## 2020-01-01 RX ORDER — MIDODRINE HYDROCHLORIDE 5 MG/1
5 TABLET ORAL 2 TIMES DAILY PRN
Status: DISPENSED | OUTPATIENT
Start: 2020-01-01 | End: 2020-01-01

## 2020-01-01 RX ORDER — PROMETHAZINE HYDROCHLORIDE 25 MG/1
12.5 TABLET ORAL EVERY 6 HOURS PRN
Status: DISCONTINUED | OUTPATIENT
Start: 2020-01-01 | End: 2020-01-01

## 2020-01-01 RX ORDER — 0.9 % SODIUM CHLORIDE 0.9 %
20 INTRAVENOUS SOLUTION INTRAVENOUS ONCE
Status: COMPLETED | OUTPATIENT
Start: 2020-01-01 | End: 2020-01-01

## 2020-01-01 RX ORDER — DOXYCYCLINE HYCLATE 100 MG
100 TABLET ORAL DAILY
Qty: 30 TABLET | Refills: 0 | DISCHARGE
Start: 2020-01-01 | End: 2020-07-03

## 2020-01-01 RX ORDER — INSULIN GLARGINE 100 [IU]/ML
INJECTION, SOLUTION SUBCUTANEOUS DAILY
COMMUNITY

## 2020-01-01 RX ORDER — MIDODRINE HYDROCHLORIDE 5 MG/1
5 TABLET ORAL
Status: DISCONTINUED | OUTPATIENT
Start: 2020-01-01 | End: 2020-01-01

## 2020-01-01 RX ORDER — SUCCINYLCHOLINE CHLORIDE 20 MG/ML
INJECTION INTRAMUSCULAR; INTRAVENOUS
Status: DISPENSED
Start: 2020-01-01 | End: 2020-01-01

## 2020-01-01 RX ORDER — LORAZEPAM 2 MG/ML
INJECTION INTRAMUSCULAR
Status: DISCONTINUED
Start: 2020-01-01 | End: 2020-01-01 | Stop reason: WASHOUT

## 2020-01-01 RX ORDER — METOPROLOL TARTRATE 5 MG/5ML
INJECTION INTRAVENOUS
Status: COMPLETED
Start: 2020-01-01 | End: 2020-01-01

## 2020-01-01 RX ORDER — INSULIN GLARGINE 100 [IU]/ML
14 INJECTION, SOLUTION SUBCUTANEOUS DAILY
Status: DISCONTINUED | OUTPATIENT
Start: 2020-01-01 | End: 2020-01-01 | Stop reason: HOSPADM

## 2020-01-01 RX ORDER — FENTANYL CITRATE 50 UG/ML
50 INJECTION, SOLUTION INTRAMUSCULAR; INTRAVENOUS ONCE
Status: COMPLETED | OUTPATIENT
Start: 2020-01-01 | End: 2020-01-01

## 2020-01-01 RX ORDER — LANSOPRAZOLE
15 KIT
Status: DISCONTINUED | OUTPATIENT
Start: 2020-01-01 | End: 2020-01-01

## 2020-01-01 RX ORDER — FENTANYL CITRATE 50 UG/ML
100 INJECTION, SOLUTION INTRAMUSCULAR; INTRAVENOUS
Status: DISCONTINUED | OUTPATIENT
Start: 2020-01-01 | End: 2020-01-01 | Stop reason: HOSPADM

## 2020-01-01 RX ADMIN — ADENOSINE 6 MG: 3 INJECTION, SOLUTION INTRAVENOUS at 05:16

## 2020-01-01 RX ADMIN — SERTRALINE 50 MG: 50 TABLET, FILM COATED ORAL at 13:17

## 2020-01-01 RX ADMIN — AMLODIPINE BESYLATE 2.5 MG: 2.5 TABLET ORAL at 08:47

## 2020-01-01 RX ADMIN — SODIUM CHLORIDE, PRESERVATIVE FREE 10 ML: 5 INJECTION INTRAVENOUS at 08:26

## 2020-01-01 RX ADMIN — CEFEPIME HYDROCHLORIDE 2 G: 2 INJECTION, POWDER, FOR SOLUTION INTRAVENOUS at 20:33

## 2020-01-01 RX ADMIN — SEVELAMER CARBONATE 800 MG: 800 TABLET, FILM COATED ORAL at 09:44

## 2020-01-01 RX ADMIN — SODIUM CHLORIDE, PRESERVATIVE FREE 10 ML: 5 INJECTION INTRAVENOUS at 22:53

## 2020-01-01 RX ADMIN — SODIUM CHLORIDE, PRESERVATIVE FREE 10 ML: 5 INJECTION INTRAVENOUS at 20:50

## 2020-01-01 RX ADMIN — METOPROLOL SUCCINATE 25 MG: 25 TABLET, FILM COATED, EXTENDED RELEASE ORAL at 13:00

## 2020-01-01 RX ADMIN — GABAPENTIN 100 MG: 100 CAPSULE ORAL at 08:46

## 2020-01-01 RX ADMIN — DONEPEZIL HYDROCHLORIDE 10 MG: 10 TABLET, FILM COATED ORAL at 21:49

## 2020-01-01 RX ADMIN — PROPOFOL 25 MCG/KG/MIN: 10 INJECTION, EMULSION INTRAVENOUS at 12:44

## 2020-01-01 RX ADMIN — FENTANYL CITRATE 25 MCG: 50 INJECTION INTRAMUSCULAR; INTRAVENOUS at 21:03

## 2020-01-01 RX ADMIN — SODIUM CHLORIDE, PRESERVATIVE FREE 10 ML: 5 INJECTION INTRAVENOUS at 21:17

## 2020-01-01 RX ADMIN — SEVELAMER CARBONATE 800 MG: 800 TABLET, FILM COATED ORAL at 12:25

## 2020-01-01 RX ADMIN — GABAPENTIN 100 MG: 100 CAPSULE ORAL at 00:43

## 2020-01-01 RX ADMIN — HEPARIN SODIUM 5000 UNITS: 5000 INJECTION INTRAVENOUS; SUBCUTANEOUS at 05:00

## 2020-01-01 RX ADMIN — ONDANSETRON 4 MG: 2 INJECTION INTRAMUSCULAR; INTRAVENOUS at 13:33

## 2020-01-01 RX ADMIN — HEPARIN SODIUM 5000 UNITS: 5000 INJECTION INTRAVENOUS; SUBCUTANEOUS at 06:08

## 2020-01-01 RX ADMIN — ALLOPURINOL 100 MG: 100 TABLET ORAL at 08:47

## 2020-01-01 RX ADMIN — ROCURONIUM BROMIDE: 10 INJECTION INTRAVENOUS at 03:27

## 2020-01-01 RX ADMIN — FLUTICASONE PROPIONATE 2 PUFF: 110 AEROSOL, METERED RESPIRATORY (INHALATION) at 20:48

## 2020-01-01 RX ADMIN — SEVELAMER CARBONATE 800 MG: 800 TABLET, FILM COATED ORAL at 09:19

## 2020-01-01 RX ADMIN — SERTRALINE 50 MG: 50 TABLET, FILM COATED ORAL at 09:44

## 2020-01-01 RX ADMIN — CEFTRIAXONE SODIUM 1 G: 1 INJECTION, POWDER, FOR SOLUTION INTRAMUSCULAR; INTRAVENOUS at 21:06

## 2020-01-01 RX ADMIN — SERTRALINE 50 MG: 50 TABLET, FILM COATED ORAL at 21:51

## 2020-01-01 RX ADMIN — FLUTICASONE PROPIONATE 2 PUFF: 110 AEROSOL, METERED RESPIRATORY (INHALATION) at 07:21

## 2020-01-01 RX ADMIN — DOXERCALCIFEROL 1 MCG: 2 INJECTION, SOLUTION INTRAVENOUS at 10:41

## 2020-01-01 RX ADMIN — AMLODIPINE BESYLATE 2.5 MG: 2.5 TABLET ORAL at 13:18

## 2020-01-01 RX ADMIN — FLUTICASONE PROPIONATE 2 SPRAY: 50 SPRAY, METERED NASAL at 08:00

## 2020-01-01 RX ADMIN — SEVELAMER CARBONATE 800 MG: 800 TABLET, FILM COATED ORAL at 17:57

## 2020-01-01 RX ADMIN — GABAPENTIN 100 MG: 100 CAPSULE ORAL at 09:44

## 2020-01-01 RX ADMIN — ALLOPURINOL 100 MG: 100 TABLET ORAL at 15:49

## 2020-01-01 RX ADMIN — HEPARIN SODIUM 5000 UNITS: 5000 INJECTION INTRAVENOUS; SUBCUTANEOUS at 23:29

## 2020-01-01 RX ADMIN — INSULIN GLARGINE 10 UNITS: 100 INJECTION, SOLUTION SUBCUTANEOUS at 23:29

## 2020-01-01 RX ADMIN — SERTRALINE 50 MG: 50 TABLET, FILM COATED ORAL at 11:02

## 2020-01-01 RX ADMIN — VANCOMYCIN HYDROCHLORIDE 1750 MG: 1 INJECTION, POWDER, LYOPHILIZED, FOR SOLUTION INTRAVENOUS at 12:36

## 2020-01-01 RX ADMIN — METOPROLOL TARTRATE 5 MG: 5 INJECTION, SOLUTION INTRAVENOUS at 10:51

## 2020-01-01 RX ADMIN — SEVELAMER CARBONATE 800 MG: 800 TABLET, FILM COATED ORAL at 13:16

## 2020-01-01 RX ADMIN — VANCOMYCIN HYDROCHLORIDE 750 MG: 10 INJECTION, POWDER, LYOPHILIZED, FOR SOLUTION INTRAVENOUS at 13:43

## 2020-01-01 RX ADMIN — INSULIN LISPRO 2 UNITS: 100 INJECTION, SOLUTION INTRAVENOUS; SUBCUTANEOUS at 12:29

## 2020-01-01 RX ADMIN — POTASSIUM CHLORIDE 20 MEQ: 1500 TABLET, EXTENDED RELEASE ORAL at 00:44

## 2020-01-01 RX ADMIN — HEPARIN SODIUM 5000 UNITS: 5000 INJECTION INTRAVENOUS; SUBCUTANEOUS at 21:49

## 2020-01-01 RX ADMIN — AZITHROMYCIN MONOHYDRATE 500 MG: 500 INJECTION, POWDER, LYOPHILIZED, FOR SOLUTION INTRAVENOUS at 21:45

## 2020-01-01 RX ADMIN — HEPARIN SODIUM 5000 UNITS: 5000 INJECTION INTRAVENOUS; SUBCUTANEOUS at 05:45

## 2020-01-01 RX ADMIN — SODIUM CHLORIDE, PRESERVATIVE FREE 10 ML: 5 INJECTION INTRAVENOUS at 19:52

## 2020-01-01 RX ADMIN — ACETAMINOPHEN 650 MG: 325 TABLET ORAL at 18:51

## 2020-01-01 RX ADMIN — HEPARIN SODIUM 5000 UNITS: 5000 INJECTION INTRAVENOUS; SUBCUTANEOUS at 14:35

## 2020-01-01 RX ADMIN — HYDRALAZINE HYDROCHLORIDE 10 MG: 10 TABLET, FILM COATED ORAL at 08:47

## 2020-01-01 RX ADMIN — VANCOMYCIN HYDROCHLORIDE 750 MG: 1 INJECTION, POWDER, LYOPHILIZED, FOR SOLUTION INTRAVENOUS at 18:52

## 2020-01-01 RX ADMIN — ASPIRIN 81 MG: 81 TABLET, CHEWABLE ORAL at 08:00

## 2020-01-01 RX ADMIN — INSULIN GLARGINE 10 UNITS: 100 INJECTION, SOLUTION SUBCUTANEOUS at 20:07

## 2020-01-01 RX ADMIN — SERTRALINE 50 MG: 50 TABLET, FILM COATED ORAL at 13:30

## 2020-01-01 RX ADMIN — DONEPEZIL HYDROCHLORIDE 10 MG: 10 TABLET, FILM COATED ORAL at 21:20

## 2020-01-01 RX ADMIN — Medication 500 MG: at 13:13

## 2020-01-01 RX ADMIN — SODIUM CHLORIDE, PRESERVATIVE FREE 10 ML: 5 INJECTION INTRAVENOUS at 21:45

## 2020-01-01 RX ADMIN — PROPOFOL 20 MCG/KG/MIN: 10 INJECTION, EMULSION INTRAVENOUS at 03:33

## 2020-01-01 RX ADMIN — DONEPEZIL HYDROCHLORIDE 10 MG: 10 TABLET, FILM COATED ORAL at 21:08

## 2020-01-01 RX ADMIN — PHENYLEPHRINE HYDROCHLORIDE 50 MCG/MIN: 10 INJECTION INTRAVENOUS at 05:37

## 2020-01-01 RX ADMIN — ASPIRIN 81 MG: 81 TABLET, CHEWABLE ORAL at 09:44

## 2020-01-01 RX ADMIN — INSULIN GLARGINE 14 UNITS: 100 INJECTION, SOLUTION SUBCUTANEOUS at 11:03

## 2020-01-01 RX ADMIN — SENNOSIDES 17.2 MG: 8.6 TABLET, FILM COATED ORAL at 20:49

## 2020-01-01 RX ADMIN — STANDARDIZED SENNA CONCENTRATE AND DOCUSATE SODIUM 2 TABLET: 8.6; 5 TABLET ORAL at 09:44

## 2020-01-01 RX ADMIN — AMLODIPINE BESYLATE 2.5 MG: 2.5 TABLET ORAL at 15:49

## 2020-01-01 RX ADMIN — INSULIN GLARGINE 10 UNITS: 100 INJECTION, SOLUTION SUBCUTANEOUS at 22:45

## 2020-01-01 RX ADMIN — HEPARIN SODIUM 5000 UNITS: 5000 INJECTION INTRAVENOUS; SUBCUTANEOUS at 22:53

## 2020-01-01 RX ADMIN — INSULIN GLARGINE 10 UNITS: 100 INJECTION, SOLUTION SUBCUTANEOUS at 20:45

## 2020-01-01 RX ADMIN — METOPROLOL SUCCINATE 25 MG: 25 TABLET, FILM COATED, EXTENDED RELEASE ORAL at 08:01

## 2020-01-01 RX ADMIN — SODIUM CHLORIDE, PRESERVATIVE FREE 10 ML: 5 INJECTION INTRAVENOUS at 20:21

## 2020-01-01 RX ADMIN — FLUTICASONE PROPIONATE 2 PUFF: 110 AEROSOL, METERED RESPIRATORY (INHALATION) at 08:48

## 2020-01-01 RX ADMIN — SODIUM CHLORIDE, PRESERVATIVE FREE 10 ML: 5 INJECTION INTRAVENOUS at 09:00

## 2020-01-01 RX ADMIN — SENNOSIDES 17.2 MG: 8.6 TABLET, FILM COATED ORAL at 22:45

## 2020-01-01 RX ADMIN — ALLOPURINOL 100 MG: 100 TABLET ORAL at 13:17

## 2020-01-01 RX ADMIN — FENTANYL CITRATE 25 MCG: 50 INJECTION, SOLUTION INTRAMUSCULAR; INTRAVENOUS at 21:28

## 2020-01-01 RX ADMIN — HEPARIN SODIUM 5000 UNITS: 5000 INJECTION INTRAVENOUS; SUBCUTANEOUS at 14:08

## 2020-01-01 RX ADMIN — SODIUM CHLORIDE, PRESERVATIVE FREE 10 ML: 5 INJECTION INTRAVENOUS at 08:36

## 2020-01-01 RX ADMIN — ASPIRIN 81 MG: 81 TABLET, CHEWABLE ORAL at 08:27

## 2020-01-01 RX ADMIN — HEPARIN SODIUM 5000 UNITS: 5000 INJECTION INTRAVENOUS; SUBCUTANEOUS at 13:54

## 2020-01-01 RX ADMIN — FLUTICASONE PROPIONATE 2 PUFF: 110 AEROSOL, METERED RESPIRATORY (INHALATION) at 20:30

## 2020-01-01 RX ADMIN — FENTANYL CITRATE 50 MCG: 50 INJECTION INTRAMUSCULAR; INTRAVENOUS at 21:24

## 2020-01-01 RX ADMIN — MIDODRINE HYDROCHLORIDE 10 MG: 5 TABLET ORAL at 08:35

## 2020-01-01 RX ADMIN — WATER 2.1 ML: 1 INJECTION INTRAMUSCULAR; INTRAVENOUS; SUBCUTANEOUS at 02:03

## 2020-01-01 RX ADMIN — SEVELAMER CARBONATE 800 MG: 800 TABLET, FILM COATED ORAL at 19:14

## 2020-01-01 RX ADMIN — ASPIRIN 81 MG 324 MG: 81 TABLET ORAL at 12:36

## 2020-01-01 RX ADMIN — ALLOPURINOL 100 MG: 100 TABLET ORAL at 08:27

## 2020-01-01 RX ADMIN — METOPROLOL SUCCINATE 25 MG: 25 TABLET, FILM COATED, EXTENDED RELEASE ORAL at 08:47

## 2020-01-01 RX ADMIN — SEVELAMER CARBONATE 800 MG: 800 TABLET, FILM COATED ORAL at 18:20

## 2020-01-01 RX ADMIN — FENTANYL CITRATE 50 MCG: 50 INJECTION, SOLUTION INTRAMUSCULAR; INTRAVENOUS at 18:52

## 2020-01-01 RX ADMIN — METOPROLOL SUCCINATE 25 MG: 25 TABLET, FILM COATED, EXTENDED RELEASE ORAL at 08:26

## 2020-01-01 RX ADMIN — HEPARIN SODIUM 5000 UNITS: 5000 INJECTION INTRAVENOUS; SUBCUTANEOUS at 08:47

## 2020-01-01 RX ADMIN — DONEPEZIL HYDROCHLORIDE 5 MG: 5 TABLET, FILM COATED ORAL at 19:53

## 2020-01-01 RX ADMIN — METOPROLOL SUCCINATE 25 MG: 25 TABLET, FILM COATED, EXTENDED RELEASE ORAL at 21:51

## 2020-01-01 RX ADMIN — GABAPENTIN 100 MG: 100 CAPSULE ORAL at 16:06

## 2020-01-01 RX ADMIN — HYDRALAZINE HYDROCHLORIDE 10 MG: 10 TABLET ORAL at 21:52

## 2020-01-01 RX ADMIN — INSULIN GLARGINE 14 UNITS: 100 INJECTION, SOLUTION SUBCUTANEOUS at 08:27

## 2020-01-01 RX ADMIN — ALLOPURINOL 100 MG: 100 TABLET ORAL at 14:24

## 2020-01-01 RX ADMIN — METOPROLOL TARTRATE 5 MG: 5 INJECTION, SOLUTION INTRAVENOUS at 15:34

## 2020-01-01 RX ADMIN — HEPARIN SODIUM 5000 UNITS: 5000 INJECTION INTRAVENOUS; SUBCUTANEOUS at 21:24

## 2020-01-01 RX ADMIN — ASPIRIN 81 MG: 81 TABLET, CHEWABLE ORAL at 09:18

## 2020-01-01 RX ADMIN — HEPARIN SODIUM 5000 UNITS: 5000 INJECTION INTRAVENOUS; SUBCUTANEOUS at 15:49

## 2020-01-01 RX ADMIN — HEPARIN SODIUM 5000 UNITS: 5000 INJECTION INTRAVENOUS; SUBCUTANEOUS at 13:30

## 2020-01-01 RX ADMIN — ASPIRIN 81 MG: 81 TABLET, CHEWABLE ORAL at 15:49

## 2020-01-01 RX ADMIN — HEPARIN SODIUM 5000 UNITS: 5000 INJECTION INTRAVENOUS; SUBCUTANEOUS at 06:13

## 2020-01-01 RX ADMIN — FLUTICASONE PROPIONATE 2 PUFF: 110 AEROSOL, METERED RESPIRATORY (INHALATION) at 20:54

## 2020-01-01 RX ADMIN — MIDODRINE HYDROCHLORIDE 10 MG: 5 TABLET ORAL at 12:29

## 2020-01-01 RX ADMIN — HEPARIN SODIUM 5000 UNITS: 5000 INJECTION INTRAVENOUS; SUBCUTANEOUS at 20:07

## 2020-01-01 RX ADMIN — FLUTICASONE PROPIONATE 2 PUFF: 110 AEROSOL, METERED RESPIRATORY (INHALATION) at 21:19

## 2020-01-01 RX ADMIN — ASPIRIN 81 MG: 81 TABLET, CHEWABLE ORAL at 13:30

## 2020-01-01 RX ADMIN — ASPIRIN 81 MG: 81 TABLET, CHEWABLE ORAL at 08:48

## 2020-01-01 RX ADMIN — FENTANYL CITRATE 50 MCG: 50 INJECTION, SOLUTION INTRAMUSCULAR; INTRAVENOUS at 00:22

## 2020-01-01 RX ADMIN — ANTACID TABLETS 1000 MG: 500 TABLET, CHEWABLE ORAL at 02:35

## 2020-01-01 RX ADMIN — FLUTICASONE PROPIONATE 2 PUFF: 110 AEROSOL, METERED RESPIRATORY (INHALATION) at 08:29

## 2020-01-01 RX ADMIN — GABAPENTIN 100 MG: 100 CAPSULE ORAL at 20:45

## 2020-01-01 RX ADMIN — FENTANYL CITRATE 50 MCG: 50 INJECTION INTRAMUSCULAR; INTRAVENOUS at 15:50

## 2020-01-01 RX ADMIN — HYDRALAZINE HYDROCHLORIDE 10 MG: 10 TABLET ORAL at 11:02

## 2020-01-01 RX ADMIN — INSULIN LISPRO 1 UNITS: 100 INJECTION, SOLUTION INTRAVENOUS; SUBCUTANEOUS at 21:19

## 2020-01-01 RX ADMIN — Medication 500 MG: at 14:07

## 2020-01-01 RX ADMIN — GABAPENTIN 100 MG: 100 CAPSULE ORAL at 15:48

## 2020-01-01 RX ADMIN — CHLORHEXIDINE GLUCONATE 15 ML: 1.2 RINSE ORAL at 10:37

## 2020-01-01 RX ADMIN — SENNOSIDES 17.2 MG: 8.6 TABLET, FILM COATED ORAL at 19:52

## 2020-01-01 RX ADMIN — HYDRALAZINE HYDROCHLORIDE 10 MG: 10 TABLET, FILM COATED ORAL at 00:43

## 2020-01-01 RX ADMIN — METOPROLOL SUCCINATE 25 MG: 25 TABLET, FILM COATED, EXTENDED RELEASE ORAL at 11:03

## 2020-01-01 RX ADMIN — SENNOSIDES 17.2 MG: 8.6 TABLET, FILM COATED ORAL at 20:45

## 2020-01-01 RX ADMIN — SODIUM CHLORIDE, PRESERVATIVE FREE 10 ML: 5 INJECTION INTRAVENOUS at 09:49

## 2020-01-01 RX ADMIN — FLUTICASONE PROPIONATE 2 PUFF: 110 AEROSOL, METERED RESPIRATORY (INHALATION) at 20:39

## 2020-01-01 RX ADMIN — FLUTICASONE PROPIONATE 2 SPRAY: 50 SPRAY, METERED NASAL at 11:02

## 2020-01-01 RX ADMIN — SODIUM CHLORIDE, PRESERVATIVE FREE 10 ML: 5 INJECTION INTRAVENOUS at 20:06

## 2020-01-01 RX ADMIN — ALLOPURINOL 100 MG: 100 TABLET ORAL at 13:29

## 2020-01-01 RX ADMIN — ACETAMINOPHEN 650 MG: 325 TABLET ORAL at 11:44

## 2020-01-01 RX ADMIN — SEVELAMER CARBONATE 800 MG: 800 TABLET, FILM COATED ORAL at 12:38

## 2020-01-01 RX ADMIN — ADENOSINE 12 MG: 3 INJECTION, SOLUTION INTRAVENOUS at 05:21

## 2020-01-01 RX ADMIN — SODIUM CHLORIDE, PRESERVATIVE FREE 10 ML: 5 INJECTION INTRAVENOUS at 21:51

## 2020-01-01 RX ADMIN — HEPARIN SODIUM 5000 UNITS: 5000 INJECTION INTRAVENOUS; SUBCUTANEOUS at 12:16

## 2020-01-01 RX ADMIN — HEPARIN SODIUM 5000 UNITS: 5000 INJECTION INTRAVENOUS; SUBCUTANEOUS at 06:17

## 2020-01-01 RX ADMIN — METOPROLOL TARTRATE 5 MG: 5 INJECTION, SOLUTION INTRAVENOUS at 18:59

## 2020-01-01 RX ADMIN — MIDODRINE HYDROCHLORIDE 10 MG: 5 TABLET ORAL at 10:20

## 2020-01-01 RX ADMIN — Medication 2 MCG/MIN: at 12:55

## 2020-01-01 RX ADMIN — HEPARIN SODIUM 5000 UNITS: 5000 INJECTION INTRAVENOUS; SUBCUTANEOUS at 13:18

## 2020-01-01 RX ADMIN — CEFTRIAXONE SODIUM 1 G: 1 INJECTION, POWDER, FOR SOLUTION INTRAMUSCULAR; INTRAVENOUS at 21:48

## 2020-01-01 RX ADMIN — FENTANYL CITRATE 50 MCG: 50 INJECTION INTRAMUSCULAR; INTRAVENOUS at 05:56

## 2020-01-01 RX ADMIN — SODIUM CHLORIDE, PRESERVATIVE FREE 10 ML: 5 INJECTION INTRAVENOUS at 15:50

## 2020-01-01 RX ADMIN — METOPROLOL SUCCINATE 25 MG: 25 TABLET, FILM COATED, EXTENDED RELEASE ORAL at 09:18

## 2020-01-01 RX ADMIN — Medication 3 MCG/MIN: at 20:04

## 2020-01-01 RX ADMIN — HEPARIN SODIUM 5000 UNITS: 5000 INJECTION INTRAVENOUS; SUBCUTANEOUS at 14:00

## 2020-01-01 RX ADMIN — PIPERACILLIN AND TAZOBACTAM 4.5 G: 4; .5 INJECTION, POWDER, LYOPHILIZED, FOR SOLUTION INTRAVENOUS; PARENTERAL at 11:07

## 2020-01-01 RX ADMIN — SERTRALINE 50 MG: 50 TABLET, FILM COATED ORAL at 14:24

## 2020-01-01 RX ADMIN — FLUTICASONE PROPIONATE 2 PUFF: 110 AEROSOL, METERED RESPIRATORY (INHALATION) at 21:20

## 2020-01-01 RX ADMIN — HEPARIN SODIUM 5000 UNITS: 5000 INJECTION INTRAVENOUS; SUBCUTANEOUS at 00:43

## 2020-01-01 RX ADMIN — ASPIRIN 81 MG: 81 TABLET, CHEWABLE ORAL at 11:02

## 2020-01-01 RX ADMIN — SODIUM CHLORIDE, PRESERVATIVE FREE 10 ML: 5 INJECTION INTRAVENOUS at 21:20

## 2020-01-01 RX ADMIN — DONEPEZIL HYDROCHLORIDE 5 MG: 5 TABLET, FILM COATED ORAL at 20:45

## 2020-01-01 RX ADMIN — AMLODIPINE BESYLATE 2.5 MG: 2.5 TABLET ORAL at 09:43

## 2020-01-01 RX ADMIN — GABAPENTIN 100 MG: 100 CAPSULE ORAL at 09:18

## 2020-01-01 RX ADMIN — LEVOFLOXACIN 250 MG: 250 TABLET, FILM COATED ORAL at 13:29

## 2020-01-01 RX ADMIN — STANDARDIZED SENNA CONCENTRATE AND DOCUSATE SODIUM 2 TABLET: 8.6; 5 TABLET ORAL at 09:19

## 2020-01-01 RX ADMIN — ASPIRIN 81 MG: 81 TABLET, CHEWABLE ORAL at 13:18

## 2020-01-01 RX ADMIN — MIDODRINE HYDROCHLORIDE 10 MG: 5 TABLET ORAL at 11:49

## 2020-01-01 RX ADMIN — SEVELAMER CARBONATE 800 MG: 800 TABLET, FILM COATED ORAL at 16:09

## 2020-01-01 RX ADMIN — AMLODIPINE BESYLATE 2.5 MG: 2.5 TABLET ORAL at 09:18

## 2020-01-01 RX ADMIN — DOXERCALCIFEROL 1 MCG: 2 INJECTION, SOLUTION INTRAVENOUS at 18:27

## 2020-01-01 RX ADMIN — SODIUM CHLORIDE, PRESERVATIVE FREE 10 ML: 5 INJECTION INTRAVENOUS at 00:39

## 2020-01-01 RX ADMIN — HYDRALAZINE HYDROCHLORIDE 10 MG: 10 TABLET, FILM COATED ORAL at 22:45

## 2020-01-01 RX ADMIN — SODIUM BICARBONATE 50 MEQ: 84 INJECTION, SOLUTION INTRAVENOUS at 16:41

## 2020-01-01 RX ADMIN — GABAPENTIN 100 MG: 100 CAPSULE ORAL at 14:08

## 2020-01-01 RX ADMIN — INSULIN GLARGINE 14 UNITS: 100 INJECTION, SOLUTION SUBCUTANEOUS at 08:00

## 2020-01-01 RX ADMIN — FENTANYL CITRATE 50 MCG: 50 INJECTION INTRAMUSCULAR; INTRAVENOUS at 20:06

## 2020-01-01 RX ADMIN — MIDODRINE HYDROCHLORIDE 10 MG: 5 TABLET ORAL at 17:36

## 2020-01-01 RX ADMIN — INSULIN GLARGINE 14 UNITS: 100 INJECTION, SOLUTION SUBCUTANEOUS at 13:00

## 2020-01-01 RX ADMIN — DONEPEZIL HYDROCHLORIDE 10 MG: 10 TABLET, FILM COATED ORAL at 21:50

## 2020-01-01 RX ADMIN — SERTRALINE 50 MG: 50 TABLET, FILM COATED ORAL at 08:26

## 2020-01-01 RX ADMIN — SODIUM CHLORIDE 350 MG: 9 INJECTION, SOLUTION INTRAVENOUS at 15:49

## 2020-01-01 RX ADMIN — HEPARIN SODIUM 5000 UNITS: 5000 INJECTION INTRAVENOUS; SUBCUTANEOUS at 09:00

## 2020-01-01 RX ADMIN — SODIUM CHLORIDE, PRESERVATIVE FREE 10 ML: 5 INJECTION INTRAVENOUS at 08:00

## 2020-01-01 RX ADMIN — SERTRALINE 50 MG: 50 TABLET, FILM COATED ORAL at 15:49

## 2020-01-01 RX ADMIN — FLUTICASONE PROPIONATE 2 PUFF: 110 AEROSOL, METERED RESPIRATORY (INHALATION) at 21:00

## 2020-01-01 RX ADMIN — DONEPEZIL HYDROCHLORIDE 5 MG: 5 TABLET, FILM COATED ORAL at 20:49

## 2020-01-01 RX ADMIN — HEPARIN SODIUM 5000 UNITS: 5000 INJECTION INTRAVENOUS; SUBCUTANEOUS at 06:18

## 2020-01-01 RX ADMIN — SODIUM CHLORIDE, PRESERVATIVE FREE 10 ML: 5 INJECTION INTRAVENOUS at 08:35

## 2020-01-01 RX ADMIN — HEPARIN SODIUM 5000 UNITS: 5000 INJECTION INTRAVENOUS; SUBCUTANEOUS at 05:37

## 2020-01-01 RX ADMIN — ACETAMINOPHEN 650 MG: 325 TABLET ORAL at 18:03

## 2020-01-01 RX ADMIN — ACETAMINOPHEN 650 MG: 325 TABLET ORAL at 08:26

## 2020-01-01 RX ADMIN — DONEPEZIL HYDROCHLORIDE 5 MG: 5 TABLET, FILM COATED ORAL at 22:45

## 2020-01-01 RX ADMIN — METOPROLOL TARTRATE 5 MG: 5 INJECTION, SOLUTION INTRAVENOUS at 04:29

## 2020-01-01 RX ADMIN — ALBUMIN (HUMAN) 25 G: 0.25 INJECTION, SOLUTION INTRAVENOUS at 10:13

## 2020-01-01 RX ADMIN — METOPROLOL SUCCINATE 25 MG: 25 TABLET, FILM COATED, EXTENDED RELEASE ORAL at 13:16

## 2020-01-01 RX ADMIN — FAMOTIDINE 20 MG: 10 INJECTION, SOLUTION INTRAVENOUS at 10:54

## 2020-01-01 RX ADMIN — INSULIN LISPRO 2 UNITS: 100 INJECTION, SOLUTION INTRAVENOUS; SUBCUTANEOUS at 17:22

## 2020-01-01 RX ADMIN — SODIUM CHLORIDE 350 MG: 9 INJECTION, SOLUTION INTRAVENOUS at 21:34

## 2020-01-01 RX ADMIN — HEPARIN SODIUM 5000 UNITS: 5000 INJECTION INTRAVENOUS; SUBCUTANEOUS at 21:00

## 2020-01-01 RX ADMIN — FLUTICASONE PROPIONATE 2 PUFF: 110 AEROSOL, METERED RESPIRATORY (INHALATION) at 08:15

## 2020-01-01 RX ADMIN — AZITHROMYCIN MONOHYDRATE 500 MG: 500 INJECTION, POWDER, LYOPHILIZED, FOR SOLUTION INTRAVENOUS at 20:00

## 2020-01-01 RX ADMIN — ALBUMIN (HUMAN) 25 G: 0.25 INJECTION, SOLUTION INTRAVENOUS at 18:42

## 2020-01-01 RX ADMIN — GABAPENTIN 100 MG: 100 CAPSULE ORAL at 20:49

## 2020-01-01 RX ADMIN — FLUTICASONE PROPIONATE 2 SPRAY: 50 SPRAY, METERED NASAL at 12:12

## 2020-01-01 RX ADMIN — SERTRALINE 50 MG: 50 TABLET, FILM COATED ORAL at 08:00

## 2020-01-01 RX ADMIN — Medication 6 MCG/MIN: at 02:35

## 2020-01-01 RX ADMIN — DONEPEZIL HYDROCHLORIDE 10 MG: 10 TABLET, FILM COATED ORAL at 20:35

## 2020-01-01 RX ADMIN — SODIUM CHLORIDE, PRESERVATIVE FREE 10 ML: 5 INJECTION INTRAVENOUS at 09:20

## 2020-01-01 RX ADMIN — SERTRALINE 50 MG: 50 TABLET, FILM COATED ORAL at 08:47

## 2020-01-01 RX ADMIN — GABAPENTIN 100 MG: 100 CAPSULE ORAL at 22:45

## 2020-01-01 RX ADMIN — SODIUM CHLORIDE 20 ML: 0.9 INJECTION, SOLUTION INTRAVENOUS at 23:20

## 2020-01-01 RX ADMIN — SODIUM CHLORIDE, PRESERVATIVE FREE 10 ML: 5 INJECTION INTRAVENOUS at 11:03

## 2020-01-01 RX ADMIN — SODIUM CHLORIDE, POTASSIUM CHLORIDE, SODIUM LACTATE AND CALCIUM CHLORIDE 250 ML: 600; 310; 30; 20 INJECTION, SOLUTION INTRAVENOUS at 11:33

## 2020-01-01 RX ADMIN — ACETAMINOPHEN 650 MG: 325 TABLET ORAL at 10:38

## 2020-01-01 RX ADMIN — SODIUM CHLORIDE, PRESERVATIVE FREE 10 ML: 5 INJECTION INTRAVENOUS at 08:09

## 2020-01-01 RX ADMIN — HEPARIN SODIUM 5000 UNITS: 5000 INJECTION INTRAVENOUS; SUBCUTANEOUS at 20:48

## 2020-01-01 RX ADMIN — LORAZEPAM 1 MG: 2 INJECTION INTRAMUSCULAR at 15:50

## 2020-01-01 RX ADMIN — LANSOPRAZOLE 15 MG: KIT at 08:32

## 2020-01-01 RX ADMIN — SODIUM CHLORIDE, PRESERVATIVE FREE 10 ML: 5 INJECTION INTRAVENOUS at 09:47

## 2020-01-01 RX ADMIN — VANCOMYCIN HYDROCHLORIDE 1000 MG: 1 INJECTION, SOLUTION INTRAVENOUS at 20:57

## 2020-01-01 RX ADMIN — ALLOPURINOL 100 MG: 100 TABLET ORAL at 08:00

## 2020-01-01 RX ADMIN — DONEPEZIL HYDROCHLORIDE 5 MG: 5 TABLET, FILM COATED ORAL at 00:43

## 2020-01-01 RX ADMIN — OLANZAPINE 5 MG: 10 INJECTION, POWDER, LYOPHILIZED, FOR SOLUTION INTRAMUSCULAR at 02:03

## 2020-01-01 RX ADMIN — FLUTICASONE PROPIONATE 2 SPRAY: 50 SPRAY, METERED NASAL at 08:28

## 2020-01-01 RX ADMIN — INSULIN LISPRO 2 UNITS: 100 INJECTION, SOLUTION INTRAVENOUS; SUBCUTANEOUS at 08:01

## 2020-01-01 RX ADMIN — DOXERCALCIFEROL 1 MCG: 2 INJECTION, SOLUTION INTRAVENOUS at 09:46

## 2020-01-01 RX ADMIN — METOPROLOL SUCCINATE 25 MG: 25 TABLET, FILM COATED, EXTENDED RELEASE ORAL at 15:49

## 2020-01-01 RX ADMIN — SODIUM CHLORIDE, PRESERVATIVE FREE 10 ML: 5 INJECTION INTRAVENOUS at 08:29

## 2020-01-01 RX ADMIN — FLUTICASONE PROPIONATE 2 SPRAY: 50 SPRAY, METERED NASAL at 13:33

## 2020-01-01 RX ADMIN — HEPARIN SODIUM 5000 UNITS: 5000 INJECTION INTRAVENOUS; SUBCUTANEOUS at 21:20

## 2020-01-01 RX ADMIN — ALLOPURINOL 100 MG: 100 TABLET ORAL at 09:18

## 2020-01-01 RX ADMIN — SODIUM CHLORIDE, PRESERVATIVE FREE 10 ML: 5 INJECTION INTRAVENOUS at 20:36

## 2020-01-01 RX ADMIN — ALLOPURINOL 100 MG: 100 TABLET ORAL at 11:02

## 2020-01-01 RX ADMIN — FENTANYL CITRATE 50 MCG: 50 INJECTION, SOLUTION INTRAMUSCULAR; INTRAVENOUS at 10:51

## 2020-01-01 RX ADMIN — ONDANSETRON 4 MG: 2 INJECTION INTRAMUSCULAR; INTRAVENOUS at 08:16

## 2020-01-01 RX ADMIN — INSULIN GLARGINE 10 UNITS: 100 INJECTION, SOLUTION SUBCUTANEOUS at 20:49

## 2020-01-01 RX ADMIN — SERTRALINE 50 MG: 50 TABLET, FILM COATED ORAL at 09:19

## 2020-01-01 RX ADMIN — GABAPENTIN 100 MG: 100 CAPSULE ORAL at 19:52

## 2020-01-01 RX ADMIN — GABAPENTIN 100 MG: 100 CAPSULE ORAL at 13:17

## 2020-01-01 RX ADMIN — SEVELAMER CARBONATE 800 MG: 800 TABLET, FILM COATED ORAL at 08:47

## 2020-01-01 RX ADMIN — FLUTICASONE PROPIONATE 2 SPRAY: 50 SPRAY, METERED NASAL at 21:49

## 2020-01-01 RX ADMIN — AZITHROMYCIN MONOHYDRATE 500 MG: 500 INJECTION, POWDER, LYOPHILIZED, FOR SOLUTION INTRAVENOUS at 22:56

## 2020-01-01 RX ADMIN — ALLOPURINOL 100 MG: 100 TABLET ORAL at 09:43

## 2020-01-01 RX ADMIN — LEVOFLOXACIN 500 MG: 500 TABLET, FILM COATED ORAL at 18:03

## 2020-01-01 RX ADMIN — CEFEPIME HYDROCHLORIDE 2 G: 2 INJECTION, POWDER, FOR SOLUTION INTRAVENOUS at 12:34

## 2020-01-01 RX ADMIN — ALLOPURINOL 100 MG: 100 TABLET ORAL at 21:48

## 2020-01-01 RX ADMIN — ASPIRIN 81 MG: 81 TABLET, CHEWABLE ORAL at 14:24

## 2020-01-01 RX ADMIN — SODIUM CHLORIDE, PRESERVATIVE FREE 10 ML: 5 INJECTION INTRAVENOUS at 20:07

## 2020-01-01 RX ADMIN — METOPROLOL TARTRATE 5 MG: 5 INJECTION INTRAVENOUS at 15:34

## 2020-01-01 RX ADMIN — HEPARIN SODIUM 5000 UNITS: 5000 INJECTION INTRAVENOUS; SUBCUTANEOUS at 20:49

## 2020-01-01 RX ADMIN — CEFTRIAXONE SODIUM 1 G: 1 INJECTION, POWDER, FOR SOLUTION INTRAMUSCULAR; INTRAVENOUS at 12:37

## 2020-01-01 RX ADMIN — ETOMIDATE 10 MG: 2 INJECTION INTRAVENOUS at 03:27

## 2020-01-01 RX ADMIN — VANCOMYCIN HYDROCHLORIDE 750 MG: 10 INJECTION, POWDER, LYOPHILIZED, FOR SOLUTION INTRAVENOUS at 16:25

## 2020-01-01 RX ADMIN — Medication 500 MG: at 08:25

## 2020-01-01 RX ADMIN — MAGNESIUM SULFATE IN DEXTROSE 2 G: 10 INJECTION, SOLUTION INTRAVENOUS at 04:28

## 2020-01-01 RX ADMIN — HYDRALAZINE HYDROCHLORIDE 10 MG: 10 TABLET, FILM COATED ORAL at 14:11

## 2020-01-01 RX ADMIN — HEPARIN SODIUM 5000 UNITS: 5000 INJECTION INTRAVENOUS; SUBCUTANEOUS at 22:45

## 2020-01-01 RX ADMIN — HEPARIN SODIUM 5000 UNITS: 5000 INJECTION INTRAVENOUS; SUBCUTANEOUS at 16:26

## 2020-01-01 RX ADMIN — SODIUM CHLORIDE 500 ML: 9 INJECTION, SOLUTION INTRAVENOUS at 11:08

## 2020-01-01 RX ADMIN — FENTANYL CITRATE: 50 INJECTION, SOLUTION INTRAMUSCULAR; INTRAVENOUS at 09:40

## 2020-01-01 RX ADMIN — SODIUM CHLORIDE, PRESERVATIVE FREE 10 ML: 5 INJECTION INTRAVENOUS at 21:00

## 2020-01-01 RX ADMIN — HEPARIN SODIUM 5000 UNITS: 5000 INJECTION INTRAVENOUS; SUBCUTANEOUS at 16:06

## 2020-01-01 RX ADMIN — SODIUM CHLORIDE, PRESERVATIVE FREE 10 ML: 5 INJECTION INTRAVENOUS at 08:27

## 2020-01-01 RX ADMIN — CEFTRIAXONE SODIUM 1 G: 1 INJECTION, POWDER, FOR SOLUTION INTRAMUSCULAR; INTRAVENOUS at 21:00

## 2020-01-01 RX ADMIN — HEPARIN SODIUM 5000 UNITS: 5000 INJECTION INTRAVENOUS; SUBCUTANEOUS at 05:03

## 2020-01-01 RX ADMIN — FENTANYL CITRATE 50 MCG: 50 INJECTION INTRAMUSCULAR; INTRAVENOUS at 22:45

## 2020-01-01 RX ADMIN — INSULIN GLARGINE 10 UNITS: 100 INJECTION, SOLUTION SUBCUTANEOUS at 00:43

## 2020-01-01 RX ADMIN — HEPARIN SODIUM 5000 UNITS: 5000 INJECTION INTRAVENOUS; SUBCUTANEOUS at 05:54

## 2020-01-01 RX ADMIN — HEPARIN SODIUM 5000 UNITS: 5000 INJECTION INTRAVENOUS; SUBCUTANEOUS at 14:54

## 2020-01-01 ASSESSMENT — PAIN SCALES - GENERAL
PAINLEVEL_OUTOF10: 0
PAINLEVEL_OUTOF10: 7
PAINLEVEL_OUTOF10: 0
PAINLEVEL_OUTOF10: 10
PAINLEVEL_OUTOF10: 4
PAINLEVEL_OUTOF10: 0
PAINLEVEL_OUTOF10: 10
PAINLEVEL_OUTOF10: 0
PAINLEVEL_OUTOF10: 0
PAINLEVEL_OUTOF10: 7
PAINLEVEL_OUTOF10: 5
PAINLEVEL_OUTOF10: 0
PAINLEVEL_OUTOF10: 7
PAINLEVEL_OUTOF10: 4
PAINLEVEL_OUTOF10: 0
PAINLEVEL_OUTOF10: 4
PAINLEVEL_OUTOF10: 0
PAINLEVEL_OUTOF10: 10
PAINLEVEL_OUTOF10: 6
PAINLEVEL_OUTOF10: 0
PAINLEVEL_OUTOF10: 5
PAINLEVEL_OUTOF10: 0
PAINLEVEL_OUTOF10: 7
PAINLEVEL_OUTOF10: 0
PAINLEVEL_OUTOF10: 6
PAINLEVEL_OUTOF10: 7
PAINLEVEL_OUTOF10: 0
PAINLEVEL_OUTOF10: 2
PAINLEVEL_OUTOF10: 0
PAINLEVEL_OUTOF10: 0
PAINLEVEL_OUTOF10: 10

## 2020-01-01 ASSESSMENT — ENCOUNTER SYMPTOMS
SORE THROAT: 0
ABDOMINAL PAIN: 0
SHORTNESS OF BREATH: 0
PHOTOPHOBIA: 0
BACK PAIN: 0
SHORTNESS OF BREATH: 0
WHEEZING: 0
EYE PAIN: 0
ANAL BLEEDING: 0
VOICE CHANGE: 0
NAUSEA: 0
DIARRHEA: 0
VOICE CHANGE: 0
WHEEZING: 0
SINUS PAIN: 0
BLOOD IN STOOL: 0
EYE REDNESS: 0
COLOR CHANGE: 0
CHEST TIGHTNESS: 0
ABDOMINAL PAIN: 0
COUGH: 0
CONSTIPATION: 0
CONSTIPATION: 0
ABDOMINAL PAIN: 0
DIARRHEA: 0
NAUSEA: 0
SINUS PRESSURE: 0
APNEA: 0
ABDOMINAL DISTENTION: 0
COLOR CHANGE: 0
CONSTIPATION: 0
BACK PAIN: 0
SINUS PRESSURE: 0
CHEST TIGHTNESS: 0
EYE ITCHING: 0
CONSTIPATION: 0
WHEEZING: 0
NAUSEA: 0
SHORTNESS OF BREATH: 0
COUGH: 0
SORE THROAT: 0
NAUSEA: 0
STRIDOR: 0
COUGH: 0
DIARRHEA: 0
NAUSEA: 0
RHINORRHEA: 0
EYE PAIN: 0
ABDOMINAL PAIN: 0
TROUBLE SWALLOWING: 0
CHOKING: 0
SHORTNESS OF BREATH: 0
RHINORRHEA: 0
VOMITING: 0
CONSTIPATION: 0
EYE ITCHING: 0
ABDOMINAL PAIN: 0
BACK PAIN: 0
CONSTIPATION: 0
WHEEZING: 0
CONSTIPATION: 0
APNEA: 0
BACK PAIN: 0
CHEST TIGHTNESS: 0
VOMITING: 0
SORE THROAT: 0
COUGH: 0
SINUS PAIN: 0
BLOOD IN STOOL: 0
SHORTNESS OF BREATH: 0
ABDOMINAL PAIN: 0
CHEST TIGHTNESS: 0
WHEEZING: 0
SINUS PRESSURE: 0
EYE REDNESS: 0
DIARRHEA: 0
DIARRHEA: 0
ABDOMINAL PAIN: 0
CHEST TIGHTNESS: 0
SORE THROAT: 0
DIARRHEA: 1
CONSTIPATION: 0
SINUS PRESSURE: 0
SINUS PRESSURE: 0
ANAL BLEEDING: 0
PHOTOPHOBIA: 0
VOMITING: 0
VOMITING: 0
COUGH: 1
NAUSEA: 0
CHOKING: 0
DIARRHEA: 0
EYE ITCHING: 0
BLOOD IN STOOL: 0
VOMITING: 0
DIARRHEA: 0
SORE THROAT: 0
RHINORRHEA: 0
WHEEZING: 0
CHOKING: 0
ABDOMINAL DISTENTION: 0
VOMITING: 0
VOMITING: 0
SINUS PRESSURE: 0
TROUBLE SWALLOWING: 0
RHINORRHEA: 0
ABDOMINAL PAIN: 0
SHORTNESS OF BREATH: 0
SINUS PAIN: 0
NAUSEA: 0
NAUSEA: 0
APNEA: 0
RHINORRHEA: 0
COUGH: 0
BACK PAIN: 0
BACK PAIN: 0
VOICE CHANGE: 0
EYE ITCHING: 0
SHORTNESS OF BREATH: 1
SINUS PRESSURE: 0
DIARRHEA: 0
ABDOMINAL PAIN: 0
RHINORRHEA: 0
NAUSEA: 0
EYE ITCHING: 0
EYE REDNESS: 0
STRIDOR: 0
VOMITING: 0
CHEST TIGHTNESS: 0
COLOR CHANGE: 0
SHORTNESS OF BREATH: 0
COUGH: 0
CHEST TIGHTNESS: 0
TROUBLE SWALLOWING: 0
WHEEZING: 0
APNEA: 0
SHORTNESS OF BREATH: 0
CHOKING: 0
COUGH: 0
BACK PAIN: 0
VOMITING: 0
VOICE CHANGE: 0
COUGH: 0

## 2020-01-01 ASSESSMENT — PAIN DESCRIPTION - PROGRESSION
CLINICAL_PROGRESSION: NOT CHANGED
CLINICAL_PROGRESSION: GRADUALLY WORSENING

## 2020-01-01 ASSESSMENT — PAIN SCALES - WONG BAKER
WONGBAKER_NUMERICALRESPONSE: 2
WONGBAKER_NUMERICALRESPONSE: 0
WONGBAKER_NUMERICALRESPONSE: 0
WONGBAKER_NUMERICALRESPONSE: 6
WONGBAKER_NUMERICALRESPONSE: 2
WONGBAKER_NUMERICALRESPONSE: 0

## 2020-01-01 ASSESSMENT — PAIN DESCRIPTION - ORIENTATION
ORIENTATION: UPPER;LEFT
ORIENTATION: UPPER
ORIENTATION: LOWER
ORIENTATION: LEFT;UPPER
ORIENTATION: LEFT

## 2020-01-01 ASSESSMENT — PAIN DESCRIPTION - LOCATION
LOCATION: GENERALIZED
LOCATION: BUTTOCKS
LOCATION: BACK;SHOULDER
LOCATION: GENERALIZED
LOCATION: BACK;SHOULDER
LOCATION: BUTTOCKS;COCCYX
LOCATION: ARM
LOCATION: BACK
LOCATION: GENERALIZED
LOCATION: SHOULDER
LOCATION: BUTTOCKS
LOCATION: BUTTOCKS

## 2020-01-01 ASSESSMENT — PAIN DESCRIPTION - ONSET
ONSET: ON-GOING

## 2020-01-01 ASSESSMENT — PAIN DESCRIPTION - PAIN TYPE
TYPE: ACUTE PAIN
TYPE: CHRONIC PAIN
TYPE: ACUTE PAIN
TYPE: ACUTE PAIN
TYPE: CHRONIC PAIN
TYPE: ACUTE PAIN

## 2020-01-01 ASSESSMENT — PAIN DESCRIPTION - FREQUENCY
FREQUENCY: CONTINUOUS
FREQUENCY: OTHER (COMMENT)
FREQUENCY: INTERMITTENT

## 2020-01-01 ASSESSMENT — PULMONARY FUNCTION TESTS
PIF_VALUE: 26
PIF_VALUE: 32

## 2020-01-01 ASSESSMENT — PAIN DESCRIPTION - DIRECTION: RADIATING_TOWARDS: LEFT HIP

## 2020-01-01 ASSESSMENT — PAIN DESCRIPTION - DESCRIPTORS
DESCRIPTORS: ACHING;DISCOMFORT;SORE
DESCRIPTORS: ACHING;DISCOMFORT
DESCRIPTORS: ACHING
DESCRIPTORS: DISCOMFORT
DESCRIPTORS: ACHING
DESCRIPTORS: SORE

## 2020-01-01 ASSESSMENT — PAIN - FUNCTIONAL ASSESSMENT: PAIN_FUNCTIONAL_ASSESSMENT: ACTIVITIES ARE NOT PREVENTED

## 2020-01-07 NOTE — PROGRESS NOTES
times a week If needed during dialysis, Disp: , Rfl:     ipratropium (ATROVENT) 0.02 % nebulizer solution, Take 0.5 mg by nebulization every 8 hours, Disp: , Rfl:     HUMALOG 100 UNIT/ML injection vial, Inject into the skin 4 times daily (after meals and at bedtime) Per Sliding Scale as follows: 151-200 = 1 unit 201-250 = 2 units 251-300 = 3 units 301-350 = 4 units 351-400 = 5 units >400 = notify physician, Disp: , Rfl:     allopurinol (ZYLOPRIM) 100 MG tablet, Take 100 mg by mouth daily, Disp: , Rfl:     midodrine (PROAMATINE) 5 MG tablet, Take 5 mg by mouth three times a week One time per day every Mon, Wed, Fri for low pressure , Disp: , Rfl:     metoprolol succinate (TOPROL XL) 25 MG extended release tablet, Take 1 tablet by mouth daily, Disp: 30 tablet, Rfl: 0    sevelamer (RENVELA) 800 MG tablet, Take 2 tablets by mouth every 8 hours as needed For snacks, Disp: , Rfl:     sertraline (ZOLOFT) 50 MG tablet, Take 50 mg by mouth daily, Disp: , Rfl:     calcium carbonate (TUMS) 500 MG chewable tablet, Take 2 tablets by mouth every 6 hours as needed for Heartburn, Disp: , Rfl:     fluticasone propionate (FLOVENT DISKUS) 100 MCG/BLIST AEPB inhaler, Inhale 2 puffs into the lungs 2 times daily , Disp: , Rfl:     insulin glargine (LANTUS) 100 UNIT/ML injection pen, Inject 10 Units into the skin nightly , Disp: , Rfl:     Nutritional Supplements (BOOST GLUCOSE CONTROL) LIQD, Take 8 oz by mouth nightly , Disp: , Rfl:     senna-docusate (PERICOLACE) 8.6-50 MG per tablet, Take 2 tablets by mouth four times a week On non-dialysis days (Sun, Tues, Thurs, Sat), Disp: , Rfl:     amLODIPine (NORVASC) 2.5 MG tablet, Take 1 tablet by mouth daily, Disp: 30 tablet, Rfl: 3    gabapentin (NEURONTIN) 100 MG capsule, Take 1 capsule by mouth 3 times daily, Disp: 30 capsule, Rfl: 0    polyethylene glycol (GLYCOLAX) powder, Take 17 g by mouth daily as needed (constipation) , Disp: , Rfl:     hydrALAZINE (APRESOLINE) 10 MG tablet, Take 10 mg by mouth 3 times daily , Disp: , Rfl:     aspirin 81 MG chewable tablet, Take 81 mg by mouth every morning , Disp: , Rfl:     sevelamer hcl (RENAGEL) 800 MG tablet, , Disp: , Rfl:     Multiple Vitamins-Minerals (THERAPEUTIC MULTIVITAMIN-MINERALS) tablet, Take 1 tablet by mouth daily, Disp: , Rfl:     senna (SENOKOT) 8.6 MG tablet, Take 2 tablets by mouth daily as needed for Constipation, Disp: , Rfl:     carboxymethylcellulose 1 % ophthalmic solution, Place 1 drop into both eyes 3 times daily as needed for Dry Eyes, Disp: , Rfl:     sevelamer (RENVELA) 800 MG tablet, Take 4 tablets by mouth 3 times daily (with meals), Disp: , Rfl:     sucralfate (CARAFATE) 1 GM tablet, Take 1 g by mouth 3 times daily (before meals), Disp: , Rfl:     tetrahydrozoline 0.05 % ophthalmic solution, Place 1 drop into both eyes 2 times daily as needed (irritation/dryness), Disp: , Rfl:     diclofenac sodium 1 % GEL, Apply topically 3 times daily Apply between last two toes and to arch of right foot, Disp: , Rfl:     NOVOFINE AUTOCOVER 30G X 8 MM MISC, , Disp: , Rfl:     Past Medical History:   Diagnosis Date    Asthma     CAD (coronary artery disease)     CKD (chronic kidney disease) stage 4, [facility]FR 15-29 ml/min [facility]     [facility]A [facility]ce[facility] vascular accident) [facility]     Depression     History of kidney stones     Hyperlipidemia     Hypertension     Osteoarthritis     Proteinuria     Type II or unspecified type diabetes mellitus without mention of complication, not stated as uncontrolled      Past Surgical History:   Procedure Laterality Date    BONE BIOPSY  6/29/15    L2-3    CARPAL TUNNEL RELEASE      CORONARY ARTERY BYPASS GRAFT      HIP SURGERY      JOINT REPLACEMENT      OTHER SURGICAL HISTORY  6/11/15    I&D coccyx wound    OTHER SURGICAL HISTORY  12/30/15    diverting loop colostomy; perirectal incision and drainage    SHOULDER SURGERY      left and right     Social History     Socioeconomic clear to auscultation bilaterally- no wheezes, rales or rhonchi, normal air movement, no respiratory distress  Cardiovascular: normal rate, regular rhythm, normal S1 and S2  Abdomen: soft, non-tender, non-distended  Extremities: no cyanosis, clubbing or edema    .   Right arm fistula ,no erythema ,no warmth or tenderness   Stage 2 sacral decubitus ulcer ,no signs of infection                                                             Lab Data     CBC with Differential:      Lab Results   Component Value Date    WBC 6.7 01/02/2020    RBC 3.23 01/02/2020    HGB 10.4 01/02/2020    HCT 32.7 01/02/2020     01/02/2020    .2 01/02/2020    MCH 32.2 01/02/2020    MCHC 31.8 01/02/2020    RDW 13.2 01/02/2020    LYMPHOPCT 18 05/27/2019    MONOPCT 8 05/27/2019    BASOPCT 1 05/27/2019    MONOSABS 0.50 05/27/2019    LYMPHSABS 1.10 05/27/2019    EOSABS 0.70 05/27/2019    BASOSABS 0.00 05/27/2019    DIFFTYPE NOT REPORTED 05/27/2019       CMP:      Lab Results   Component Value Date     01/02/2020    K 4.4 01/02/2020    CL 96 01/02/2020    CO2 29 01/02/2020    BUN 35 01/02/2020    CREATININE 8.52 01/02/2020    GFRAA 7 01/02/2020    LABGLOM 6 01/02/2020    GLUCOSE 79 01/02/2020    PROT 7.6 05/22/2019    PROT 5.7 11/27/2012    LABALBU 3.8 05/22/2019    CALCIUM 8.6 01/02/2020    BILITOT 0.44 05/22/2019    ALKPHOS 308 05/22/2019    AST 29 05/22/2019    ALT 32 05/22/2019     Calcium:  No components found for: CA    Hepatic Function Panel:    Lab Results   Component Value Date    ALKPHOS 308 05/22/2019    ALT 32 05/22/2019    AST 29 05/22/2019    PROT 7.6 05/22/2019    PROT 5.7 11/27/2012    BILITOT 0.44 05/22/2019    BILIDIR 0.18 06/30/2016    IBILI 0.27 06/30/2016    LABALBU 3.8 05/22/2019       PT/INR:    Lab Results   Component Value Date    PROTIME 13.5 05/22/2019    INR 1.0 05/22/2019           Assessment   Cerumen  Stage 2 sacral decubitus ulcer   L3-L4 discitis /Osteomylitis with bone destruction and lumbar

## 2020-05-08 PROBLEM — R89.9 ABNORMAL LABORATORY TEST: Status: ACTIVE | Noted: 2020-01-01

## 2020-05-08 NOTE — ED PROVIDER NOTES
Provider, MD   sevelamer (RENVELA) 800 MG tablet Take 2 tablets by mouth every 8 hours as needed For snacks    Historical Provider, MD   sevelamer (RENVELA) 800 MG tablet Take 4 tablets by mouth 3 times daily (with meals)    Historical Provider, MD   sertraline (ZOLOFT) 50 MG tablet Take 50 mg by mouth daily    Historical Provider, MD   sucralfate (CARAFATE) 1 GM tablet Take 1 g by mouth 3 times daily (before meals)    Historical Provider, MD   calcium carbonate (TUMS) 500 MG chewable tablet Take 2 tablets by mouth every 6 hours as needed for Heartburn    Historical Provider, MD   tetrahydrozoline 0.05 % ophthalmic solution Place 1 drop into both eyes 2 times daily as needed (irritation/dryness)    Historical Provider, MD   diclofenac sodium 1 % GEL Apply topically 3 times daily Apply between last two toes and to arch of right foot    Historical Provider, MD   fluticasone propionate (FLOVENT DISKUS) 100 MCG/BLIST AEPB inhaler Inhale 2 puffs into the lungs 2 times daily     Historical Provider, MD Golden Weiner 30G X 8 MM [address]  1/17/17   Historical Provider, MD   insulin glargine (LANTUS) 100 UNIT/ML injection pen Inject 10 Units into the skin nightly     Historical Provider, MD   Nutritional Supplements (BOOST GLUCOSE CONTROL) LIQD Take 8 oz by mouth nightly     Historical Provider, MD   senna-docusate (Floyce Powell) 8.6-50 MG per tablet Take 2 tablets by mouth four times a week On non-dialysis days (Sun, Tues, Thurs, Sat)    Historical Provider, MD   amLODIPine (NORVASC) 2.5 MG tablet Take 1 tablet by mouth daily 6/17/15   Carina Mejia MD   gabapentin (NEURONTIN) 100 MG capsule Take 1 capsule by mouth 3 times daily 6/17/15   Carina Mejia MD   polyethylene glycol (GLYCOLAX) powder Take 17 g by mouth daily as needed (constipation)     Historical Provider, MD   hydrALAZINE (APRESOLINE) 10 MG tablet Take 10 mg by mouth 3 times daily  2/13/14   Historical Provider, MD   aspirin 81 MG chewable tablet Medicine Resident    (Please note that portions ofthis note were completed with a voice recognition program.  Efforts were made to edit the dictations but occasionally words are mis-transcribed.)      Alex Mccord MD  05/08/20 2019

## 2020-05-09 PROBLEM — E87.1 HYPONATREMIA: Status: RESOLVED | Noted: 2018-09-10 | Resolved: 2020-01-01

## 2020-05-09 PROBLEM — J11.1 INFLUENZA: Status: RESOLVED | Noted: 2019-01-24 | Resolved: 2020-01-01

## 2020-05-09 PROBLEM — Z99.2 ESRD ON HEMODIALYSIS (HCC): Status: RESOLVED | Noted: 2019-01-25 | Resolved: 2020-01-01

## 2020-05-09 PROBLEM — J44.9 COPD WITHOUT EXACERBATION (HCC): Status: ACTIVE | Noted: 2018-09-08

## 2020-05-09 PROBLEM — N18.6 ESRD ON HEMODIALYSIS (HCC): Status: RESOLVED | Noted: 2019-01-25 | Resolved: 2020-01-01

## 2020-05-09 PROBLEM — L03.113 CELLULITIS OF FOREARM, RIGHT: Status: RESOLVED | Noted: 2018-11-18 | Resolved: 2020-01-01

## 2020-05-09 PROBLEM — K80.20 ASYMPTOMATIC CHOLELITHIASIS: Status: ACTIVE | Noted: 2020-01-01

## 2020-05-09 NOTE — PROGRESS NOTES
Covid 19 swab taken from right nare, labeled, placed in red dot bag, and handed off to second healthcare worker outside of room for transport to laboratory per hospital policy and procedure. Patient tolerated procedure well.

## 2020-05-09 NOTE — ED PROVIDER NOTES
[facility] ED  Emergency Department  Emergency Medicine Resident Sign-out     Care of Jeancarlos Parnell was assumed from Dr. Deon Harrison and is being seen for Other (high albumin levels)  . The patient's initial evaluation and plan have been discussed with the prior provider who initially evaluated the patient. EMERGENCY DEPARTMENT COURSE / MEDICAL DECISION MAKING:       MEDICATIONS GIVEN:  No orders of the defined types were placed in this encounter. LABS / RADIOLOGY:     Labs Reviewed   CBC WITH AUTO DIFFERENTIAL - Abnormal; Notable for the following components:       Result Value    RBC 4.03 (*)     Hemoglobin 12.5 (*)     Hematocrit 38.8 (*)     RDW 15.3 (*)     Lymphocytes 15 (*)     Eosinophils % 16 (*)     Immature Granulocytes 1 (*)     Absolute Eos # 1.58 (*)     All other components within normal limits   COMPREHENSIVE METABOLIC PANEL - Abnormal; Notable for the following components:    Glucose 168 (*)     BUN 24 (*)     CREATININE 6.72 (*)     Calcium 8.1 (*)     Potassium 3.3 (*)     Chloride 97 (*)     Total Protein 5.7 (*)     Alb 3.2 (*)     GFR Non- 8 (*)     GFR  10 (*)     All other components within normal limits   IMMATURE PLATELET FRACTION   ALUMINUM LEVEL       No results found. RECENT VITALS:     Temp: 98.6 °F (37 °C),  Pulse: 63, Resp: 19, BP: (!) 142/107, SpO2: 97 %    This patient is a 67 y.o. Male with who presents with EMS from nursing facility for elevated aluminum level. Patient was sent in by nephrologist for evaluation. Patient has no symptoms at this time no cough fever chills nausea vomiting diarrhea. Case was discussed with nephrology who stated patient will need iron chelation in the morning. Patient admitted to [facility] awaiting transfer to the floor. OUTSTANDING TASKS / RECOMMENDATIONS:    1. Awaiting transfer to the floor. FINAL IMPRESSION:     1.  Elevated laboratory test result        DISPOSITION:

## 2020-05-09 NOTE — CONSULTS
Nephrology ESRD Consult Note    Reason for Consult:  Fluid and hypertension management for pt with ESRD     Requesting Physician:  Emergency Physician    History Obtained From:  electronic medical record    HD Unit:       [facility]  Previously [facility] in [place]    Dry Weight:       Unknown    Chief Complaint:  Abnormal Labs, elevated albumin levels    History of Present Illness: This is a 67 y.o. male with end stage renal disease on hemodialysis [facility] -Friday for 2 hours each day, via right arm AVF, who presents to the hospital for evaluation of elevated aluminum levels of 148. Repeat still pending. His . Apparently, due to COVID restrictions, his dialysis was changed from [facility] to [facility] M-T-TH-F for 2 hours each day. Per patient his aluminium level was noted to be high so they sent him to hospital.      Per [facility], aluminum levels in December 2019 was 43, and 55 in January 2020. His level was only 17 in April 2019. He denies any complaints except for right arm pain. He does report that he has muscle twitching at times but this is not new. He does have difficulty with memory. He has low grade temp this morning. His PMH includes CAD/CABG, CVA, Diabetes, and HTN. His outpatient history and dialysis orders, usual dry wt  and out pt dialysis run sheets were reviewed.      Past Medical History:        Diagnosis Date    Asthma     CAD (coronary artery disease)     CKD (chronic kidney disease) stage 4, GFR 15-29 ml/min ([facility])     CVA (cerebral vascular accident) ([address]     Decubitus ulcer 6/10/2015    Depression     Discitis of lumbar region     ESRD (end stage renal disease) ([address] 6/26/2015    History of kidney stones     Hyperlipidemia     Hypertension     Lumbar discitis 6/25/2015    Osteoarthritis     Osteomyelitis of lumbar spine ([address] 6/25/2015    Perianal abscess 12/29/2015    Protein-calorie malnutrition ([address] (GLYCOLAX) packet 17 g, Daily PRN  promethazine (PHENERGAN) tablet 12.5 mg, Q6H PRN    Or  ondansetron (ZOFRAN) injection 4 mg, Q6H PRN  nicotine (NICODERM CQ) 21 MG/24HR 1 patch, Daily PRN  heparin (porcine) injection 5,000 Units, 3 times per day  glucose (GLUTOSE) 40 % oral gel 15 g, PRN  dextrose 50 % IV solution, PRN  glucagon (rDNA) injection 1 mg, PRN  dextrose 5 % solution, PRN  oxyCODONE-acetaminophen (PERCOCET) 5-325 MG per tablet 1 tablet, Q6H PRN    And  oxyCODONE (ROXICODONE) immediate release tablet 2.5 mg, Q6H PRN        Allergies:  Oxycontin [oxycodone];  Avodart [dutasteride]; and Darvocet [propoxyphene n-acetaminophen]    Social History:    Social History     Socioeconomic History    Marital status:      Spouse name: Not on file    Number of children: Not on file    Years of education: Not on file    Highest education level: Not on file   Occupational History    Not on file   Social Needs    Financial resource strain: Not on file    Food insecurity     Worry: Not on file     Inability: Not on file   [employer] needs     Medical: Not on file     Non-medical: Not on file   Tobacco Use    Smoking status: Never Smoker    Smokeless tobacco: Never Used   Substance and Sexual Activity    Alcohol use: No     Alcohol/week: 0.0 standard drinks    Drug use: No    Sexual activity: Never   Lifestyle    Physical activity     Days per week: Not on file     Minutes per session: Not on file    Stress: Not on file   Relationships    Social connections     Talks on phone: Not on file     Gets together: Not on file     Attends Quaker service: Not on file     Active member of club or organization: Not on file     Attends meetings of clubs or organizations: Not on file     Relationship status: Not on file    Intimate partner violence     Fear of current or ex partner: Not on file     Emotionally abused: Not on file     Physically abused: Not on file     Forced sexual activity: Not on file

## 2020-05-09 NOTE — PROGRESS NOTES
[address]     Hospitalist Progress Note-Internal Medicine. [facility] Renal//Med Surg       Patient: Sanjuana Junior  Unit/Bed: 9131/8142-71  YOB: 1945  MRN: [id number]  Acct: [de-identified]   [address] [facility] laboratory test [R89.9]  Admit Date:  5/8/2020  Hospital Day: 1    Subjective:    Patient sent to the hospital for admission due to elevated outpatient serum aluminium levels. He serum aluminum  level was 148 and 108.0 on 4/28/2020. He is feeling fine. Denies any symptoms. Patient Seen, Chart, Labs, Radiology studies, and Consults reviewed. Objective:   /60   Pulse 68   Temp 98.9 °F (37.2 °C) (Oral)   Resp 16   Ht 5' 11\" (1.803 m)   Wt 167 lb 15.9 oz (76.2 kg)   SpO2 100%   BMI 23.43 kg/m²       Intake/Output Summary (Last 24 hours) at 5/9/2020 2047  Last data filed at 5/9/2020 1355  Gross per 24 hour   Intake 370 ml   Output 1500 ml   Net -1130 ml     Review of Systems   Constitutional: Positive for fever. Negative for activity change, appetite change, chills, diaphoresis, fatigue and unexpected weight change. HENT: Negative for congestion, drooling, ear pain, hearing loss, postnasal drip, sinus pressure, sinus pain, sore throat and trouble swallowing. Eyes: Negative for photophobia, pain, redness, itching and visual disturbance. Respiratory: Negative for apnea, cough, chest tightness, shortness of breath and wheezing. Cardiovascular: Negative for chest pain, palpitations and leg swelling. Gastrointestinal: Negative for abdominal distention, abdominal pain, anal bleeding, constipation, diarrhea, nausea and vomiting. Endocrine: Negative for heat intolerance, polydipsia and polyuria. Genitourinary: Negative for decreased urine volume, difficulty urinating, discharge, dysuria, enuresis, flank pain, genital sores, hematuria, penile pain, penile swelling, testicular pain and urgency.

## 2020-05-09 NOTE — CARE COORDINATION
Case Management Initial Discharge Plan  Fran Beans,             Met with:patient to discuss discharge plans. Information verified: address, contacts, phone number, , insurance Yes  Emergency Contact/Next of Kin name & number:   PCP: Renate Yoon MD  Date of last visit: patient is long term placement at [facility] Provider: medicare and [place]    Discharge Planning    Living Arrangements:  Other (Comment)(nursing home residents)   Support Systems:  Friends/Neighbors, Family Members    Home has 1 stories  No  stairs to climb to get into front door, na stairs to climb to reach second floor  Location of bedroom/bathroom in home na    Patient able to perform ADL's:Assisted    Current Services (outpatient & in home) is at [facility]  DME equipment: per SNF, wheelchair and O2 prn at [facility]  DME provider: as above      Potential Assistance Needed:  N/A    Patient agreeable to home care: No   No  Freedom of choice provided:  no    Prior SNF/Rehab Placement and Facility: is at [address] [place] [address] to SNF/Rehab: Yes  [place] of choice provided: n/a   Evaluation: n/a    Expected Discharge date:  20  Patient expects to be discharged to:  [facility]  Follow Up Appointment: Best Day/ Time: Monday PM    Transportation provider: shellie  Transportation arrangements needed for discharge: yes    Readmission Risk              Risk of Unplanned Readmission:        18             Does patient have a readmission risk score greater than 14?: Yes  If yes, follow-up appointment must be made within 7 days of discharge.      Goals of Care: able to participate in self care      Discharge Plan: Return to [facility], bed hold is permanent resident          Electronically signed by Airam Rahman RN on 20 at 12:19 PM EDT

## 2020-05-10 NOTE — PROGRESS NOTES
three times a week On MWF before dialysys  [DISCONTINUED] midodrine (PROAMATINE) 5 MG tablet, Take 10 mg by mouth three times a week If needed during dialysis  ipratropium (ATROVENT) 0.02 % nebulizer solution, Take 0.5 mg by nebulization every 8 hours  Multiple Vitamins-Minerals (THERAPEUTIC MULTIVITAMIN-MINERALS) tablet, Take 1 tablet by mouth daily  [DISCONTINUED] HUMALOG 100 UNIT/ML injection vial, Inject into the skin 4 times daily (after meals and at bedtime) Per Sliding Scale as follows: 151-200 = 1 unit 201-250 = 2 units 251-300 = 3 units 301-350 = 4 units 351-400 = 5 units >400 = notify physician  allopurinol (ZYLOPRIM) 100 MG tablet, Take 100 mg by mouth daily  [DISCONTINUED] midodrine (PROAMATINE) 5 MG tablet, Take 5 mg by mouth three times a week One time per day every Mon, Wed, Fri for low pressure   [DISCONTINUED] senna (SENOKOT) 8.6 MG tablet, Take 2 tablets by mouth daily as needed for Constipation  metoprolol succinate (TOPROL XL) 25 MG extended release tablet, Take 1 tablet by mouth daily  carboxymethylcellulose 1 % ophthalmic solution, Place 1 drop into both eyes 3 times daily as needed for Dry Eyes  sertraline (ZOLOFT) 50 MG tablet, Take 50 mg by mouth daily  sucralfate (CARAFATE) 1 GM tablet, Take 1 g by mouth 3 times daily (before meals)  calcium carbonate (TUMS) 500 MG chewable tablet, Take 2 tablets by mouth every 6 hours as needed for Heartburn  diclofenac sodium 1 % GEL, Apply topically 3 times daily Apply between last two toes and to arch of right foot  [DISCONTINUED] sevelamer (RENVELA) 800 MG tablet, Take 2 tablets by mouth every 8 hours as needed For snacks  [DISCONTINUED] sevelamer (RENVELA) 800 MG tablet, Take 4 tablets by mouth 3 times daily (with meals)  [DISCONTINUED] tetrahydrozoline 0.05 % ophthalmic solution, Place 1 drop into both eyes 2 times daily as needed (irritation/dryness)  fluticasone propionate (FLOVENT DISKUS) 100 MCG/BLIST AEPB inhaler, Inhale 2 puffs into the lungs 2 times daily   [DISCONTINUED] NOVOFINE AUTOCOVER 30G X 8 MM MISC,   [DISCONTINUED] insulin glargine (LANTUS) 100 UNIT/ML injection pen, Inject 10 Units into the skin nightly   Nutritional Supplements (BOOST GLUCOSE CONTROL) LIQD, Take 8 oz by mouth nightly   senna-docusate (PERICOLACE) 8.6-50 MG per tablet, Take 2 tablets by mouth four times a week On non-dialysis days (Sun, Tues, Thurs, Sat)  gabapentin (NEURONTIN) 100 MG capsule, Take 1 capsule by mouth 3 times daily  [DISCONTINUED] amLODIPine (NORVASC) 2.5 MG tablet, Take 1 tablet by mouth daily  [DISCONTINUED] polyethylene glycol (GLYCOLAX) powder, Take 17 g by mouth daily as needed (constipation)   hydrALAZINE (APRESOLINE) 10 MG tablet, Take 10 mg by mouth 3 times daily   aspirin 81 MG chewable tablet, Take 81 mg by mouth every morning     Current Medications:     Scheduled Meds:    allopurinol  100 mg Oral Daily    amLODIPine  2.5 mg Oral Daily    aspirin  81 mg Oral QAM    donepezil  5 mg Oral Nightly    fluticasone  2 puff Inhalation BID    gabapentin  100 mg Oral TID    hydrALAZINE  10 mg Oral TID    insulin glargine  10 Units Subcutaneous Nightly    metoprolol succinate  25 mg Oral Daily    senna  2 tablet Oral Nightly    sertraline  50 mg Oral Daily    sennosides-docusate sodium  2 tablet Oral Once per day on Sun Tue Thu Sat    sevelamer  800 mg Oral TID WC    sodium chloride flush  10 mL Intravenous 2 times per day    heparin (porcine)  5,000 Units Subcutaneous 3 times per day     Input/Output:       I/O last 3 completed shifts:   In: 370   Out: 1500     Vital Signs:   Temperature:  Temp: 98.7 °F (37.1 °C)  TMax:   Temp (24hrs), Av.6 °F (37 °C), Min:98.3 °F (36.8 °C), Max:98.9 °F (37.2 °C)    Respirations:  Resp: 18  Pulse:   Pulse: 63  BP:    BP: 117/61  BP Range: Systolic (61NKE), JDJ:054 , Min:98 , GQH:916       Diastolic (97QPE), IEF:04, Min:50, Max:73      Physical Examination:     Constitutional:  Speaking in full sentences, no Output, Daily weight   3. Low Potassium, Low phosphorus and low salt diet. Fluids restricted to 1500ml/day. 4. Possible Chelation therapy. 5. Will try to call again and obtain copy of aluminum lab results from [facility] along with dialysate/water being used during dialysis. Plan to be discussed with Dr Barker Safe   Please ensure that patient is on a renal diet/TF. Avoid nephrotoxic drugs/contrast exposure. We will continue to follow along with you. Florencia Espino MD.  Attending Physician Statement  I have discussed the care of Jeancarlos Parnell, including pertinent history and exam findings with the resident/fellow. I have reviewed the key elements of all parts of the encounter with the resident/fellow. I have seen and examined the patient with the resident/fellow. I agree with the assessment and plan and status of the problem list as documented. Addiitionally I recommend   To get a complete medication list from last 1 year from his nursing home  2. Get actual reports of aluminum levels from dialysis center at nursing home as well as from his old dialysis center. 3.  Continue to hold Carafate.   Simon Madrigal

## 2020-05-10 NOTE — CARE COORDINATION
Patient/family seen: Yes       Informed patient/family of [facility]PCI-A Medical Bundle Program with potential outreach by either Care Transitions Team or [facility] Team based on hospital admission and location.        BPCI-A Notification Letter given: Yes         Current discharge plan: Plan is to return to [facility]

## 2020-05-10 NOTE — PROGRESS NOTES
tenderness. There is no right CVA tenderness, left CVA tenderness, guarding or rebound. Hernia: No hernia is present. Musculoskeletal: Normal range of motion. General: No swelling, tenderness, deformity or signs of injury. Right lower leg: No edema. Left lower leg: No edema. Lymphadenopathy:      Cervical: No cervical adenopathy. Skin:     General: Skin is warm and dry. Coloration: Skin is not jaundiced or pale. Findings: No bruising, erythema, lesion or rash. Neurological:      General: No focal deficit present. Mental Status: He is alert and oriented to person, place, and time. Cranial Nerves: No cranial nerve deficit. Sensory: No sensory deficit. Motor: No weakness. Coordination: Coordination normal.      Gait: Gait normal.      Deep Tendon Reflexes: Reflexes normal.   Psychiatric:         Mood and Affect: Mood normal.         Behavior: Behavior normal.         Thought Content:  Thought content normal.         Judgment: Judgment normal.     Medications:    allopurinol  100 mg Oral Daily    amLODIPine  2.5 mg Oral Daily    aspirin  81 mg Oral QAM    donepezil  5 mg Oral Nightly    fluticasone  2 puff Inhalation BID    gabapentin  100 mg Oral TID    hydrALAZINE  10 mg Oral TID    insulin glargine  10 Units Subcutaneous Nightly    metoprolol succinate  25 mg Oral Daily    senna  2 tablet Oral Nightly    sertraline  50 mg Oral Daily    sennosides-docusate sodium  2 tablet Oral Once per day on Sun Tue Thu Sat    sevelamer  800 mg Oral TID WC    sodium chloride flush  10 mL Intravenous 2 times per day    heparin (porcine)  5,000 Units Subcutaneous 3 times per day     Continuous Infusions:   dextrose       PRN Meds:sodium chloride, sodium chloride, midodrine, polyvinyl alcohol, tetrahydrozoline, sodium chloride flush, acetaminophen **OR** acetaminophen, polyethylene glycol, promethazine **OR** ondansetron, nicotine, glucose, dextrose, glucagon (rDNA), dextrose, oxyCODONE-acetaminophen **AND** oxyCODONE    Data:  CBC:   Recent Labs     05/08/20 1914 05/09/20 0620   WBC 9.8 9.1   RBC 4.03* 3.67*   HGB 12.5* 11.5*   HCT 38.8* 35.2*   MCV 96.3 95.9   RDW 15.3* 14.9*   PLT See Reflexed IPF Result See Reflexed IPF Result     BMP:   Recent Labs     05/07/20 2205 05/08/20 1914 05/09/20 0620   NA  --  137 139   K  --  3.3* 3.4*   CL  --  97* 100   CO2  --  27 27   BUN  --  24* 26*   CREATININE 7.88* 6.72* 7.82*     BNP: No results for input(s): BNP in the last 72 hours. PT/INR:No results for input(s): PROTIME, INR in the last 72 hours. APTT: No results for input(s): APTT in the last 72 hours. CARDIAC ENZYMES: No results for input(s): CKMB, CKMBINDEX, TROPONINT in the last 72 hours. Invalid input(s): CKTOTAL;3    FASTING LIPID PANEL:  Lab Results   Component Value Date    CHOL 88 05/27/2019    HDL 28 (L) 05/27/2019    TRIG 163 (H) 05/27/2019       LIVER PROFILE:   Recent Labs     05/08/20 1914   AST 24   ALT 18   BILITOT 0.36   ALKPHOS 110      Results for Zurdo Ram (MRN [id number]) as of 5/9/2020 08:30   Ref. Range 5/8/2020 19:14   Iron Latest Ref Range: 59 - 158 ug/dL 47 (L)   Iron Saturation Latest Ref Range: 20 - 55 % 34   UIBC Latest Ref Range: 112 - 347 ug/dL 90 (L)   TIBC Latest Ref Range: 250 - 450 ug/dL 137 (L)     ABG. None. URINALYSIS. None. SEROLOGY  Results for Zurdo Ram (MRN [id number]) as of 5/9/2020 08:30   Ref. Range 9/7/2018 17:30 11/19/2018 12:36 11/20/2018 07:11   Hep B S Ab Latest Ref Range: <10 mIU/mL  13.13 (H) 11.63 (H)   Hepatitis B Surface Ag Latest Ref Range: NR  NONREACTIVE NONREACTIVE    Hep B Core Ab, IgM Latest Ref Range: NR  NONREACTIVE         TUMOR MARKER. None. MICROBIOLOGY   Results for Zurdo Ram (MRN [id number]) as of 5/9/2020 20:35   Ref. Range 5/9/2020 09:34   SARS-CoV-2 Latest Ref Range: Not Detected  Not Detected     HISTOPATHOLOGY. None. TOXICOLOGY. None.     ENDOSCOPE

## 2020-05-10 NOTE — PROGRESS NOTES
Hypertension, Lumbar discitis, Osteoarthritis, Osteomyelitis of lumbar spine ([facility] [address] Perianal abscess, Protein-calorie malnutrition ([facility] [address] Proteinuria, Thrombocytopenia ([facility] [address] and Type II or unspecified type diabetes mellitus without mention of complication, not stated as uncontrolled. has a past surgical history that includes Coronary artery bypass graft; shoulder surgery; hip surgery; joint replacement; other surgical history (6/11/15); bone biopsy (6/29/15); other surgical history (12/30/15); and Carpal tunnel release. Restrictions  Restrictions/Precautions  Restrictions/Precautions: Fall Risk, Contact Precautions  Required Braces or Orthoses?: No  Position Activity Restriction  Other position/activity restrictions: up with assist    Subjective   General  Patient assessed for rehabilitation services?: Yes  Family / Caregiver Present: No  General Comment  Comments: RN ok'd pt for OT star this AM. Pt agreeable to session and cooperative throughout  Pain Assessment  Pain Assessment: 0-10  Pain Level: 4  Pain Type: Acute pain  Pain Location: Buttocks  Pain Orientation: Upper;Left  Pain Radiating Towards: left hip  Pain Frequency: Intermittent  Pain Onset: On-going  Vital Signs  Level of Consciousness: Alert  Oxygen Therapy  O2 Device: Nasal cannula  O2 Flow Rate (L/min): 3 L/min  Patient Observation  Observations: pt reports is not typically on O2     Social/Functional History  Social/Functional History  Lives With: Other (comment)  Type of Home: Facility(From [facility] ~[facility] years)  Home Layout: One level  Bathroom Shower/Tub: Walk-in shower(pt reports has to be wheeled down to shower room)  Home Equipment: Wheelchair-electric, Rolling walker  ADL Assistance: Needs assistance(meals are brought to room right now.  Pt reports recieves mod assist for ADLs)  Homemaking Responsibilities: No  Ambulation Assistance: Needs assistance  Transfer Assistance: Needs assistance(uses walker or motorized w/c)  Active : No  Leisure & Hobbies: pt enjoys painting  Additional Comments: pt reports was getting OT/PT at [facility] however recently has not been. Pt with some inconsistencies in answering questions, so is a poor historian for soc/functional hx at this time       Objective   Vision: Impaired  Vision Exceptions: Wears glasses at all times  Hearing: Exceptions to [facility]  Hearing Exceptions: Hard of hearing/hearing concerns         Balance  Sitting Balance: Stand by assistance  Standing Balance: Minimal assistance  Standing Balance  Time: ~2 minutes  Activity: with RW; prior to and after functional mobility  Comment: severely flexed posture  Functional Mobility  Functional - Mobility Device: Rolling Walker  Activity: Other  Assist Level: Minimal assistance  Functional Mobility Comments: Pt completed functional mobility from bed <> door with use of RW and Min A X2 as pt completes mobility with severely flexed posture and shuffled, short steps     ADL  Feeding: Independent;Setup(pt finishing breakfast upon OT entrance)  Grooming: Minimal assistance;Setup;Verbal cueing; Increased time to complete  UE Bathing: Moderate assistance;Setup;Verbal cueing; Increased time to complete  LE Bathing: Moderate assistance;Setup;Verbal cueing; Increased time to complete  UE Dressing: Moderate assistance;Setup;Verbal cueing; Increased time to complete  LE Dressing: Setup;Verbal cueing; Increased time to complete;Maximum assistance(Max A to don B shoes; pt reports nursing staff typically completes )  Toileting: Maximum assistance;Setup; Increased time to complete  Tone RUE  RUE Tone: Normotonic  Tone LUE  LUE Tone: Normotonic  Coordination  Movements Are Fluid And Coordinated: Yes    Bed mobility  Rolling to Right: Stand by assistance  Supine to Sit: Minimal assistance  Sit to Supine: Minimal assistance  Scooting: Minimal assistance  Comment: Min A for trunk/BLE progression; pt demo good log rolling technique  Transfers  Sit to stand: Minimal assistance  Stand to sit: Minimal assistance  Transfer Comments: Min A with use of RW; Min VCs for proper hand placement    Cognition  Overall Cognitive Status: Exceptions  Following Commands: Follows multistep commands with increased time; Follows multistep commands with repitition  Problem Solving: Assistance required to correct errors made  Initiation: Requires cues for some  Sequencing: Requires cues for some       Sensation  Overall Sensation Status: Impaired(pt reports neuropathy in B hands and feet)        LUE AROM (degrees)  LUE AROM : WFL  Left Hand AROM (degrees)  Left Hand AROM: WFL  RUE PROM (degrees)  RUE PROM: WFL  RUE AROM (degrees)  RUE AROM : Exceptions  RUE General AROM: pt reports prior injury to R shoulder; all other joints WFL  R Shoulder Flexion 0-180: 0-75  Right Hand AROM (degrees)  Right Hand AROM: WFL  LUE Strength  Gross LUE Strength: Exceptions to [facility]  L Hand General: 4-/5  LUE Strength Comment: grossly 4-/5  RUE Strength  Gross RUE Strength: Exceptions to [facility]  R Shoulder Flex: 3/5  R Elbow Flex: 4-/5  R Elbow Ext: 4-/5  R Wrist Flex: 4-/5  R Wrist Ext: 4-/5  R Hand General: 4-/5                   Plan   Plan  Times per week: 3-5 x/wk  Current Treatment Recommendations: Balance Training, Functional Mobility Training, Endurance Training, Safety Education & Training, Patient/Caregiver Education & Training, Equipment Evaluation, Education, & procurement, Self-Care / ADL    [facility] Score        [facility] Inpatient Daily Activity Raw Score: 15 (05/10/20 1129)  AM-[facility] Inpatient ADL T-Scale Score : 34.69 (05/10/20 1129)  ADL Inpatient CMS 0-100% Score: 56.46 (05/10/20 1129)  ADL Inpatient CMS G-Code Modifier : CK (05/10/20 1129)    Goals  Short term goals  Time Frame for Short term goals: By discharge, pt will:  Short term goal 1: Demo CGA with use of LRD for functional transfers and functional mobility  Short term goal 2: Demo SBA with setup for UB ADLs and grooming tasks  Short term goal 3: Demo Min A with setup for LB ADLs and toileting tasks  Short term goal 4: Demo 5+ minutes dynamic standing task to increase safety and independence with functional ADLs/IADLs  Short term goal 5:  Increase activity tolerance to 30+ minutes for increased endurance for ADL/IADL performance       Therapy Time   Individual Concurrent Group Co-treatment   Time In 0809         Time Out 0831         Minutes 22         Timed Code Treatment Minutes: [address] [facility], OTR/L

## 2020-05-10 NOTE — PROGRESS NOTES
assessed for rehabilitation services?: Yes  Response To Previous Treatment: Not applicable  Family / Caregiver Present: No  Follows Commands: Within Functional Limits  Pain Screening  Patient Currently in Pain: Yes  Pain Assessment  Pain Assessment: 0-10  Pain Level: 4  Pain Location: Buttocks  Pain Orientation: Left;Upper  Vital Signs  Patient Currently in Pain: Yes  Orientation  Orientation  Overall Orientation Status: Within Functional Limits  Social/Functional History  Social/Functional History  Lives With: Other (comment)  Type of Home: Facility(From [facility] ~5 years)  Home Layout: One level  Bathroom Shower/Tub: Walk-in shower(pt reports has to be wheeled down to shower room)  Home Equipment: Wheelchair-electric, Rolling walker  ADL Assistance: Needs assistance(meals are brought to room right now. Pt reports recieves mod assist for ADLs)  Homemaking Responsibilities: No  Ambulation Assistance: Needs assistance  Transfer Assistance: Needs assistance(uses walker or motorized w/c)  Active : No  Leisure & Hobbies: pt enjoys painting  Additional Comments: pt reports was getting OT/PT at [facility] however recently has not been.  Pt with some inconsistencies in answering questions, so is a poor historian for soc/functional hx at this time  Cognition      Objective     AROM RLE (degrees)  RLE AROM: WFL  AROM LLE (degrees)  LLE AROM : WFL  AROM RUE (degrees)  RUE AROM : WFL  AROM LUE (degrees)  LUE AROM : WFL  Strength RLE  Strength RLE: WFL  Strength LLE  Strength LLE: WFL  Strength RUE  Strength RUE: WFL  Strength LUE  Strength LUE: WFL     Sensation  Overall Sensation Status: Impaired  Bed mobility  Supine to Sit: Minimal assistance  Sit to Supine: Minimal assistance  Scooting: Minimal assistance  Transfers  Sit to Stand: Minimal Assistance  Stand to sit: Minimal Assistance  Ambulation  Ambulation?: Yes  Ambulation 1  Surface: level tile  Device: Rolling Walker  Assistance: Minimal

## 2020-05-11 NOTE — CARE COORDINATION
Received call from Britany Zamora at [facility]  She stated they will accept pt back and he can go to [facility] instead of having dialysis at [address] [place] [address]  However, pt will need to move out of their long-term unit to a different unit and they will contact pt's dtr to make sure she is ok with this    Ref made to [address] call from [facility] at [facility]  She stated they spoke with pt's dtr and she was agreeable to pt switching rooms at [address] [place] [address]

## 2020-05-11 NOTE — PROGRESS NOTES
Occupational Therapy    Occupational Therapy Not Seen Note    DATE: 2020  Name: Debbie Bonilla  : 1945  MRN: [id number]    Patient not available for Occupational Therapy due to:    Pt off unit to Hemodialysis    Electronically signed by KELLY Dunlap on 2020 at 2:44 PM

## 2020-05-11 NOTE — DISCHARGE SUMMARY
None.     TOXICOLOGY. None.     ENDOSCOPE STUDIES. None.     PROCEDURES. None.     RADIOLOGY. Echocardiogram-05/23/2019. CONCLUSION:     Summary     Contrast (Definity) was utilized on this technically difficult study.     Left ventricle is normal in size and wall thickness.     Global left ventricular systolic function is normal.      Estimated LV EF 60-65 %.     Both atria are normal in size.     Normal right ventricular size and function.     Mild mitral regurgitation.     Mild tricuspid regurgitation.     CT head/chest/abdomen/pelvis-05/22/2019. IMPRESSION:  Head CT:   No acute intracranial abnormalities.     Chest CT:   Mild infiltrate in the lingula.  Mild atelectasis at the lung bases.     Abdomen and pelvis CT:   No acute findings.       Cholelithiasis.       Old pathologic fracture of L3 without convincing evidence for acute discitis/osteomyelitis.     Lumbar spine MRI-01/02/2016. IMPRESSION:   1. Discitis/osteomyelitis at L2/3 with destructive changes and loss of height within L2 and L3 vertebral       bodies appears similar compared to prior exam.          Fluid collection anterior aspect L3 vertebral body similar compared to prior exam, abscess is not excluded. Paraspinal edema and edema throughout both psoas muscles and posterior paraspinal soft tissues       appears similar compared to prior exam.     2.  New edema and fluid L3/4 disc space extending into the superior endplate and body of L4 with new edema      throughout the L4 vertebral body concerning for extension of infectious process.     3. Small amount of fluid within the L4/5 and L5/S1 disc spaces with mild edema in the adjacent endplates       similar compared to prior exam.          Although this may be on a degenerative basis, additional areas of infectious process not excluded.     4. See above for description of degenerative changes which are similar compared to prior exam.         Final report electronically signed by mouth every 12 hours as needed for Pain. insulin glargine (LANTUS SOLOSTAR) 100 UNIT/ML injection pen  Inject into the skin daily inject 14 unit subcutaneously one time a day for DM             ipratropium (ATROVENT) 0.02 % nebulizer solution  Take 0.5 mg by nebulization every 8 hours             Lidocaine 0.5 % AERO  Apply topically Apply to dialysis access topically one time a day every Mon, Wed, Fri             metoprolol succinate (TOPROL XL) 25 MG extended release tablet  Take 1 tablet by mouth daily             Multiple Vitamins-Minerals (THERAPEUTIC MULTIVITAMIN-MINERALS) tablet  Take 1 tablet by mouth daily             Nutritional Supplements (BOOST GLUCOSE CONTROL) LIQD  Take 8 oz by mouth nightly              oxyCODONE-acetaminophen (PERCOCET) 7.5-325 MG per tablet  Take 1 tablet by mouth every 6 hours as needed for Pain.             polyvinyl alcohol (LIQUIFILM TEARS) 1.4 % ophthalmic solution  1 drop as needed             senna-docusate (PERICOLACE) 8.6-50 MG per tablet  Take 2 tablets by mouth four times a week On non-dialysis days (Sun, Tues, Thurs, Sat)             sertraline (ZOLOFT) 50 MG tablet  Take 50 mg by mouth daily                 Time Spent on discharge is more than 30 minutes in the examination, evaluation, counseling and review of medications and discharge plan. Signed: Thank you Deidre Mckinney MD for the opportunity to be involved in this patient's care.     Electronically signed by Alondra Weinberg MD on 5/11/2020 at 5:56 PM

## 2020-05-11 NOTE — PROGRESS NOTES
Dialysis Post Treatment Note  Vitals:    05/11/20 1205   BP: (!) 137/98   Pulse: 63   Resp: 18   Temp: 97.4 °F (36.3 °C)   SpO2:      Pre-Weight = 78.3kg  Post-weight = Weight: 167 lb 12.3 oz (76.1 kg)  Total Liters Processed = Total Liters Processed (l/min): 96.3 l/min  Rinseback Volume (mL) = Rinseback Volume (ml): 310 ml  Net Removal (mL) = 2210  Type of access used= Right AVF  Length of treatment= 3.5 hours    Pt tolerated treatment well with the help of BP support meds. No complications. Pt denies pain or discomfort by end of treatment.

## 2020-05-12 NOTE — PROGRESS NOTES
Nephrology Progress Note        Subjective: Pt is stable on dialysis. Hd yesterday  Doing well, no complaints. No pain in his extremities legs muscles. Calcium levels have been stable. No.  Of confusion. Patient is awake alert oriented. Hemoglobin has been stable. Aluminum level was in the 100-112 range. This is increased more so since she is been getting dialysis at [facility]. Prior to that in [facility] his levels were in the 20-50 range. No definite exogenous ingestion of aluminum could be found based on reviewing his medication list.  Does not take any aluminum based binders. AV fistula works well. Received desferoxime chelation yesterday. No other exogenous source of aluminum identified other than Carafate which has been discontinued. Patient is completely asymptomatic. Plan for discharge today once hepatitis B levels are available. He will get dialysis at the [facility] facility. Will be discharged to [facility] today.     Objective:  CURRENT TEMPERATURE:  Temp: 97.5 °F (36.4 °C)  MAXIMUM TEMPERATURE OVER 24HRS:  Temp (24hrs), Av °F (36.7 °C), Min:97.5 °F (36.4 °C), Max:98.4 °F (36.9 °C)    CURRENT RESPIRATORY RATE:  Resp: 16  CURRENT PULSE:  Pulse: 77  CURRENT BLOOD PRESSURE:  BP: 129/63  24HR BLOOD PRESSURE RANGE:  Systolic (71TKO), LWL:774 , Min:126 , DGS:275   ; Diastolic (63HXE), GPU:01, Min:63, Max:65    24HR INTAKE/OUTPUT:    No intake or output data in the 24 hours ending 20 1320  Patient Vitals for the past 96 hrs (Last 3 readings):   Weight   20 0455 171 lb 15.3 oz (78 kg)   20 1205 167 lb 12.3 oz (76.1 kg)   20 0815 172 lb 9.9 oz (78.3 kg)         Physical Exam:  General appearance:Awake, alert, in no acute distress  Skin: warm and dry, no rash or erythema  Eyes: conjunctivae normal and sclera anicteric  ENT:no thrush no pharyngeal congestion   Neck:  No JVD, No Thyromegaly  Pulmonary: clear to auscultation and no wheezing or rhonchi Cardiovascular: normal S1 and S2. NO rubs and NO murmur.  No S3 OR S4   Abdomen: soft nontender, bowel sounds present, no organomegaly,  no ascites   Extremities: no cyanosis, clubbing or edema     Access:  previous  Right AV fistula    Current Medications:    deferoxamine (DESFERAL) 350 mg in sodium chloride 0.9 % 100 mL infusion, Weekly - Monday  0.9 % sodium chloride bolus, PRN  0.9 % sodium chloride bolus, PRN  midodrine (PROAMATINE) tablet 10 mg, PRN  allopurinol (ZYLOPRIM) tablet 100 mg, Daily  amLODIPine (NORVASC) tablet 2.5 mg, Daily  aspirin chewable tablet 81 mg, QAM  donepezil (ARICEPT) tablet 5 mg, Nightly  fluticasone (FLOVENT HFA) 110 MCG/ACT inhaler 2 puff, BID  gabapentin (NEURONTIN) capsule 100 mg, TID  insulin glargine (LANTUS) injection vial 10 Units, Nightly  metoprolol succinate (TOPROL XL) extended release tablet 25 mg, Daily  polyvinyl alcohol (LIQUIFILM TEARS) 1.4 % ophthalmic solution 1 drop, PRN  senna (SENOKOT) tablet 17.2 mg, Nightly  sertraline (ZOLOFT) tablet 50 mg, Daily  sennosides-docusate sodium (SENOKOT-S) 8.6-50 MG tablet 2 tablet, Once per day on Sun Tue Thu Sat  sevelamer (RENVELA) tablet 800 mg, TID WC  tetrahydrozoline 0.05 % ophthalmic solution 1 drop, BID PRN  sodium chloride flush 0.9 % injection 10 mL, 2 times per day  sodium chloride flush 0.9 % injection 10 mL, PRN  acetaminophen (TYLENOL) tablet 650 mg, Q6H PRN    Or  acetaminophen (TYLENOL) suppository 650 mg, Q6H PRN  polyethylene glycol (GLYCOLAX) packet 17 g, Daily PRN  promethazine (PHENERGAN) tablet 12.5 mg, Q6H PRN    Or  ondansetron (ZOFRAN) injection 4 mg, Q6H PRN  nicotine (NICODERM CQ) 21 MG/24HR 1 patch, Daily PRN  heparin (porcine) injection 5,000 Units, 3 times per day  glucose (GLUTOSE) 40 % oral gel 15 g, PRN  dextrose 50 % IV solution, PRN  glucagon (rDNA) injection 1 mg, PRN  dextrose 5 % solution, PRN  oxyCODONE-acetaminophen (PERCOCET) 5-325 MG per tablet 1 tablet, Q6H PRN    And  oxyCODONE

## 2020-05-12 NOTE — PROGRESS NOTES
smokeless tobacco. He reports that he does not drink alcohol or use drugs. Family History:   Family History   Problem Relation Age of Onset    Stroke Mother     Heart Attack Mother     Heart Attack Father     Diabetes Sister     Diabetes Brother        Vitals:  /63   Pulse 77   Temp 97.5 °F (36.4 °C)   Resp 16   Ht 5' 11\" (1.803 m)   Wt 171 lb 15.3 oz (78 kg) Comment: bed zeroed  SpO2 99%   BMI 23.98 kg/m²   Temp (24hrs), Av.9 °F (36.6 °C), Min:97.4 °F (36.3 °C), Max:98.4 °F (36.9 °C)    Recent Labs     20  1301 20  1636 20  0726   POCGLU 131* 290* 282* 94       I/O (24Hr): Intake/Output Summary (Last 24 hours) at 2020 1156  Last data filed at 2020 1205  Gross per 24 hour   Intake 310 ml   Output --   Net 310 ml       Labs:  Hematology:  Recent Labs     20  0549   WBC 8.0   RBC 3.52*   HGB 10.8*   HCT 34.2*   MCV 97.2   MCH 30.7   MCHC 31.6   RDW 14.6*   *   MPV 11.2     Chemistry:  Recent Labs     20  0549      K 4.1      CO2 23   GLUCOSE 104*   BUN 35*   CREATININE 8.23*   ANIONGAP 14   LABGLOM 6*   GFRAA 8*   CALCIUM 7.6*     Recent Labs     05/10/20  2241 20  0737 20  1301 20  1636 20  0726   POCGLU 212* 91 131* 290* 282* 94     ABG:No results found for: POCPH, PHART, PH, POCPCO2, OJV4TBY, PCO2, POCPO2, PO2ART, PO2, POCHCO3, CAS6IFU, HCO3, NBEA, PBEA, BEART, BE, THGBART, THB, AZV3QVU, CCPD4TUT, P1WVROSO, O2SAT, FIO2  Lab Results   Component Value Date/Time    SPECIAL ROOM 104 2020 02:30 PM    SPECIAL NOT REPORTED 2020 02:30 PM     Lab Results   Component Value Date/Time    CULTURE NORMAL RESPIRATORY ROBERT LIGHT GROWTH 2019 04:30 PM    CULTURE PSEUDOMONAS AERUGINOSA LIGHT GROWTH (A) 2019 04:30 PM       Radiology:  No results found.     Physical Examination:        General appearance:  alert, cooperative and no distress  Mental Status:  oriented to

## 2020-05-13 NOTE — FLOWSHEET NOTE
DATE: 2020    NAME: Cierra Isaacs  MRN: [id number]   : 1945    Patient not seen this date for Physical Therapy due to:  [] Blood transfusion in progress  [x] Hemodialysis  []  Patient Declined  [] Spine Precautions   [] Strict Bedrest  [] Surgery/ Procedure  [] Testing      [] Other        [] PT being discontinued at this time. Patient independent. No further needs. [] PT being discontinued at this time as the patient has been transferred to palliative care. No further needs.     Ailyn Oshea, PTA

## 2020-05-13 NOTE — DISCHARGE SUMMARY
known as:  DILAUDID     sucralfate 1 GM tablet  Commonly known as:  CARAFATE           Where to Get Your Medications      You can get these medications from any pharmacy    Bring a paper prescription for each of these medications  · oxyCODONE-acetaminophen 7.5-325 MG per tablet         No discharge procedures on file. Time Spent on discharge is  32 mins in patient examination, evaluation, counseling as well as medication reconciliation, prescriptions for required medications, discharge plan and follow up. Electronically signed by   Isamar Lee MD  5/13/2020  1:00 PM      Thank you Dr. Radha Hdez MD for the opportunity to be involved in this patient's care.

## 2020-05-13 NOTE — PROGRESS NOTES
injection pen, Inject 10 Units into the skin nightly   Nutritional Supplements (BOOST GLUCOSE CONTROL) LIQD, Take 8 oz by mouth nightly   senna-docusate (PERICOLACE) 8.6-50 MG per tablet, Take 2 tablets by mouth four times a week On non-dialysis days (Sun, Tues, Thurs, Sat)  gabapentin (NEURONTIN) 100 MG capsule, Take 1 capsule by mouth 3 times daily  [DISCONTINUED] amLODIPine (NORVASC) 2.5 MG tablet, Take 1 tablet by mouth daily  [DISCONTINUED] polyethylene glycol (GLYCOLAX) powder, Take 17 g by mouth daily as needed (constipation)   hydrALAZINE (APRESOLINE) 10 MG tablet, Take 10 mg by mouth 3 times daily   aspirin 81 MG chewable tablet, Take 81 mg by mouth every morning     Current Medications:     Scheduled Meds:    deferoxamine (DESFERAL) IV infusion  350 mg Intravenous Weekly - Monday    allopurinol  100 mg Oral Daily    amLODIPine  2.5 mg Oral Daily    aspirin  81 mg Oral QAM    donepezil  5 mg Oral Nightly    fluticasone  2 puff Inhalation BID    gabapentin  100 mg Oral TID    insulin glargine  10 Units Subcutaneous Nightly    metoprolol succinate  25 mg Oral Daily    senna  2 tablet Oral Nightly    sertraline  50 mg Oral Daily    sennosides-docusate sodium  2 tablet Oral Once per day on Sun Tue Thu Sat    sevelamer  800 mg Oral TID WC    sodium chloride flush  10 mL Intravenous 2 times per day    heparin (porcine)  5,000 Units Subcutaneous 3 times per day     Continuous Infusions:    dextrose       PRN Meds:  sodium chloride, sodium chloride, midodrine, polyvinyl alcohol, tetrahydrozoline, sodium chloride flush, acetaminophen **OR** acetaminophen, polyethylene glycol, promethazine **OR** ondansetron, nicotine, glucose, dextrose, glucagon (rDNA), dextrose, oxyCODONE-acetaminophen **AND** oxyCODONE    Input/Output:       I/O last 3 completed shifts:   In: 450 [P.O.:450]  Out: - .      Patient Vitals for the past 96 hrs (Last 3 readings):   Weight   05/13/20 1247 167 lb 8.8 oz (76 kg)   05/13/20 0840 170 lb 3.1 oz (77.2 kg)   20 0455 171 lb 15.3 oz (78 kg)       Vital Signs:   Temperature:  Temp: 97.9 °F (36.6 °C)  TMax:   Temp (24hrs), Av.2 °F (36.8 °C), Min:97.9 °F (36.6 °C), Max:98.7 °F (37.1 °C)    Respirations:  Resp: 18  Pulse:   Pulse: 74  BP:    BP: (!) 118/5  BP Range: Systolic (67AJJ), BUU:186 , Min:96 , RIP:748       Diastolic (09VOC), VHR:10, Min:5, Max:76      Physical Examination:     General:  AAO x 3, speaking in full sentences, no accessory muscle use. HEENT: Atraumatic, normocephalic, no throat congestion, moist mucosa. Eyes:   Pupils equal, round and reactive to light, EOMI. Neck:   Supple  Chest:   Bilateral vesicular breath sounds, no rales or wheezes. Cardiac:  S1 S2 RR, no murmurs, gallops or rubs. Abdomen: Soft, non-tender, no masses or organomegaly, BS audible. :   No suprapubic or flank tenderness. Neuro:  AAO x 3, No FND. SKIN:  No rashes, good skin turgor. Extremities:  No edema.     Labs:       Recent Labs     20  0549   WBC 8.0   RBC 3.52*   HGB 10.8*   HCT 34.2*   MCV 97.2   MCH 30.7   MCHC 31.6   RDW 14.6*   *   MPV 11.2      BMP:   Recent Labs     20  0549 20  0738    142   K 4.1 4.1    105   CO2 23 22   BUN 35* 39*   CREATININE 8.23* 7.87*   GLUCOSE 104* 102*   CALCIUM 7.6* 8.0*      BNP:      Lab Results   Component Value Date     2012     KEMAR:      Lab Results   Component Value Date    KEMAR NEGATIVE 2012     SPEP:  Lab Results   Component Value Date    PROT 5.7 2020    PROT 5.7 2012    ALBCAL 2.6 2012    ALBPCT 46 2012    LABALPH 0.3 2012    LABALPH 1.1 2012    A1PCT 6 2012    A2PCT 19 2012    LABBETA 0.9 2012    BETAPCT 16 2012    GAMGLOB 0.8 2012    GGPCT 13 2012    PATH NOT REPORTED 2012     UPEP:     Lab Results   Component Value Date    LABPE  2012     RANDOM URINE PROTEIN IS ELEVATED  MG/DL WITH ALBUMIN PREDOMINATING. Hep BsAg:         Lab Results   Component Value Date    HEPBSAG NONREACTIVE 05/12/2020     Urinalysis/Chemistries:      Lab Results   Component Value Date    NITRU NEGATIVE 05/22/2019    COLORU DARK YELLOW 05/22/2019    PHUR 5.5 05/22/2019    WBCUA 10 TO 20 05/22/2019    RBCUA 2 TO 5 05/22/2019    MUCUS NOT REPORTED 05/22/2019    TRICHOMONAS NOT REPORTED 05/22/2019    YEAST NOT REPORTED 05/22/2019    BACTERIA FEW 05/22/2019    SPECGRAV 1.016 05/22/2019    LEUKOCYTESUR LARGE 05/22/2019    UROBILINOGEN Normal 05/22/2019    BILIRUBINUR  05/22/2019     Presumptive positive. Unable to confirm due to unavailability of reagent. GLUCOSEU TRACE 05/22/2019    KETUA NEGATIVE 05/22/2019    AMORPHOUS 1+ 05/22/2019     Urine Sodium:     Lab Results   Component Value Date    NEERU 68 11/26/2012     Urine Chloride:    Lab Results   Component Value Date    CLUR 57 11/26/2012     Urine Creatinine:     Lab Results   Component Value Date    LABCREA 86.5 11/26/2012     Radiology:     Reviewed. Assessment:     1 ESRD: On hemodialysis, currently was getting dialysis 4 days a week at [facility] via AV fistula dialysis but normally gets his regular hemodialysis at [facility] on MWF schedule. 2. HTN: Blood pressure stable somewhat on the low side  3. Admitted with asymptomatic aluminum toxicity, level was as high as 145 as reported by [facility]. 4. EDEMA : Minimal  5. ANEMIA OF CHRONIC DISEASE:   6. SECONDARY HYPERPARATHYROIDISM:     Plan:   1. Patient was seen on HD at bedside. Orders were confirmed with the HD nurse. 2.  Patient to get IV deferoxamine weekly for 8 weeks at his outpatient hemodialysis unit [facility]. 3.  Okay to discharge from nephrology standpoint. Spoke with  and confirmed his discharge treatments. Also did speak with [facility] yesterday and they were okay with patient coming back to the unit and will receive deferoxamine there.   Will repeat levels in 4 weeks. Carafate has been stopped and currently off other binders too. Nutrition   Please ensure that patient is on a renal diet/TF. Avoid nephrotoxic drugs/contrast exposure. We will continue to follow along with you. Lance Matos MD  [facility]     This note is created with the assistance of a speech-recognition program. While intending to generate a document that actually reflects the content of the visit, no guarantees can be provided that every mistake has been identified and corrected by editing.

## 2020-05-13 NOTE — PLAN OF CARE
Problem: Falls - Risk of:  Goal: Will remain free from falls  Description: Will remain free from falls  5/10/2020 1657 by Viviana Poon RN  Outcome: Ongoing   Bed in lowest position, non-skid socks on, patient's call light is in within reach and used appropriately, room decluttered    Problem: Falls - Risk of:  Goal: Absence of physical injury  Description: Absence of physical injury  5/10/2020 1657 by Viviana Poon RN  Outcome: Ongoing     Problem: Activity:  Goal: Fatigue will decrease  Description: Fatigue will decrease  5/10/2020 1657 by Viviana Poon RN  Outcome: Ongoing   Pt is educated on ROM exercises and is encouraged to preform ADLs    Problem:  Activity:  Goal: Risk for activity intolerance will decrease  Description: Risk for activity intolerance will decrease  5/10/2020 1657 by Viviana Poon RN  Outcome: Ongoing     Problem: Fluid Volume:  Goal: Will show no signs or symptoms of fluid imbalance  Description: Will show no signs or symptoms of fluid imbalance  5/10/2020 1657 by Viviana Poon RN  Outcome: Ongoing  I&O's assessed, pt is educated on signs and symptoms of fluid imbalance, assessment is ongoing     Problem: Pain:  Goal: Pain level will decrease  Description: Pain level will decrease  Outcome: Ongoing   Managed with PRN medication, pain reassessment ongoing    Problem: Pain:  Goal: Control of acute pain  Description: Control of acute pain  Outcome: Ongoing     Problem: Pain:  Goal: Control of chronic pain  Description: Control of chronic pain  Outcome: Ongoing     Problem: Musculor/Skeletal Functional Status  Goal: Highest potential functional level  Outcome: Ongoing
Problem: Falls - Risk of:  Goal: Will remain free from falls  Description: Will remain free from falls  5/13/2020 1116 by Shar Lara RN  Outcome: Ongoing  5/13/2020 0402 by Tamar Tillman RN  Outcome: Ongoing  Goal: Absence of physical injury  Description: Absence of physical injury  5/13/2020 1116 by Shar Lara RN  Outcome: Ongoing  5/13/2020 0402 by Tamar Tillman RN  Outcome: Ongoing     Problem:  Activity:  Goal: Fatigue will decrease  Description: Fatigue will decrease  5/13/2020 1116 by Shar Lara RN  Outcome: Ongoing  5/13/2020 0402 by Tamar Tillman RN  Outcome: Ongoing  Goal: Risk for activity intolerance will decrease  Description: Risk for activity intolerance will decrease  5/13/2020 1116 by Shar Lara RN  Outcome: Ongoing  5/13/2020 0402 by Tamar Tillman RN  Outcome: Ongoing     Problem: Fluid Volume:  Goal: Will show no signs or symptoms of fluid imbalance  Description: Will show no signs or symptoms of fluid imbalance  5/13/2020 1116 by Shar Lara RN  Outcome: Ongoing  5/13/2020 0402 by Tamar Tillman RN  Outcome: Ongoing     Problem: Pain:  Goal: Pain level will decrease  Description: Pain level will decrease  5/13/2020 0402 by Tamar Tillman RN  Outcome: Ongoing  Goal: Control of acute pain  Description: Control of acute pain  5/13/2020 0402 by Tamar Tillman RN  Outcome: Ongoing  Goal: Control of chronic pain  Description: Control of chronic pain  5/13/2020 0402 by Tamar Tillman RN  Outcome: Ongoing     Problem: Musculor/Skeletal Functional Status  Goal: Highest potential functional level  5/13/2020 0402 by Tamar Tillman RN  Outcome: Ongoing     Problem: Skin Integrity:  Goal: Will show no infection signs and symptoms  Description: Will show no infection signs and symptoms  5/13/2020 0402 by Tamar Tillman RN  Outcome: Ongoing  Goal: Absence of new skin breakdown  Description: Absence of new skin breakdown  5/13/2020 0402 by
Integrity:  Goal: Will show no infection signs and symptoms  Description: Will show no infection signs and symptoms  5/11/2020 1827 by Teo Willingham RN  Outcome: Ongoing  5/11/2020 0447 by Torsten Mcnulty RN  Outcome: Ongoing  Goal: Absence of new skin breakdown  Description: Absence of new skin breakdown  5/11/2020 1827 by Teo Willingham RN  Outcome: Ongoing  5/11/2020 0447 by Torsten Mcnulty RN  Outcome: Ongoing
SOCIAL HISTORY    Social History     Tobacco Use    Smoking status: Never Smoker    Smokeless tobacco: Never Used   Substance Use Topics    Alcohol use: No     Alcohol/week: 0.0 standard drinks    Drug use: No       ALLERGIES    Allergies   Allergen Reactions    Oxycontin [Oxycodone] Shortness Of Breath and Other (See Comments)     Patient had an overdose on Oxycontin before and does NOT want this ever again. He can take Oxycodone/acetaminophen as needed.  Avodart [Dutasteride]     Darvocet [Propoxyphene N-Acetaminophen]        MEDICATIONS    No current facility-administered medications on file prior to encounter. Current Outpatient Medications on File Prior to Encounter   Medication Sig Dispense Refill    donepezil (ARICEPT) 10 MG tablet Take 10 mg by mouth nightly      albuterol (PROVENTIL) (2.5 MG/3ML) 0.083% nebulizer solution Take 2.5 mg by nebulization every 4 hours as needed (as needed for copd)      insulin glargine (LANTUS SOLOSTAR) 100 UNIT/ML injection pen Inject into the skin daily inject 14 unit subcutaneously one time a day for DM      Lidocaine 0.5 % AERO Apply topically Apply to dialysis access topically one time a day every Mon, Wed, Fri      polyvinyl alcohol (LIQUIFILM TEARS) 1.4 % ophthalmic solution 1 drop as needed      HYDROmorphone (DILAUDID) 2 MG tablet Take 2 mg by mouth every 12 hours as needed for Pain.  oxyCODONE-acetaminophen (PERCOCET) 7.5-325 MG per tablet Take 1 tablet by mouth every 6 hours as needed for Pain.       fluticasone (FLONASE) 50 MCG/ACT nasal spray 2 sprays by Each Nostril route daily      ipratropium (ATROVENT) 0.02 % nebulizer solution Take 0.5 mg by nebulization every 8 hours      Multiple Vitamins-Minerals (THERAPEUTIC MULTIVITAMIN-MINERALS) tablet Take 1 tablet by mouth daily      allopurinol (ZYLOPRIM) 100 MG tablet Take 100 mg by mouth daily      metoprolol succinate (TOPROL XL) 25 MG extended release tablet Take 1 tablet by
signs and symptoms  Outcome: Ongoing  Goal: Absence of new skin breakdown  Description: Absence of new skin breakdown  Outcome: Ongoing

## 2020-05-13 NOTE — CARE COORDINATION
[place] faxed deferoxamine order to [facility] for patient to have during dialysis.

## 2020-05-13 NOTE — CARE COORDINATION
Transitional planning. Spoke to Rickey with admissions at [facility], she will contact [facility] and have her call CM back to confirm 2p transfer time. [place] to Stella Mckeon with admissions at [facility]. Confirmed 2p transfer time is still good    Fax [phone]  Report [phone] ask for nurse taking care of 206  NO HENS needed. 1130 spoke to [facility], she confirmed Drew BANEGAS @ 11:30a and has already called them and informed them that pt will be there on Friday. 1215 confirmed with Martha with [facility] that pt will be picked up at 2p.     1306 Informed pt's daughter Rigoberto Segura that pt will be transferred at 2p today. She asked about an antibiotic that pt was on at [facility].  CM did not see that on discharge list or on pt's med list. She will call NP that sees her father at [facility]

## 2020-05-31 PROBLEM — U07.1 COVID-19: Status: ACTIVE | Noted: 2020-01-01

## 2020-05-31 NOTE — ED NOTES
Mode of arrival (squad #, walk in, police, etc) : [employer], Medic 40 from [address] complaint(s): shaking        Arrival Note (brief scenario, treatment PTA, etc). : EMS reports that the daughter requested that the ECF have patient transported to hospital because he has been shaking, which is not normal for him. Patient arrives to ED alert and oriented x3 (disoriented to year), he states that he has been shaking, he does not know when the shaking/ tremors started. Patient has a productive cough; producing thick, green sputum.         Netta Matta, RN  05/31/20 1040

## 2020-05-31 NOTE — ED NOTES
RN at bedside to discuss with patient family wishes and what the patient would prefer to do. After a discussion on patient's care plan if he stays and waits to be transferred to [facility] to continue care in their COVID unit patient has decided that he would like to be transferred to  if all of the family agrees.        Ju Gutiérrez RN  05/31/20 5536

## 2020-05-31 NOTE — ED NOTES
[address] [place] [facility] and talked to Prerna [place] at [phone] about transferring this patient to [facility]. Nakia stated that she will get a hold of the Hospitalist and call us back to finish transferring this patient.      William Kline  05/31/20 9496

## 2020-05-31 NOTE — ED PROVIDER NOTES
[address]  eMERGENCY dEPARTMENT eNCOUnter    Pt Name: Jarrett Rooney  MRN: [id number]  YOB: 1945  Date of evaluation:20  PCP: Lore Bacon MD    CHIEF COMPLAINT       Chief Complaint   Patient presents with    Shaking       HISTORY OF PRESENT ILLNESS    Jarrett Rooney is a 67 y.o. male who presents with a chief complaint of tremors. Patient is very poor historian. Apparently his whole body was shaking this morning and so EMS was called. He is end-stage renal disease on dialysis. Unsure when his last dialysis was. RN reports that he was also coughing in the room. He is very difficult to get a history from. Unsure if he is had a fever recently. He denies any pain anywhere. No chest pain, difficulty breathing. No abdominal pain, nausea or vomiting. Symptoms are acute. Symptoms are moderate. Nothing make symptoms better or worse. Patient has no other complaints at this time. REVIEW OF SYSTEMS       Review of Systems   Constitutional: Negative for chills, fatigue and fever. HENT: Negative for congestion, ear pain, sore throat and trouble swallowing. Eyes: Negative for visual disturbance. Respiratory: Positive for cough. Negative for shortness of breath. Cardiovascular: Negative for chest pain, palpitations and leg swelling. Gastrointestinal: Negative for abdominal pain, blood in stool, constipation, diarrhea, nausea and vomiting. Genitourinary: Negative for dysuria and flank pain. Musculoskeletal: Negative for arthralgias, back pain, myalgias and neck pain. Skin: Negative for color change, rash and wound. Neurological: Positive for tremors. Negative for dizziness, weakness, light-headedness, numbness and headaches. Psychiatric/Behavioral: Negative for confusion. All other systems reviewed and are negative. Negativein 10 essential Systems except as mentioned above and in the HPI.         PAST MEDICAL HISTORY     Past Medical History:   Diagnosis Date  Asthma     CAD (coronary artery disease)     CKD (chronic kidney disease) stage 4, GFR 15-29 ml/min [facility])     COPD (chronic obstructive pulmonary disease) ([facility])     CVA [facility]cerebral vascular accident) ([facility]     Decubitus ulcer 6/10/2015    Depression     Discitis of lumbar region     ESRD (end stage renal [facility]) [facility] 6/26/2015    History of kidney stones     Hyperlipidemia     Hypertension     Lumbar discitis 6/25/2015    Osteoarthritis     Osteomyelitis of lumbar spine ([facility] 6/25/2015    Perianal abscess 12/29/2015    Protein-calorie malnutrition ([facility] 6/26/2015    Proteinuria     Thrombocytopenia ([facility] 7/2/2015    Type II or unspecified type diabetes mellitus without mention of complication, not stated as uncontrolled          SURGICAL HISTORY      has a past surgical history that includes Coronary artery bypass graft; shoulder surgery; hip surgery; joint replacement; other surgical history (6/11/15); bone biopsy (6/29/15); other surgical history (12/30/15); and Carpal tunnel release.       CURRENT MEDICATIONS       Previous Medications    ALBUTEROL (PROVENTIL) (2.5 MG/3ML) 0.083% NEBULIZER SOLUTION    Take 2.5 mg by nebulization every 4 hours as needed (as needed for copd)    ALLOPURINOL (ZYLOPRIM) 100 MG TABLET    Take 100 mg by mouth daily    ASPIRIN 81 MG CHEWABLE TABLET    Take 81 mg by mouth every morning     CALCIUM CARBONATE (TUMS) 500 MG CHEWABLE TABLET    Take 2 tablets by mouth every 6 hours as needed for Heartburn    CARBOXYMETHYLCELLULOSE 1 % OPHTHALMIC SOLUTION    Place 1 drop into both eyes 3 times daily as needed for Dry Eyes    DICLOFENAC SODIUM 1 % GEL    Apply topically 3 times daily Apply between last two toes and to arch of right foot    DONEPEZIL (ARICEPT) 10 MG TABLET    Take 10 mg by mouth nightly    FLUTICASONE (FLONASE) 50 MCG/ACT NASAL SPRAY    2 sprays by Each Nostril route daily    FLUTICASONE PROPIONATE (FLOVENT DISKUS) 100 MCG/BLIST AEPB INHALER    Inhale 2 (Please note that portions ofthis note were completed with a voice recognition program.  Efforts were made to edit the dictations but occasionally words are mis-transcribed.)    Volodymyr Mehta DO  Attending Emergency Physician          Volodymyr Mehta DO  05/31/20 [id number]

## 2020-05-31 NOTE — ED NOTES
Per patient staff may update his family, including his daughter, son, and son in law. This RN spent 24 minutes on the phone with patient's daughter, Franki Hammans, discussing patient's diagnosis of COVID and his UTI. RN notified Franki Hammans that patient would be transferred to [facility] for further care due to his positive COVID swab but that doctors at both [facility] and [facility] agree that as of now the patient is stable enough to return to [facility] for continued observation and his usual level of care. Franki Hammans is concerned at this time that patient may need hospice care now that he has COVID, RN educated patient's daughter that [place] does not result in death for every case and that patients can recover. RN provided information to family regarding the plan of care this far, including all medications, labs and progress. Per Emelyn, if the patient wishes to return to [facility] then he should, that way they can \"saw high through the glass window\".       Akhil Carr RN  05/31/20 Natalya. Nicole Ellis [address] Kendra Graff RN  05/31/20 [id number]

## 2020-06-01 NOTE — CONSULTS
meaningcan be extrapolated by contextual diversion.     Jenifer Alvarado MD  Office: [phone]  Perfect serve / office [phone]

## 2020-06-01 NOTE — PROGRESS NOTES
Sepsis protocol is populating in Epic. Patient's temperature still elevated at 100.4. Ice packs applied. Temp decreased to 99.2. Dr. Gage Sosa notified via perfect serve. Continue to monitor.

## 2020-06-01 NOTE — H&P
[facility]    HISTORY AND PHYSICAL EXAMINATION            Date:   6/1/2020  Patient name:  Delta Fang  Date of admission:  5/31/2020  7:36 PM  MRN:   [id number]  Account:  [de-identified]  YOB: 1945  PCP:    Wendy Martinez MD  Room:   5622/1606-17  Code Status:    Full Code    Chief Complaint:     'body shaking'    History Obtained From:     electronic medical record    History of Present Illness: Delta Fang is a 67 y.o. / male who presents with No chief complaint on file. and is admitted to the hospital for the management of COVID-19. This is a 67 yr old male who presents from [facility] to [facility] with reports of 'full body tremors' and new productive cough. He was found to be febrile at 101 F. Septic workup was initiated along with COVID-19 workup given the above symptoms. His COVID swab was found to be positive, patient was started on Azithromycin and Ceftriaxone, and decision to transfer to our facility in the event he should decompensate. Pertinent presenting diagnostics include:BUN: 35, CRT: 8.73,GFR: 6, CRP: 49.5, LD: 232, Trop: 447--445, Alk. Phos: 134, HGB: 12.7, lymphocytes: 15, blood and urine cultures collected and remain pending, COVID-19 Positve, and UA with few bacteria    CXR: Negative for any acute changes     The patient is a very poor historian with medical history including DMII,  ESRD on HD 4x/week at [facility] (RUE AVF), CAD s/p CABG, HTN, Anemia, and Hyperparathyroidism. On my evaluation, patient is viewed from the room window, he is sleeping. Case discussed with RN-- no acute issues at this time. Patient has denied CP, SOB, ABD pain, n/v/d, low grade fever since arrival, no hypoxia and no tachypnea noted. Patient with stable vitals and without clinical signs of distress.     Past Medical History:     Past Medical History:   Diagnosis Date    Asthma     CAD (coronary Tobacco:    reports that he has never smoked. He has never used smokeless tobacco.  Alcohol:      reports no history of alcohol use. Drug Use:  reports no history of drug use. Family History:     Family History   Problem Relation Age of Onset   Iglesia Click Stroke Mother     Heart Attack Mother     Heart Attack Father     Diabetes Sister     Diabetes Brother        Review of Systems:     Positive and Negative as described in HPI. Review of Systems   Reason unable to perform ROS: Deferred at this time given attempts to conserve PPE and reserve for our most critical patient, attending physician will see today. Physical Exam:   /66   Pulse 69   Temp 99.3 °F (37.4 °C) (Axillary)   Resp 19   SpO2 99%   Temp (24hrs), Av.8 °F (37.7 °C), Min:99.1 °F (37.3 °C), Max:101 °F (38.3 °C)    Recent Labs     20   POCGLU 112*     No intake or output data in the 24 hours ending 20 0235    Physical Exam    Deferred at this time given attempts to conserve PPE and reserve for our most critical patient, attending physician will see today. Investigations:      Laboratory Testing:  Recent Results (from the past 24 hour(s))   Culture, Blood 1    Collection Time: 20 10:15 AM   Result Value Ref Range    Specimen Description . BLOOD     Special Requests 5 ML LAV LFA     Culture NO GROWTH 7 HOURS    Lactate, Sepsis    Collection Time: 20 10:18 AM   Result Value Ref Range    Lactic Acid, Sepsis 2.0 (H) 0.5 - 1.9 mmol/L    Lactic Acid, Sepsis, Whole Blood NOT REPORTED 0.5 - 1.9 mmol/L   Culture, Blood 2    Collection Time: 20 10:18 AM   Result Value Ref Range    Specimen Description . BLOOD     Special Requests 8 ML LAV 3 ML RED LT HAND     Culture NO GROWTH 7 HOURS    CBC Auto Differential    Collection Time: 20 10:18 AM   Result Value Ref Range    WBC 6.3 3.5 - 11.0 k/uL    RBC 4.15 (L) 4.5 - 5.9 m/uL    Hemoglobin 12.7 (L) 13.5 - 17.5 g/dL    Hematocrit 39.0 (L) 41 - 53 % 05/31/20 10:18 AM   Result Value Ref Range    CRP 49.5 (H) 0.0 - 5.0 mg/L   EKG 12 lead    Collection Time: 05/31/20 10:26 AM   Result Value Ref Range    Ventricular Rate 79 BPM    Atrial Rate 79 BPM    P-R Interval 158 ms    QRS Duration 146 ms    Q-T Interval 416 ms    QTc Calculation (Bazett) 477 ms    P Axis 44 degrees    R Axis -51 degrees    T Axis 43 degrees   COVID-19    Collection Time: 05/31/20 11:47 AM   Result Value Ref Range    SARS-CoV-2          SARS-CoV-2, Rapid DETECTED (A) Not Detected    Source . NASOPHARYNGEAL SWAB     SARS-CoV-2, PCR         Urinalysis Reflex to Culture    Collection Time: 05/31/20 12:30 PM   Result Value Ref Range    Color, UA YELLOW YELLOW    Turbidity UA TURBID (A) CLEAR    Glucose, Ur TRACE (A) NEGATIVE    Bilirubin Urine NEGATIVE NEGATIVE    Ketones, Urine NEGATIVE NEGATIVE    Specific [place], UA 1.016 1.000 - 1.030    Urine Hgb MOD (A) NEGATIVE    pH, UA 8.0 5.0 - 8.0    Protein, UA 4+ (A) NEGATIVE    Urobilinogen, Urine Normal Normal    Nitrite, Urine NEGATIVE NEGATIVE    Leukocyte Esterase, Urine NEGATIVE NEGATIVE    Urinalysis Comments NOT REPORTED    Microscopic Urinalysis    Collection Time: 05/31/20 12:30 PM   Result Value Ref Range    -          WBC, UA 20 TO 50 /HPF    RBC, UA 20 TO 50 /HPF    Casts UA NOT REPORTED /LPF    Crystals, UA NOT REPORTED None /HPF    Epithelial Cells UA 0 TO 2 /HPF    Renal Epithelial, UA NOT REPORTED 0 /HPF    Bacteria, UA FEW (A) None    Mucus, UA NOT REPORTED None    Trichomonas, UA NOT REPORTED None    Amorphous, UA NOT REPORTED None    Other Observations UA NOT REPORTED NOT REQ.     Yeast, UA NOT REPORTED None   Lactate, Sepsis    Collection Time: 05/31/20 12:40 PM   Result Value Ref Range    Lactic Acid, Sepsis 1.8 0.5 - 1.9 mmol/L    Lactic Acid, Sepsis, Whole Blood NOT REPORTED 0.5 - 1.9 mmol/L   Troponin    Collection Time: 05/31/20 12:40 PM   Result Value Ref Range    Troponin, High Sensitivity 447 (HH) 0 - 22 ng/L    Troponin T

## 2020-06-01 NOTE — PLAN OF CARE
Problem: Airway Clearance - Ineffective  Goal: Achieve or maintain patent airway  Outcome: Ongoing     Problem: Gas Exchange - Impaired  Goal: Absence of hypoxia  Outcome: Ongoing  Goal: Promote optimal lung function  Outcome: Ongoing     Problem: Breathing Pattern - Ineffective  Goal: Ability to achieve and maintain a regular respiratory rate  Outcome: Ongoing

## 2020-06-01 NOTE — PROGRESS NOTES
this time    Nutrition Evaluation:   · Evaluation: Goals set   · Goals: po intake to equal 50% or more    · Monitoring: Meal Intake, Supplement Intake, Pertinent Labs, Monitor Bowel Function      Electronically signed by Farheen Gomez RD, DIANE on 6/1/20 at 3:16 PM EDT    Contact Number: [phone]

## 2020-06-02 NOTE — PROGRESS NOTES
ordered - might take a few days -igf he leave [facility], he will loose his opportunity to get it. Summary of relevant labs:  Labs:  W 5.1  Micro:  BC 5/31 x 2  UA 5./31 infective  Imaging:  CT chest 6/2/20  Increased bilateral ground-glass opacities which are nonspecific but can be   seen with COVID. I have personally reviewed the past medical history, past surgical history, medications, social history, and family history, and I haveupdated the database accordingly.   Past Medical History:     Past Medical History:   Diagnosis Date    Asthma     CAD (coronary artery disease)     CKD (chronic kidney disease) stage 4, GFR 15-29 ml/min ([facility]     COPD (chronic obstructive pulmonary disease) ([facility]     CVA (cerebral vascular accident) ([address]     Decubitus ulcer 6/10/2015    Depression     Discitis of lumbar region     ESRD (end stage renal disease) ([address] 6/26/2015    History of kidney stones     Hyperlipidemia     Hypertension     Lumbar discitis 6/25/2015    Osteoarthritis     Osteomyelitis of lumbar spine ([address] 6/25/2015    Patient in clinical research study 06/02/2020    Enrolled in [address] for COVID expected date of completion 7/5/2020    Perianal abscess 12/29/2015    Protein-calorie malnutrition ([address] 6/26/2015    Proteinuria     Thrombocytopenia ([address] 7/2/2015    Type II or unspecified type diabetes mellitus without mention of complication, not stated as uncontrolled        Past Surgical  History:     Past Surgical History:   Procedure Laterality Date    BONE BIOPSY  6/29/15    L2-3    CARPAL TUNNEL RELEASE      CORONARY ARTERY BYPASS GRAFT      HIP SURGERY      JOINT REPLACEMENT      OTHER SURGICAL HISTORY  6/11/15    I&D coccyx wound    OTHER SURGICAL HISTORY  12/30/15    diverting loop colostomy; perirectal incision and drainage    SHOULDER SURGERY      left and right       Medications:      sodium chloride  20 mL Intravenous Once    doxercalciferol  1 mcg file   Other Topics Concern    Not on file   Social History Narrative    Not on file       Family History:     Family History   Problem Relation Age of Onset    Stroke Mother     Heart Attack Mother     Heart Attack Father     Diabetes Sister     Diabetes Brother         Allergies:   Oxycontin [oxycodone]; Avodart [dutasteride]; and Darvocet [propoxyphene n-acetaminophen]     Review of Systems:     Review of Systems    Physical Examination :     Patient Vitals for the past 8 hrs:   BP Temp Pulse Resp SpO2   06/02/20 0800 (!) 140/70 100.3 °F (37.9 °C) 90 22 94 %       Physical Exam      Medical Decision Making:   I have independently reviewed/ordered the following labs:    CBC with Differential:   Recent Labs     06/01/20  1146 06/02/20  0453   WBC 5.1 4.8   HGB 11.8* 12.1*   HCT 36.9* 37.9*   PLT 70* 67*   LYMPHOPCT 20* 19*   MONOPCT 7 7     BMP:  Recent Labs     06/01/20  1146 06/02/20  0453    138   K 5.2 4.2   CL 93* 97*   CO2 23 24   BUN 46* 24*   CREATININE 9.99* 6.81*   MG 2.1 1.8     Hepatic Function Panel:   Recent Labs     05/31/20  1018   PROT 6.2*   LABALBU 3.4*   BILITOT 0.39   ALKPHOS 134*   ALT 37   AST 47*     No results for input(s): RPR in the last 72 hours. No results for input(s): HIV in the last 72 hours. No results for input(s): BC in the last 72 hours. Lab Results   Component Value Date    CREATININE 6.81 06/02/2020    GLUCOSE 131 06/02/2020       Detailed results: Thank you for allowing us to participate in the care of this patient. Please call with questions. This note is created with the assistance of a speech recognition program.  While intending to generate adocument that actually reflects the content of the visit, the document can still have some errors including those of syntax and sound a like substitutions which may escape proof reading. It such instances, actual meaningcan be extrapolated by contextual diversion.     Dieter Roche MD  Office: [phone]  Perfect serve / office [phone]

## 2020-06-02 NOTE — PLAN OF CARE
Problem: Pain:  Description: Pain management should include both nonpharmacologic and pharmacologic interventions.   Goal: Pain level will decrease  Description: Pain level will decrease  Outcome: Met This Shift  Goal: Control of acute pain  Description: Control of acute pain  Outcome: Met This Shift  Goal: Control of chronic pain  Description: Control of chronic pain  Outcome: Met This Shift

## 2020-06-02 NOTE — FLOWSHEET NOTE
Patients POA, point of contact and daughter Edward Lockhart called and updated on patients current plan of care. All questions answered at this time.     Electronically signed by Edmund Zaman RN on 6/2/2020 at 3:18 PM

## 2020-06-02 NOTE — FLOWSHEET NOTE
[facility] notified and updated as to patient's condition. Patient had received dialysis treatment in his room today with treatment ending at shift change (1900). Patient appears to be tired and lethargic. Patient has refused any PO or injectable medications but is willing to take IV medications. Family updated as to plan set by Nephrologist; Dr. Venkatesh Knight as to patient's dialysis and treatment plan. Family also made aware that Henri Zaldivar will continue to attempt to turn patient every 2 hours and offer any nutritional support as appropriate.

## 2020-06-02 NOTE — PROGRESS NOTES
Dialysis Post Treatment Note  Vitals:    06/01/20 2000   BP: (!) 114/51   Pulse: 74   Resp: 18   Temp: 99.5 °F (37.5 °C)   SpO2: 96%     Pre-Weight = 78.5kg   Post-weight = Weight: 167 lb 15.9 oz (76.2 kg)  Total Liters Processed = Total Liters Processed (l/min): 82 l/min  Rinseback Volume (mL) = Rinseback Volume (ml): 290 ml  Net Removal (mL) = 2300  Patient's dry weight=76kg    Type of access used=Right forearm AVF  Length of treatment=210 minutes     Pt tolerated HD tx very well. VSS throughout. Pt given Tylenol once mid tx for c/o's generalized aches and shills with oral temp=100.5F PO. Pt rechecked post tx oral temp=99.5F PO.

## 2020-06-03 NOTE — PROGRESS NOTES
Patient received the immune plasma on 6/2  His sputum is now growing Pseudomonas, the CT scan shows groundglass, the picture is less likely to be bacterial pneumonia. His QTC is 470 and her Pseudomonas is sensitive to Cipro, hence we will discharge him on Levaquin 7 days, 500 mg daily. To follow-up with Dr. Radhika Tomlinson the lab as outpatient in the [place] office  She knows him from the past.  Case discussed with internal medicine who also discussed with his daughter.     Will add zinc 50 mg once a day for 30 days to help boost his immunity    Sabrina Hi MD. Infectious Diseases

## 2020-06-03 NOTE — PROGRESS NOTES
Nephrology Progress Note        Subjective: patient evaluated on dialysis. Pt is stable on dialysis. Access cannulated without problems. No new issues overnite. Stable hemodynamics. Patient is awake. He appears comfortable. We are targeting approximately 2 kg ultrafiltration on dialysis as his blood pressure would allow. Objective:  CURRENT TEMPERATURE:  Temp: 100.6 °F (38.1 °C)  MAXIMUM TEMPERATURE OVER 24HRS:  Temp (24hrs), Av.2 °F (37.3 °C), Min:98 °F (36.7 °C), Max:100.6 °F (38.1 °C)    CURRENT RESPIRATORY RATE:  Resp: 23  CURRENT PULSE:  Pulse: 79  CURRENT BLOOD PRESSURE:  BP: 129/60  24HR BLOOD PRESSURE RANGE:  Systolic (71UKO), CSB:718 , Min:105 , USZ:142   ; Diastolic (50RST), GV, Min:51, Max:76    24HR INTAKE/OUTPUT:      Intake/Output Summary (Last 24 hours) at 6/3/2020 1129  Last data filed at 6/3/2020 0030  Gross per 24 hour   Intake 232.19 ml   Output --   Net 232.19 ml     Patient Vitals for the past 96 hrs (Last 3 readings):   Weight   20 0940 164 lb 14.5 oz (74.8 kg)   20 0546 169 lb 5 oz (76.8 kg)   20 0454 169 lb 5 oz (76.8 kg)         Physical Exam:  General appearance: Awake, alert, appears comfortable  Skin: warm and dry, no rash or erythema  Eyes: conjunctivae normal and sclera anicteric  ENT:no thrush no pharyngeal congestion   Neck:  No JVD, No Thyromegaly  Pulmonary: clear to auscultation and no wheezing or rhonchi   Cardiovascular: normal S1 and S2. NO rubs and NO murmur.  No S3 OR S4   Abdomen: soft nontender, bowel sounds present, no organomegaly,  no ascites   Extremities: no cyanosis, clubbing or edema     Access:  previous  Right AV fistula    Current Medications:    doxercalciferol (HECTOROL) injection 1 mcg, Q MWF  acetaminophen (TYLENOL) tablet 650 mg, Q4H PRN  albuterol (PROVENTIL) nebulizer solution 2.5 mg, Q4H PRN  allopurinol (ZYLOPRIM) tablet 100 mg, Daily  aspirin chewable tablet 81 mg, QAM  calcium carbonate (TUMS) chewable tablet 1,000 mg, Q6H PRN  polyvinyl alcohol (LIQUIFILM TEARS) 1.4 % ophthalmic solution 1 drop, TID PRN  donepezil (ARICEPT) tablet 10 mg, Nightly  fluticasone (FLONASE) 50 MCG/ACT nasal spray 2 spray, Daily  insulin glargine (LANTUS) injection vial 14 Units, Daily  metoprolol succinate (TOPROL XL) extended release tablet 25 mg, Daily  sertraline (ZOLOFT) tablet 50 mg, Daily  sodium chloride flush 0.9 % injection 10 mL, 2 times per day  sodium chloride flush 0.9 % injection 10 mL, PRN  cefTRIAXone (ROCEPHIN) 1 g IVPB in 50 mL D5W minibag, Q24H  azithromycin (ZITHROMAX) 500 mg in dextrose 5% 250 mL IVPB, Q24H  heparin (porcine) injection 5,000 Units, 3 times per day  insulin lispro (HUMALOG) injection vial 0-12 Units, TID WC  insulin lispro (HUMALOG) injection vial 0-6 Units, Nightly  glucose (GLUTOSE) 40 % oral gel 15 g, PRN  dextrose 50 % IV solution, PRN  glucagon (rDNA) injection 1 mg, PRN  dextrose 5 % solution, PRN  fluticasone (FLOVENT HFA) 110 MCG/ACT inhaler 2 puff, BID      Labs:   CBC:   Recent Labs     06/01/20  1146 06/02/20  0453 06/03/20  0259   WBC 5.1 4.8 4.2   RBC 3.88* 3.99* 3.68*   HGB 11.8* 12.1* 11.3*   HCT 36.9* 37.9* 34.8*   PLT 70* 67* 58*   MPV 12.1 12.1 12.5      BMP:   Recent Labs     06/01/20  1146 06/02/20  0453 06/03/20  0259    138 137   K 5.2 4.2 4.0   CL 93* 97* 96*   CO2 23 24 24   BUN 46* 24* 34*   CREATININE 9.99* 6.81* 9.08*   GLUCOSE 119* 131* 93   CALCIUM 7.7* 7.6* 7.7*        Magnesium:   Recent Labs     06/01/20  1146 06/02/20  0453 06/03/20  0259   MG 2.1 1.8 1.9       Dialysis bath: Dialysis Bath  K+ (Potassium): 3  Ca+ (Calcium): 2.25  Na+ (Sodium): 140  HCO3 (Bicarb): 30    Radiology:  Reviewed as available. Assessment:  1 ESRD:dialysis bath reviewed with nurse. 2.HTN:  3 COVID-19 positive: Clinically no pneumonia  4 recent treatment for aluminum toxicity:  5. ANEMIA OF CHRONIC DISEASE:   6.SECONDARY HYPERPARATHYROIDISM:     Plan:  1. Wt removal and dialysis orders reviewed.     2. Await repeat aluminum levels. 3. Cont pt on aranesp and zemplar per protocol. 4. Follow up labs ordered. Please do not hesitate to call with questions.     Electronically signed by Maria Del Rosario De La Torre MD on 6/3/2020 at 11:29 AM

## 2020-06-03 NOTE — FLOWSHEET NOTE
RN provided update to [facility] patients daughterAngel Dunaway wishes upon discharge that if the [address] is taken out that a infectious disease team follow patient at [address] she has concerns about his coccyx wound worsening and causing a infection for him.      Electronically signed by Carolynne Apgar, RN on 6/3/2020 at 5:08 PM

## 2020-06-03 NOTE — PLAN OF CARE
absence of muscle cramping  6/3/2020 1046 by Rafael Skelton RN  Outcome: Ongoing  6/2/2020 2232 by Filiberto Grewal RN  Outcome: Ongoing  Goal: Maintain normal serum potassium, sodium, calcium, phosphorus, and pH  6/3/2020 1046 by Rafael Skelton RN  Outcome: Ongoing  6/2/2020 2232 by Filiberto Grewal RN  Outcome: Ongoing     Problem: Loneliness or Risk for Loneliness  Goal: Demonstrate positive use of time alone when socialization is not possible  6/3/2020 1046 by Rafael Skelton RN  Outcome: Ongoing  6/2/2020 2232 by Filiberto Grewal RN  Outcome: Ongoing     Problem: Fatigue  Goal: Verbalize increase energy and improved vitality  6/3/2020 1046 by Rafael Skelton RN  Outcome: Ongoing  6/2/2020 2232 by Filiberto Grewal RN  Outcome: Ongoing     Problem: Patient Education: Go to Patient Education Activity  Goal: Patient/Family Education  6/3/2020 1046 by Rafael Skelton RN  Outcome: Ongoing  6/2/2020 2232 by Filiberto Grewal RN  Outcome: Ongoing     Problem: Falls - Risk of:  Goal: Will remain free from falls  Description: Will remain free from falls  6/3/2020 1046 by Rafael Skelton RN  Outcome: Ongoing  6/2/2020 2232 by Filiberto Grewal RN  Outcome: Ongoing  Goal: Absence of physical injury  Description: Absence of physical injury  6/3/2020 1046 by Rafael Skelton RN  Outcome: Ongoing  6/2/2020 2232 by Filiberto Grewal RN  Outcome: Ongoing     Problem: Nutrition  Goal: Optimal nutrition therapy  6/3/2020 1046 by Rafael Skelton RN  Outcome: Ongoing  6/2/2020 2232 by Filiberto Grewal RN  Outcome: Ongoing     Problem: Pain:  Goal: Pain level will decrease  Description: Pain level will decrease  6/3/2020 1046 by Rafael Skelton RN  Outcome: Ongoing  6/2/2020 2232 by Filiberto Grewal RN  Outcome: Ongoing  Goal: Control of acute pain  Description: Control of acute pain  6/3/2020 1046 by Rafael Skelton RN  Outcome: Ongoing  6/2/2020 2232 by Filiberto Grewal RN  Outcome: Ongoing  Goal: Control of chronic pain  Description: Control of chronic pain  6/3/2020 1046 by Alvaro Lombardi RN  Outcome: Ongoing  6/2/2020 2232 by Shahzad May RN  Outcome: Ongoing

## 2020-06-03 NOTE — PROGRESS NOTES
[facility]      Daily Progress Note     Admit Date: 5/31/2020  Bed/Room No.  3022/3022-01  Admitting Physician : Gi Ornelas MD  Code Status :[address] Day:  LOS: 3 days   Chief Complaint:     Fever  COVID-19 infection    Principal Problem:    COVID-19  Active Problems:    Essential hypertension    ESRD (end stage renal disease) on dialysis ([facility]    Type 2 diabetes mellitus, with long-term current use of insulin ([facility]    COPD without exacerbation ([facility]    [facility]    Coronary artery disease involving native coronary artery of native heart without angina pectoris  Resolved Problems:    * No resolved hospital problems. *    Subjective : Interval History/Significant events :  06/03/20    Patient remains on room air. He has cough with productive clear sputum. Patient had intermittent fevers. He received convalescent plasma last night. Patient had hemodialysis today. He is eating and drinking good. He has fatigue. Has baseline dementia. Vitals - Stable afebrile currently. Labs - elevated creatinine. Nursing notes , Consults notes reviewed. Overnight events and updates discussed with Nursing staff . Background History:         Jerica Maya is 67 y.o. male  Who was admitted to the hospital on 5/31/2020 for treatment of COVID-19. Patient was sent from [facility] to [facility]  emergency room for productive cough, shaking, high-grade fever. Patient had tested positive for COVID-19 infection. Initial evaluation showed BUN 35, creatinine 8.73, 1/4/1948, alk phos 134, hemoglobin 12.7. Chest x-ray was negative for acute disease. Patient has underlying history of type 2 diabetes mellitus, hypertension, ESRD on hemodialysis, CAD s/p CABG, secondary hyperparathyroidism. Patient has been having intermittent fever since admission. Patient was recently admitted in the hospital for aluminum toxicity and was treated with deferoxamine. PMH:  Past Medical History:   Diagnosis Date    Asthma     CAD (coronary artery disease)     CKD (chronic kidney disease) stage 4, GFR 15-29 ml/min ([facility]     COPD (chronic obstructive pulmonary disease) ([facility]     [facility]A [facility]cerebral vascular accident) [facility]     Decubitus ulcer 6/10/2015    Depression     Discitis of lumbar region     ESRD (end stage renal disease) ([facility] 6/26/2015    History of kidney stones     Hyperlipidemia     Hypertension     Lumbar discitis 6/25/2015    Osteoarthritis     Osteomyelitis of lumbar spine ([facility] 6/25/2015    Patient in clinical research study 06/02/2020    Enrolled in [address] for COVID expected date of completion 7/5/2020    Perianal abscess 12/29/2015    Protein-calorie malnutrition ([facility] 6/26/2015    Proteinuria     Thrombocytopenia ([facility] 7/2/2015    Type II or unspecified type diabetes mellitus without mention of complication, not stated as uncontrolled       Allergies: Allergies   Allergen Reactions    Oxycontin [Oxycodone] Shortness Of Breath and Other (See Comments)     Patient had an overdose on Oxycontin before and does NOT want this ever again. He can take Oxycodone/acetaminophen as needed.     Avodart [Dutasteride]    Ozzy Lockett [Propoxyphene N-Acetaminophen]       Medications :  doxercalciferol, 1 mcg, Intravenous, Q MWF  allopurinol, 100 mg, Oral, Daily  aspirin, 81 mg, Oral, QAM  donepezil, 10 mg, Oral, Nightly  fluticasone, 2 spray, Each Nostril, Daily  insulin glargine, 14 Units, Subcutaneous, Daily  metoprolol succinate, 25 mg, Oral, Daily  sertraline, 50 mg, Oral, Daily  sodium chloride flush, 10 mL, Intravenous, 2 times per day  cefTRIAXone (ROCEPHIN) IV, 1 g, Intravenous, Q24H  azithromycin, 500 mg, Intravenous, Q24H  heparin (porcine), 5,000 Units, Subcutaneous, 3 times per day  insulin lispro, 0-12 Units, Subcutaneous, TID WC  insulin lispro, 0-6 Units, Subcutaneous, Nightly  fluticasone, 2 puff, Inhalation, BID        Review Abdominal:      Palpations: Abdomen is soft. There is no mass. Tenderness: There is no abdominal tenderness. Musculoskeletal:      Comments: Right upper extremity AV fistula in place   Lymphadenopathy:      Head:      Right side of head: No submandibular adenopathy. Left side of head: No submandibular adenopathy. Cervical: No cervical adenopathy. Skin:     General: Skin is warm. Neurological:      Mental Status: He is alert. He is confused. Motor: No tremor. Psychiatric:         Behavior: Behavior is cooperative. Cognition and Memory: Cognition is impaired. Laboratory findings:    Recent Labs     06/01/20  1146 06/02/20  0453 06/03/20  0259   WBC 5.1 4.8 4.2   HGB 11.8* 12.1* 11.3*   HCT 36.9* 37.9* 34.8*   PLT 70* 67* 58*   INR 1.0  --   --      Recent Labs     06/01/20  1146 06/02/20  0453 06/03/20  0259    138 137   K 5.2 4.2 4.0   CL 93* 97* 96*   CO2 23 24 24   GLUCOSE 119* 131* 93   BUN 46* 24* 34*   CREATININE 9.99* 6.81* 9.08*   MG 2.1 1.8 1.9   CALCIUM 7.7* 7.6* 7.7*   CAION 1.02* 0.97* 1.03*     Recent Labs     05/31/20  1018   PROT 6.2*   LABALBU 3.4*   AST 47*   ALT 37   *   ALKPHOS 134*   BILITOT 0.39          Specific Gravity, UA   Date Value Ref Range Status   05/31/2020 1.016 1.000 - 1.030 Final     Protein, UA   Date Value Ref Range Status   05/31/2020 4+ (A) NEGATIVE Final     RBC, UA   Date Value Ref Range Status   05/31/2020 20 TO 50 /HPF Final     Bacteria, UA   Date Value Ref Range Status   05/31/2020 FEW (A) None Final     Nitrite, Urine   Date Value Ref Range Status   05/31/2020 NEGATIVE NEGATIVE Final     WBC, UA   Date Value Ref Range Status   05/31/2020 20 TO 50 /HPF Final     Leukocyte Esterase, Urine   Date Value Ref Range Status   05/31/2020 NEGATIVE NEGATIVE Final       Imaging / Clinical Data :-   Xr Chest Portable    Result Date: 5/31/2020  1. Stable moderate elevation of the right hemidiaphragm.   Mild bibasilar atelectasis

## 2020-06-03 NOTE — FLOWSHEET NOTE
Patient's family (Daughter; Britney Guevara) updated as to patient's condition with all questions answered to family's satisifaction. Will have day-shift RN review with provider (ID) two questions asked by family;   1. When will antibodies be detectable in patient's blood after plasma infusion with antibodies? 2. What protection will this offer patient? 100% immunity? Less?

## 2020-06-03 NOTE — DISCHARGE INSTR - COC
patient):  Glasses, Dentures upper and lower    RN SIGNATURE:  Electronically signed by Yuriy Marroquin RN on 6/5/20 at 11:07 AM EDT    CASE MANAGEMENT/SOCIAL WORK SECTION    [facility]    Readmission Risk Assessment Score:  Readmission Risk              Risk of Unplanned Readmission:        22           Discharging to Facility/ Agency   · Name:   Address:  [address] [address]       Phone: [phone]       Fax: [phone]        ·   · Phone:  · Fax:    Dialysis Facility (if applicable)   · Name: [facility]  · Address:  · Dialysis Schedule:Monday Wednesday Friday   · Phone:  · Fax:    / signature: Electronically signed by Brad Rios RN on 6/3/20 at 12:40 PM EDT  Electronically signed by Mary Jo St RN on 6/5/2020 at 8:16 AM    PHYSICIAN SECTION    Prognosis: Fair    Condition at Discharge: Stable    Rehab Potential (if transferring to Rehab): Fair    Recommended Labs or Other Treatments After Discharge: Dialysis TTS schedule . Social distancing , COVID-19 isolation per protocol. Follow up appointment with Dr Gonzalo Norwood MD in 3-5 days . Wound care for decubitus ulcer       Physician Certification: I certify the above information and transfer of Adali Cooepr  is necessary for the continuing treatment of the diagnosis listed and that he requires [facility] for greater 30 days.      Update Admission H&P: No change in H&P    PHYSICIAN SIGNATURE:  Electronically signed by Ventura Miranda MD on 6/3/20 at 2:13 PM EDT

## 2020-06-03 NOTE — CARE COORDINATION
Transitional planning. Spoke with Brian Schneider about pts return. He is long term resident and can return. O2 @ 2L/NC and had plasma last night. Spoke with daughter Deion Barriga and answered questions and also told her I would have Radha call her to discuss protocol with +COVID pts. Called Brian Schneider and gave her Emelyn's number. 56 Called daughter and aware Dr Luz Elena Cannon wanting D/C today. Questions and concerns about going back to a COVID + SNF after getting the plasma. PS Dr Luz Elena Cannon to please call daughter and also PS Dr Sara Mejía for the same. Radha calls back and probably no COVID bed today-she will keep me posted. [address] calls back and will not have a bed until Friday.

## 2020-06-03 NOTE — PLAN OF CARE
Problem: Airway Clearance - Ineffective  Goal: Achieve or maintain patent airway  6/2/2020 2232 by Eula Mireles RN  Outcome: Ongoing  6/2/2020 1517 by Gautam Rojas RN  Outcome: Met This Shift     Problem: Gas Exchange - Impaired  Goal: Absence of hypoxia  6/2/2020 2232 by Eula Mireles RN  Outcome: Ongoing  6/2/2020 1517 by Gautam Rojas RN  Outcome: Met This Shift  Goal: Promote optimal lung function  6/2/2020 2232 by Eula Mireles RN  Outcome: Ongoing  6/2/2020 1517 by Gautam Rojas RN  Outcome: Met This Shift     Problem: Breathing Pattern - Ineffective  Goal: Ability to achieve and maintain a regular respiratory rate  6/2/2020 2232 by Eula Mireles RN  Outcome: Ongoing  6/2/2020 1517 by Gautam Rojas RN  Outcome: Met This Shift

## 2020-06-04 PROBLEM — J12.82 PNEUMONIA DUE TO COVID-19 VIRUS: Status: ACTIVE | Noted: 2020-01-01

## 2020-06-04 PROBLEM — U07.1 PNEUMONIA DUE TO COVID-19 VIRUS: Status: ACTIVE | Noted: 2020-01-01

## 2020-06-04 NOTE — PROGRESS NOTES
Subjective: patient evaluated . Pt received dialysis yesterday . No Access problems reported . No new issues overnite. Stable hemodynamics. This morning he is saturating 98% on room air. Patient is COVID 19 positive    Objective:  CURRENT TEMPERATURE:  Temp: 98.6 °F (37 °C)  MAXIMUM TEMPERATURE OVER 24HRS:  Temp (24hrs), Av.4 °F (37.4 °C), Min:98.6 °F (37 °C), Max:100.2 °F (37.9 °C)    CURRENT RESPIRATORY RATE:  Resp: 20  CURRENT PULSE:  Pulse: 82  CURRENT BLOOD PRESSURE:  BP: (!) 140/79  24HR BLOOD PRESSURE RANGE:  Systolic (78QQN), RZQ:390 , Min:94 , FCC:906   ; Diastolic (43MAB), OBM:46, Min:43, Max:100    24HR INTAKE/OUTPUT:      Intake/Output Summary (Last 24 hours) at 2020 0940  Last data filed at 2020 0800  Gross per 24 hour   Intake 1250 ml   Output 2710 ml   Net -1460 ml     Patient Vitals for the past 96 hrs (Last 3 readings):   Weight   20 0530 160 lb (72.6 kg)   20 1324 160 lb 15 oz (73 kg)   20 0940 164 lb 14.5 oz (74.8 kg)         Physical Exam:  General appearance: Awake, alert, had some nausea earlier, now better  Skin: warm and dry, no rash or erythema  Eyes: conjunctivae normal and sclera anicteric  ENT:no thrush no pharyngeal congestion   Neck:  No JVD, No Thyromegaly  Pulmonary: clear to auscultation and no wheezing or rhonchi   Cardiovascular: normal S1 and S2. NO rubs and NO murmur.  No S3 OR S4   Abdomen: soft nontender, bowel sounds present, no organomegaly,  no ascites   Extremities: no cyanosis, clubbing or edema     Access:  previous  Right AV fistula    Current Medications:    ondansetron (ZOFRAN) injection 4 mg, Q6H PRN  [START ON 2020] levoFLOXacin (LEVAQUIN) tablet 250 mg, Every Other Day  doxercalciferol (HECTOROL) injection 1 mcg, Q MWF  acetaminophen (TYLENOL) tablet 650 mg, Q4H PRN  albuterol (PROVENTIL) nebulizer solution 2.5 mg, Q4H PRN  allopurinol (ZYLOPRIM) tablet 100 mg, Daily  aspirin chewable tablet 81 mg, QAM  calcium carbonate (TUMS) renal standpoint he potentially can be discharged anytime. We can treat his high aluminum levels with desferoxamine as an outpatient just as well      Please do not hesitate to call with questions.     Electronically signed by Rivera Ward MD on 6/4/2020 at 9:40 AM

## 2020-06-04 NOTE — PROGRESS NOTES
appetite change, fatigue and unexpected weight change. HENT: Negative for congestion, nosebleeds, rhinorrhea, sinus pressure, sneezing and voice change. Respiratory: Negative for cough, choking, chest tightness, shortness of breath and wheezing. Cardiovascular: Negative for chest pain, palpitations and leg swelling. Gastrointestinal: Negative for abdominal pain, constipation, diarrhea, nausea and vomiting. Genitourinary: Negative for difficulty urinating, discharge, dysuria, frequency and testicular pain. Musculoskeletal: Negative for back pain. Skin: Negative for rash. Neurological: Negative for dizziness, weakness, light-headedness and headaches. Hematological: Does not bruise/bleed easily. Psychiatric/Behavioral: Positive for confusion. Negative for agitation, behavioral problems, self-injury, sleep disturbance and suicidal ideas.      Objective :      Current Vitals : Temp: 99.1 °F (37.3 °C),  Pulse: 82, Resp: 21, BP: (!) 140/79, SpO2: 96 %  Last 24 Hrs Vitals   Patient Vitals for the past 24 hrs:   BP Temp Temp src Pulse Resp SpO2 Weight   06/04/20 0801 (!) 140/79 -- -- 82 -- -- --   06/04/20 0630 -- -- -- 86 21 96 % --   06/04/20 0600 -- -- -- 93 19 95 % --   06/04/20 0530 -- -- -- 84 24 96 % 160 lb (72.6 kg)   06/04/20 0500 -- -- -- 90 28 95 % --   06/04/20 0430 135/81 -- -- 83 22 97 % --   06/04/20 0400 (!) 150/100 99.1 °F (37.3 °C) Oral 85 22 95 % --   06/04/20 0330 (!) 147/75 -- -- 86 24 97 % --   06/04/20 0300 138/78 -- -- 92 25 97 % --   06/04/20 0230 (!) 140/68 -- -- 94 23 96 % --   06/04/20 0200 (!) 140/76 -- -- 91 24 96 % --   06/04/20 0130 (!) 145/77 -- -- 107 26 97 % --   06/04/20 0100 137/75 -- -- 96 25 94 % --   06/04/20 0030 137/86 -- -- 96 26 94 % --   06/04/20 0015 (!) 151/81 99.2 °F (37.3 °C) Oral 112 27 94 % --   06/04/20 0000 (!) 147/81 -- -- 111 25 96 % --   06/03/20 2330 135/74 -- -- 88 25 95 % --   06/03/20 2300 (!) 153/74 -- -- 87 24 95 % --   06/03/20 2230 (!)

## 2020-06-04 NOTE — PROGRESS NOTES
[facility] -   Infectious diseases evaluation  admission date 5/31/2020    reason for consultation:   covid +    Impression :   Current:  · COVID + fever chills -  · bilat ground glass pneumonia   · Respiratory Pseudomonas infection  · Acute encephalpathy    Other:  ·   Discussion / summary of stay / plan of care   ·   Recommendations   · Zuñiga not fit criteria for he Remdesevir since has no hypoxemia and is on HD  · Ordered  immune serum-receive it without complications 6/2  · Levaquin for 7 days 500 a day  · Discharge planning  · Chart reconciled  · Will follow    Infection Control Recommendations   · Bryant Precautions  · Contact Isolation   · Airborne isolation  · Droplet Isolation      Antimicrobial Stewardship Recommendations   · Simplification of therapy  · Targeted therapy      Coordination ofOutpatient Care:   · Estimated Length of IV antimicrobials:  · Patient will need Midline / picc Catheter Insertion:   · Patient will need SNF:  · Patient will need outpatient wound care:     History of Present Illness:   Initial history:  Krish Villanueva is a 67y.o.-year-old male w body tremors and cough, 89 F, went to [address] [place] [address] and COVID + -sent to us - CXR neg  Very poor historian- not SOB  100% on RA    ESRD- HD 4 x per week  DM  Hyperparathyroidism    Lives at [address] [facility] [place] [facility] and unable to clarke huerta a decision for some of his care - defers me to [address] his daughter POA      Interval changes  6/3/2020   Due to the current efforts to prevent transmission of COVID-19 and also the need to preserve PPE for other caregivers, a face-to-face encounter with the patient was not performed. That being said, all relevant records and diagnostic tests were reviewed, including laboratory results and imaging. Patient was evaluated from the window, called on the phone, and chart reviewed, disc w  involved healthcare workers.     Patient received the immune plasma on 6/2  His

## 2020-06-04 NOTE — FLOWSHEET NOTE
Assessment:   contacted daughter. Daughter seemed a bit taken back by the call as  asked how she was coping with everything. States that she is doing fine.  offered Zoom as an option if she would like to see that patient. Will give it consideration and may ask the nurse about it. Intervention:   attempted to provided space for daughter to express feelings, thoughts, and concerns. Determined support is available. Outcome:  Daughter seems a bit guarded but coping.          06/04/20 1626   Encounter Summary   Services provided to: Family   Referral/Consult From: [address] [place] [address] Road Visiting   ([phone])   Complexity of Encounter Moderate   Length of Encounter 15 minutes   Routine   Type Initial   Assessment Passive   Intervention Active listening;Explored feelings, thoughts, concerns;Explored coping resources   Outcome Expressed gratitude     Electronically signed by Nickie Tejada on 6/4/2020 at 4:30 PM

## 2020-06-05 NOTE — PROGRESS NOTES
abused: Not on file     Physically abused: Not on file     Forced sexual activity: Not on file   Other Topics Concern    Not on file   Social History Narrative    Not on file       Family History:     Family History   Problem Relation Age of Onset    Stroke Mother     Heart Attack Mother     Heart Attack Father     Diabetes Sister     Diabetes Brother         Allergies:   Oxycontin [oxycodone]; Avodart [dutasteride]; and Darvocet [propoxyphene n-acetaminophen]     Review of Systems:     Review of Systems   Constitutional: Negative for activity change, appetite change and chills. HENT: Negative for congestion. Eyes: Negative for itching. Respiratory: Negative for apnea, cough and choking. Cardiovascular: Negative for chest pain. Gastrointestinal: Negative for abdominal pain. Endocrine: Negative for heat intolerance. Genitourinary: Negative for dysuria and frequency. Musculoskeletal: Negative for arthralgias. Skin: Negative for color change. Allergic/Immunologic: Negative for environmental allergies. Neurological: Negative for dizziness and facial asymmetry. Hematological: Negative for adenopathy. Psychiatric/Behavioral: Negative for agitation.        Physical Examination :     Patient Vitals for the past 8 hrs:   BP Temp Pulse Resp SpO2 Weight   06/05/20 1245 (!) 107/59 98.5 °F (36.9 °C) 87 24 92 % 149 lb 4 oz (67.7 kg)   06/05/20 1230 (!) 114/58 -- 79 -- -- --   06/05/20 1222 -- -- 81 -- -- --   06/05/20 1215 105/62 -- 83 -- -- --   06/05/20 1208 -- -- 81 -- -- --   06/05/20 1200 (!) 99/56 -- 88 -- -- --   06/05/20 1152 -- -- 82 -- -- --   06/05/20 1145 107/63 -- 82 -- -- --   06/05/20 1137 -- -- 84 -- -- --   06/05/20 1130 (!) 106/59 -- 79 -- -- --   06/05/20 1122 -- -- 79 -- -- --   06/05/20 1115 (!) 105/59 -- 79 -- -- --   06/05/20 1107 -- -- 78 -- -- --   06/05/20 1100 112/67 -- 80 -- -- --   06/05/20 1052 -- -- 78 -- -- --   06/05/20 1045 111/62 -- 79 -- -- --   06/05/20 1038 -- -- 77 -- -- --   06/05/20 1030 110/64 -- 73 -- -- --   06/05/20 1022 -- -- 74 -- -- --   06/05/20 1015 112/62 -- 74 -- -- --   06/05/20 1007 -- -- 76 -- -- --   06/05/20 1000 119/65 -- 74 -- -- --   06/05/20 0952 -- -- 72 -- -- --   06/05/20 0937 -- -- 71 -- -- --   06/05/20 0922 -- -- 72 -- -- --   06/05/20 0907 (!) 142/69 -- 67 -- -- --   06/05/20 0856 (!) 144/63 -- 68 -- -- --   06/05/20 0852 -- -- 74 -- -- --   06/05/20 0822 -- 99.4 °F (37.4 °C) -- -- -- 154 lb 15.7 oz (70.3 kg)       Physical Exam  Constitutional:       General: He is not in acute distress. Appearance: Normal appearance. He is not ill-appearing. HENT:      Head: Normocephalic and atraumatic. Nose: No congestion or rhinorrhea. Eyes:      General: No scleral icterus. Conjunctiva/sclera: Conjunctivae normal.      Pupils: Pupils are equal, round, and reactive to light. Neck:      Musculoskeletal: Neck supple. No neck rigidity or muscular tenderness. Cardiovascular:      Rate and Rhythm: Normal rate and regular rhythm. Pulmonary:      Effort: Pulmonary effort is normal.   Abdominal:      General: There is no distension. Tenderness: There is no abdominal tenderness. Genitourinary:     Comments: No Thomas  Musculoskeletal:         General: No swelling. Right lower leg: No edema. Left lower leg: No edema. Skin:     General: Skin is dry. Coloration: Skin is not jaundiced or pale. Findings: No bruising, erythema or lesion. Neurological:      General: No focal deficit present. Mental Status: He is alert. Mental status is at baseline. Cranial Nerves: No cranial nerve deficit. Psychiatric:         Mood and Affect: Mood normal.         Behavior: Behavior normal.         Thought Content:  Thought content normal.           Medical Decision Making:   I have independently reviewed/ordered the following labs:    CBC with Differential:   Recent Labs     06/04/20  0419 06/05/20  1012   WBC 5.0 6.3 HGB 13.3 13.1   HCT 41.3 40.8   PLT 65* 82*   LYMPHOPCT 17* 16*   MONOPCT 7 6     BMP:  Recent Labs     06/04/20  0419 06/05/20  1012    137   K 3.9 3.2*   CL 93* 97*   CO2 19* 22   BUN 26* 20   CREATININE 6.27* 4.34*   MG 1.8 1.8     Hepatic Function Panel:   No results for input(s): PROT, LABALBU, BILIDIR, IBILI, BILITOT, ALKPHOS, ALT, AST in the last 72 hours. No results for input(s): RPR in the last 72 hours. No results for input(s): HIV in the last 72 hours. No results for input(s): BC in the last 72 hours. Lab Results   Component Value Date    CREATININE 4.34 06/05/2020    GLUCOSE 169 06/05/2020       Detailed results: Thank you for allowing us to participate in the care of this patient. Please call with questions. This note is created with the assistance of a speech recognition program.  While intending to generate adocument that actually reflects the content of the visit, the document can still have some errors including those of syntax and sound a like substitutions which may escape proof reading. It such instances, actual meaningcan be extrapolated by contextual diversion.     Pretty Toscano MD  Office: [phone]  Perfect serve / office [phone]

## 2020-06-05 NOTE — PROGRESS NOTES
plasma on 6/2  His sputum is now growing Pseudomonas, the CT scan shows groundglass, the picture is less likely to be bacterial pneumonia. Talked to him over the phone today through the window, he is alert appropriate, feels much better less short of breath and very comfortable. Saturating 97%  FMS with some diarrhea  No fever      Summary of relevant labs:  Labs:  W 5.1  Micro:  BC 5/31 x 2  UA 5./31 infective  Imaging:  CT chest 6/2/20  Increased bilateral ground-glass opacities which are nonspecific but can be   seen with COVID. I have personally reviewed the past medical history, past surgical history, medications, social history, and family history, and I haveupdated the database accordingly.   Past Medical History:     Past Medical History:   Diagnosis Date    Asthma     CAD (coronary artery disease)     CKD (chronic kidney disease) stage 4, GFR 15-29 ml/min [facility]     COPD (chronic obstructive pulmonary disease) [facility]     CVA (cerebral vascular accident) ([address]     Decubitus ulcer 6/10/2015    Depression     Discitis of lumbar region     ESRD (end stage renal disease) ([address] 6/26/2015    History of kidney stones     Hyperlipidemia     Hypertension     Lumbar discitis 6/25/2015    Osteoarthritis     Osteomyelitis of lumbar spine ([address] 6/25/2015    Patient in clinical research study 06/02/2020    Enrolled in [facility] [address] for COVID expected date of completion 7/5/2020    Perianal abscess 12/29/2015    Protein-calorie malnutrition ([address] 6/26/2015    Proteinuria     Thrombocytopenia ([address] 7/2/2015    Type II or unspecified type diabetes mellitus without mention of complication, not stated as uncontrolled        Past Surgical  History:     Past Surgical History:   Procedure Laterality Date    BONE BIOPSY  6/29/15    L2-3    CARPAL TUNNEL RELEASE      CORONARY ARTERY BYPASS GRAFT      HIP SURGERY      JOINT REPLACEMENT      OTHER SURGICAL HISTORY  6/11/15    I&D coccyx extrapolated by contextual diversion.     Robbin Gongora MD  Office: [phone]  Perfect serve / office [phone]

## 2020-06-05 NOTE — CARE COORDINATION
Discharge [facility] Case Management Department  Written by: Daniel Aviles RN    Patient Name: Letitia Mascorro  Attending Provider: Emerson Tompkins MD  Admit Date: 2020  7:36 PM  MRN: [id number]  Account: [de-identified]                     : 1945  Discharge Date: 2020  Called [address] [facility] spoke to [facility] can accept back today. Called daughter jaime to discuss dc plan today. Disposition: return to [facility] @ 15:[facility].  Faxed avs     Daniel Aviles RN

## 2020-06-05 NOTE — DISCHARGE SUMMARY
known as:  ZYLOPRIM     aspirin 81 MG chewable tablet     Boost Glucose Control Liqd     calcium carbonate 500 MG chewable tablet  Commonly known as:  TUMS     carboxymethylcellulose 1 % ophthalmic solution     diclofenac sodium 1 % Gel  Commonly known as:  VOLTAREN     donepezil 10 MG tablet  Commonly known as:  ARICEPT     Flovent Diskus 100 MCG/BLIST Aepb inhaler  Generic drug:  fluticasone propionate     fluticasone 50 MCG/ACT nasal spray  Commonly known as:  FLONASE     gabapentin 100 MG capsule  Commonly known as:  NEURONTIN  Take 1 capsule by mouth 3 times daily     hydrALAZINE 10 MG tablet  Commonly known as:  APRESOLINE     ipratropium 0.02 % nebulizer solution  Commonly known as:  ATROVENT     Lantus SoloStar 100 UNIT/ML injection pen  Generic drug:  insulin glargine     Lidocaine 0.5 % Aero     metoprolol succinate 25 MG extended release tablet  Commonly known as:  TOPROL XL  Take 1 tablet by mouth daily     polyvinyl alcohol 1.4 % ophthalmic solution  Commonly known as:  LIQUIFILM TEARS     senna-docusate 8.6-50 MG per tablet  Commonly known as:  PERICOLACE     sertraline 50 MG tablet  Commonly known as:  ZOLOFT     therapeutic multivitamin-minerals tablet        ASK your doctor about these medications    * doxycycline hyclate 100 MG tablet  Commonly known as:  VIBRA-TABS  Take 1 tablet by mouth 2 times daily for 10 days  Ask about: Which instructions should I use? * doxycycline hyclate 100 MG tablet  Commonly known as:  VIBRA-TABS  Ask about: Which instructions should I use? * doxycycline hyclate 100 MG tablet  Commonly known as:  VIBRA-TABS  Take 1 tablet by mouth daily  Ask about: Which instructions should I use? * This list has 3 medication(s) that are the same as other medications prescribed for you. Read the directions carefully, and ask your doctor or other care provider to review them with you.                Where to Get Your Medications      These medications were sent to [facility]. [address], [address] [address]    Phone:  [phone]   · zinc 48 MG Caps     You can get these medications from any pharmacy    Bring a paper prescription for each of these medications  · doxycycline hyclate 100 MG tablet     Information about where to get these medications is not yet available    Ask your nurse or doctor about these medications  · doxycycline hyclate 100 MG tablet         Time Spent on discharge is  36 mins in patient examination, evaluation, counseling as well as medication reconciliation, prescriptions for required medications, discharge plan and follow up. Electronically signed by   Joseph Acuña MD  6/5/2020        Thank you Dr. Reba Wong MD for the opportunity to be involved in this patient's care.

## 2020-06-05 NOTE — PROGRESS NOTES
[facility]      Daily Progress Note     Admit Date: 5/31/2020  Bed/Room No.  3022/3022-01  Admitting Physician : Ingrid Magana MD  Code Status :[address] Day:  LOS: 5 days   Chief Complaint:     Fever  COVID-19 infection    Principal Problem:    COVID-19  Active Problems:    Essential hypertension    ESRD (end stage renal disease) on dialysis ([facility]    Type 2 diabetes mellitus, with long-term current use of insulin ([facility]    COPD without exacerbation ([address]    AVF (arteriovenous fistula) ([facility]    Coronary artery disease involving native coronary artery of native heart without angina pectoris    Pneumonia of both lower lobes due to Pseudomonas species ([facility]    Pneumonia due to COVID-19 virus  Resolved Problems:    * No resolved hospital problems. *    Subjective : Interval History/Significant events :  06/05/20    Patient has no new complaints. He underwent hemodialysis today. Patient has been afebrile he is eating and drinking okay. Vitals - Stable afebrile c.   Labs - elevated creatinine. Nursing notes , Consults notes reviewed. Overnight events and updates discussed with Nursing staff . Background History:         Delta Fang is 67 y.o. male  Who was admitted to the hospital on 5/31/2020 for treatment of COVID-19. Patient was sent from [facility] to [facility]  emergency room for productive cough, shaking, high-grade fever. Patient had tested positive for COVID-19 infection. Initial evaluation showed BUN 35, creatinine 8.73, 1/4/1948, alk phos 134, hemoglobin 12.7. Chest x-ray was negative for acute disease. Patient has underlying history of type 2 diabetes mellitus, hypertension, ESRD on hemodialysis, CAD s/p CABG, secondary hyperparathyroidism. Patient has been having intermittent fever since admission. Patient was recently admitted in the hospital for aluminum toxicity and was treated with deferoxamine.        PMH:  Past Medical History:   Diagnosis Date    Date Value Ref Range Status   05/31/2020 FEW (A) None Final     Nitrite, Urine   Date Value Ref Range Status   05/31/2020 NEGATIVE NEGATIVE Final     WBC, UA   Date Value Ref Range Status   05/31/2020 20 TO 50 /HPF Final     Leukocyte Esterase, Urine   Date Value Ref Range Status   05/31/2020 NEGATIVE NEGATIVE Final       Imaging / Clinical Data :-   Xr Chest Portable    Result Date: 5/31/2020  1. Stable moderate elevation of the right hemidiaphragm. Mild bibasilar atelectasis and scarring. 2. No acute focal airspace consolidation. 3. Prior median sternotomy and CABG. Clinical Course : unchanged  Assessment and Plan  :        1. COVID-19 infection - Tylenol as needed for intermittent fevers. ,  Received convalescent plasma treatment 6/2/2020.   2. ESRD on hemodialysis -dialysis management per nephrology  3. Type 2 diabetes mellitus -blood sugar controlled on Lantus. 4. Essential hypertension -well controlled  5. COPD -stable chronic bronchitis. 6. Anemia of chronic kidney disease -   7. CAD s/p CABG -   8. Recent aluminum toxicity -deferoxamine weekly. 9. Dementia without behavioral disturbance. Discharge back to [facility]      Continue to monitor vitals , Intake / output ,  Cell count , HGB , Kidney function, oxygenation  as indicated . Plan and updates discussed with patient ,  answers  explained to satisfaction.    Plan discussed with Staff  RN     (Please note that portions of this note were completed with a voice recognition program. Efforts were made to edit the dictations but occasionally words are mis-transcribed.)      Lona Skelton MD  6/5/2020

## 2020-06-05 NOTE — PROGRESS NOTES
for discharge. He can follow his aluminum levels and treat him with desferrioxamine as needed. This can conveniently be done as outpatient  3. Cont pt on aranesp and zemplar per protocol. 4. Follow up labs ordered. Please do not hesitate to call with questions.     Electronically signed by Winifred Modi MD on 6/5/2020 at 9:04 AM

## 2020-06-09 PROBLEM — J96.21 ACUTE ON CHRONIC RESPIRATORY FAILURE WITH HYPOXIA (HCC): Status: ACTIVE | Noted: 2020-01-01

## 2020-06-09 PROBLEM — I95.9 HYPOTENSION: Status: ACTIVE | Noted: 2020-01-01

## 2020-06-09 NOTE — PROGRESS NOTES
Emelyn; the patient sister, called the patient in his room. RN held the phone up the his ear and they spoke for a minute.

## 2020-06-09 NOTE — H&P
717    Chest x-ray showed poor inspiratory effort with bilateral patchy infiltrates worsened from prior study on 5/31    Pt was given 250 ml of crystalloid, started on levophed with improvement of bp to 91/62. He also received a dose of azithromycin and rocephin in the ED. Blood cultures drawn in ED. PAST MEDICAL HISTORY:         Diagnosis Date    Asthma     CAD (coronary artery disease)     CKD (chronic kidney disease) stage 4, GFR 15-29 ml/min ([facility])     COPD (chronic obstructive pulmonary disease) ([facility])     CVA (cerebral vascular accident) ([address]     Decubitus ulcer 6/10/2015    Depression     Discitis of lumbar region     ESRD (end stage renal disease) ([address] 6/26/2015    History of kidney stones     Hyperlipidemia     Hypertension     Lumbar discitis 6/25/2015    Osteoarthritis     Osteomyelitis of lumbar spine ([address] 6/25/2015    Patient in clinical research study 06/02/2020    Enrolled in [address] for COVID expected date of completion 7/5/2020    Perianal abscess 12/29/2015    Protein-calorie malnutrition ([address] 6/26/2015    Proteinuria     Thrombocytopenia ([address] 7/2/2015    Type II or unspecified type diabetes mellitus without mention of complication, not stated as uncontrolled          PAST SURGICAL HISTORY:         Procedure Laterality Date    BONE BIOPSY  6/29/15    L2-3    CARPAL TUNNEL RELEASE      CORONARY ARTERY BYPASS GRAFT      HIP SURGERY      JOINT REPLACEMENT      OTHER SURGICAL HISTORY  6/11/15    I&D coccyx wound    OTHER SURGICAL HISTORY  12/30/15    diverting loop colostomy; perirectal incision and drainage    SHOULDER SURGERY      left and right       ALLERGIES:      Allergies   Allergen Reactions    Oxycontin [Oxycodone] Shortness Of Breath and Other (See Comments)     Patient had an overdose on Oxycontin before and does NOT want this ever again. He can take Oxycodone/acetaminophen as needed.     Avodart [Dutasteride]     Darvocet [Propoxyphene N-Acetaminophen]          HOME MEDS: :      Prior to Admission medications    Medication Sig Start Date End Date Taking?  Authorizing Provider   doxycycline hyclate (VIBRA-TABS) 100 MG tablet Take 1 tablet by mouth daily 6/3/20 7/3/20  Regan Henriquez MD   zinc 50 MG CAPS Take 50 mg by mouth daily 6/3/20 7/3/20  Taurus Espino MD   donepezil (ARICEPT) 10 MG tablet Take 10 mg by mouth nightly    Historical Provider, MD   albuterol (PROVENTIL) (2.5 MG/3ML) 0.083% nebulizer solution Take 2.5 mg by nebulization every 4 hours as needed (as needed for copd)    Historical Provider, MD   insulin glargine (LANTUS SOLOSTAR) 100 UNIT/ML injection pen Inject into the skin daily inject 14 unit subcutaneously one time a day for DM    Historical Provider, MD   Lidocaine 0.5 % AERO Apply topically Apply to dialysis access topically one time a day every Mon, Wed, Fri    Historical Provider, MD   polyvinyl alcohol (LIQUIFILM TEARS) 1.4 % ophthalmic solution 1 drop as needed    Historical Provider, MD   doxycycline hyclate (VIBRA-TABS) 100 MG tablet Take 100 mg by mouth 2 times daily Until 7/15/19 for osteomyelitis 1/31/19 7/15/19  Historical Provider, MD   fluticasone (FLONASE) 50 MCG/ACT nasal spray 2 sprays by Each Nostril route daily    Historical Provider, MD   ipratropium (ATROVENT) 0.02 % nebulizer solution Take 0.5 mg by nebulization every 8 hours    Historical Provider, MD   Multiple Vitamins-Minerals (THERAPEUTIC MULTIVITAMIN-MINERALS) tablet Take 1 tablet by mouth daily    Historical Provider, MD   allopurinol (ZYLOPRIM) 100 MG tablet Take 100 mg by mouth daily    Historical Provider, MD   metoprolol succinate (TOPROL XL) 25 MG extended release tablet Take 1 tablet by mouth daily 9/11/18   Manuel Del Rio MD   carboxymethylcellulose 1 % ophthalmic solution Place 1 drop into both eyes 3 times daily as needed for Dry Eyes    Historical Provider, MD   sertraline (ZOLOFT) 50 MG tablet Take 50 mg by mouth daily    Historical Provider, MD   calcium carbonate (TUMS) 500 MG chewable tablet Take 2 tablets by mouth every 6 hours as needed for Heartburn    Historical Provider, MD   diclofenac sodium 1 % GEL Apply topically 3 times daily Apply between last two toes and to arch of right foot    Historical Provider, MD   fluticasone propionate (FLOVENT DISKUS) 100 MCG/BLIST AEPB inhaler Inhale 2 puffs into the lungs 2 times daily     Historical Provider, MD   Nutritional Supplements (BOOST GLUCOSE CONTROL) LIQD Take 8 oz by mouth nightly     Historical Provider, MD   senna-docusate (PERICOLACE) 8.6-50 MG per tablet Take 2 tablets by mouth four times a week On non-dialysis days (Sun, Tues, Thurs, Sat)    Historical Provider, MD   gabapentin (NEURONTIN) 100 MG capsule Take 1 capsule by mouth 3 times daily 6/17/15   Sharyle Long, MD   doxycycline (VIBRA-TABS) 100 MG tablet Take 1 tablet by mouth 2 times daily for 10 days 6/17/15 6/27/15  Sharyle Long, MD   hydrALAZINE (APRESOLINE) 10 MG tablet Take 10 mg by mouth 3 times daily  2/13/14   Historical Provider, MD   aspirin 81 MG chewable tablet Take 81 mg by mouth every morning     Historical Provider, MD       SOCIAL HISTORY:       TOBACCO:   reports that he has never smoked. He has never used smokeless tobacco.  ETOH:   reports no history of alcohol use. DRUGS:  reports no history of drug use.   OCCUPATION:       FAMILY HISTORY:          Problem Relation Age of Onset   Ferhat.Frankel Stroke Mother     Heart Attack Mother     Heart Attack Father     Diabetes Sister     Diabetes Brother        REVIEW OF SYSTEMS (ROS):     Review of Systems -   General ROS: See HPI  Psychological ROS: negative  Ophthalmic ROS: negative  ENT ROS: negative  Allergy and Immunology ROS: negative  Hematological and Lymphatic ROS: negative  Endocrine ROS: negative  Breast ROS: negative  Respiratory ROS: see HPI  Cardiovascular ROS: no chest pain or dyspnea on exertion  Gastrointestinal ROS:negative  Genito-Urinary ROS: negative  Musculoskeletal ROS: negative  Neurological ROS: negative  Dermatological ROS: negative    OBJECTIVE:     VITAL SIGNS:  BP (!) 125/53   Pulse 89   Temp 97.9 °F (36.6 °C) (Oral)   Resp 18   SpO2 95%   Tmax over 24 hours:  Temp (24hrs), Av.9 °F (36.6 °C), Min:97.9 °F (36.6 °C), Max:97.9 °F (36.6 °C)      Patient Vitals for the past 8 hrs:   BP Temp Temp src Pulse Resp SpO2   20 1421 (!) 125/53 -- -- 89 -- 95 %   20 1341 (!) 128/53 -- -- 83 -- --   20 1230 (!) 93/47 -- -- 83 -- (!) 87 %   20 1159 (!) 88/46 -- -- -- -- --   20 1128 (!) 89/50 -- -- 79 -- 92 %   20 1123 -- -- -- 79 -- --   20 1122 (!) 81/46 -- -- 78 -- 91 %   20 1043 -- 97.9 °F (36.6 °C) Oral -- -- --   20 1041 -- -- -- -- -- 91 %   20 1040 96/62 -- -- 79 18 --       No intake or output data in the 24 hours ending 20 1517    Wt Readings from Last 3 Encounters:   20 149 lb 4 oz (67.7 kg)   20 160 lb (72.6 kg)   20 167 lb 8.8 oz (76 kg)     There is no height or weight on file to calculate BMI. PHYSICAL EXAM:  Constitutional: Appears weak, in mild respiratory distress on 10 L non-rebreather. EENT: neck supple with midline trachea. Neck: Supple, symmetrical, trachea midline, no adenopathy,  no jvd, skin normal  Respiratory: mild bilateral crepitations, no wheezes or rales. No intercostal tenderness  Cardiovascular: regular rate, normal S1, S2, no murmur noted  Abdomen: soft, nontender, nondistended, no masses or organomegaly  Extremities:   Right forearm AV fistula appears non-infected with good palpable thrill.  No pedal edema, no clubbing or cyanosis    MEDICATIONS:  Scheduled Meds:   sodium chloride flush  10 mL Intravenous 2 times per day    heparin (porcine)  7,500 Units Subcutaneous 3 times per day    sodium chloride flush  10 mL Intravenous 2 times per day     Continuous Infusions:   norepinephrine 6 mcg/min (20 1301)     PRN Meds: sodium chloride flush, 10 mL, PRN  ondansetron, 4 mg, Q8H PRN  sodium chloride flush, 10 mL, PRN  polyethylene glycol, 17 g, Daily PRN  promethazine, 12.5 mg, Q6H PRN    Or  ondansetron, 4 mg, Q6H PRN          ABGs:   Lab Results   Component Value Date    FIO2 NOT REPORTED 06/09/2020       DATA:  Complete Blood Count:   Recent Labs     06/09/20  1114   WBC 8.2   RBC 4.53   HGB 13.7   HCT 43.2   MCV 95.4   MCH 30.2   MCHC 31.7   RDW 14.4      MPV 11.6        Last 3 Blood Glucose:   Recent Labs     06/09/20  1114   GLUCOSE 161*        PT/INR:    Lab Results   Component Value Date    PROTIME 10.2 06/09/2020    INR 1.0 06/09/2020     PTT:    Lab Results   Component Value Date    APTT 40.3 06/09/2020       Comprehensive Metabolic Profile:   Recent Labs     06/09/20  1055 06/09/20  1114   NA  --  136   K  --  4.7   CL  --  95*   CO2  --  16*   BUN  --  76*   CREATININE 12.28* 12.74*   GLUCOSE  --  161*   CALCIUM  --  8.4*   PROT  --  6.7   LABALBU  --  2.7*   BILITOT  --  0.38   ALKPHOS  --  221*   AST  --  70*   ALT  --  61*      Magnesium:   Lab Results   Component Value Date    MG 1.8 06/05/2020    MG 1.8 06/04/2020    MG 1.9 06/03/2020     Phosphorus:   Lab Results   Component Value Date    PHOS 3.2 05/27/2019    PHOS 3.2 05/26/2019    PHOS 2.7 05/25/2019     Ionized Calcium:   Lab Results   Component Value Date    CAION 1.07 06/05/2020    CAION 1.07 06/04/2020    CAION 1.03 06/03/2020        Urinalysis:   Lab Results   Component Value Date    NITRU NEGATIVE 05/31/2020    COLORU YELLOW 05/31/2020    PHUR 8.0 05/31/2020    WBCUA 20 TO 50 05/31/2020    RBCUA 20 TO 50 05/31/2020    MUCUS NOT REPORTED 05/31/2020    TRICHOMONAS NOT REPORTED 05/31/2020    YEAST NOT REPORTED 05/31/2020    BACTERIA FEW 05/31/2020    SPECGRAV 1.016 05/31/2020    LEUKOCYTESUR NEGATIVE 05/31/2020    UROBILINOGEN Normal 05/31/2020    BILIRUBINUR NEGATIVE 05/31/2020    GLUCOSEU TRACE 05/31/2020    KETUA NEGATIVE 05/31/2020    AMORPHOUS NOT REPORTED 05/31/2020       HgBA1c:    Lab Results   Component Value Date    LABA1C 6.5 12/03/2019     TSH:    Lab Results   Component Value Date    TSH 1.47 05/18/2018       Lactic Acid:   Lab Results   Component Value Date    LACTA 2.0 05/22/2019    LACTA 2.3 05/22/2019    LACTA 0.9 09/07/2018      Troponin: No results for input(s): TROPONINI in the last 72 hours. Electrocardiogram:       Last Echocardiogram findings:   (See actual reports for details)      Radiological imaging  Ct Chest Wo Contrast    Result Date: 6/2/2020    Increased bilateral ground-glass opacities which are nonspecific but can be seen with COVID. Xr Chest Portable    Result Date: 6/9/2020    1. Patchy bilateral infiltrates. This likely represents an atypical pneumonia, including viral pneumonia. Follow-up to resolution is recommended. 2. Stable cardiomegaly. Xr Chest Portable    Result Date: 5/31/2020    1. Stable moderate elevation of the right hemidiaphragm. Mild bibasilar atelectasis and scarring. 2. No acute focal airspace consolidation. 3. Prior median sternotomy and CABG. Xr Chest Portable    Result Date: 5/12/2020    No supradiaphragmatic dialysis catheter identified. Lower lung volumes with bibasilar subsegmental atelectasis. No pulmonary edema or convincing lung consolidation. ASSESSMENT:     Principal Problem:    Acute on chronic respiratory failure with hypoxia ([facility])  Active Problems:    ESRD (end stage renal disease) on dialysis ([facility])    Type 2 diabetes mellitus, with long-term current use of insulin (HCC)    COPD without exacerbation ([facility])    Coronary artery disease involving native coronary artery of native heart without angina pectoris    Hyperlipidemia    COVID-19    Pneumonia due to COVID-19 virus    Hypotension  Resolved Problems:    * No resolved hospital problems.  *      PLAN:     ICU PROPHYLAXIS:  Stress ulcer:  [] PPI Agent  [] [id number] [] Sucralfate  [] Other:  VTE:   [] Enoxaparin  [x] Unfract. Heparin Subcut  [] EPC Cuffs    NUTRITION:  [] NPO  [] Tube Feeding (Specify: ) [] TPN  [x] PO    CONSULTATION NEEDED:  [] No   [x] Yes     HOME MEDICATIONS RECONCILED: [] No  [] Yes    FAMILY UPDATED:    [] No   [] Yes     ADDITIONAL PLAN:  - continue pressor support with levophed  - Follow blood cultures, COVID-19 testing.   - Start IV vancomycin and cefepime for healthcare associated PNA. Pharmacy to dose vancomycin. Cefepime given at renal dose. - Continue O2 supplementation with non-re breather. Will need intubation if increased work of breathing to prevent respiratory muscle fatigue. -Nephrology consult for HD  - ID consult for Covid rule out and clearance for transfer if negative  - Troponin trending downwards from prior admission. Unlikely ACS at this time  - sc heparin 7500 U Q 8 hr for DVT prophylaxis.  - PT/OT  -Full liquids. Advance to Renal diet if tolerated. Reginaldo Wong MD  ICU resident   [facility] [place] [facility]  6/9/2020 3:17 PM    The critical care team assigned to the patient will be following up the patient in the intensive care unit. I have discussed the current plan with the critical care attending. The above mentioned assessment and plan will be reviewed again in detail by the critical care attending at bedside, and can be further changed or modified accordingly by the attending physician.

## 2020-06-09 NOTE — ED NOTES
Bed: 18  Expected date:   Expected time:   Means of arrival:   Comments:     Aileen Bruno, GIO  06/09/20 [id number]

## 2020-06-09 NOTE — ED PROVIDER NOTES
[facility]  ED  Emergency Department Encounter  EmergencyMedicine Resident     Pt Elizabeth Gifford  MRN: [id number]  [place] 1945  Date of evaluation: 6/9/20  PCP:  Chantell Lozada MD    [address] [place] [address]       Chief Complaint   Patient presents with    Hypotension       HISTORY OF PRESENT ILLNESS  (Location/Symptom, Timing/Onset, Context/Setting, Quality, Duration, Modifying Factors, Severity.)      Og Craig is a 67 y.o. male who presents with hypoxia, shortness of breath, hypertension. Patient presented to dialysis clinic from nursing home today and was found to be hypoxic with shortness of breath, patient was also hypotensive on arrival her with systolic blood pressure in 70s. Patient given Middaugh drain prior to transfer to emergency department with only mild increase in blood pressure to systolic of 19R. Patient reporting that he feels fatigued and short of breath but denies any other symptoms at this time. PAST MEDICAL / SURGICAL / SOCIAL / FAMILY HISTORY      has a past medical history of Asthma, CAD (coronary artery disease), CKD (chronic kidney disease) stage 4, GFR 15-29 ml/min ([facility]), COPD (chronic obstructive pulmonary disease) ([address] CVA (cerebral vascular accident) ([address] Decubitus ulcer, Depression, Discitis of lumbar region, ESRD (end stage renal disease) ([address] History of kidney stones, Hyperlipidemia, Hypertension, Lumbar discitis, Osteoarthritis, Osteomyelitis of lumbar spine ([address] Patient in clinical research study, Perianal abscess, Protein-calorie malnutrition ([address] Proteinuria, Thrombocytopenia ([address] and Type II or unspecified type diabetes mellitus without mention of complication, not stated as uncontrolled.        has a past surgical history that includes Coronary artery bypass graft; shoulder surgery; hip surgery; joint replacement; other surgical history (6/11/15); bone biopsy (6/29/15); other surgical history (12/30/15); and Carpal tunnel propionate (FLOVENT DISKUS) 100 MCG/BLIST AEPB inhaler Inhale 2 puffs into the lungs 2 times daily     Historical Provider, MD   Nutritional Supplements (BOOST GLUCOSE CONTROL) LIQD Take 8 oz by mouth nightly     Historical Provider, MD   senna-docusate (Ronalee Asai) 8.6-50 MG per tablet Take 2 tablets by mouth four times a week On non-dialysis days (Sun, Tues, Thurs, Sat)    Historical Provider, MD   gabapentin (NEURONTIN) 100 MG capsule Take 1 capsule by mouth 3 times daily 6/17/15   Alexis Mondragon MD   doxycycline (VIBRA-TABS) 100 MG tablet Take 1 tablet by mouth 2 times daily for 10 days 6/17/15 6/27/15  Alexis Mondragon MD   hydrALAZINE (APRESOLINE) 10 MG tablet Take 10 mg by mouth 3 times daily  2/13/14   Historical Provider, MD   aspirin 81 MG chewable tablet Take 81 mg by mouth every morning     Historical Provider, MD       REVIEW OF SYSTEMS    (2-9 systems for level 4, 10 or more for level 5)      Review of Systems   Constitutional: Positive for fatigue. Negative for appetite change, chills, fever and unexpected weight change. HENT: Negative for congestion, ear pain, postnasal drip, rhinorrhea, sinus pressure, sinus pain, sneezing and sore throat. Eyes: Negative for redness, itching and visual disturbance. Respiratory: Positive for shortness of breath. Negative for cough, chest tightness, wheezing and stridor. Cardiovascular: Negative for chest pain, palpitations and leg swelling. Gastrointestinal: Negative for abdominal pain, blood in stool, constipation, diarrhea, nausea and vomiting. Endocrine: Negative for polydipsia and polyuria. Genitourinary: Negative for difficulty urinating, dysuria and frequency. Musculoskeletal: Negative for gait problem, joint swelling, myalgias, neck pain and neck stiffness. Skin: Negative for pallor and rash. Allergic/Immunologic: Negative for immunocompromised state.    Neurological: Negative for dizziness, syncope, facial asymmetry, weakness and light-headedness. Psychiatric/Behavioral: Negative for confusion. The patient is not nervous/anxious. PHYSICAL EXAM   (up to 7 for level 4, 8 or more for level 5)      INITIAL VITALS:   BP (!) 98/44   Pulse 91   Temp 98.8 °F (37.1 °C) (Axillary)   Resp 21   Ht 5' 11\" (1.803 m)   Wt 153 lb 3.5 oz (69.5 kg)   SpO2 93%   BMI 21.37 kg/m²     Physical Exam  Vitals signs and nursing note reviewed. Constitutional:       General: He is not in acute distress. Appearance: He is well-developed. He is ill-appearing. He is not diaphoretic. HENT:      Head: Normocephalic and atraumatic. Right Ear: External ear normal.      Left Ear: External ear normal.   Eyes:      General: No scleral icterus. Right eye: No discharge. Left eye: No discharge. Pupils: Pupils are equal, round, and reactive to light. Neck:      Vascular: No JVD. Trachea: No tracheal deviation. Cardiovascular:      Rate and Rhythm: Normal rate and regular rhythm. Heart sounds: No murmur. No friction rub. No gallop. Pulmonary:      Effort: Respiratory distress present. Breath sounds: No stridor. Wheezing present. No rhonchi or rales. Abdominal:      General: There is no distension. Palpations: Abdomen is soft. Tenderness: There is no abdominal tenderness. There is no guarding. Musculoskeletal:         General: No deformity. Skin:     General: Skin is warm and dry. Capillary Refill: Capillary refill takes less than 2 seconds. Coloration: Skin is not pale. Findings: No erythema or rash. Neurological:      Mental Status: He is alert and oriented to person, place, and time. Cranial Nerves: No cranial nerve deficit. Motor: No abnormal muscle tone.          DIFFERENTIAL  DIAGNOSIS     PLAN (LABS / IMAGING / EKG):  Orders Placed This Encounter   Procedures    Culture, Blood 1    Culture, Blood 1    Culture, Urine    Infectious Disease Intervention    LEGIONELLA ANTIGEN, URINE    XR CHEST PORTABLE    CBC Auto Differential    Ammonia    Comprehensive Metabolic Panel    Lactate, Sepsis    APTT    Protime-INR    Troponin    Urinalysis with Microscopic    Hemoglobin and hematocrit, blood    SODIUM (POC)    POTASSIUM (POC)    CHLORIDE (POC)    CALCIUM, IONIC (POC)    COVID-19    Basic Metabolic Panel w/ Reflex to MG    CBC auto differential    C-REACTIVE PROTEIN    FERRITIN    LACTATE DEHYDROGENASE    Procalcitonin    MYCOPLASMA PNEUMONIAE ANTIBODY, IGM    Miscellaneous lab test #1    DIET FULL LIQUID;    Vital signs per unit routine    Notify physician    Notify physician    Telemetry Monitoring    Vital signs per unit routine    Daily weights    Intake and output    Up as tolerated    Up with assistance    Elevate heels off of bed at all times if patient is not able to move lower extremities    Turn or assist with turn every 2 hours if patient is unable to turn self. Remind patient to turn if necessary.     Inspect skin per unit guidelines    Maintain HOB at the lowest elevation consistent with medical plan of care    Full Code    Inpatient consult to Critical Care    Consult to Infectious Disease    Consult to Nephrology    Pharmacy to Dose: Vancomycin    Pharmacy to Dose: Vancomycin Trough 14-17    Contact and airborne isolation    OT eval and treat    PT eval and treat    Initiate Oxygen Therapy Protocol    Respiratory care evaluation only    Venous Blood Gas, POC    Creatinine W/GFR Point of Care    Lactic Acid, POC    POCT Glucose    Anion Gap (Calc) POC    EKG 12 Lead    Wound ostomy eval and treat    Insert arterial line    Hemodialysis inpatient    PATIENT STATUS (FROM ED OR OR/PROCEDURAL) Inpatient       MEDICATIONS ORDERED:  Orders Placed This Encounter   Medications    lactated ringers bolus    cefTRIAXone (ROCEPHIN) 1 g IVPB in 50 mL D5W minibag    azithromycin (ZITHROMAX) 500 mg in dextrose 5% 250 mL IVPB    norepinephrine (LEVOPHED) 16 mg in sodium chloride 0.9 % 250 mL infusion    sodium chloride flush 0.9 % injection 10 mL    sodium chloride flush 0.9 % injection 10 mL    ondansetron (ZOFRAN-ODT) disintegrating tablet 4 mg    heparin (porcine) injection 5,000 Units    sodium chloride flush 0.9 % injection 10 mL    sodium chloride flush 0.9 % injection 10 mL    polyethylene glycol (GLYCOLAX) packet 17 g    OR Linked Order Group     promethazine (PHENERGAN) tablet 12.5 mg     ondansetron (ZOFRAN) injection 4 mg    DISCONTD: cefepime (MAXIPIME) 1 g IVPB minibag    vancomycin (VANCOCIN) 1000 mg in dextrose 5% 200 mL IVPB    cefepime (MAXIPIME) 2 g IVPB minibag    DISCONTD: vancomycin (VANCOCIN) 750 mg in dextrose 5 % 250 mL IVPB    vancomycin (VANCOCIN) intermittent dosing (placeholder)    vancomycin (VANCOCIN) 750 mg in dextrose 5 % 250 mL IVPB    albumin human 25 % IV solution 25 g    midodrine (PROAMATINE) tablet 5 mg    albumin human 25 % IV solution     Shane Olsen: cabinet override    DISCONTD: midodrine (PROAMATINE) 5 MG tablet     Shane Chase: cabinet override    fentaNYL (SUBLIMAZE) injection 25 mcg    fentaNYL (SUBLIMAZE) 100 MCG/2ML injection     Evie Santizo: cabinet override    fentaNYL (SUBLIMAZE) injection 50 mcg    fentaNYL (SUBLIMAZE) 100 MCG/2ML injection     Evie Santizo: cabinet override    midodrine (PROAMATINE) tablet 10 mg    fentaNYL (SUBLIMAZE) injection 50 mcg       DDX: Pneumonia, COPD exacerbation, CHF exacerbation, pulmonary edema, exacerbation of end-stage renal disease    DIAGNOSTIC RESULTS / EMERGENCY DEPARTMENT COURSE / MDM   LAB RESULTS:  Results for orders placed or performed during the hospital encounter of 06/09/20   Culture, Blood 1   Result Value Ref Range    Specimen Description . BLOOD     Special Requests LEFT FOREARM 7ML     Culture NO GROWTH 21 HOURS    Culture, Blood 1   Result Value Ref Range    Specimen Description . BLOOD     Special Requests L AC 13ML     Culture NO GROWTH 21 HOURS    Infectious Disease Intervention   Result Value Ref Range    Intervention Expand Empiric Coverage    CBC Auto Differential   Result Value Ref Range    WBC 8.2 3.5 - 11.3 k/uL    RBC 4.53 4.21 - 5.77 m/uL    Hemoglobin 13.7 13.0 - 17.0 g/dL    Hematocrit 43.2 40.7 - 50.3 %    MCV 95.4 82.6 - 102.9 fL    MCH 30.2 25.2 - 33.5 pg    MCHC 31.7 28.4 - 34.8 g/dL    RDW 14.4 11.8 - 14.4 %    Platelets 628 234 - 802 k/uL    MPV 11.6 8.1 - 13.5 fL    NRBC Automated 0.0 0.0 per 100 WBC    Differential Type NOT REPORTED     WBC Morphology NOT REPORTED     RBC Morphology NOT REPORTED     Platelet Estimate NOT REPORTED     Seg Neutrophils 77 (H) 36 - 65 %    Lymphocytes 15 (L) 24 - 43 %    Monocytes 6 3 - 12 %    Eosinophils % 0 (L) 1 - 4 %    Basophils 0 0 - 2 %    Immature Granulocytes 2 (H) 0 %    Segs Absolute 6.34 1.50 - 8.10 k/uL    Absolute Lymph # 1.20 1.10 - 3.70 k/uL    Absolute Mono # 0.51 0.10 - 1.20 k/uL    Absolute Eos # <0.03 0.00 - 0.44 k/uL    Basophils Absolute <0.03 0.00 - 0.20 k/uL    Absolute Immature Granulocyte 0.15 0.00 - 0.30 k/uL   Ammonia   Result Value Ref Range    Ammonia 66 (H) 16 - 60 umol/L   Comprehensive Metabolic Panel   Result Value Ref Range    Glucose 161 (H) 70 - 99 mg/dL    BUN 76 (H) 8 - 23 mg/dL    CREATININE 12.74 (HH) 0.70 - 1.20 mg/dL    Bun/Cre Ratio NOT REPORTED 9 - 20    Calcium 8.4 (L) 8.6 - 10.4 mg/dL    Sodium 136 135 - 144 mmol/L    Potassium 4.7 3.7 - 5.3 mmol/L    Chloride 95 (L) 98 - 107 mmol/L    CO2 16 (L) 20 - 31 mmol/L    Anion Gap 25 (H) 9 - 17 mmol/L    Alkaline Phosphatase 221 (H) 40 - 129 U/L    ALT 61 (H) 5 - 41 U/L    AST 70 (H) <40 U/L    Total Bilirubin 0.38 0.3 - 1.2 mg/dL    Total Protein 6.7 6.4 - 8.3 g/dL    Alb 2.7 (L) 3.5 - 5.2 g/dL    Albumin/Globulin Ratio 0.7 (L) 1.0 - 2.5    GFR Non-African American 4 (L) >60 mL/min    GFR African American 5 (L) >60 mL/min    GFR Comment (L) 13.0 - 17.0 g/dL    Hematocrit 37.4 (L) 40.7 - 50.3 %    MCV 91.9 82.6 - 102.9 fL    MCH 30.2 25.2 - 33.5 pg    MCHC 32.9 28.4 - 34.8 g/dL    RDW 14.5 (H) 11.8 - 14.4 %    Platelets 252 970 - 423 k/uL    MPV 11.2 8.1 - 13.5 fL    NRBC Automated 0.0 0.0 per 100 WBC    Differential Type NOT REPORTED     Seg Neutrophils 83 (H) 36 - 65 %    Lymphocytes 9 (L) 24 - 43 %    Monocytes 6 3 - 12 %    Eosinophils % 0 (L) 1 - 4 %    Basophils 0 0 - 2 %    Immature Granulocytes 2 (H) 0 %    Segs Absolute 8.95 (H) 1.50 - 8.10 k/uL    Absolute Lymph # 1.01 (L) 1.10 - 3.70 k/uL    Absolute Mono # 0.67 0.10 - 1.20 k/uL    Absolute Eos # 0.04 0.00 - 0.44 k/uL    Basophils Absolute 0.03 0.00 - 0.20 k/uL    Absolute Immature Granulocyte 0.22 0.00 - 0.30 k/uL    WBC Morphology NOT REPORTED     RBC Morphology ANISOCYTOSIS PRESENT     Platelet Estimate NOT REPORTED    C-REACTIVE PROTEIN   Result Value Ref Range    .9 (H) 0.0 - 5.0 mg/L   FERRITIN   Result Value Ref Range    Ferritin 3,412 (H) 30 - 400 ug/L   LACTATE DEHYDROGENASE   Result Value Ref Range     (H) 135 - 225 U/L   Procalcitonin   Result Value Ref Range    Procalcitonin 1.49 (H) <0.09 ng/mL   Venous Blood Gas, POC   Result Value Ref Range    pH, Femi 7.331 7.320 - 7.430    pCO2, Femi 39.8 (L) 41.0 - 51.0 mm Hg    pO2, Femi 27.8 (L) 30.0 - 50.0 mm Hg    HCO3, Venous 21.0 (L) 22.0 - 29.0 mmol/L    Total CO2, Venous 22 (L) 23.0 - 30.0 mmol/L    Negative Base Excess, Femi 5 (H) 0.0 - 2.0    Positive Base Excess, Femi NOT REPORTED 0.0 - 3.0    O2 Sat, Femi 48 (L) 60.0 - 85.0 %    O2 Device/Flow/% NOT REPORTED     Dereje Test NOT REPORTED     Sample Site NOT REPORTED     Mode NOT REPORTED     FIO2 NOT REPORTED     Pt Temp NOT REPORTED     POC pH Temp NOT REPORTED     POC pCO2 Temp NOT REPORTED mm Hg    POC pO2 Temp NOT REPORTED mm Hg   Creatinine W/GFR Point of Care   Result Value Ref Range    POC Creatinine 12.28 (HH) 0.51 - 1.19 mg/dL    GFR Comment 5 (L) >60 mL/min GFR Non-African American 4 (L) >60 mL/min    GFR Comment         Lactic Acid, POC   Result Value Ref Range    POC Lactic Acid 1.17 0.56 - 1.39 mmol/L   POCT Glucose   Result Value Ref Range    POC Glucose 171 (H) 74 - 100 mg/dL   Anion Gap (Calc) POC   Result Value Ref Range    Anion Gap 9 7 - 16 mmol/L   EKG 12 Lead   Result Value Ref Range    Ventricular Rate 76 BPM    Atrial Rate 76 BPM    P-R Interval 166 ms    QRS Duration 150 ms    Q-T Interval 422 ms    QTc Calculation (Bazett) 474 ms    P Axis 26 degrees    R Axis -51 degrees    T Axis 31 degrees       IMPRESSION: Ill-appearing 76-year-old male presenting by EMS for hypoxia, confusion, hypotension. Patient went to have at dialysis today and was found to be hypotensive. Received midodrine prior to transport with no improvement in blood pressure. Plan for sepsis work-up, careful fluid resuscitation and possible vasopressors pending blood pressure response to fluids. RADIOLOGY:  Xr Chest Portable    Result Date: 6/9/2020  EXAMINATION: ONE XRAY VIEW OF THE CHEST 6/9/2020 11:22 am COMPARISON: 05/31/2020. HISTORY: ORDERING SYSTEM PROVIDED HISTORY: Hypotension, productive cough, COVID positive TECHNOLOGIST PROVIDED HISTORY: Hypotension, productive cough, COVID positive FINDINGS: There are low lung volumes. There are postsurgical changes of median sternotomy. The heart size is enlarged. The pulmonary vasculature is within normal limits. There are patchy bilateral infiltrates. No pneumothoraces are seen. 1. Patchy bilateral infiltrates. This likely represents an atypical pneumonia, including viral pneumonia. Follow-up to resolution is recommended. 2. Stable cardiomegaly. EKG  Regular rate, mildly prolonged QTC, right bundle branch block, left anterior fascicular block.   No acute changes compared to prior EKG    All EKG's are interpreted by the Emergency Department Physician who either signs or Co-signs this chart in the absence of a cardiologist.    EMERGENCY DEPARTMENT COURSE:  Evaluated at bedside, patient hypotensive on arrival.  Patient arrives on 10 L nonrebreather mask with oxygen saturation 91 to 94%. Hypotensive with initial systolic blood pressure in 70s. Sepsis work-up initiated which revealed patchy bilateral infiltrates on chest x-ray. Patient with prior positive COVID test approximately 2 weeks ago. Patient given 250 mL fluid bolus. Fluids given judiciously due to end-stage renal disease and concern for possible pulmonary edema and fluid overloaded. Patient with only minimal response to fluids. Started on IV Levophed. Patient admitted to ICU with nephrology consult for end-stage renal disease  PROCEDURES:  None    CONSULTS:  IP CONSULT TO CRITICAL CARE  IP CONSULT TO INFECTIOUS DISEASES  IP CONSULT TO NEPHROLOGY  PHARMACY TO DOSE VANCOMYCIN  IP CONSULT TO PHARMACY    CRITICAL CARE:  Please see attending note    FINAL IMPRESSION      1. Hypotension, unspecified hypotension type    2.  Sepsis due to COVID-19          DISPOSITION / [id number] Admitted 06/09/2020 12:50:09 PM      PATIENT REFERRED TO:  Iraida De León MD  [place] [phone]            DISCHARGE MEDICATIONS:  Current Discharge Medication List          Tea Qureshi DO  Emergency Medicine Resident    (Please note that portions of thisnote were completed with a voice recognition program.  Efforts were made to edit the dictations but occasionally words are mis-transcribed.)       Tea Qureshi DO  Resident  06/10/20 0427

## 2020-06-09 NOTE — CONSULTS
[facility] -   Infectious diseases evaluation  admission date 6/9/2020    reason for consultation:   COVID +    Impression :   Current:  · COVID + 5/31-post immune plasma 6/2  · 6/9 diffuse multifocal bilateral pneumonia  · ESRD hemodialysis 3 times weekly  · Infected sacral wound  Other:  ·   Discussion / summary of stay / plan of care   · Status post COVID, at the time could not get them to severe, was stable enough and went home on 6/5  · He had received the emergency room on 6/1 tolerated well  · Was not a candidate for remdesevir due to ESRD  · Was treated with Levaquin for a Pseudomonas sputum culture thought to be either colonization or bronchitis.   · Comes back with increasing shortness of breath right before he could get dialysis, is now on nonrebreather escalating doses, 10 L  · Has a sacral wound w foul discharge  Recommendations   · Get a CT scan of the chest once stable enough  · Agree with starting cefepime and Vanco empirically at this point  · 2 blood cultures pending  · covid have been +5/31 twice  · Not a candidate for remdesevir - not sure if the presentation is cause by the COVID or P edema or a bacterial infection  · Get inflamm parameters   · Consult stoma RN    Infection Control Recommendations   · [place] Precautions  · Contact Isolation   · Airborne isolation  · Droplet Isolation  Antimicrobial Stewardship Recommendations   · Simplification of therapy  · Targeted therapy  · IV to oral conversion  · Per Kg dosing      Coordination ofOutpatient Care:   · Estimated Length of IV antimicrobials:  · Patient will need Midline / picc Catheter Insertion:   · Patient will need SNF:  · Patient will need outpatient wound care:     History of Present Illness:   Initial history:  Alicia Summers is a 67y.o.-year-old male who is known to our service for chest pain discharged on 6/5 diagnosis of COVID and chills, groundglass pneumonia, respiratory Pseudomonas infection of unclear extent. At that time he did not fit criteria for REMDESEVIR brian to HD and ESRD. He received an immune serum 6/2 which he tolerated well without complications  Sputum culture ultimately grew Pseudomonas and he was started on Levaquin for 7 days until 6/4  He was discharged on 6/5 feeling great, on same for 2 weeks  At the time his CT of the chest showed groundglass and the Pseudomonas was causing most likely an upper respiratory infection rather than pneumonia. It seems he went to dialysis and was found to be hypoxic before he ran on dialysis, started on a nonrebreather, he was alert oriented x3, and admitted on 7 L of oxygen nonrebreather. Chest x-ray showed increased bilateral diffuse infiltrates, interstitial as shown below    At this time at this time he is on a nonrebreather, 10 L of oxygen, little confused, coughing with thick secretions in the back of his throat rather yellowish  His oxygenation has deteriorated since he came in. Abdomen is soft, he has no rash, he is confused  He has no fever        Interval changes  6/9/2020   Due to the current efforts to prevent transmission of COVID-19 and also the need to preserve PPE for other caregivers, a face-to-face encounter with the patient was not performed. That being said, all relevant records and diagnostic tests were reviewed, including laboratory results and imaging. Patient was evaluated from the window, called on the phone, and chart reviewed, disc w  involved healthcare workers. Summary of relevant labs:  Labs:  W 8.2  Micro:  BC 6/9   Imaging:  CXR 6/9/20  Impression   1. Patchy bilateral infiltrates.  This likely represents an atypical   pneumonia, including viral pneumonia.  Follow-up to resolution is recommended. 2. Stable cardiomegaly.          I have personally reviewed the past medical history, past surgical history, medications, social history, and family history, and I haveupdated the database accordingly.   Past Medical History:     Past Medical History:   Diagnosis Date    Asthma     CAD (coronary artery disease)     CKD (chronic kidney disease) stage 4, GFR 15-29 ml/min ([facility])     COPD [facility]chronic obstructive pulmonary disease) [facility]     [facility]re[facility]     Decubitus ulcer 6/10/2015    Depression     Discitis of lumbar region     ESRD (end stage renal disease) ([address] 6/26/2015    History of kidney stones     Hyperlipidemia     Hypertension     Lumbar discitis 6/25/2015    Osteoarthritis     Osteomyelitis of lumbar spine ([address] 6/25/2015    Patient in clinical research study 06/02/2020    Enrolled in [address]  for COVID expected date of completion 7/5/2020    Perianal abscess 12/29/2015    Protein-calorie malnutrition ([facility] 6/26/2015    Proteinuria     Thrombocytopenia ([facility] 7/2/2015    Type II or unspecified type diabetes mellitus without mention of complication, not stated as uncontrolled        Past Surgical  History:     Past Surgical History:   Procedure Laterality Date    BONE BIOPSY  6/29/15    L2-3    CARPAL TUNNEL RELEASE      CORONARY ARTERY BYPASS GRAFT      HIP SURGERY      JOINT REPLACEMENT      OTHER SURGICAL HISTORY  6/11/15    I&D coccyx wound    OTHER SURGICAL HISTORY  12/30/15    diverting loop colostomy; perirectal incision and drainage    SHOULDER SURGERY      left and right       Medications:      sodium chloride flush  10 mL Intravenous 2 times per day    heparin (porcine)  5,000 Units Subcutaneous 3 times per day    sodium chloride flush  10 mL Intravenous 2 times per day       Social History:     Social History     Socioeconomic History    Marital status:      Spouse name: Not on file    Number of children: Not on file    Years of education: Not on file    Highest education level: Not on file   Occupational History    Not on file   Social Needs    Financial resource strain: Not on file    Food insecurity independently reviewed/ordered the following labs:    CBC with Differential:   Recent Labs     06/09/20  1114   WBC 8.2   HGB 13.7   HCT 43.2      LYMPHOPCT 15*   MONOPCT 6     BMP:  Recent Labs     06/09/20  1055 06/09/20  1114   NA  --  136   K  --  4.7   CL  --  95*   CO2  --  16*   BUN  --  76*   CREATININE 12.28* 12.74*     Hepatic Function Panel:   Recent Labs     06/09/20  1114   PROT 6.7   LABALBU 2.7*   BILITOT 0.38   ALKPHOS 221*   ALT 61*   AST 70*     No results for input(s): RPR in the last 72 hours. No results for input(s): HIV in the last 72 hours. No results for input(s): BC in the last 72 hours. Lab Results   Component Value Date    CREATININE 12.74 06/09/2020    GLUCOSE 161 06/09/2020       Detailed results: Thank you for allowing us to participate in the care of this patient. Please call with questions. This note is created with the assistance of a speech recognition program.  While intending to generate adocument that actually reflects the content of the visit, the document can still have some errors including those of syntax and sound a like substitutions which may escape proof reading. It such instances, actual meaningcan be extrapolated by contextual diversion.     America Crigler, MD  Office: [phone]  Perfect serve / office [phone]

## 2020-06-09 NOTE — PROGRESS NOTES
Pharmacy Note  COVID-19 Anticoagulation Adjustment    Jerman Adler is a 67 y.o. male. Pharmacist assessment of anticoagulation in a SARS-CoV-2 positive patient. Recent Labs     06/09/20  1114   BUN 76*       Recent Labs     06/09/20  1055 06/09/20  1114   CREATININE 12.28* 12.74*     No results for input(s): DDIMER in the last 72 hours. CrCl cannot be calculated (Unknown ideal weight.). Height:   Ht Readings from Last 1 Encounters:   06/03/20 5' 11\" (1.803 m)     Weight:  Wt Readings from Last 1 Encounters:   06/05/20 149 lb 4 oz (67.7 kg)     BMI:  There is no height or weight on file to calculate BMI. Per the [address] [place] [address] Anticoagulation Algorithm for COVID-19 positive or clinically-suspected patients, the following adjustment has been made per P&T Guidelines:             Heparin 7500 units every 8 hours changed to 5000 units every 8 hours.     Thank you,  Gerald Underwood, PharmD 6/9/2020 3:26 PM

## 2020-06-09 NOTE — ED PROVIDER NOTES
[facility]     Emergency Department     Faculty Attestation    I performed a history and physical examination of the patient and discussed management with the resident. I reviewed the residents note and agree with the documented findings and plan of care. Any areas of disagreement are noted on the chart. I was personally present for the key portions of any procedures. I have documented in the chart those procedures where I was not present during the key portions. I have reviewed the emergency nurses triage note. I agree with the chief complaint, past medical history, past surgical history, allergies, medications, social and family history as documented unless otherwise noted below. For Physician Assistant/ Nurse Practitioner cases/documentation I have personally evaluated this patient and have completed at least one if not all key elements of the E/M (history, physical exam, and MDM). Additional findings are as noted. I have personally seen and evaluated the patient. I find the patient's history and physical exam are consistent with the NP/PA documentation. I agree with the care provided, treatment rendered, disposition and follow-up plan. Is known to be COVID positive and hypoxic upon arrival now saturations are in the low 90s on a nonrebreather mask. The patient is tolerating this well reporting anterior neck discomfort without odynophagia neck has full range of motion no headache or fever noted on examination he is answering all questions appropriately at this time      Critical Care     Taras Cortez M.D.   Attending Emergency  Physician              Trinity Araujo MD  06/09/20 [id number]

## 2020-06-09 NOTE — PROGRESS NOTES
Pharmacy Note  Vancomycin Consult    Tyree Mills is a 67 y.o. male started on Vancomycin for empiric treatment; consult received from Dr. Grace Reaves to manage therapy. Also receiving the following antibiotics: Cefepime. Patient Active Problem List   Diagnosis    Essential hypertension    ESRD (end stage renal disease) on dialysis ([facility]    Type 2 diabetes mellitus, with long-term current use of insulin ([facility]    COPD without exacerbation ([facility]    AVF (arteriovenous fistula) ([facility])    Abnormal laboratory test    Coronary artery disease involving native coronary artery of native heart without angina pectoris    Cerebrovascular disease    Hyperlipidemia    Asymptomatic cholelithiasis    COVID-19    Pneumonia of both lower lobes due to Pseudomonas species ([facility]    Pneumonia due to COVID-19 virus    Hypotension    Acute on chronic respiratory failure with hypoxia ([facility])       Allergies:  Oxycontin [oxycodone]; Avodart [dutasteride]; and Darvocet [propoxyphene n-acetaminophen]     Temp max: afebrile    Recent Labs     06/09/20  1114   BUN 76*       Recent Labs     06/09/20  1055 06/09/20  1114   CREATININE 12.28* 12.74*       Recent Labs     06/09/20  1114   WBC 8.2       No intake or output data in the 24 hours ending 06/09/20 1622    Culture Date      Source                       Results  06/09/20              Urine                          Pending  06/09/20              Blood x 2                    Pending    Ht Readings from Last 1 Encounters:   06/09/20 5' 11\" (1.803 m)        Wt Readings from Last 1 Encounters:   06/09/20 149 lb 4 oz (67.7 kg)         Body mass index is 20.82 kg/m². Estimated Creatinine Clearance: 5 mL/min (A) (based on SCr of 12.74 mg/dL [facility])). Goal Trough Level: 10-20 mcg/mL    Assessment/Plan:  Will initiate Vancomycin with a one time loading dose of 1000 mg x1, followed by 750 mg IV every dialysis treatment on [id number].   Timing of trough level will be determined based on culture results, renal function, and clinical response. Thank you for the consult. Will continue to follow.   Shana Zamarripa [facility]  6/9/2020  4:24 PM

## 2020-06-09 NOTE — ED NOTES
PT to ED with c/o hypoxemia noted before beginning dialysis arrived on a nonrebreather mask. Pt confirmed COVID positive, hx of COPD, hypertension, end stage renal failure. Pt is alert and oriented  X3, disoriented to time, pt says it is January unable to say year.  Pt placed on full cardiac monitoring and oxygen at 7L on the [address] [place]  06/09/20 5527

## 2020-06-09 NOTE — CARE COORDINATION
Case Management Initial Discharge Plan  nenita Allen  Called patients room too sob to talk called daughter jaime to verify information   Readmit for sob/ hypoxia   . Information verified: address, contacts, phone number, , insurance Yes    Emergency Contact/Next of Kin name & number: aldo ballard    PCP: Cecily Penaloza MD      Insurance Provider: medicare/[place]     Discharge Planning    Living Arrangements:    long term [facility] [id number] [place] [address] [place] spoke to [facility] in admissions will follow for return   Support Systems:   family           Patient able to perform ADL's:Dependent    DME equipment: wc, o2      Receiving oral anticoagulation therapy? No    dialysis [facility]       Potential Assistance Needed:   return to [facility]       Prior SNF/Rehab Placement and Facility: [address] to SNF/Rehab: Yes  [place] of choice provided: yes     Evaluation: no    Expected Discharge date:       Patient daughter expects patient to be discharged to:   OV  Follow Up Appointment: Best Day/ Time:      Transportation provider: [facility]  Transportation arrangements needed for discharge: Yes    Readmission Risk              Risk of Unplanned Readmission:        21             Does patient have a readmission risk score greater than 14?: Yes  If yes, follow-up appointment must be made within 7 days of discharge.      Goals of Care: return to adl without shortness of breath       Discharge Plan: return to [facility]           Electronically signed by Sarika Hill RN on 20 at 3:53 PM EDT

## 2020-06-10 NOTE — PROGRESS NOTES
Physical Therapy  DATE: 6/10/2020    NAME: Bernard Johnson  MRN: [id number]   : 1945    Patient not seen this date for Physical Therapy due to:  [] Blood transfusion in progress  [] Hemodialysis  []  Patient Declined  [] Spine Precautions   [] Strict Bedrest  [] Surgery/ Procedure  [] Testing      [x] Other--respiratory issues; ck         [] PT being discontinued at this time. Patient independent. No further needs. [] PT being discontinued at this time as the patient has been transferred to palliative care. No further needs.     Liliya Edward, PT

## 2020-06-10 NOTE — PROGRESS NOTES
[facility] -   Infectious diseases evaluation  admission date 6/9/2020    reason for consultation:   COVID +    Impression :   Current:  · COVID + 5/31-post immune plasma 6/2  · COVID  +6/9  · 6/9 diffuse multifocal bilateral pneumonia-interstitial  · ESRD hemodialysis 3 times weekly  · Infected sacral wound   · Staph bacteremia  ·   Other:  ·   Discussion / summary of stay / plan of care   · Status post COVID,went home on 6/5  · He had received immune serum  on 6/1 tolerated well  · Was not a candidate for remdesevir due to ESRD  · Was treated with Levaquin for a Pseudomonas sputum culture thought to be either colonization or bronchitis.   · Comes back with increasing shortness of breath right before he could get dialysis, is now on nonrebreather escalating doses, 10 L  · Has a sacral wound w foul discharge  · COVID + 5/31 + 6/9  Recommendations     · Day 2 cefepime and Vanco empirically at this point  · Add Zithromax for atypicals  · BC clusters pend results  · Were sent inflammatory parameters, possibly suggestive of inflammatory surge  · Follow the parameters further  · Not a candidate for remdesevir -   · Wound care for the sacral wound  · Will follow  · Discussed with nurse    Infection Control Recommendations   · [place] Precautions  · Contact Isolation   · Airborne isolation  · Droplet Isolation  Antimicrobial Stewardship Recommendations   · Simplification of therapy  · Targeted therapy  · IV to oral conversion  · Per Kg dosing      Coordination ofOutpatient Care:   · Estimated Length of IV antimicrobials:  · Patient will need Midline / picc Catheter Insertion:   · Patient will need SNF:  · Patient will need outpatient wound care:     History of Present Illness:   Initial history:  Kailey Arsmtrong is a 67y.o.-year-old male who is known to our service for chest pain discharged on 6/5 diagnosis of COVID and chills, groundglass pneumonia, respiratory Pseudomonas infection of unclear extent. At that time he did not fit criteria for REMDESEVIR brian to HD and ESRD. He received an immune serum 6/2 which he tolerated well without complications  Sputum culture ultimately grew Pseudomonas and he was started on Levaquin for 7 days until 6/4  He was discharged on 6/5 feeling great, on same for 2 weeks  At the time his CT of the chest showed groundglass and the Pseudomonas was causing most likely an upper respiratory infection rather than pneumonia. It seems he went to dialysis and was found to be hypoxic before he ran on dialysis, started on a nonrebreather, he was alert oriented x3, and admitted on 7 L of oxygen nonrebreather. Chest x-ray showed increased bilateral diffuse infiltrates, interstitial as shown below    At this time at this time he is on a nonrebreather, 10 L of oxygen, little confused, coughing with thick secretions in the back of his throat rather yellowish  His oxygenation has deteriorated since he came in. Abdomen is soft, he has no rash, he is confused  He has no fever        Interval changes  6/10/2020   Due to the current efforts to prevent transmission of COVID-19 and also the need to preserve PPE for other caregivers, a face-to-face encounter with the patient was not performed. That being said, all relevant records and diagnostic tests were reviewed, including laboratory results and imaging. Patient was evaluated from the window, called on the phone, and chart reviewed, disc w  involved healthcare workers. COVID pos   Low-grade fever  Getting hemodialysis of blood pressure 44/32,  Not intubated but on nonrebreather.     He went into A. fib with RVR, heart rate of 122  Low-dose Levophed 8 mcg    Blood cultures x1 clusters unclear significance, the patient is already on the Vanco  Inflammatory parameters are much higher at the time where that she CT scan is suggestive interstitial markings possibly pulmonary edema versus COVID    Summary of relevant labs:  Labs:  W 8.2    CRP 49 - 335   - 365  Ferritin 3412  procalc 1.49    Micro:  BC 6/9 clusters x 1   COVID + 6/9    Sputum cx 5/31 pseudomonas S cipro and cefepime  Imaging:  CT chest 3/10/20  Increased bilateral ground-glass opacities which are nonspecific but can be seen with COVID. CXR 6/9/20  Impression   1. Patchy bilateral infiltrates.  This likely represents an atypical   pneumonia, including viral pneumonia.  Follow-up to resolution is recommended. 2. Stable cardiomegaly. I have personally reviewed the past medical history, past surgical history, medications, social history, and family history, and I haveupdated the database accordingly.   Past Medical History:     Past Medical History:   Diagnosis Date    Asthma     CAD (coronary artery disease)     CKD (chronic kidney disease) stage 4, GFR 15-29 ml/min [facility])     COPD (ch[facility] obstructive pulmonary disease) ([facility]     CVA (cerebral vascular accident) ([address]     Decubitus ulcer 6/10/2015    Depression     Discitis of lumbar region     ESRD (end stage renal disease) ([address] 6/26/2015    History of kidney stones     Hyperlipidemia     Hypertension     Lumbar discitis 6/25/2015    Osteoarthritis     Osteomyelitis of lumbar spine ([address] 6/25/2015    Patient in clinical research study 06/02/2020    Enrolled in [address] for COVID expected date of completion 7/5/2020    Perianal abscess 12/29/2015    Protein-calorie malnutrition ([address] 6/26/2015    Proteinuria     Thrombocytopenia ([address] 7/2/2015    Type II or unspecified type diabetes mellitus without mention of complication, not stated as uncontrolled        Past Surgical  History:     Past Surgical History:   Procedure Laterality Date    BONE BIOPSY  6/29/15    L2-3    CARPAL TUNNEL RELEASE      CORONARY ARTERY BYPASS GRAFT      HIP SURGERY      JOINT REPLACEMENT      OTHER SURGICAL HISTORY  6/11/15    I&D coccyx wound    OTHER SURGICAL HISTORY 12/30/15    diverting loop colostomy; perirectal incision and drainage    SHOULDER SURGERY      left and right       Medications:      fentaNYL        midodrine  10 mg Oral TID     sodium chloride flush  10 mL Intravenous 2 times per day    heparin (porcine)  5,000 Units Subcutaneous 3 times per day    sodium chloride flush  10 mL Intravenous 2 times per day    cefepime  2 g Intravenous Once per day on Mon Wed Fri    vancomycin (VANCOCIN) intermittent dosing (placeholder)   Other RX Placeholder    vancomycin  750 mg Intravenous Once per day on Mon Wed Fri       Social History:     Social History     Socioeconomic History    Marital status:      Spouse name: Not on file    Number of children: Not on file    Years of education: Not on file    Highest education level: Not on file   Occupational History    Not on file   Social Needs    Financial resource strain: Not on file    Food insecurity     Worry: Not on file     Inability: Not on file    Transportation needs     Medical: Not on file     Non-medical: Not on file   Tobacco Use    Smoking status: Never Smoker    Smokeless tobacco: Never Used   Substance and Sexual Activity    Alcohol use: No     Alcohol/week: 0.0 standard drinks    Drug use: No    Sexual activity: Never   Lifestyle    Physical activity     Days per week: Not on file     Minutes per session: Not on file    Stress: Not on file   Relationships    Social connections     Talks on phone: Not on file     Gets together: Not on file     Attends Protestant service: Not on file     Active member of club or organization: Not on file     Attends meetings of clubs or organizations: Not on file     Relationship status: Not on file    Intimate partner violence     Fear of current or ex partner: Not on file     Emotionally abused: Not on file     Physically abused: Not on file     Forced sexual activity: Not on file   Other Topics Concern    Not on file   Social History Narrative  Not on file       Family History:     Family History   Problem Relation Age of Onset    Stroke Mother     Heart Attack Mother     Heart Attack Father     Diabetes Sister     Diabetes Brother         Allergies:   Oxycontin [oxycodone]; Avodart [dutasteride]; and Darvocet [propoxyphene n-acetaminophen]     Review of Systems:     Review of Systems   Unable to perform ROS: Unstable vital signs       Physical Examination :     Patient Vitals for the past 8 hrs:   BP Temp Temp src Pulse Resp SpO2   06/10/20 1000 (!) 98/44 -- -- 91 21 93 %   06/10/20 0800 97/61 98.8 °F (37.1 °C) Axillary 97 21 95 %   06/10/20 0630 (!) 115/56 -- -- 93 21 95 %   06/10/20 0615 (!) 116/54 -- -- 93 21 95 %   06/10/20 0600 (!) 108/58 -- -- 92 21 95 %   06/10/20 0545 (!) 106/55 -- -- 94 21 94 %   06/10/20 0530 (!) 99/50 -- -- 95 22 93 %   06/10/20 0515 98/83 -- -- 93 19 96 %   06/10/20 0500 (!) 112/54 -- -- 93 21 95 %   06/10/20 0445 (!) 126/58 -- -- 96 21 95 %   06/10/20 0430 (!) 131/56 -- -- 93 22 95 %   06/10/20 0415 (!) 117/54 -- -- 94 21 95 %   06/10/20 0400 (!) 129/59 99 °F (37.2 °C) Oral 100 23 95 %   06/10/20 0345 126/65 -- -- 92 22 96 %       Physical Exam  Constitutional:       General: He is in acute distress. Appearance: He is ill-appearing. HENT:      Head: Normocephalic and atraumatic. Nose: No rhinorrhea. Eyes:      General: No scleral icterus. Conjunctiva/sclera: Conjunctivae normal.   Cardiovascular:      Rate and Rhythm: Tachycardia present. Rhythm irregular. Abdominal:      General: There is no distension. Tenderness: There is no abdominal tenderness. Genitourinary:     Comments: Urine susan  Musculoskeletal:         General: No swelling. Right lower leg: No edema. Left lower leg: No edema. Skin:     General: Skin is dry. Coloration: Skin is not jaundiced. Comments: Sacral wound   Neurological:      General: No focal deficit present. Mental Status: He is alert. Psychiatric:      Comments: Tired            Medical Decision Making:   I have independently reviewed/ordered the following labs:    CBC with Differential:   Recent Labs     06/09/20  1114 06/10/20  0448   WBC 8.2 10.9   HGB 13.7 12.3*   HCT 43.2 37.4*    190   LYMPHOPCT 15* 9*   MONOPCT 6 6     BMP:  Recent Labs     06/09/20  1114 06/10/20  0448    136   K 4.7 4.4   CL 95* 96*   CO2 16* 14*   BUN 76* 60*   CREATININE 12.74* 10.06*     Hepatic Function Panel:   Recent Labs     06/09/20  1114   PROT 6.7   LABALBU 2.7*   BILITOT 0.38   ALKPHOS 221*   ALT 61*   AST 70*     No results for input(s): RPR in the last 72 hours. No results for input(s): HIV in the last 72 hours. No results for input(s): BC in the last 72 hours. Lab Results   Component Value Date    CREATININE 10.06 06/10/2020    GLUCOSE 207 06/10/2020       Detailed results: Thank you for allowing us to participate in the care of this patient. Please call with questions. This note is created with the assistance of a speech recognition program.  While intending to generate adocument that actually reflects the content of the visit, the document can still have some errors including those of syntax and sound a like substitutions which may escape proof reading. It such instances, actual meaningcan be extrapolated by contextual diversion.     Tiny Habermann, MD  Office: [phone]  Perfect serve / office [phone]

## 2020-06-10 NOTE — PROGRESS NOTES
Occupational Therapy    Occupational Therapy Not Seen Note    DATE: 6/10/2020  Name: Og Craig  : 1945  MRN: [id number]    Patient not available for Occupational Therapy due to: Other: Pt on non-rebreather, RN's want to hold OOB activity at this time. Next Scheduled Treatment: Attempt on  as appropriate.     Electronically signed by Estela Brar OT on 6/10/2020 at 9:01 AM

## 2020-06-10 NOTE — CONSULTS
REASON FOR  NEPHROLOGY CONSULT     Fluid and BP management in ESRD     ACCESS   AV fistula    DRY WEIGHT   79.5 kg    NEPHROLOGIST   Dr. Milagro Patiño               This is a 67 y.o. male with transferred from dialysis unit because of hypotension and shortness of breath with hypoxia. Patient was recently discharged from [facility] on 5 June for COVID-19 pneumonia for which he received convalescent plasma on 2 June. Following discharge he went for his regular dialysis and reported shortness of breath and fatigue. No history of fever or cough. Was noted to be hypoxic and hypotensive. Dialysis was not carried out and he was transferred here for further management. In the ER noted to have systolic blood pressure of 70. Saturation was 92% on 10 L nonrebreather. He has history of chronic respiratory failure and uses 2 L back home. Received 250 mils of crystalloid and started on Levophed with improvement in the blood pressure. Empiric antibiotics were also started. Chest x-ray showed bilateral patchy infiltrates which are worsened from prior study. Blood cultures have been drawn. He underwent hemodialysis yesterday. Continues to be on pressor. 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     PAST MEDICAL HISTORY         Diagnosis Date    Asthma     CAD (coronary artery disease)     CKD (chronic kidney disease) stage 4, GFR 15-29 ml/min [facility]     COPD (chronic obstructive pulmonary disease) [facility]     CVA (cerebral vascular accident) ([address]     Decubitus ulcer 6/10/2015    Depression     Discitis of lumbar region     ESRD (end stage renal disease) ([address] 6/26/2015    History of kidney stones     Hyperlipidemia     Hypertension     Lumbar discitis 6/25/2015    Osteoarthritis     Osteomyelitis of lumbar spine ([address] 6/25/2015    Patient in clinical research study 06/02/2020    Enrolled in [address]  for BETYID expected date of completion 7/5/2020    Perianal abscess 12/29/2015    Protein-calorie malnutrition ([facility] 6/26/2015    Proteinuria     Thrombocytopenia ([facility] 7/2/2015    Type II or unspecified type diabetes mellitus without mention of complication, not stated as uncontrolled          PAST SURGICAL HISTORY         Procedure Laterality Date    BONE BIOPSY  6/29/15    L2-3    CARPAL TUNNEL RELEASE      CORONARY ARTERY BYPASS GRAFT      HIP SURGERY      JOINT REPLACEMENT      OTHER SURGICAL HISTORY  6/11/15    I&D coccyx wound    OTHER SURGICAL HISTORY  12/30/15    diverting loop colostomy; perirectal incision and drainage    SHOULDER SURGERY      left and right       MEDICATIONS     Home Meds:                Medications Prior to Admission: doxycycline hyclate (VIBRA-TABS) 100 MG tablet, Take 1 tablet by mouth daily  zinc 50 MG CAPS, Take 50 mg by mouth daily  donepezil (ARICEPT) 10 MG tablet, Take 10 mg by mouth nightly  albuterol (PROVENTIL) (2.5 MG/3ML) 0.083% nebulizer solution, Take 2.5 mg by nebulization every 4 hours as needed (as needed for copd)  insulin glargine (LANTUS SOLOSTAR) 100 UNIT/ML injection pen, Inject into the skin daily inject 14 unit subcutaneously one time a day for DM  Lidocaine 0.5 % AERO, Apply topically Apply to dialysis access topically one time a day every Mon, Wed, Fri  polyvinyl alcohol (LIQUIFILM TEARS) 1.4 % ophthalmic solution, 1 drop as needed  doxycycline hyclate (VIBRA-TABS) 100 MG tablet, Take 100 mg by mouth 2 times daily Until 7/15/19 for osteomyelitis  fluticasone (FLONASE) 50 MCG/ACT nasal spray, 2 sprays by Each Nostril route daily  ipratropium (ATROVENT) 0.02 % nebulizer solution, Take 0.5 mg by nebulization every 8 hours  Multiple Vitamins-Minerals (THERAPEUTIC MULTIVITAMIN-MINERALS) tablet, Take 1 tablet by mouth daily  allopurinol (ZYLOPRIM) 100 MG tablet, Take 100 mg by mouth daily  metoprolol succinate (TOPROL XL) 25 MG extended release tablet, Take 1 tablet by mouth daily  carboxymethylcellulose 1 % ophthalmic solution, Place 1 drop into both eyes 3 times daily as needed for Dry Eyes  sertraline (ZOLOFT) 50 MG tablet, Take 50 mg by mouth daily  calcium carbonate (TUMS) 500 MG chewable tablet, Take 2 tablets by mouth every 6 hours as needed for Heartburn  diclofenac sodium 1 % GEL, Apply topically 3 times daily Apply between last two toes and to arch of right foot  fluticasone propionate (FLOVENT DISKUS) 100 MCG/BLIST AEPB inhaler, Inhale 2 puffs into the lungs 2 times daily   Nutritional Supplements (BOOST GLUCOSE CONTROL) LIQD, Take 8 oz by mouth nightly   senna-docusate (PERICOLACE) 8.6-50 MG per tablet, Take 2 tablets by mouth four times a week On non-dialysis days (Sun, Tues, Thurs, Sat)  gabapentin (NEURONTIN) 100 MG capsule, Take 1 capsule by mouth 3 times daily  doxycycline (VIBRA-TABS) 100 MG tablet, Take 1 tablet by mouth 2 times daily for 10 days  hydrALAZINE (APRESOLINE) 10 MG tablet, Take 10 mg by mouth 3 times daily   aspirin 81 MG chewable tablet, Take 81 mg by mouth every morning   Scheduled Meds:    fentaNYL        midodrine  10 mg Oral TID WC    sodium chloride flush  10 mL Intravenous 2 times per day    heparin (porcine)  5,000 Units Subcutaneous 3 times per day    sodium chloride flush  10 mL Intravenous 2 times per day    cefepime  2 g Intravenous Once per day on Mon Wed Fri    vancomycin (VANCOCIN) intermittent dosing (placeholder)   Other RX Placeholder    vancomycin  750 mg Intravenous Once per day on Mon Wed Fri     Continuous Infusions:    norepinephrine 4 mcg/min (06/10/20 1006)     PRN Meds:  fentanNYL, sodium chloride flush, ondansetron, sodium chloride flush, polyethylene glycol, promethazine **OR** ondansetron    ALLERGY     Oxycontin [oxycodone];  Avodart [dutasteride]; and Darvocet [propoxyphene n-acetaminophen]    SOCIAL HISTORY     Social History     Socioeconomic History    Marital status:      Spouse name: Not on file    Number of children: Not on file    Years of education: Not on file    Highest education level: Not on file   Occupational History    Not on file   Social Needs    Financial resource strain: Not on file    Food insecurity     Worry: Not on file     Inability: Not on file    Transportation needs     Medical: Not on file     Non-medical: Not on file   Tobacco Use    Smoking status: Never Smoker    Smokeless tobacco: Never Used   Substance and Sexual Activity    Alcohol use: No     Alcohol/week: 0.0 standard drinks    Drug use: No    Sexual activity: Never   Lifestyle    Physical activity     Days per week: Not on file     Minutes per session: Not on file    Stress: Not on file   Relationships    Social connections     Talks on phone: Not on file     Gets together: Not on file     Attends Buddhist service: Not on file     Active member of club or organization: Not on file     Attends meetings of clubs or organizations: Not on file     Relationship status: Not on file    Intimate partner violence     Fear of current or ex partner: Not on file     Emotionally abused: Not on file     Physically abused: Not on file     Forced sexual activity: Not on file   Other Topics Concern    Not on file   Social History Narrative    Not on file       FAMILY HISTORY      Family History   Problem Relation Age of Onset    Stroke Mother     Heart Attack Mother     Heart Attack Father     Diabetes Sister     Diabetes Brother           REVIEW OF SYSTEM      Unable to obtain    EXAMINATION       Vitals:    06/10/20 0615 06/10/20 0630 06/10/20 0800 06/10/20 1000   BP: (!) 116/54 (!) 115/56 97/61 (!) 98/44   Pulse: 93 93 97 91   Resp: 21 21 21 21   Temp:   98.8 °F (37.1 °C)    TempSrc:   Axillary    SpO2: 95% 95% 95% 93%   Weight:       Height:         24HR INTAKE/OUTPUT:      Intake/Output Summary (Last 24 hours) at 6/10/2020 1107  Last data filed at 6/10/2020 0630  Gross per 24 hour   Intake 1402.52 ml Output 2010 ml   Net -607.48 ml       Physical examination deferred in view of pandemic. Continues to need nonrebreather oxygenation. Blood pressure is stable. INVESTIGATIONS     PTH:  No results found for: PTH  abs:   CBC:   Recent Labs     06/09/20  1114 06/10/20  0448   WBC 8.2 10.9   RBC 4.53 4.07*   HGB 13.7 12.3*   HCT 43.2 37.4*   MCV 95.4 91.9   MCH 30.2 30.2   MCHC 31.7 32.9   RDW 14.4 14.5*    190   MPV 11.6 11.2      BMP:   Recent Labs     06/09/20  1055 06/09/20  1114 06/10/20  0448   NA  --  136 136   K  --  4.7 4.4   CL  --  95* 96*   CO2  --  16* 14*   BUN  --  76* 60*   CREATININE 12.28* 12.74* 10.06*   GLUCOSE  --  161* 207*   CALCIUM  --  8.4* 7.9*        Phosphorus:  No results for input(s): PHOS in the last 72 hours. Magnesium: No results for input(s): MG in the last 72 hours. Albumin:   Recent Labs     06/09/20  1114   LABALBU 2.7*       ASSESSMENT     1. End-stage renal disease on intermittent maintenance hemodialysis Tuesday Thursday Saturday using AV fistula at [facility] HD unit. DW [address] Emerson Burnham  2. COVID - 19 Pneumonia   3. Aluminium toxicity on Deferoxamine  4. CABG with preserved EF  5. DM2  6. Chronic resp failure on LTOT at 3 L    PLAN     1.HD today again to optimize volume status and persistent met acidosis  2. Will give Deforaxmine as last levels 32  3. HD am  4. Check Aluminium levels AM  5. Resume home meds  6. Abx as per ESRD  7. Will follow        Thank you for the consultation. Please do not hesitate to call with questions.     This note is created with the assistance of a speech-recognition program. While intending to generate a document that actually reflects the content of the visit, no guarantees can be provided that every mistake has been identified and corrected by editing      Maura Toledo MD, [facility] [place], [address]   6/10/2020 11:07 AM  [facility]

## 2020-06-10 NOTE — PROCEDURES
PROCEDURE NOTE - CENTRAL VENOUS LINE PLACEMENT    PATIENT NAME: Andrés Cabral  MEDICAL RECORD NO. [id number]  DATE: 6/9/2020  ATTENDING PHYSICIAN: Dr. Manuel Flores DIAGNOSIS:  Need for IV access  POSTOPERATIVE DIAGNOSIS:  Same  PROCEDURE PERFORMED:  Right Femoral Vein Central Line Insertion  PERFORMING PHYSICIAN: Karon Dunbar MD  ANESTHESIA:  Local utilizing 1% lidocaine  ESTIMATED BLOOD LOSS:  Less than 25 ml  COMPLICATIONS:  None immediately appreciated. DISCUSSION:  Andrés Cabral is a 67y.o.-year-old male who requires central IV access. The history and physical examination were reviewed and confirmed. The diagnoses, proposed procedure, risks, possible complications, benefits and alternatives were discussed with the patient or family. He was given the opportunity to ask questions, and once answered, informed consent was obtained. The patient was then prepared for the procedure. PROCEDURE:  A timeout was initiated by the bedside nurse and was confirmed by those present. The patient was placed in a supine position. The skin overlying the Right Femoral Vein was prepped with chlorhexadine and draped in sterile fashion. The skin was infiltrated with local anesthetic. The vessel and surrounding anatomy was visualized using ultrasound. Through the anesthetized region, the introducer needle was inserted into the femoral vein returning dark red non pulsatile blood. A guidewire was placed through the center of the needle with no resistance. Ultrasound confirmed presence of wire in the vein. A small incision made in the skin with a #11 scalpel blade. The dilator was inserted into the skin and vein over guidewire using Seldinger technique. The dilator was then removed and the catheter was placed in the vein over the guidewire using Seldinger technique. The guidewire was then removed and all ports aspirated and flushed appropriately.  The catheter then secured using silk suture and a temporary sterile dressing was applied. No immediate complication was evident. All sponge, instrument and needle counts were correct at the completion of the procedure.       Edgard Roman MD  11:13 PM, 6/9/20

## 2020-06-10 NOTE — PROGRESS NOTES
Arterial Line Placement Procedure Note    DATE: 6/9/2020  RE: Kerri Mo   1945    PREOPERATIVE DIAGNOSIS:  Hypotension    POSTOPERATIVE DIAGNOSIS:  Hypotension    INDICATION:  Need or invasive blood pressure monitoring. OPERATION PERFORMED:  Placement of 20 Gauge  Arterial line in Left femoral Artery    Performed by: Cherrie Vilchis MD    ASSISTANT(S):      ANESTHESIA:  None    Procedure: Sterile technique was initiated (hand washing, gown, cap, mask, gloves, draping) before the skin over the left Femoral artery was prepped with betadine and draped in a sterile fashion. After skin infiltration with 1% plain lidocaine, the vessel was identified with a 20 Ga. introducer needle and a guide wire was placed without difficulty. Then, a 18 Ga. catheter was easily threaded. Good waveform obtained on monitor and line withdrew and flushed well. A-line was secured with skin sutures, and dressed. Complications: None.     Cherrie Vilchis MD  [facility]         6/10/2020, 1:27 AM

## 2020-06-10 NOTE — PROGRESS NOTES
Perfect served on call internal med intern. Writer wrote, \"pt has that wound on his bottom. Wound was around and recommended a FMS\"    Awaiting response.      Electronically signed by Stanley Sloan RN on 6/10/2020 at 5:26 PM

## 2020-06-10 NOTE — PROGRESS NOTES
pts daughter updated with all questions and concerns at this time.     Electronically signed by Emma Gudino RN on 6/10/2020 at 7:20 PM

## 2020-06-10 NOTE — PROGRESS NOTES
Dialysis Post Treatment Note  Vitals:    06/09/20 2232   BP: (!) 124/52   Pulse: 90   Resp:    Temp: 99 °F (37.2 °C)   SpO2:      Pre-Weight =71kg  Post-weight = Weight: 153 lb 3.5 oz (69.5 kg)  Total Liters Processed = Total Liters Processed (l/min): 79.1 l/min  Rinseback Volume (mL) = Rinseback Volume (ml): 290 ml  Net Removal (mL) = 1720  Patient's dry weight=  Type of access used=right AVF  Length of treatment= 210

## 2020-06-10 NOTE — PROGRESS NOTES
[facility] - CLOSED, pts daughter with an update. All questions and concerns answered at this time.      Electronically signed by Octavia Louise RN on 6/10/2020 at 1:50 PM

## 2020-06-10 NOTE — PROGRESS NOTES
Dialysis Post Treatment Note  Vitals:    06/10/20 1730   BP: (!) 111/52   Pulse: 105   Resp: 21   Temp:    SpO2: 96%     Pre-Weight = 74kg  Post-weight = Weight: 163 lb 2.3 oz (74 kg)  Total Liters Processed = Total Liters Processed (l/min): 13.8 l/min  Rinseback Volume (mL) = Rinseback Volume (ml): 360 ml  Net Removal (mL) = 270  Patient's dry weight=  Type of access used= R arm AVF  Length of treatment= 60    Pt tolerated HD poorly. Pt given midodrine for decreased BP. Pt began coughing and dropped oxygen, BP dropping to 70s/50s  w a fib HR and missing beats. Pt rinsed back at 60 minutes of 2 hr treatment. Pt improved. Informed Dr Abdirashid Hutton of situation. Okay to stop HD for today. Able to give deferoxamine in CVC triple lumen access per phramacy. Floor RN informed.

## 2020-06-10 NOTE — PROGRESS NOTES
[id number] [facility] Wound Ostomy  Nurse  Consult Note       NAME:  Radha Gibbs  MEDICAL RECORD NUMBER:  [id number]  AGE: 67 y.o. GENDER: male  : 1945  TODAY'S DATE:  6/10/2020    Subjective   Reason for [address] Nurse Evaluation and Assessment: Non-intact skin to sacrococcygeal area present on admission. Coccyx appears dusky purple discolored consistent with deep tissue pressure injury;  surrounding tissue is severely denuded that is consistent with severe incontinence associated dermatitis due to frequent watery stools. Wound Identification:  Wound Type: pressure and caustic dermatitis  Contributing Factors: chronic pressure, decreased mobility, shear force, malnutrition and incontinence of stool    Wound History: Last seen in mid May; presented with chronic friction injury to bilateral buttocks but largely intact.           PAST MEDICAL HISTORY        Diagnosis Date    Asthma     CAD (coronary artery disease)     CKD (chronic kidney disease) stage 4, GFR 15-29 ml/min ([facility]     COPD (chronic obstructive pulmonary disease) ([facility])     CVA (cerebral vascular accident) ([address]     Decubitus ulcer 6/10/2015    Depression     Discitis of lumbar region     ESRD (end stage renal disease) ([address] 2015    History of kidney stones     Hyperlipidemia     Hypertension     Lumbar discitis 2015    Osteoarthritis     Osteomyelitis of lumbar spine ([address] 2015    Patient in clinical research study 2020    Enrolled in [address] for COVID expected date of completion 2020    Perianal abscess 2015    Protein-calorie malnutrition ([address] 2015    Proteinuria     Thrombocytopenia ([address] 2015    Type II or unspecified type diabetes mellitus without mention of complication, not stated as uncontrolled        PAST SURGICAL HISTORY    Past Surgical History:   Procedure Laterality Date    BONE BIOPSY  6/29/15    L2-3    CARPAL TUNNEL RELEASE      CORONARY ARTERY BYPASS GRAFT  HIP SURGERY      JOINT REPLACEMENT      OTHER SURGICAL HISTORY  6/11/15    I&D coccyx wound    OTHER SURGICAL HISTORY  12/30/15    diverting loop colostomy; perirectal incision and drainage    SHOULDER SURGERY      left and right       FAMILY HISTORY    Family History   Problem Relation Age of Onset    Stroke Mother     Heart Attack Mother     Heart Attack Father     Diabetes Sister     Diabetes Brother        SOCIAL HISTORY    Social History     Tobacco Use    Smoking status: Never Smoker    Smokeless tobacco: Never Used   Substance Use Topics    Alcohol use: No     Alcohol/week: 0.0 standard drinks    Drug use: No       ALLERGIES    Allergies   Allergen Reactions    Oxycontin [Oxycodone] Shortness Of Breath and Other (See Comments)     Patient had an overdose on Oxycontin before and does NOT want this ever again. He can take Oxycodone/acetaminophen as needed.  Avodart [Dutasteride]     Darvocet [Propoxyphene N-Acetaminophen]        MEDICATIONS    No current facility-administered medications on file prior to encounter.       Current Outpatient Medications on File Prior to Encounter   Medication Sig Dispense Refill    doxycycline hyclate (VIBRA-TABS) 100 MG tablet Take 1 tablet by mouth daily 30 tablet 0    zinc 50 MG CAPS Take 50 mg by mouth daily 30 capsule 0    donepezil (ARICEPT) 10 MG tablet Take 10 mg by mouth nightly      albuterol (PROVENTIL) (2.5 MG/3ML) 0.083% nebulizer solution Take 2.5 mg by nebulization every 4 hours as needed (as needed for copd)      insulin glargine (LANTUS SOLOSTAR) 100 UNIT/ML injection pen Inject into the skin daily inject 14 unit subcutaneously one time a day for DM      Lidocaine 0.5 % AERO Apply topically Apply to dialysis access topically one time a day every Mon, Wed, Fri      polyvinyl alcohol (LIQUIFILM TEARS) 1.4 % ophthalmic solution 1 drop as needed      doxycycline hyclate (VIBRA-TABS) 100 MG tablet Take 100 mg by mouth 2 times daily Until CENTER/COCCYX; PARTIAL THICKNESS TISSUE LOSS SURROUND)   Drainage Amount Small   Drainage Description Serosanguinous   Chana-wound Assessment Denuded;Fragile; Maceration;Painful;Red   Sharif Scale   Sensory Perceptions 3   Moisture 2   Activity 1   Mobility 2   Nutrition 1   Friction and Shear 1   Sharif Scale Score 10       Response to treatment:  With complaints of pain. Plan   Plan of Care:  Recommend insertion of FMS to manage watery stools. Apply OPTICELL AG (sivler hydrofiber) to raw, denuded tissue, cover with ABD and secure with MEDFIX wound tape. Change daily and prn if soiled. Turn every 2 hours  Float heels off of bed with pillows under calves    Use Comfort Glide to reposition patient to minimize potential for shear injury. Incontinence care with incontinence barrier cloths and zinc oxide cream. Apply zinc oxide cream BID and prn incontinence.    Moisture wicking under pads         Specialty Bed Required : Yes   [x] Low Air Loss   [x] Pressure Redistribution   ISOLIBRIUM SURFACE IN USE  [] Fluid Immersion  [] Bariatric  [] Total Pressure Relief  [] Other:     Current Diet: DIET FULL LIQUID;     JAZMIN GAONA, RN, [employer]

## 2020-06-10 NOTE — PLAN OF CARE
Problem: Body Temperature -  Risk of, Imbalanced  Goal: Ability to maintain a body temperature within defined limits  Outcome: Met This Shift     Problem: Airway Clearance - Ineffective  Goal: Achieve or maintain patent airway  Outcome: Ongoing     Problem: Gas Exchange - Impaired  Goal: Absence of hypoxia  Outcome: Ongoing  Goal: Promote optimal lung function  Outcome: Ongoing     Problem: Breathing Pattern - Ineffective  Goal: Ability to achieve and maintain a regular respiratory rate  Outcome: Ongoing     Problem: Body Temperature -  Risk of, Imbalanced  Goal: Complications related to the disease process, condition or treatment will be avoided or minimized  Outcome: Ongoing     Problem: Isolation Precautions - Risk of Spread of Infection  Goal: Prevent transmission of infection  Outcome: Ongoing     Problem: Nutrition Deficits  Goal: Optimize nutrtional status  Outcome: Ongoing     Problem: Risk for Fluid Volume Deficit  Goal: Maintain normal heart rhythm  Outcome: Ongoing  Goal: Maintain absence of muscle cramping  Outcome: Ongoing  Goal: Maintain normal serum potassium, sodium, calcium, phosphorus, and pH  Outcome: Ongoing     Problem: Loneliness or Risk for Loneliness  Goal: Demonstrate positive use of time alone when socialization is not possible  Outcome: Ongoing     Problem: Fatigue  Goal: Verbalize increase energy and improved vitality  Outcome: Ongoing     Problem: Patient Education: Go to Patient Education Activity  Goal: Patient/Family Education  Outcome: Ongoing     Problem:  Body Temperature -  Risk of, Imbalanced  Goal: Will regain or maintain usual level of consciousness  Outcome: Not Met This Shift

## 2020-06-11 NOTE — PROGRESS NOTES
[facility]  Occupational Therapy Not Seen Note    DATE: 2020  Name: Lucia Borges  : 1945  MRN: [id number]    Patient not available for Occupational Therapy due to:    [] Testing:    [x] Hemodialysis    [] Blood Transfusion in Progress    []Refusal by Patient:    [] Surgery/Procedure:    [] Strict Bedrest    [] Sedation    [] Spine Precautions     [] Pt being transferred to palliative care at this time. Spoke with pt/family and OT services to be defered. [] Pt independent with functional mobility and functional tasks.  Pt with no OT acute care needs at this time, will defer OT eval.    [] Other    Next Scheduled Treatment: Ck     LORETA Shea/JOSE ALBERTO

## 2020-06-11 NOTE — PLAN OF CARE
Problem: Airway Clearance - Ineffective  Goal: Achieve or maintain patent airway  Outcome: Met This Shift     Problem: Gas Exchange - Impaired  Goal: Absence of hypoxia  Outcome: Met This Shift  Goal: Promote optimal lung function  Outcome: Ongoing     Problem: Breathing Pattern - Ineffective  Goal: Ability to achieve and maintain a regular respiratory rate  Outcome: Ongoing     Problem:  Body Temperature -  Risk of, Imbalanced  Goal: Ability to maintain a body temperature within defined limits  Outcome: Ongoing  Goal: Will regain or maintain usual level of consciousness  Outcome: Ongoing  Goal: Complications related to the disease process, condition or treatment will be avoided or minimized  Outcome: Ongoing     Problem: Isolation Precautions - Risk of Spread of Infection  Goal: Prevent transmission of infection  Outcome: Met This Shift     Problem: Nutrition Deficits  Goal: Optimize nutrtional status  Outcome: Not Met This Shift     Problem: Risk for Fluid Volume Deficit  Goal: Maintain normal heart rhythm  Outcome: Ongoing  Goal: Maintain absence of muscle cramping  Outcome: Ongoing  Goal: Maintain normal serum potassium, sodium, calcium, phosphorus, and pH  Outcome: Ongoing     Problem: Loneliness or Risk for Loneliness  Goal: Demonstrate positive use of time alone when socialization is not possible  Outcome: Not Met This Shift     Problem: Fatigue  Goal: Verbalize increase energy and improved vitality  Outcome: Ongoing     Problem: Patient Education: Go to Patient Education Activity  Goal: Patient/Family Education  Outcome: Met This Shift     Problem: Falls - Risk of:  Goal: Will remain free from falls  Description: Will remain free from falls  Outcome: Met This Shift  Goal: Absence of physical injury  Description: Absence of physical injury  Outcome: Met This Shift

## 2020-06-11 NOTE — FLOWSHEET NOTE
Patients daughter and point of contact Emelyn called and updated on patients condition and current plan of care. All questions answered at this time.     Electronically signed by José Miguel Kinsey RN on 6/11/2020 at 6:53 PM

## 2020-06-11 NOTE — PROGRESS NOTES
Dialysis Post Treatment Note  Vitals:    06/11/20 1315   BP: 104/64   Pulse: 110   Resp: (!) 31   Temp: 99.4 °F (37.4 °C)   SpO2: 98%     Pre-Weight = 69.4kg  Post-weight = Weight: 149 lb 14.6 oz (68 kg)  Total Liters Processed = Total Liters Processed (l/min): 70.5 l/min  Rinseback Volume (mL) = Rinseback Volume (ml): 300 ml  Net Removal (mL) = 830mL  Patient's dry weight= 79.5  Type of access used= Right forearm AVF  Length of treatment= 210 mins    Pt tolerated tx fairly well; remained hypotensive throughout, albumin admin (see MAR) and Levo adjusted.  Electronically signed by Eugene Hamm RN on 6/11/2020 at 1:46 PM

## 2020-06-11 NOTE — PROGRESS NOTES
Physical Therapy  DATE: 2020    NAME: Tyree Mills  MRN: [id number]   : 1945    Patient not seen this date for Physical Therapy due to:  [] Blood transfusion in progress  [x] Hemodialysis  []  Patient Declined  [] Spine Precautions   [] Strict Bedrest  [] Surgery/ Procedure  [] Testing      [] Other        [] PT being discontinued at this time. Patient independent. No further needs. [] PT being discontinued at this time as the patient has been transferred to palliative care. No further needs.     Nehemiah Torres, PT

## 2020-06-11 NOTE — PROGRESS NOTES
Patient undergoing dialysis this morning, unable to turn him for safety, patient is turned to his right side in bed. Sacrococcygeal and bilateral buttocks wound dressing currently intact. Photos of the wounds reviewed with patient's primary RN, dressing supplies provided. Please send a Perfect Serve message by searching \"wound\"  with any concerns regarding condition of the wound today.

## 2020-06-11 NOTE — PLAN OF CARE
Problem: Airway Clearance - Ineffective  Goal: Achieve or maintain patent airway  6/11/2020 1753 by Vernon Cornell RN  Outcome: Ongoing  6/11/2020 1146 by Rubén Pena RN  Outcome: Ongoing  6/11/2020 1145 by Rubén Pena RN  Outcome: Ongoing  6/11/2020 0616 by Gavi Esparza RN  Outcome: Met This Shift     Problem: Gas Exchange - Impaired  Goal: Absence of hypoxia  6/11/2020 1753 by Vernon Cornell RN  Outcome: Ongoing  6/11/2020 1146 by Rubén Pena RN  Outcome: Ongoing  6/11/2020 1145 by Rubén Pena RN  Outcome: Ongoing  6/11/2020 0616 by Gavi Esparza RN  Outcome: Met This Shift  Goal: Promote optimal lung function  6/11/2020 1753 by Vernon Cornell RN  Outcome: Ongoing  6/11/2020 1146 by Rubén Pena RN  Outcome: Ongoing  6/11/2020 1145 by Rubén Pena RN  Outcome: Ongoing  6/11/2020 0616 by Gavi Esparza RN  Outcome: Ongoing     Problem: Breathing Pattern - Ineffective  Goal: Ability to achieve and maintain a regular respiratory rate  6/11/2020 1753 by Vernon Cornell RN  Outcome: Ongoing  6/11/2020 1146 by Rubén Pena RN  Outcome: Ongoing  6/11/2020 0616 by Gavi Esparza RN  Outcome: Ongoing     Problem:  Body Temperature -  Risk of, Imbalanced  Goal: Ability to maintain a body temperature within defined limits  6/11/2020 1753 by Vernon Cornell RN  Outcome: Ongoing  6/11/2020 1146 by Rubén Pena RN  Outcome: Ongoing  6/11/2020 0616 by Gavi Esparza RN  Outcome: Ongoing  Goal: Will regain or maintain usual level of consciousness  6/11/2020 1753 by Vernon Cornell RN  Outcome: Ongoing  6/11/2020 1146 by Rubén Pena RN  Outcome: Ongoing  6/11/2020 0616 by Gavi Esparza RN  Outcome: Ongoing  Goal: Complications related to the disease process, condition or treatment will be avoided or minimized  6/11/2020 1753 by Vernon Cornell RN  Outcome: Ongoing  6/11/2020 1146 by Rubén Pena RN  Outcome: Ongoing  6/11/2020 0616 by Rosana Jessica Camarillo RN  Outcome: Ongoing     Problem: Isolation Precautions - Risk of Spread of Infection  Goal: Prevent transmission of infection  6/11/2020 1753 by Itz Lomax RN  Outcome: Ongoing  6/11/2020 1146 by Marci Golden RN  Outcome: Ongoing  6/11/2020 0616 by Terrence Guan RN  Outcome: Met This Shift     Problem: Nutrition Deficits  Goal: Optimize nutrtional status  6/11/2020 1753 by Itz Lomax RN  Outcome: Ongoing  6/11/2020 1146 by Marci Golden RN  Outcome: Ongoing  6/11/2020 0616 by Terrence Guan RN  Outcome: Not Met This Shift     Problem: Risk for Fluid Volume Deficit  Goal: Maintain normal heart rhythm  6/11/2020 1753 by Itz Lomax RN  Outcome: Ongoing  6/11/2020 1146 by Marci Golden RN  Outcome: Ongoing  6/11/2020 0616 by Terrence Guan RN  Outcome: Ongoing  Goal: Maintain absence of muscle cramping  6/11/2020 1753 by Itz Lomax RN  Outcome: Ongoing  6/11/2020 1146 by Marci Golden RN  Outcome: Ongoing  6/11/2020 0616 by Terrence Guan RN  Outcome: Ongoing  Goal: Maintain normal serum potassium, sodium, calcium, phosphorus, and pH  6/11/2020 1753 by Itz Lomax RN  Outcome: Ongoing  6/11/2020 1146 by Marci Golden RN  Outcome: Ongoing  6/11/2020 0616 by Terrence Guan RN  Outcome: Ongoing     Problem: Loneliness or Risk for Loneliness  Goal: Demonstrate positive use of time alone when socialization is not possible  6/11/2020 1753 by Itz Lomax RN  Outcome: Ongoing  6/11/2020 0616 by Terrence Guan RN  Outcome: Not Met This Shift     Problem: Fatigue  Goal: Verbalize increase energy and improved vitality  6/11/2020 1753 by Itz Lomax RN  Outcome: Ongoing  6/11/2020 0616 by Terrence Guan RN  Outcome: Ongoing     Problem: Patient Education: Go to Patient Education Activity  Goal: Patient/Family Education  6/11/2020 1753 by Itz Lomax RN  Outcome: Ongoing  6/11/2020 0616 by Terrence Guan RN  Outcome: Met This Shift Problem: Falls - Risk of:  Goal: Will remain free from falls  Description: Will remain free from falls  6/11/2020 1753 by Claudia Burkett RN  Outcome: Ongoing  6/11/2020 0616 by Rolanda Cobb RN  Outcome: Met This Shift  Goal: Absence of physical injury  Description: Absence of physical injury  6/11/2020 1753 by Claudia Burkett RN  Outcome: Ongoing  6/11/2020 0616 by Rolanda Cobb RN  Outcome: Met This Shift

## 2020-06-11 NOTE — PLAN OF CARE
Problem: Airway Clearance - Ineffective  Goal: Achieve or maintain patent airway  6/11/2020 1146 by Debbie Fernando RN  Outcome: Ongoing  6/11/2020 1145 by Debbie Fernando RN  Outcome: Ongoing  6/11/2020 0616 by Luci Dooley RN  Outcome: Met This Shift     Problem: Gas Exchange - Impaired  Goal: Absence of hypoxia  6/11/2020 1146 by Debbie Fernando RN  Outcome: Ongoing  6/11/2020 1145 by Debbie Fernando RN  Outcome: Ongoing  6/11/2020 0616 by Luci Dooley RN  Outcome: Met This Shift  Goal: Promote optimal lung function  6/11/2020 1146 by Debbie Fernando RN  Outcome: Ongoing  6/11/2020 1145 by Debbie Fernando RN  Outcome: Ongoing  6/11/2020 0616 by Luci Dooley RN  Outcome: Ongoing     Problem: Breathing Pattern - Ineffective  Goal: Ability to achieve and maintain a regular respiratory rate  6/11/2020 1146 by Debbie Fernando RN  Outcome: Ongoing  6/11/2020 0616 by Luci Dooley RN  Outcome: Ongoing     Problem:  Body Temperature -  Risk of, Imbalanced  Goal: Ability to maintain a body temperature within defined limits  6/11/2020 1146 by Debbie Fernando RN  Outcome: Ongoing  6/11/2020 0616 by Luci Dooley RN  Outcome: Ongoing  Goal: Will regain or maintain usual level of consciousness  6/11/2020 1146 by Debbie Fernando RN  Outcome: Ongoing  6/11/2020 0616 by Luci Dooley RN  Outcome: Ongoing  Goal: Complications related to the disease process, condition or treatment will be avoided or minimized  6/11/2020 1146 by Debbie Fernando RN  Outcome: Ongoing  6/11/2020 0616 by Luci Dooley RN  Outcome: Ongoing     Problem: Isolation Precautions - Risk of Spread of Infection  Goal: Prevent transmission of infection  6/11/2020 1146 by Debbie Fernando RN  Outcome: Ongoing  6/11/2020 0616 by Luci Dooley RN  Outcome: Met This Shift     Problem: Nutrition Deficits  Goal: Optimize nutrtional status  6/11/2020 1146 by Debbie Fernando RN  Outcome: Ongoing  6/11/2020 3108 by Michelle Nielsen RN  Outcome: Not Met This Shift     Problem: Risk for Fluid Volume Deficit  Goal: Maintain normal heart rhythm  6/11/2020 1146 by Yuliya Schofield RN  Outcome: Ongoing  6/11/2020 0616 by Michelle Nielsen RN  Outcome: Ongoing  Goal: Maintain absence of muscle cramping  6/11/2020 1146 by Yuliya Schofield RN  Outcome: Ongoing  6/11/2020 0616 by Michelle Nielsen RN  Outcome: Ongoing  Goal: Maintain normal serum potassium, sodium, calcium, phosphorus, and pH  6/11/2020 1146 by Yuliya Schofield RN  Outcome: Ongoing  6/11/2020 0616 by Michelle Nielsen RN  Outcome: Ongoing

## 2020-06-11 NOTE — PROGRESS NOTES
NEPHROLOGY PROGRESS NOTE      SUBJECTIVE     Scheduled for hemodialysis today. Had 2-hour session of hemodialysis yesterday for fluid overload. Did not tolerate well. Received desferrioxamine yesterday. Continues to be on pressors. Also receiving midodrine. Currently on nonrebreather at 10 L/min. Remains empirically on cefepime and vancomycin for atypical pneumonia. 1 out of 2 blood cultures positive for gram-positive cocci in clusters. Final cultures are pending.     OBJECTIVE     Vitals:    06/11/20 1055 06/11/20 1111 06/11/20 1125 06/11/20 1140   BP:       Pulse: 117 118 120 121   Resp:       Temp:       TempSrc:       SpO2:       Weight:       Height:         24HR INTAKE/OUTPUT:      Intake/Output Summary (Last 24 hours) at 6/11/2020 1201  Last data filed at 6/11/2020 9932  Gross per 24 hour   Intake 943.14 ml   Output 630 ml   Net 313.14 ml       Physical examination deferred in view of pandemic      MEDICATIONS     Scheduled Meds:    midodrine  10 mg Oral 4x daily    azithromycin  500 mg Intravenous Q24H    sodium chloride flush  10 mL Intravenous 2 times per day    heparin (porcine)  5,000 Units Subcutaneous 3 times per day    sodium chloride flush  10 mL Intravenous 2 times per day    cefepime  2 g Intravenous Once per day on Mon Wed Fri    vancomycin (VANCOCIN) intermittent dosing (placeholder)   Other RX Placeholder    vancomycin  750 mg Intravenous Once per day on Mon Wed Fri     Continuous Infusions:    norepinephrine 8 mcg/min (06/11/20 0956)     PRN Meds:  fentanNYL, sodium chloride flush, ondansetron, sodium chloride flush, polyethylene glycol, promethazine **OR** ondansetron  Home Meds:                Medications Prior to Admission: doxycycline hyclate (VIBRA-TABS) 100 MG tablet, Take 1 tablet by mouth daily  zinc 50 MG CAPS, Take 50 mg by mouth daily  donepezil (ARICEPT) 10 MG tablet, Take 10 mg by mouth nightly  albuterol (PROVENTIL) (2.5 MG/3ML) 0.083% nebulizer solution, Take 2.5 mg by nebulization every 4 hours as needed (as needed for copd)  insulin glargine (LANTUS SOLOSTAR) 100 UNIT/ML injection pen, Inject into the skin daily inject 14 unit subcutaneously one time a day for DM  Lidocaine 0.5 % AERO, Apply topically Apply to dialysis access topically one time a day every Mon, Wed, Fri  polyvinyl alcohol (LIQUIFILM TEARS) 1.4 % ophthalmic solution, 1 drop as needed  doxycycline hyclate (VIBRA-TABS) 100 MG tablet, Take 100 mg by mouth 2 times daily Until 7/15/19 for osteomyelitis  fluticasone (FLONASE) 50 MCG/ACT nasal spray, 2 sprays by Each Nostril route daily  ipratropium (ATROVENT) 0.02 % nebulizer solution, Take 0.5 mg by nebulization every 8 hours  Multiple Vitamins-Minerals (THERAPEUTIC MULTIVITAMIN-MINERALS) tablet, Take 1 tablet by mouth daily  allopurinol (ZYLOPRIM) 100 MG tablet, Take 100 mg by mouth daily  metoprolol succinate (TOPROL XL) 25 MG extended release tablet, Take 1 tablet by mouth daily  carboxymethylcellulose 1 % ophthalmic solution, Place 1 drop into both eyes 3 times daily as needed for Dry Eyes  sertraline (ZOLOFT) 50 MG tablet, Take 50 mg by mouth daily  calcium carbonate (TUMS) 500 MG chewable tablet, Take 2 tablets by mouth every 6 hours as needed for Heartburn  diclofenac sodium 1 % GEL, Apply topically 3 times daily Apply between last two toes and to arch of right foot  fluticasone propionate (FLOVENT DISKUS) 100 MCG/BLIST AEPB inhaler, Inhale 2 puffs into the lungs 2 times daily   Nutritional Supplements (BOOST GLUCOSE CONTROL) LIQD, Take 8 oz by mouth nightly   senna-docusate (PERICOLACE) 8.6-50 MG per tablet, Take 2 tablets by mouth four times a week On non-dialysis days (Sun, Tues, Thurs, Sat)  gabapentin (NEURONTIN) 100 MG capsule, Take 1 capsule by mouth 3 times daily  doxycycline (VIBRA-TABS) 100 MG tablet, Take 1 tablet by mouth 2 times daily for 10 days  hydrALAZINE (APRESOLINE) 10 MG tablet, Take 10 mg by mouth 3 times daily   aspirin 81 MG

## 2020-06-12 NOTE — PROGRESS NOTES
Dialysis Post Treatment Note  Vitals:    06/12/20 1745   BP: (!) 148/68   Pulse: 126   Resp: 32   Temp: 98.9 °F (37.2 °C)   SpO2: 93     Pre-Weight =71.4kg  Post-weight = Weight: 156 lb 8.4 oz (71 kg)  Total Liters Processed = Total Liters Processed (l/min): 61 l/min  Rinseback Volume (mL) = Rinseback Volume (ml): 300 ml  Net Removal (mL) = 1000ml  Patient's dry weight=  Type of access used=rt lower fistula  Length of treatment=180    Pt tolerated 3hr tx, ATB given.

## 2020-06-12 NOTE — PROGRESS NOTES
[facility] -   Infectious diseases evaluation  admission date 6/9/2020    reason for consultation:   COVID +    Impression :   Current:  · COVID + 5/31-post immune plasma 6/2  · COVID  +6/9  · 6/9 diffuse multifocal bilateral pneumonia-interstitial  · ESRD hemodialysis 3 times weekly  · Infected sacral wound   · Staph bacteremia  ·   Other:  ·   Discussion / summary of stay / plan of care   · Status post COVID,went home on 6/5  · He had received immune serum  on 6/1 tolerated well  · Was not a candidate for remdesevir due to ESRD  · Was treated with Levaquin for a Pseudomonas sputum culture thought to be either colonization or bronchitis.   · Comes back with increasing shortness of breath right before he could get dialysis, is now on nonrebreather escalating doses, 10 L  · Has a sacral wound w foul discharge  · COVID + 5/31 + 6/9  Recommendations     · Day 4 cefepime and Vanco empirically at this point  · Day 3 Zithromax for atypicals  · worsened inflammatory parameters, possibly suggestive of inflammatory surge  · Not a candidate for remdesevir -   · Received immune plasma last admission  · Wound care for the sacral wound  · Will follow  · Discussed with nurse    Infection Control Recommendations   · [place] Precautions  · Contact Isolation   · Airborne isolation  · Droplet Isolation  Antimicrobial Stewardship Recommendations   · Simplification of therapy  · Targeted therapy  · IV to oral conversion  · Per Kg dosing      Coordination ofOutpatient Care:   · Estimated Length of IV antimicrobials:  · Patient will need Midline / picc Catheter Insertion:   · Patient will need SNF:  · Patient will need outpatient wound care:     History of Present Illness:   Initial history:  Tanvi Garcia is a 67y.o.-year-old male who is known to our service for chest pain discharged on 6/5 diagnosis of COVID and chills, groundglass pneumonia, respiratory Pseudomonas infection of unclear extent. At that time he did not fit criteria for REMDESEVIR brian to HD and ESRD. He received an immune serum 6/2 which he tolerated well without complications  Sputum culture ultimately grew Pseudomonas and he was started on Levaquin for 7 days until 6/4  He was discharged on 6/5 feeling great, on same for 2 weeks  At the time his CT of the chest showed groundglass and the Pseudomonas was causing most likely an upper respiratory infection rather than pneumonia. It seems he went to dialysis and was found to be hypoxic before he ran on dialysis, started on a nonrebreather, he was alert oriented x3, and admitted on 7 L of oxygen nonrebreather. Chest x-ray showed increased bilateral diffuse infiltrates, interstitial as shown below    At this time at this time he is on a nonrebreather, 10 L of oxygen, little confused, coughing with thick secretions in the back of his throat rather yellowish  His oxygenation has deteriorated since he came in. Abdomen is soft, he has no rash, he is confused  He has no fever        Interval changes  6/12/2020   Due to the current efforts to prevent transmission of COVID-19 and also the need to preserve PPE for other caregivers, a face-to-face encounter with the patient was not performed. That being said, all relevant records and diagnostic tests were reviewed, including laboratory results and imaging. Patient was evaluated from the window, called on the phone, and chart reviewed, disc w  involved healthcare workers.     Still looks very ill - on NR at 15L - alert Ox 1 and starting HD - So2 85-90% BP stable for now  SOB  No diarrhea  No new pos cx    Blood culture SCN,  CRP elevated as below  CXR worse 6/11      Inflammatory parameters are much higher at the time where that she CT scan is suggestive interstitial markings possibly pulmonary edema versus COVID    Summary of relevant labs:  Labs:  W 8.2 - 9.9    CRP 49 - 335 - 404   - 365  Ferritin 3412  procalc 1.49    Micro:  BC 6/9 clusters x 1 - SCN  COVID + 6/9    Sputum cx 5/31 pseudomonas S cipro and cefepime  Imaging:  Chest x-ray 6/11 worsening interstitial infiltrates    CT chest 3/10/20  Increased bilateral ground-glass opacities which are nonspecific but can be seen with COVID. CXR 6/9/20  Impression   1. Patchy bilateral infiltrates.  This likely represents an atypical   pneumonia, including viral pneumonia.  Follow-up to resolution is recommended. 2. Stable cardiomegaly. I have personally reviewed the past medical history, past surgical history, medications, social history, and family history, and I haveupdated the database accordingly.   Past Medical History:     Past Medical History:   Diagnosis Date    Asthma     CAD (coronary artery disease)     CKD (chronic kidney disease) stage 4, GFR 15-29 ml/min [facility]     COPD [facility] obstructive [facility] disease) [facility]     CVA (cerebral vascular accident) ([address]     Decubitus ulcer 6/10/2015    Depression     Discitis of lumbar region     ESRD (end stage renal disease) ([address] 6/26/2015    History of kidney stones     Hyperlipidemia     Hypertension     Lumbar discitis 6/25/2015    Osteoarthritis     Osteomyelitis of lumbar spine ([address] 6/25/2015    Patient in clinical research study 06/02/2020    Enrolled in [facility] [address] for COVID expected date of completion 7/5/2020    Perianal abscess 12/29/2015    Protein-calorie malnutrition ([address] 6/26/2015    Proteinuria     Thrombocytopenia ([address] 7/2/2015    Type II or unspecified type diabetes mellitus without mention of complication, not stated as uncontrolled        Past Surgical  History:     Past Surgical History:   Procedure Laterality Date    BONE BIOPSY  6/29/15    L2-3    CARPAL TUNNEL RELEASE      CORONARY ARTERY BYPASS GRAFT      HIP SURGERY      JOINT REPLACEMENT      OTHER SURGICAL HISTORY  6/11/15    I&D coccyx wound    OTHER SURGICAL HISTORY  12/30/15    diverting loop colostomy; perirectal incision and drainage    SHOULDER SURGERY      left and right       Medications:      midodrine  10 mg Oral 4x daily    azithromycin  500 mg Intravenous Q24H    insulin glargine  10 Units Subcutaneous Nightly    sodium chloride flush  10 mL Intravenous 2 times per day    heparin (porcine)  5,000 Units Subcutaneous 3 times per day    sodium chloride flush  10 mL Intravenous 2 times per day    cefepime  2 g Intravenous Once per day on Mon Wed Fri    vancomycin (VANCOCIN) intermittent dosing (placeholder)   Other RX Placeholder    vancomycin  750 mg Intravenous Once per day on Mon Wed Fri       Social History:     Social History     Socioeconomic History    Marital status:      Spouse name: Not on file    Number of children: Not on file    Years of education: Not on file    Highest education level: Not on file   Occupational History    Not on file   Social Needs    Financial resource strain: Not on file    Food insecurity     Worry: Not on file     Inability: Not on file    Transportation needs     Medical: Not on file     Non-medical: Not on file   Tobacco Use    Smoking status: Never Smoker    Smokeless tobacco: Never Used   Substance and Sexual Activity    Alcohol use: No     Alcohol/week: 0.0 standard drinks    Drug use: No    Sexual activity: Never   Lifestyle    Physical activity     Days per week: Not on file     Minutes per session: Not on file    Stress: Not on file   Relationships    Social connections     Talks on phone: Not on file     Gets together: Not on file     Attends Sabianist service: Not on file     Active member of club or organization: Not on file     Attends meetings of clubs or organizations: Not on file     Relationship status: Not on file    Intimate partner violence     Fear of current or ex partner: Not on file     Emotionally abused: Not on file     Physically abused: Not on file     Forced sexual activity: Not on file   Other Topics Concern    Not on file   Social History Narrative    Not on file       Family History:     Family History   Problem Relation Age of Onset    Stroke Mother     Heart Attack Mother     Heart Attack Father     Diabetes Sister     Diabetes Brother         Allergies:   Oxycontin [oxycodone]; Avodart [dutasteride]; and Darvocet [propoxyphene n-acetaminophen]     Review of Systems:     Review of Systems   Unable to perform ROS: Patient unresponsive       Physical Examination :     Patient Vitals for the past 8 hrs:   BP Temp Temp src Pulse Resp SpO2 Weight   06/12/20 0815 (!) 158/69 98.8 °F (37.1 °C) Axillary -- -- -- --   06/12/20 0630 -- -- -- -- -- -- 156 lb 15.5 oz (71.2 kg)   06/12/20 0621 -- -- -- 117 23 95 % --   06/12/20 0600 -- -- -- 104 22 96 % --       Physical Exam  Constitutional:       General: He is in acute distress. Appearance: He is ill-appearing. HENT:      Head: Normocephalic and atraumatic. Nose: No rhinorrhea. Eyes:      General: No scleral icterus. Conjunctiva/sclera: Conjunctivae normal.   Cardiovascular:      Rate and Rhythm: Tachycardia present. Rhythm irregular. Pulmonary:      Effort: No respiratory distress. Abdominal:      General: There is no distension. Tenderness: There is no abdominal tenderness. Genitourinary:     Comments: Urine susan  Musculoskeletal:         General: No swelling. Right lower leg: No edema. Left lower leg: No edema. Skin:     General: Skin is dry. Coloration: Skin is not jaundiced or pale. Findings: No erythema. Comments: Sacral wound   Neurological:      General: No focal deficit present. Mental Status: He is alert. Mental status is at baseline.    Psychiatric:      Comments: Tired            Medical Decision Making:   I have independently reviewed/ordered the following labs:    CBC with Differential:   Recent Labs     06/11/20  0426 06/12/20  0550   WBC 9.9 11.3   HGB 12.1* 12.1*   HCT 36.6* 37.6*    234   LYMPHOPCT 8* 8*   MONOPCT 5 6     BMP:  Recent Labs     06/11/20  0426 06/12/20  0550    139   K 4.8 4.2   CL 96* 94*   CO2 13* 17*   BUN 69* 59*   CREATININE 10.85* 8.62*     Hepatic Function Panel:   No results for input(s): PROT, LABALBU, BILIDIR, IBILI, BILITOT, ALKPHOS, ALT, AST in the last 72 hours. No results for input(s): RPR in the last 72 hours. No results for input(s): HIV in the last 72 hours. No results for input(s): BC in the last 72 hours. Lab Results   Component Value Date    CREATININE 8.62 06/12/2020    GLUCOSE 297 06/12/2020       Detailed results: Thank you for allowing us to participate in the care of this patient. Please call with questions. This note is created with the assistance of a speech recognition program.  While intending to generate adocument that actually reflects the content of the visit, the document can still have some errors including those of syntax and sound a like substitutions which may escape proof reading. It such instances, actual meaningcan be extrapolated by contextual diversion.     Violeta Smith MD  Office: [phone]  Perfect serve / office [phone]

## 2020-06-12 NOTE — PROGRESS NOTES
Physical Therapy    Facility/Department: [facility]  Initial Assessment    NAME: Shelbie Rivas  : 1945  MRN: [id number]    Date of Service: 2020    Discharge Recommendations:    Further therapy recommended at discharge. PT Equipment Recommendations  Equipment Needed: (CTA pending progression of mobility)    Assessment   Body structures, Functions, Activity limitations: Decreased functional mobility ; Decreased safe awareness;Decreased balance;Decreased ROM; Decreased endurance;Decreased strength; Increased pain;Decreased posture;Decreased cognition  Assessment: Pt required dependent assistance to perform rolling this date. Pt extremely lethargic throughout session and is a high fall risk due to pt's decreased cognition and weakness. Recommending continued skilled physical therapy to address functional mobility deficits and return pt to prior level of function. Prognosis: Fair  Decision Making: Medium Complexity  PT Education: Goals;PT Role;Plan of Care  REQUIRES PT FOLLOW UP: Yes  Activity Tolerance  Activity Tolerance: Patient limited by endurance; Patient limited by cognitive status; Patient limited by fatigue       Patient Diagnosis(es): The primary encounter diagnosis was Hypotension, unspecified hypotension type. A diagnosis of Sepsis due to COVID-19 was also pertinent to this visit.      has a past medical history of Asthma, CAD (coronary artery disease), CKD (chronic kidney disease) stage 4, GFR 15-29 ml/min ([facility]), COPD (chronic obstructive pulmonary disease) ([address] CVA (cerebral vascular accident) ([address] Decubitus ulcer, Depression, Discitis of lumbar region, ESRD (end stage renal disease) ([address] History of kidney stones, Hyperlipidemia, Hypertension, Lumbar discitis, Osteoarthritis, Osteomyelitis of lumbar spine ([address] Patient in clinical research study, Perianal abscess, Protein-calorie malnutrition ([address] Proteinuria, Thrombocytopenia ([address] and Type II or unspecified type diabetes mellitus without mention of complication, not stated as uncontrolled. has a past surgical history that includes Coronary artery bypass graft; shoulder surgery; hip surgery; joint replacement; other surgical history (6/11/15); bone biopsy (6/29/15); other surgical history (12/30/15); and Carpal tunnel release. Restrictions  Restrictions/Precautions  Restrictions/Precautions: Up as Tolerated, Fall Risk, Isolation(COVID+; Droplet plus)  Required Braces or Orthoses?: No  Position Activity Restriction  Other position/activity restrictions: Up w/assist  Vision/Hearing  Vision: (PARVEEN due to pt cognition)  Hearing: Exceptions to [facility]  Hearing Exceptions: Hard of hearing/hearing concerns     Subjective  General  Patient assessed for rehabilitation services?: Yes  Response To Previous Treatment: Not applicable  Family / Caregiver Present: No  Follows Commands: Impaired  Subjective  Subjective: RN and pt in agreement for PT eval; pt supine in bed on 15L NRB, pt very lethargic, demonstrating significant difficulty participating in evaluation this date  Pain Screening  Patient Currently in Pain: Yes  Pain Assessment  Pain Assessment: Faces  Falk-Baker Pain Rating: Hurts a little bit  Pain Location: Generalized  Pain Descriptors: Discomfort  Non-Pharmaceutical Pain Intervention(s): Ambulation/Increased Activity;Repositioned  Response to Pain Intervention: Patient Satisfied  Multiple Pain Sites: No  Vital Signs  Patient Currently in Pain: Yes       Orientation  Orientation  Overall Orientation Status: Impaired(Unable to assess due to pt cognition)  Social/Functional History  Social/Functional History  Additional Comments: Unable to obtain from pt due to cognition. Per chart, pt is a long term resident at [facility].    Cognition   Cognition  Overall Cognitive Status: Exceptions  Arousal/Alertness: Inconsistent responses to stimuli  Following Commands: Inconsistently follows commands  Attention Span: Unable to maintain attention  Memory: Decreased recall of recent events;Decreased recall of precautions  Safety Judgement: Decreased awareness of need for safety;Decreased awareness of need for assistance  Insights: Not aware of deficits  Initiation: Requires cues for all  Sequencing: Requires cues for all    Objective  Joint Mobility  Spine: WFL  ROM RLE: PROM hip and knee WFL; Ankle DF to neutral  ROM LLE: Unable to assess hip flexion due to groin line; Knee WFL; Ankle DF to neutral  ROM RUE: See OT assessment- PT/ OT coeval  ROM LUE: See OT assessment- PT/ OT coeval  Strength RLE  Strength RLE: Exception  Comment: Unable to formally assess d/t pt's difficulty following commands  Strength LLE  Strength LLE: Exception  Comment: Unable to formally assess d/t pt's difficulty following commands  Strength RUE  Comment: See OT assessment- PT/ OT coeval  Strength LUE  Comment: See OT assessment- PT/ OT coeval         Bed mobility  Sit to Supine: Dependent/Total  Comment: Pt unable to initiate bed mobility despite max verbal and tactile cues. Further mobility deferred due to presence of groin ART line and unsafe to attempt due to pt's cognition. Pt's HR 122bpm and SpO2 92% with rolling.    Transfers  Comment: Unable to formally assess this date  Ambulation  Ambulation?: No     Balance  Posture: Poor        Plan   Plan  Times per week: 2-3x/week  Current Treatment Recommendations: Strengthening, Transfer Training, Endurance Training, Patient/Caregiver Education & Training, ROM, Balance Training, Gait Training, Home Exercise Program, Functional Mobility Training, Stair training, Safety Education & Training  Safety Devices  Type of devices: Left in bed, Call light within reach, Nurse notified, Patient at risk for falls, Bed alarm in place  Restraints  Initially in place: Yes  Restraints: Bilateral mitts doffed for mobility, donned prior to writer's exit  AM-PAC Score     AM-PAC Inpatient Mobility without Stair Climbing Raw Score : 6 (06/12/20 1416)  AM-PAC Inpatient without Stair Climbing T-Scale Score : 26.48 (06/12/20 1416)  Mobility Inpatient CMS 0-100% Score: 92.18 (06/12/20 1416)  Mobility Inpatient without Stair CMS G-Code Modifier : CM (06/12/20 1416)       Goals  Short term goals  Time Frame for Short term goals: 14  Short term goal 1: Pt to perform bed mobility modA  Short term goal 2: Demonstrate functional transfers [address] term goal 3: Actively participate in 30 minutes of therapy to demo increased endurance  Short term goal 4: Demonstrate seated static balance of fair + to maximize independence   Patient Goals   Patient goals : Did not state       Therapy Time   Individual Concurrent Group Co-treatment   Time In 1111         Time Out 1127         Minutes 16         Timed Code Treatment Minutes: 8 Minutes       Lara Samuel, PT

## 2020-06-12 NOTE — PROGRESS NOTES
Occupational Therapy   Occupational Therapy Initial Assessment  Date: 2020   Patient Name: Bernard Johnson  MRN: [id number]     : 1945    Date of Service: 2020    Discharge Recommendations:  Patient would benefit from continued therapy after discharge       Assessment   Performance deficits / Impairments: Decreased functional mobility ; Decreased ADL status; Decreased strength;Decreased safe awareness;Decreased cognition;Decreased endurance;Decreased balance;Decreased high-level IADLs  Assessment: Pt agreeable to OT session this date. Pt minimally participitory throughout. Pt demo ability to lightly squeeze therapists hands. PROM performed to BUE with tight end ranges noted. Pt unsafe to attempt sup <> sit d/t poor resp status, poor cog, and groin art line. Pt returned to comfortable position at end of session, call light within reach. Pt to benefit from continued OT services to increase overall functional ability to perform ADLs and improve overall quality of life. Prognosis: Fair  Decision Making: Low Complexity  Patient Education: Pt educated on OT role, OT POC, safety awareness; fair return only  REQUIRES OT FOLLOW UP: Yes  Activity Tolerance  Activity Tolerance: Treatment limited secondary to decreased cognition;Treatment limited secondary to medical complications (free text)  Activity Tolerance: poor respiratory status  Safety Devices  Safety Devices in place: Yes  Type of devices: Left in bed;Call light within reach  Restraints  Initially in place: Yes  Restraints: B mits           Patient Diagnosis(es): The primary encounter diagnosis was Hypotension, unspecified hypotension type. A diagnosis of Sepsis due to COVID-19 was also pertinent to this visit.      has a past medical history of Asthma, CAD (coronary artery disease), CKD (chronic kidney disease) stage 4, GFR 15-29 ml/min ([facility]), COPD (chronic obstructive pulmonary disease) ([facility] [address] CVA (cerebral vascular accident) ([facility] [address] Decubitus ulcer, Depression, Discitis of lumbar region, ESRD (end stage renal disease) ([facility] [address] History of kidney stones, Hyperlipidemia, Hypertension, Lumbar discitis, Osteoarthritis, Osteomyelitis of lumbar spine ([facility] [address] Patient in clinical research study, Perianal abscess, Protein-calorie malnutrition ([facility] [address] Proteinuria, Thrombocytopenia ([facility] [address] and Type II or unspecified type diabetes mellitus without mention of complication, not stated as uncontrolled. has a past surgical history that includes Coronary artery bypass graft; shoulder surgery; hip surgery; joint replacement; other surgical history (6/11/15); bone biopsy (6/29/15); other surgical history (12/30/15); and Carpal tunnel release. Restrictions  Restrictions/Precautions  Restrictions/Precautions: Up as Tolerated, Fall Risk, Isolation(COVID+; Droplet plus)  Required Braces or Orthoses?: No  Position Activity Restriction  Other position/activity restrictions: Up w/assist    Subjective   General  Patient assessed for rehabilitation services?: Yes  Family / Caregiver Present: No  General Comment  Comments: RN ok'd pt for OT/PT eval this AM. Pt agreeable to session  Patient Currently in Pain: Yes  Pain Assessment  Pain Assessment: Faces  Falk-Baker Pain Rating: Hurts a little bit  Pain Location: Generalized  Pain Descriptors: Discomfort  Non-Pharmaceutical Pain Intervention(s): Ambulation/Increased Activity;Repositioned;Distraction  Response to Pain Intervention: Patient Satisfied  Multiple Pain Sites: No  Pre Treatment Pain Screening  Intervention List: Patient able to continue with treatment  Vital Signs  Level of Consciousness: Alert  Patient Currently in Pain: Yes  Oxygen Therapy  O2 Device: Non-rebreather mask     Social/Functional History  Social/Functional History  Additional Comments: Unable to obtain from pt due to cognition. Per chart, pt is a long term resident at [facility].         Objective   Vision: (PARVEEN due to pt cognition)  Hearing: Exceptions to WFL  Hearing Exceptions: Hard of hearing/hearing concerns         Balance  Sitting Balance: Unable to assess(comment)  Standing Balance: Unable to assess(comment)     ADL  Feeding: Maximum assistance;Setup;Bringing food to mouth assist  Grooming: Maximum assistance;Setup;Verbal cueing; Increased time to complete  UE Bathing: Dependent/Total  LE Bathing: Dependent/Total  UE Dressing: Dependent/Total  LE Dressing: Dependent/Total  Toileting: Dependent/Total  Tone RUE  RUE Tone: Normotonic  Tone LUE  LUE Tone: Normotonic  Coordination  Movements Are Fluid And Coordinated: No  Coordination and Movement description: Gross motor impairments;Decreased speed;Decreased accuracy    Bed mobility  Rolling to Left: Dependent/Total  Sit to Supine: Dependent/Total  Comment: Pt attempted rolling in bed however unable to initiate and minimally participitory throughout. Pt unable to complete further bed mobility this date d/t pt poor cognition and groin ART line.  Poor respiratory status impacting session  Transfers  Sit to stand: Unable to assess  Stand to sit: Unable to assess    Cognition  Overall Cognitive Status: Exceptions  Arousal/Alertness: Inconsistent responses to stimuli  Following Commands: Inconsistently follows commands  Attention Span: Unable to maintain attention  Memory: Decreased recall of recent events;Decreased recall of precautions  Safety Judgement: Decreased awareness of need for safety;Decreased awareness of need for assistance  Insights: Not aware of deficits  Initiation: Requires cues for all  Sequencing: Requires cues for all       Sensation  Overall Sensation Status: (PARVEEN)        LUE PROM (degrees)  LUE PROM: WFL  Left Hand PROM (degrees)  Left Hand PROM: WFL  RUE PROM (degrees)  RUE PROM: WFL  Right Hand PROM (degrees)  Right Hand PROM: WFL  LUE Strength  L Hand General: 3+/5  LUE Strength Comment: PARVEEN  RUE Strength  R Hand General: 3+/5  RUE Strength Comment: PARVEEN                   Plan   Plan  Times per week: 1-3 x/wk  Current Treatment Recommendations: Strengthening, ROM, Balance Training, Functional Mobility Training, Endurance Training, Cognitive Reorientation, Safety Education & Training, Pain Management, Patient/Caregiver Education & Training, Equipment Evaluation, Education, & procurement, Self-Care / ADL    AM-PAC Score        AM-PAC Inpatient Daily Activity Raw Score: 8 (06/12/20 1440)  AM-PAC Inpatient ADL T-Scale Score : 22.86 (06/12/20 1440)  ADL Inpatient CMS 0-100% Score: 85.69 (06/12/20 1440)  ADL Inpatient CMS G-Code Modifier : CM (06/12/20 1440)    Goals  Short term goals  Time Frame for Short term goals: By discharge, pt will:  Short term goal 1: Demo Max A for bed mobility to increase safety and independence with ADLs  Short term goal 2: Demo Mod A for grooming and feeding tasks with setup provided and AE PRN  Short term goal 3: Follow ~25% of commands throughout therapy session to increase overall performance with ADLs/IADLS  Short term goal 4: Demo 4-/5 strength to BUE to increase ability to perform ADLs  Short term goal 5: Notify OTR to update goals as pt progresses       Therapy Time   Individual Concurrent Group Co-treatment   Time In 1111         Time Out 1127         Minutes 16                 LORETA Hernandez/JOSE ALBERTO

## 2020-06-12 NOTE — PLAN OF CARE
Problem: Airway Clearance - Ineffective  Goal: Achieve or maintain patent airway  6/12/2020 0018 by Geronimo Hmam RN  Outcome: Ongoing  6/11/2020 1753 by Gilberto Oviedo RN  Outcome: Ongoing  6/11/2020 1146 by Demetrius Canchola RN  Outcome: Ongoing  6/11/2020 1145 by Demetrius Canchola RN  Outcome: Ongoing     Problem: Gas Exchange - Impaired  Goal: Absence of hypoxia  6/12/2020 0018 by Geronimo Hamm RN  Outcome: Ongoing  6/11/2020 1753 by Gilberto Oviedo RN  Outcome: Ongoing  6/11/2020 1146 by Demetrius Canchola RN  Outcome: Ongoing  6/11/2020 1145 by Demetrius Canchola RN  Outcome: Ongoing  Goal: Promote optimal lung function  6/12/2020 0018 by Geronimo Hamm RN  Outcome: Ongoing  6/11/2020 1753 by Gilberto Oviedo RN  Outcome: Ongoing  6/11/2020 1146 by Demetrius Canchola RN  Outcome: Ongoing  6/11/2020 1145 by Demetrius Canchola RN  Outcome: Ongoing     Problem: Breathing Pattern - Ineffective  Goal: Ability to achieve and maintain a regular respiratory rate  6/12/2020 0018 by Geronimo Hamm RN  Outcome: Ongoing  6/11/2020 1753 by Gilberto Oviedo RN  Outcome: Ongoing  6/11/2020 1146 by Demetrius Canchola RN  Outcome: Ongoing     Problem:  Body Temperature -  Risk of, Imbalanced  Goal: Ability to maintain a body temperature within defined limits  6/12/2020 0018 by Geronimo Hamm RN  Outcome: Ongoing  6/11/2020 1753 by Gilberto Oviedo RN  Outcome: Ongoing  6/11/2020 1146 by Demetrius Canchola RN  Outcome: Ongoing  Goal: Will regain or maintain usual level of consciousness  6/12/2020 0018 by Geronimo Hamm RN  Outcome: Ongoing  6/11/2020 1753 by Gilberto Oviedo RN  Outcome: Ongoing  6/11/2020 1146 by Demetrius Canchola RN  Outcome: Ongoing  Goal: Complications related to the disease process, condition or treatment will be avoided or minimized  6/12/2020 0018 by Geronimo Hamm RN  Outcome: Ongoing  6/11/2020 1753 by Gilberto Oviedo RN  Outcome: Ongoing  6/11/2020 1146 by Demetrius Canchola RN  Outcome: Ongoing     Problem: Isolation Precautions - Risk of Spread of Infection  Goal: Prevent transmission of infection  6/12/2020 0018 by Heriberto Winn RN  Outcome: Ongoing  6/11/2020 1753 by Yesenia Martinez RN  Outcome: Ongoing  6/11/2020 1146 by Jonnathan Alegria RN  Outcome: Ongoing     Problem: Nutrition Deficits  Goal: Optimize nutrtional status  6/12/2020 0018 by Heriberto Winn RN  Outcome: Ongoing  6/11/2020 1753 by Yesenia Martinez RN  Outcome: Ongoing  6/11/2020 1146 by Jonnathan Alegria RN  Outcome: Ongoing     Problem: Risk for Fluid Volume Deficit  Goal: Maintain normal heart rhythm  6/12/2020 0018 by Heriberto Winn RN  Outcome: Ongoing  6/11/2020 1753 by Yesenia Martinez RN  Outcome: Ongoing  6/11/2020 1146 by Jonnathan Alegria RN  Outcome: Ongoing  Goal: Maintain absence of muscle cramping  6/12/2020 0018 by Heriberto Winn RN  Outcome: Ongoing  6/11/2020 1753 by Yesenia Martinez RN  Outcome: Ongoing  6/11/2020 1146 by Jonnathan Alegria RN  Outcome: Ongoing  Goal: Maintain normal serum potassium, sodium, calcium, phosphorus, and pH  6/12/2020 0018 by Heriberto Winn RN  Outcome: Ongoing  6/11/2020 1753 by Yesenia Martinez RN  Outcome: Ongoing  6/11/2020 1146 by Jonnathan Alegria RN  Outcome: Ongoing     Problem: Loneliness or Risk for Loneliness  Goal: Demonstrate positive use of time alone when socialization is not possible  6/12/2020 0018 by Heriberto Winn RN  Outcome: Ongoing  6/11/2020 1753 by Yesenia Martinez RN  Outcome: Ongoing     Problem: Fatigue  Goal: Verbalize increase energy and improved vitality  6/12/2020 0018 by Heriberto Winn RN  Outcome: Ongoing  6/11/2020 1753 by Yesenia Martinez RN  Outcome: Ongoing     Problem: Patient Education: Go to Patient Education Activity  Goal: Patient/Family Education  6/12/2020 0018 by Heriberto Winn RN  Outcome: Ongoing  6/11/2020 1753 by Yesenia Martinez RN  Outcome: Ongoing     Problem: Falls - Risk of:  Goal: Will remain free from falls  6/12/2020 0018 by Cassidy Pinedo RN  Outcome: Ongoing  6/11/2020 1753 by Griselda Santiago RN  Outcome: Ongoing  Goal: Absence of physical injury  6/12/2020 0018 by Cassidy Pinedo RN  Outcome: Ongoing  6/11/2020 1753 by Griselda Santiago RN  Outcome: Ongoing     Problem: Restraint Use - Nonviolent/Non-Self-Destructive Behavior:  Goal: Absence of restraint indications  Outcome: Ongoing  Goal: Absence of restraint-related injury  Outcome: Ongoing     Problem: Pain:  Description: Pain management should include both nonpharmacologic and pharmacologic interventions.   Goal: Pain level will decrease  Outcome: Ongoing  Goal: Control of acute pain  Outcome: Ongoing  Goal: Control of chronic pain  Outcome: Ongoing

## 2020-06-12 NOTE — PROGRESS NOTES
[facility] Continence Nurse  Follow up       NAME:  Nithin Uriostegui RECORD NUMBER:  [id number]  AGE: 67 y.o. GENDER: male  : 1945  TODAY'S DATE:  2020    Subjective:     Reason for [id number] Nurse Evaluation and Assessment: Non-intact skin to sacrococcygeal area present on admission. Coccyx appears dusky purple discolored consistent with deep tissue pressure injury;  surrounding tissue is severely denuded that is consistent with severe incontinence associated dermatitis due to frequent watery stools. Wound Identification:  Wound Type: pressure and caustic dermatitis  Contributing Factors: chronic pressure, decreased mobility, shear force, malnutrition and incontinence of stool     Wound History: Last seen in mid May; presented with chronic friction injury to bilateral buttocks but largely intact. Right heel purulent filled blisters discovered today. Purulence drained, wounded skin cleansed,  Foam dressing applied.          PAST MEDICAL HISTORY        Diagnosis Date    Asthma     CAD (coronary artery disease)     CKD (chronic kidney disease) stage 4, GFR 15-29 ml/min ([facility]     COPD (chronic obstructive pulmonary disease) ([facility]     CVA (cerebral vascular accident) ([address]     Decubitus ulcer 6/10/2015    Depression     Discitis of lumbar region     ESRD (end stage renal disease) ([address] 2015    History of kidney stones     Hyperlipidemia     Hypertension     Lumbar discitis 2015    Osteoarthritis     Osteomyelitis of lumbar spine ([address] 2015    Patient in clinical research study 2020    Enrolled in [address] for COVID expected date of completion 2020    Perianal abscess 2015    Protein-calorie malnutrition ([address] 2015    Proteinuria     Thrombocytopenia ([address] 2015    Type II or unspecified type diabetes mellitus without mention of complication, not stated as uncontrolled        PAST SURGICAL HISTORY    Past Surgical History:   Procedure Laterality Date    BONE BIOPSY  6/29/15    L2-3    CARPAL TUNNEL RELEASE      CORONARY ARTERY BYPASS GRAFT      HIP SURGERY      JOINT REPLACEMENT      OTHER SURGICAL HISTORY  6/11/15    I&D coccyx wound    OTHER SURGICAL HISTORY  12/30/15    diverting loop colostomy; perirectal incision and drainage    SHOULDER SURGERY      left and right       FAMILY HISTORY    Family History   Problem Relation Age of Onset    Stroke Mother     Heart Attack Mother     Heart Attack Father     Diabetes Sister     Diabetes Brother        SOCIAL HISTORY    Social History     Tobacco Use    Smoking status: Never Smoker    Smokeless tobacco: Never Used   Substance Use Topics    Alcohol use: No     Alcohol/week: 0.0 standard drinks    Drug use: No       ALLERGIES    Allergies   Allergen Reactions    Oxycontin [Oxycodone] Shortness Of Breath and Other (See Comments)     Patient had an overdose on Oxycontin before and does NOT want this ever again. He can take Oxycodone/acetaminophen as needed.     Avodart [Dutasteride]     Darvocet [Propoxyphene N-Acetaminophen]            Objective:      BP (!) 158/69   Pulse 117   Temp 98.8 °F (37.1 °C) (Axillary)   Resp 23   Ht 5' 11\" (1.803 m)   Wt 156 lb 15.5 oz (71.2 kg)   SpO2 95%   BMI 21.89 kg/m²   Sharif Risk Score: Sharif Scale Score: 11    LABS    CBC:   Lab Results   Component Value Date    WBC 11.3 06/12/2020    RBC 4.16 06/12/2020    HGB 12.1 06/12/2020     CMP:  Albumin:    Lab Results   Component Value Date    LABALBU 2.7 06/09/2020     PT/INR:    Lab Results   Component Value Date    PROTIME 10.2 06/09/2020    INR 1.0 06/09/2020     HgBA1c:    Lab Results   Component Value Date    LABA1C 6.5 12/03/2019     PTT: No components found for: LABPTT      Assessment:     Patient Active Problem List   Diagnosis    Essential hypertension    ESRD (end stage renal disease) [facility] dialysis ([facility]    Type 2 diabetes mellitus, with long-term current use of insulin ([facility]    COPD without exacerbation ([facility] [facility]    [facility]    Abnormal laboratory test    Coronary artery disease involving native coronary artery of native heart without angina pectoris    Cerebrovascular disease    Hyperlipidemia    Asymptomatic cholelithiasis    COVID-19    Pneumonia of both lower lobes due to Pseudomonas species ([facility] [facility]    Pneumonia due to COVID-19 virus    Hypotension    Acute on chronic respiratory failure with hypoxia ([facility] [facility]       Measurements:     06/12/20 1237   Wound 06/02/20 Coccyx SACROCOCCYGEAL AREA TO BILATERAL BUTTOCKS   Date First Assessed/Time First Assessed: 06/02/20 1900   Present on Hospital Admission: No  Primary Wound Type: Pressure Injury  Location: Coccyx  Wound Description (Comments): SACROCOCCYGEAL AREA TO BILATERAL BUTTOCKS   Wound Image    Wound Deep tissue/Injury   Dressing Status Changed   Dressing Changed Changed/New   Dressing/Treatment Alginate with Ag;ABD   Wound Cleansed Wound cleanser   Dressing Change Due 06/13/20   Wound Assessment Dusky; Red;Purple;Denuded;Fragile;Painful   Drainage Amount Moderate   Drainage Description Serosanguinous   Odor None   Chana-wound Assessment Maceration   Wound 06/12/20 Heel Right   Date First Assessed/Time First Assessed: 06/12/20 1237   Location: Heel  Wound Location Orientation: Right   Wound Image    Wound Other   Dressing/Treatment Foam   Wound Cleansed Soap and water  (blister ruptured, purlent material expressed)   Dressing Change Due 06/13/20   Wound Length (cm) 2.4 cm   Wound Width (cm) 1.4 cm   Wound Surface Area (cm^2) 3.36 cm^2   Wound Assessment Yellow   Drainage Amount Small   Drainage Description Purulent   Odor None   Chana-wound Assessment Dry;Red;Blanchable erythema                     Plan of Care:       Apply OPTICELL AG (sivler gelling fiber) to raw, denuded tissue, cover with ABD and secure with MEDFIX or Pinc Tape.   Change daily and prn if soiled.     Turn every 2 hours  Float heels off of bed with pillows under calves. Monitor the right heel carefully for s/s infection.     Use Comfort [place] to reposition patient to minimize potential for shear injury. Incontinence care with incontinence barrier cloths and zinc oxide cream. Apply zinc oxide cream BID and prn incontinence.    Moisture wicking under pads       Electronically signed by Sanna Marquez RN, CWON on 6/12/2020 at 12:46 PM

## 2020-06-12 NOTE — FLOWSHEET NOTE
Emelyn (patient's daughter) called & requested update. RN updated, all questions answered at this time. Informed of patient desatting into the 80s during dialysis, despite being on NRB @ 15L. Emelyn stated that if needed, would still want patient to be intubated.

## 2020-06-12 NOTE — PROGRESS NOTES
[facility] -   Infectious diseases evaluation  admission date 6/9/2020    reason for consultation:   COVID +    Impression :   Current:  · COVID + 5/31-post immune plasma 6/2  · COVID  +6/9  · 6/9 diffuse multifocal bilateral pneumonia-interstitial  · ESRD hemodialysis 3 times weekly  · Infected sacral wound   · Staph bacteremia  ·   Other:  ·   Discussion / summary of stay / plan of care   · Status post COVID,went home on 6/5  · He had received immune serum  on 6/1 tolerated well  · Was not a candidate for remdesevir due to ESRD  · Was treated with Levaquin for a Pseudomonas sputum culture thought to be either colonization or bronchitis.   · Comes back with increasing shortness of breath right before he could get dialysis, is now on nonrebreather escalating doses, 10 L  · Has a sacral wound w foul discharge  · COVID + 5/31 + 6/9  Recommendations     · Day 3 cefepime and Vanco empirically at this point  ·  Zithromax for atypicals  · worsened inflammatory parameters, possibly suggestive of inflammatory surge  · Not a candidate for remdesevir -   · Received immune plasma last admission  · Wound care for the sacral wound  · Will follow  · Discussed with nurse    Infection Control Recommendations   · [place] Precautions  · Contact Isolation   · Airborne isolation  · Droplet Isolation  Antimicrobial Stewardship Recommendations   · Simplification of therapy  · Targeted therapy  · IV to oral conversion  · Per Kg dosing      Coordination ofOutpatient Care:   · Estimated Length of IV antimicrobials:  · Patient will need Midline / picc Catheter Insertion:   · Patient will need SNF:  · Patient will need outpatient wound care:     History of Present Illness:   Initial history:  Linda Pabon is a 67y.o.-year-old male who is known to our service for chest pain discharged on 6/5 diagnosis of COVID and chills, groundglass pneumonia, respiratory Pseudomonas infection of unclear extent. At that time he did not fit criteria for REMDESEVIR brian to HD and ESRD. He received an immune serum 6/2 which he tolerated well without complications  Sputum culture ultimately grew Pseudomonas and he was started on Levaquin for 7 days until 6/4  He was discharged on 6/5 feeling great, on same for 2 weeks  At the time his CT of the chest showed groundglass and the Pseudomonas was causing most likely an upper respiratory infection rather than pneumonia. It seems he went to dialysis and was found to be hypoxic before he ran on dialysis, started on a nonrebreather, he was alert oriented x3, and admitted on 7 L of oxygen nonrebreather. Chest x-ray showed increased bilateral diffuse infiltrates, interstitial as shown below    At this time at this time he is on a nonrebreather, 10 L of oxygen, little confused, coughing with thick secretions in the back of his throat rather yellowish  His oxygenation has deteriorated since he came in. Abdomen is soft, he has no rash, he is confused  He has no fever        Interval changes  6/11/2020   Due to the current efforts to prevent transmission of COVID-19 and also the need to preserve PPE for other caregivers, a face-to-face encounter with the patient was not performed. That being said, all relevant records and diagnostic tests were reviewed, including laboratory results and imaging. Patient was evaluated from the window, called on the phone, and chart reviewed, disc w  involved healthcare workers.     Continues to be on a nonrebreather, very tired lethargic, pulse is 110, saturation 96, blood pressure 120/60    Blood culture SCN,  CRP elevated as below  CXR worse 6/11      Inflammatory parameters are much higher at the time where that she CT scan is suggestive interstitial markings possibly pulmonary edema versus COVID    Summary of relevant labs:  Labs:  W 8.2 - 9.9    CRP 49 - 335 - 404   - 365  Ferritin 3412  procalc 1.49    Micro:  BC 6/9 clusters x 1 - SCN  COVID + 6/9    Sputum cx 5/31 pseudomonas S cipro and cefepime  Imaging:  Chest x-ray 6/11 worsening interstitial infiltrates    CT chest 3/10/20  Increased bilateral ground-glass opacities which are nonspecific but can be seen with COVID. CXR 6/9/20  Impression   1. Patchy bilateral infiltrates.  This likely represents an atypical   pneumonia, including viral pneumonia.  Follow-up to resolution is recommended. 2. Stable cardiomegaly. I have personally reviewed the past medical history, past surgical history, medications, social history, and family history, and I haveupdated the database accordingly.   Past Medical History:     Past Medical History:   Diagnosis Date    Asthma     CAD (coronary artery disease)     CKD (chronic kidney disease) stage 4, GFR 15-29 ml/min ([facility]     COPD (chronic obstructive pulmonary disease) ([facility]     CVA (cerebral vascular accident) ([address]     Decubitus ulcer 6/10/2015    Depression     Discitis of lumbar region     ESRD (end stage renal disease) ([address] 6/26/2015    History of kidney stones     Hyperlipidemia     Hypertension     Lumbar discitis 6/25/2015    Osteoarthritis     Osteomyelitis of lumbar spine ([address] 6/25/2015    Patient in clinical research study 06/02/2020    Enrolled in [facility] [address] for COVID expected date of completion 7/5/2020    Perianal abscess 12/29/2015    Protein-calorie malnutrition ([address] 6/26/2015    Proteinuria     Thrombocytopenia ([address] 7/2/2015    Type II or unspecified type diabetes mellitus without mention of complication, not stated as uncontrolled        Past Surgical  History:     Past Surgical History:   Procedure Laterality Date    BONE BIOPSY  6/29/15    L2-3    CARPAL TUNNEL RELEASE      CORONARY ARTERY BYPASS GRAFT      HIP SURGERY      JOINT REPLACEMENT      OTHER SURGICAL HISTORY  6/11/15    I&D coccyx wound    OTHER SURGICAL HISTORY  12/30/15    diverting loop colostomy; perirectal incision and drainage    SHOULDER SURGERY      left and right       Medications:      midodrine  10 mg Oral 4x daily    azithromycin  500 mg Intravenous Q24H    insulin glargine  10 Units Subcutaneous Nightly    sodium chloride flush  10 mL Intravenous 2 times per day    heparin (porcine)  5,000 Units Subcutaneous 3 times per day    sodium chloride flush  10 mL Intravenous 2 times per day    cefepime  2 g Intravenous Once per day on Mon Wed Fri    vancomycin (VANCOCIN) intermittent dosing (placeholder)   Other RX Placeholder    vancomycin  750 mg Intravenous Once per day on Mon Wed Fri       Social History:     Social History     Socioeconomic History    Marital status:      Spouse name: Not on file    Number of children: Not on file    Years of education: Not on file    Highest education level: Not on file   Occupational History    Not on file   Social Needs    Financial resource strain: Not on file    Food insecurity     Worry: Not on file     Inability: Not on file    Transportation needs     Medical: Not on file     Non-medical: Not on file   Tobacco Use    Smoking status: Never Smoker    Smokeless tobacco: Never Used   Substance and Sexual Activity    Alcohol use: No     Alcohol/week: 0.0 standard drinks    Drug use: No    Sexual activity: Never   Lifestyle    Physical activity     Days per week: Not on file     Minutes per session: Not on file    Stress: Not on file   Relationships    Social connections     Talks on phone: Not on file     Gets together: Not on file     Attends [employer] service: Not on file     Active member of club or organization: Not on file     Attends meetings of clubs or organizations: Not on file     Relationship status: Not on file    Intimate partner violence     Fear of current or ex partner: Not on file     Emotionally abused: Not on file     Physically abused: Not on file     Forced sexual activity: Not on file   Other Topics Concern    Not on file Social History Narrative    Not on file       Family History:     Family History   Problem Relation Age of Onset    Stroke Mother     Heart Attack Mother     Heart Attack Father     Diabetes Sister     Diabetes Brother         Allergies:   Oxycontin [oxycodone]; Avodart [dutasteride]; and Darvocet [propoxyphene n-acetaminophen]     Review of Systems:     Review of Systems   Unable to perform ROS: Patient unresponsive       Physical Examination :     Patient Vitals for the past 8 hrs:   BP Temp Temp src Pulse Resp SpO2   06/11/20 2116 -- -- -- 114 30 94 %   06/11/20 2100 (!) 133/52 98.2 °F (36.8 °C) Axillary 135 (!) 45 90 %   06/11/20 1815 110/67 -- -- 103 27 --   06/11/20 1800 (!) 105/29 98.8 °F (37.1 °C) Axillary 117 (!) 35 --   06/11/20 1745 (!) 105/37 -- -- 127 (!) 35 --   06/11/20 1730 (!) 122/25 -- -- 122 (!) 32 --   06/11/20 1715 (!) 104/90 -- -- 120 (!) 43 --   06/11/20 1700 (!) 163/26 -- -- 122 (!) 36 --   06/11/20 1645 (!) 159/43 -- -- 120 (!) 37 --   06/11/20 1630 -- -- -- 114 26 97 %   06/11/20 1615 105/86 -- -- 115 25 97 %   06/11/20 1600 (!) 120/90 -- -- 117 22 96 %   06/11/20 1545 -- -- -- 117 24 98 %   06/11/20 1530 (!) 72/28 -- -- 100 (!) 35 96 %       Physical Exam  Constitutional:       General: He is in acute distress. Appearance: He is ill-appearing. HENT:      Head: Normocephalic and atraumatic. Nose: No rhinorrhea. Eyes:      General: No scleral icterus. Conjunctiva/sclera: Conjunctivae normal.   Cardiovascular:      Rate and Rhythm: Tachycardia present. Rhythm irregular. Abdominal:      General: There is no distension. Tenderness: There is no abdominal tenderness. Genitourinary:     Comments: Urine susan  Musculoskeletal:         General: No swelling. Right lower leg: No edema. Left lower leg: No edema. Skin:     General: Skin is dry. Coloration: Skin is not jaundiced or pale. Findings: No erythema.       Comments: Sacral wound Neurological:      General: No focal deficit present. Mental Status: He is alert. Psychiatric:      Comments: Tired            Medical Decision Making:   I have independently reviewed/ordered the following labs:    CBC with Differential:   Recent Labs     06/10/20  0448 06/11/20  0426   WBC 10.9 9.9   HGB 12.3* 12.1*   HCT 37.4* 36.6*    202   LYMPHOPCT 9* 8*   MONOPCT 6 5     BMP:  Recent Labs     06/10/20  0448 06/11/20 0426    135   K 4.4 4.8   CL 96* 96*   CO2 14* 13*   BUN 60* 69*   CREATININE 10.06* 10.85*     Hepatic Function Panel:   Recent Labs     06/09/20  1114   PROT 6.7   LABALBU 2.7*   BILITOT 0.38   ALKPHOS 221*   ALT 61*   AST 70*     No results for input(s): RPR in the last 72 hours. No results for input(s): HIV in the last 72 hours. No results for input(s): BC in the last 72 hours. Lab Results   Component Value Date    CREATININE 10.85 06/11/2020    GLUCOSE 251 06/11/2020       Detailed results: Thank you for allowing us to participate in the care of this patient. Please call with questions. This note is created with the assistance of a speech recognition program.  While intending to generate adocument that actually reflects the content of the visit, the document can still have some errors including those of syntax and sound a like substitutions which may escape proof reading. It such instances, actual meaningcan be extrapolated by contextual diversion.     Mat Rob MD  Office: [phone]  Perfect serve / office [phone]

## 2020-06-12 NOTE — CARE COORDINATION
Ill add- see TRANSITIONAL CARE PLANNING/ 2 [facility]:     Reason for Admission: COVID-19 [U07.1]     Treatment Plan of Care:   [employer], levophed  Tests/Procedures still needed:     Barriers to Discharge: Covid+, NRB, levophed    Readmission Risk              Risk of Unplanned Readmission:        26            Patient goals/Treatment Preferences/Transitional Plan:   [facility] to [facility]  Referrals Made:     Follow Up needed:   O2 requirements

## 2020-06-12 NOTE — PROGRESS NOTES
INTENSIVE CARE UNIT  Resident Physician Progress Note    Travis Britton  Date of Admission -  6/9/2020 10:40 AM  Date of Evaluation -  6/12/2020  Room and Bed Number -  5441/9721-77   Hospital Day - 3    Chief Complaint   Patient presents with    Hypotension      SUBJECTIVE:     OVERNIGHT EVENTS:      desatted when tried to put NG and came up again. Mentation wise oriented to himself only. 1. Feeding Diet: Diet NPO Effective Now . Did not tolerate put in in the NG   2. Fluids none   3. Family updated   4. Analgesic fentanyl  5. Sedation fentanyl  6. Thrombo-prophylaxis heparin 5000 tid   7. Mobility difficult to assess   8. Heads up 30 Degrees  9. Ulcer prophylaxis . None   10. Glycemic control  Lantus 10 untis nightly   11. Spontaneous breathing trial  On non rebreather   12. Bowel regimen/urine output on HD   13. Indwelling catheter/lines CVC triple lumen, right femoral, arterial line, hd fistula  14. De-escalation trying to wean levophed      1. patient  still requiring minimal dose of Levophed at 3 ug/min to maintain blood pressure,, patient remained afebrile overnight, currently patient is on nonrebreather at 10 L/min, comfortable in bed  2. Patient remains on cefepime and vancomycin empirically and azithromycin was added for atypical pneumonia   3. 1/2 blood culture on rectal examination showed gram-positive cocci in clusters, final cultures are pending. 4. Patient was recently treated for Pseudomonas pneumonia with levofloxacin on last admission  5. Last COVID was positive on 5/31, patient received convalescent plasma on 6/2  6. No acute issues overnight  7.  Received a dose of dexferoxamine as due to elevated aluminum       Brief past medical history  COVID-19 pneumonia  ESRD on hemodialysis right arm fistula   COPD on 3 L home nasal cannula   Type 2 diabetes mellitus  Essential hypertension  CAD status post CABG last echocardiogram last year, ejection fraction 55 to 60%    AWAKE & FOLLOWING COMMANDS:  [] No   [x] Yes    SECRETIONS Amount:  [x] Small [] Moderate  [] Large  [] None  Color:     [] White [] Colored  [] Bloody    SEDATION:  RAAS Score:  [] Propofol gtt  [] Versed gtt  [] Ativan gtt   [x] No Sedation    PARALYZED:  [x] No    [] Yes    VASOPRESSORS:  [] No    [x] Yes  [x] Levophed [] Dopamine [] Vasopressin  [] Dobutamine [] Phenylephrine [] Epinephrine      OBJECTIVE:     VITAL SIGNS:  BP (!) 158/69   Pulse 117   Temp 98.8 °F (37.1 °C) (Axillary)   Resp 23   Ht 5' 11\" (1.803 m)   Wt 156 lb 15.5 oz (71.2 kg)   SpO2 95%   BMI 21.89 kg/m²   Tmax over 24 hours:  Temp (24hrs), Av.6 °F (37 °C), Min:98.2 °F (36.8 °C), Max:99.4 °F (37.4 °C)      Patient Vitals for the past 8 hrs:   BP Temp Temp src Pulse Resp SpO2 Weight   20 0815 (!) 158/69 98.8 °F (37.1 °C) Axillary -- -- -- --   20 0630 -- -- -- -- -- -- 156 lb 15.5 oz (71.2 kg)   20 0621 -- -- -- 117 23 95 % --   20 0600 -- -- -- 104 22 96 % --   20 0400 (!) 160/32 -- -- 106 27 94 % --   20 0300 (!) 124/32 98.3 °F (36.8 °C) Axillary 112 30 94 % --   20 0200 (!) 93/40 -- -- 118 30 95 % --   20 0155 (!) 99/39 -- -- 117 30 95 % --         Intake/Output Summary (Last 24 hours) at 2020 0908  Last data filed at 2020 3076  Gross per 24 hour   Intake 646.87 ml   Output 1130 ml   Net -483.13 ml     Date 20 0000 - 20 2359   Shift 9239-2209 1230-1218 0560-1532 24 Hour Total   INTAKE   P.O.(mL/kg/hr) 0(0)   0   I. V.(mL/kg) 232(3.3)   232(3.3)   Shift Total(mL/kg) 232(3.3)   232(3.3)   OUTPUT   Urine(mL/kg/hr) 0(0)   0   Shift Total(mL/kg) 0(0)   0(0)   Weight (kg) 71.2 71.2 71.2 71.2     Wt Readings from Last 3 Encounters:   20 156 lb 15.5 oz (71.2 kg)   20 149 lb 4 oz (67.7 kg)   20 160 lb (72.6 kg)     Body mass index is 21.89 kg/m².         PHYSICAL EXAM:    Due to the current efforts to prevent transmission of COVID-19 and also the need to preserve PPE for other caregivers, face-to-face encounter with the patient was not performed. That being said, all relevant records and diagnostic tests were reviewed, including laboratory results and imaging. Patient was evaluated from the window, chart reviewed and case was discussed with relevant caregivers.      MEDICATIONS:  Scheduled Meds:   midodrine  10 mg Oral 4x daily    azithromycin  500 mg Intravenous Q24H    insulin glargine  10 Units Subcutaneous Nightly    sodium chloride flush  10 mL Intravenous 2 times per day    heparin (porcine)  5,000 Units Subcutaneous 3 times per day    sodium chloride flush  10 mL Intravenous 2 times per day    cefepime  2 g Intravenous Once per day on Mon Wed Fri    vancomycin (VANCOCIN) intermittent dosing (placeholder)   Other RX Placeholder    vancomycin  750 mg Intravenous Once per day on Mon Wed Fri     Continuous Infusions:   dextrose      norepinephrine 6 mcg/min (06/11/20 2021)     PRN Meds:   glucose, 15 g, PRN  dextrose, 12.5 g, PRN  glucagon (rDNA), 1 mg, PRN  dextrose, 100 mL/hr, PRN  fentanNYL, 50 mcg, Q2H PRN  sodium chloride flush, 10 mL, PRN  ondansetron, 4 mg, Q8H PRN  sodium chloride flush, 10 mL, PRN  polyethylene glycol, 17 g, Daily PRN  promethazine, 12.5 mg, Q6H PRN    Or  ondansetron, 4 mg, Q6H PRN        SUPPORT DEVICES: [] Ventilator [] BIPAP  [x]nonrebreather [] Room Air    VENT SETTINGS (Comprehensive) (if applicable):    Vent Information  FiO2 : 100 %  SpO2: 95 %  SpO2/FiO2 ratio: 96  Additional Respiratory  Assessments  Pulse: 117  Resp: 23  SpO2: 95 %  Oral Care: Lip moisturizer applied, Mouth swabbed, Mouth moisturizer, Mouth suctioned, Suction toothette    ABGs:     Lab Results   Component Value Date    JUB1RYC 16 06/11/2020    FIO2 100.0 06/11/2020         DATA:  Complete Blood Count:   Recent Labs     06/10/20  0448 06/11/20  0426 06/12/20  0550   WBC 10.9 9.9 11.3   RBC 4.07* 3.96* 4.16*   HGB 12.3* 12.1* 12.1*   HCT 37.4* 36.6* 37.6*   MCV 91.9 92.4 90.4   MCH 30.2 30.6 29.1   MCHC 32.9 33.1 32.2   RDW 14.5* 14.6* 14.4    202 234   MPV 11.2 10.7 10.7        Last 3 Blood Glucose:   Recent Labs     06/09/20  1114 06/10/20  0448 06/11/20  0426 06/12/20  0550   GLUCOSE 161* 207* 251* 297*        PT/INR:    Lab Results   Component Value Date    PROTIME 10.2 06/09/2020    INR 1.0 06/09/2020     PTT:    Lab Results   Component Value Date    APTT 40.3 06/09/2020       Comprehensive Metabolic Profile:   Recent Labs     06/09/20  1114 06/10/20  0448 06/11/20  0426 06/12/20  0550    136 135 139   K 4.7 4.4 4.8 4.2   CL 95* 96* 96* 94*   CO2 16* 14* 13* 17*   BUN 76* 60* 69* 59*   CREATININE 12.74* 10.06* 10.85* 8.62*   GLUCOSE 161* 207* 251* 297*   CALCIUM 8.4* 7.9* 8.2* 8.8   PROT 6.7  --   --   --    LABALBU 2.7*  --   --   --    BILITOT 0.38  --   --   --    ALKPHOS 221*  --   --   --    AST 70*  --   --   --    ALT 61*  --   --   --       Magnesium:   Lab Results   Component Value Date    MG 1.8 06/05/2020    MG 1.8 06/04/2020    MG 1.9 06/03/2020     Phosphorus:   Lab Results   Component Value Date    PHOS 3.2 05/27/2019    PHOS 3.2 05/26/2019    PHOS 2.7 05/25/2019     Ionized Calcium:   Lab Results   Component Value Date    CAION 1.07 06/05/2020    CAION 1.07 06/04/2020    CAION 1.03 06/03/2020        Urinalysis:   Lab Results   Component Value Date    NITRU NEGATIVE 05/31/2020    COLORU YELLOW 05/31/2020    PHUR 8.0 05/31/2020    WBCUA 20 TO 50 05/31/2020    RBCUA 20 TO 50 05/31/2020    MUCUS NOT REPORTED 05/31/2020    TRICHOMONAS NOT REPORTED 05/31/2020    YEAST NOT REPORTED 05/31/2020    BACTERIA FEW 05/31/2020    SPECGRAV 1.016 05/31/2020    LEUKOCYTESUR NEGATIVE 05/31/2020    UROBILINOGEN Normal 05/31/2020    BILIRUBINUR NEGATIVE 05/31/2020    GLUCOSEU TRACE 05/31/2020    KETUA NEGATIVE 05/31/2020    AMORPHOUS NOT REPORTED 05/31/2020       HgBA1c:    Lab Results   Component Value Date    LABA1C 6.5 12/03/2019     TSH:    Lab Results Component Value Date    TSH 1.47 05/18/2018     Lactic Acid:   Lab Results   Component Value Date    LACTA 2.0 05/22/2019    LACTA 2.3 05/22/2019    LACTA 0.9 09/07/2018      Troponin: No results for input(s): TROPONINI in the last 72 hours. ASSESSMENT:     Patient Active Problem List    Diagnosis Date Noted    Hypotension 06/09/2020    Acute on chronic respiratory failure with hypoxia ([facility] 06/09/2020    Pneumonia due to COVID-19 virus 06/04/2020    Pneumonia of both lower lobes due to Pseudomonas species ([facility]     COVID-19 05/31/2020    Asymptomatic cholelithiasis 05/09/2020    Coronary artery disease involving native coronary artery of native heart without angina pectoris     Cerebrovascular disease     Hyperlipidemia     Abnormal laboratory test 05/08/2020    AVF (arteriovenous fistula) ([facility]     COPD without exacerbation ([facility] 09/08/2018    Type 2 diabetes mellitus, with long-term current use of insulin ([facility] 12/29/2015    Essential hypertension     ESRD (end stage renal disease) on dialysis ([facility]           PLAN:     WEAN PER PROTOCOL:  [] No   [] Yes  [x] N/A    ICU PROPHYLAXIS:  Stress ulcer:  [] PPI Agent  [x] [id number] [] Sucralfate  [] Other:  VTE:   [] Enoxaparin  [x] Unfract.  Heparin Subcut  [] EPC Cuffs    NUTRITION:  [] NPO [] Tube Feeding (Specify: ) [] TPN  [x] PO    HOME MEDS RECONCILED: [] No  [x] Yes    CONSULTATION NEEDED:  [x] No  [] Yes    FAMILY UPDATED:    [x] No  [] Yes    TRANSFER OUT OF ICU:   [x] No  [] Yes        Additional Assessment:  ·       Principal Problem:    Acute on chronic respiratory failure with hypoxia (HCC)  Active Problems:    ESRD (end stage renal disease) on dialysis ([facility]    Type 2 diabetes mellitus, with long-term current use of insulin (HCC)    COPD without exacerbation (HCC)    Coronary artery disease involving native coronary artery of native heart without angina pectoris    Hyperlipidemia    COVID-19    Pneumonia due to COVID-19 virus Hypotension  Resolved Problems:    * No resolved hospital problems. *    Plan:  1. Acute hypoxic respiratory failure secondary to COVID-19. COVID-19 pneumonia. Continue supplemental oxygen with nonrebreather, wean off as appropriate, monitor for need of intubation. ID consult for Covid rule out and clearance for transfer if negative. Monitor for respiratory decompensation. 2. Healthcare associated pneumonia . Continue vancomycin and cefepime. Follow blood cultures, COVID-19 testing. 1 out of 2 blood cultures positive for coagulase negative staphylococcus. 3. Atypical pneumonia. Azithromycin d/marcie , mycoplasma antibody and Legionella antigen testing pending. 4. Hypotension. Circulatory shock ? Wean off pressor support as appropriate. Increase midodrine 10 mg 4 times daily  5. ESRD on HD. Nephrology following along. HD today per nephro. 6. Metabolic acidosis. Secondary to uremia. Repeat ABG and lactic acid as needed. 7. Pulmonary edema. On HD. Hold lasix for now as the patient is on Levophed. 8. Troponinemia. Troponin trending downwards from prior admission. Unlikely ACS at this time  9. DVT prophylaxis. sc heparin 5000 U Q 8 hr for DVT prophylaxis. 10. Mal- Nutrition. Try NG tube insertion if the blood pressure tolerates now, there was concern for aspiration , will get a CXR. 11. Aluminium toxicity. Continue desferrioxamine. Kain Valdez M.D.   Internal Medicine Resident , PGY-2  [address] [place] [address]  6/12/2020 9:08 AM

## 2020-06-12 NOTE — PROGRESS NOTES
NEPHROLOGY PROGRESS NOTE      SUBJECTIVE     Patient had hemodialysis yesterday. He remains acidotic, although better than yesterday. He is seen through the window in the COVID-ICU. He is on a non rebreather mask. Chest x-ray shows scattered pulmonary opacities. We will schedule the patient for an extra session of hemodialysis today. Patient has received desferroxamine for high aluminum levels on this admission. Received desferrioxamine yesterday. Continues to be on norepinephrine. .  Also receiving midodrine. He is positive for COVID-19. He remains  on azithromycin, cefepime and vancomycin for atypical pneumonia. 1 out of 2 blood cultures positive for coagulase negative staphylococcus. OBJECTIVE     Vitals:    06/12/20 0600 06/12/20 0621 06/12/20 0630 06/12/20 0815   BP:    (!) 158/69   Pulse: 104 117     Resp: 22 23     Temp:    98.8 °F (37.1 °C)   TempSrc:    Axillary   SpO2: 96% 95%     Weight:   156 lb 15.5 oz (71.2 kg)    Height:         24HR INTAKE/OUTPUT:      Intake/Output Summary (Last 24 hours) at 6/12/2020 0930  Last data filed at 6/12/2020 2876  Gross per 24 hour   Intake 646.87 ml   Output 1130 ml   Net -483.13 ml       Patient seen through the window with plan of care discussed with nursing for hemodialysis today. Direct physical examination deferred to preserve valuable PPE in the setting of the COVID-19 pneumonia in view of the pandemic.       MEDICATIONS     Scheduled Meds:    midodrine  10 mg Oral 4x daily    azithromycin  500 mg Intravenous Q24H    insulin glargine  10 Units Subcutaneous Nightly    sodium chloride flush  10 mL Intravenous 2 times per day    heparin (porcine)  5,000 Units Subcutaneous 3 times per day    sodium chloride flush  10 mL Intravenous 2 times per day    cefepime  2 g Intravenous Once per day on Mon Wed Fri    vancomycin (VANCOCIN) intermittent dosing (placeholder)   Other RX Placeholder    vancomycin  750 mg Intravenous Once per day on Mon Wed Fri     Continuous Infusions:    dextrose      norepinephrine 6 mcg/min (06/11/20 2021)     PRN Meds:  glucose, dextrose, glucagon (rDNA), dextrose, fentanNYL, sodium chloride flush, ondansetron, sodium chloride flush, polyethylene glycol, promethazine **OR** ondansetron  Home Meds:                Medications Prior to Admission: doxycycline hyclate (VIBRA-TABS) 100 MG tablet, Take 1 tablet by mouth daily  zinc 50 MG CAPS, Take 50 mg by mouth daily  donepezil (ARICEPT) 10 MG tablet, Take 10 mg by mouth nightly  albuterol (PROVENTIL) (2.5 MG/3ML) 0.083% nebulizer solution, Take 2.5 mg by nebulization every 4 hours as needed (as needed for copd)  insulin glargine (LANTUS SOLOSTAR) 100 UNIT/ML injection pen, Inject into the skin daily inject 14 unit subcutaneously one time a day for DM  Lidocaine 0.5 % AERO, Apply topically Apply to dialysis access topically one time a day every Mon, Wed, Fri  polyvinyl alcohol (LIQUIFILM TEARS) 1.4 % ophthalmic solution, 1 drop as needed  doxycycline hyclate (VIBRA-TABS) 100 MG tablet, Take 100 mg by mouth 2 times daily Until 7/15/19 for osteomyelitis  fluticasone (FLONASE) 50 MCG/ACT nasal spray, 2 sprays by Each Nostril route daily  ipratropium (ATROVENT) 0.02 % nebulizer solution, Take 0.5 mg by nebulization every 8 hours  Multiple Vitamins-Minerals (THERAPEUTIC MULTIVITAMIN-MINERALS) tablet, Take 1 tablet by mouth daily  allopurinol (ZYLOPRIM) 100 MG tablet, Take 100 mg by mouth daily  metoprolol succinate (TOPROL XL) 25 MG extended release tablet, Take 1 tablet by mouth daily  carboxymethylcellulose 1 % ophthalmic solution, Place 1 drop into both eyes 3 times daily as needed for Dry Eyes  sertraline (ZOLOFT) 50 MG tablet, Take 50 mg by mouth daily  calcium carbonate (TUMS) 500 MG chewable tablet, Take 2 tablets by mouth every 6 hours as needed for Heartburn  diclofenac sodium 1 % GEL, Apply topically 3 times daily Apply between last two toes and to arch of right foot  fluticasone propionate (FLOVENT DISKUS) 100 MCG/BLIST AEPB inhaler, Inhale 2 puffs into the lungs 2 times daily   Nutritional Supplements (BOOST GLUCOSE CONTROL) LIQD, Take 8 oz by mouth nightly   senna-docusate (PERICOLACE) 8.6-50 MG per tablet, Take 2 tablets by mouth four times a week On non-dialysis days (Sun, Tues, Thurs, Sat)  gabapentin (NEURONTIN) 100 MG capsule, Take 1 capsule by mouth 3 times daily  doxycycline (VIBRA-TABS) 100 MG tablet, Take 1 tablet by mouth 2 times daily for 10 days  hydrALAZINE (APRESOLINE) 10 MG tablet, Take 10 mg by mouth 3 times daily   aspirin 81 MG chewable tablet, Take 81 mg by mouth every morning     INVESTIGATIONS     Last 3 CMP:    Recent Labs     06/09/20  1114 06/10/20  0448 06/11/20  0426 06/12/20  0550    136 135 139   K 4.7 4.4 4.8 4.2   CL 95* 96* 96* 94*   CO2 16* 14* 13* 17*   BUN 76* 60* 69* 59*   CREATININE 12.74* 10.06* 10.85* 8.62*   CALCIUM 8.4* 7.9* 8.2* 8.8   PROT 6.7  --   --   --    LABALBU 2.7*  --   --   --    BILITOT 0.38  --   --   --    ALKPHOS 221*  --   --   --    AST 70*  --   --   --    ALT 61*  --   --   --        Last 3 CBC:  Recent Labs     06/10/20  0448 06/11/20  0426 06/12/20  0550   WBC 10.9 9.9 11.3   RBC 4.07* 3.96* 4.16*   HGB 12.3* 12.1* 12.1*   HCT 37.4* 36.6* 37.6*   MCV 91.9 92.4 90.4   MCH 30.2 30.6 29.1   MCHC 32.9 33.1 32.2   RDW 14.5* 14.6* 14.4    202 234   MPV 11.2 10.7 10.7       ASSESSMENT     1. End-stage renal disease on intermittent maintenance hemodialysis Tuesday Thursday Saturday using AV fistula at [facility] HD unit. DW 79.5 Dr Ernie Samayoa  2. COVID - 19 Pneumonia   3. Hypotension on norepinephrine and Midodrine  4. Aluminium toxicity on Desferoxamine weekly. Last dose was 6/10/2020  5. CABG with preserved EF  6. DM2  7. Respiratory failure, on NRBM  8. Significant metabolic acidosis  9. Sinus tachycardia    PLAN     1. Hemodialysis extra today for acidosis for 3 hours with net UF of 1 kg  2.  Antibiotics as per ESRD  3. Continue Midodrine  4.  Usual hemodialysis again likely tomorrow to keep patient on usual Tuesday and Thursday and Saturday hemodialysis schedule    Please do not hesitate to call with questions    This note is created with the assistance of a speech-recognition program. While intending to generate a document that actually reflects the content of the visit, no guarantees can be provided that every mistake has been identified and corrected by editing    Jhon Mcdonald MD    6/12/2020 9:30 AM  [facility]

## 2020-06-13 NOTE — PROGRESS NOTES
NEPHROLOGY PROGRESS NOTE      SUBJECTIVE     Patient had hemodialysis yesterday. He remains acidotic. He is seen through the window in the COVID-ICU. Yesterday we scheduled an extra session of hemodialysis. Patient has received desferroxamine for high aluminum levels on this admission. Patient desatted overnight with SOB and increased work of breathing, ABG consistent with resp. Acidosis. Was intubated. FiO2 of 100% PEEP of 8. ABG showed pH 7.17, CO2 66.9 initially with improvement to pH 7.28 CO2 46.2. Subsequently went into SVT - adenosine 6 mg f/b 12 mg with temporary conversion. subsequently went back again into it. Cardiology consulted and electrical cardioversion with 100J. HR improved to 100's - 120's. Levophed started initially and then on júnior-synephrine with the intent to wean levo off.      Code status discussion between critical care team and the daughter and changed to DNR -CCA with no reintubation or cardioversion. OBJECTIVE     Vitals:    06/13/20 1030 06/13/20 1045 06/13/20 1100 06/13/20 1115   BP:       Pulse: 140 155 141 108   Resp:       Temp:       TempSrc:       SpO2:       Weight:       Height:         24HR INTAKE/OUTPUT:      Intake/Output Summary (Last 24 hours) at 6/13/2020 1141  Last data filed at 6/13/2020 9581  Gross per 24 hour   Intake 1350 ml   Output 1300 ml   Net 50 ml       Patient seen through the window with plan of care discussed with nursing for hemodialysis today. Direct physical examination deferred to preserve valuable PPE in the setting of the COVID-19 pneumonia in view of the pandemic.       MEDICATIONS     Scheduled Meds:    succinylcholine        chlorhexidine  15 mL Mouth/Throat BID    famotidine (PEPCID) injection  20 mg Intravenous Daily    insulin glargine  15 Units Subcutaneous Nightly    insulin glargine  10 Units Subcutaneous Once    vancomycin  750 mg Intravenous Once per day on Tue Thu Sat    cefepime  2 g Intravenous Once per day on Tue Thu Sat    midodrine  10 mg Oral 4x daily    azithromycin  500 mg Intravenous Q24H    sodium chloride flush  10 mL Intravenous 2 times per day    heparin (porcine)  5,000 Units Subcutaneous 3 times per day    sodium chloride flush  10 mL Intravenous 2 times per day    vancomycin (VANCOCIN) intermittent dosing (placeholder)   Other RX Placeholder     Continuous Infusions:    propofol 20 mcg/kg/min (06/13/20 0333)    vasopressin (Septic Shock) infusion      phenylephrine (REGINA-SYNEPHRINE) 50mg/250mL infusion 150 mcg/min (06/13/20 1103)    dextrose      norepinephrine Stopped (06/13/20 0620)     PRN Meds:  metoprolol, fentanNYL, glucose, dextrose, glucagon (rDNA), dextrose, sodium chloride flush, ondansetron, sodium chloride flush, polyethylene glycol, promethazine **OR** ondansetron  Home Meds:                Medications Prior to Admission: doxycycline hyclate (VIBRA-TABS) 100 MG tablet, Take 1 tablet by mouth daily  zinc 50 MG CAPS, Take 50 mg by mouth daily  donepezil (ARICEPT) 10 MG tablet, Take 10 mg by mouth nightly  albuterol (PROVENTIL) (2.5 MG/3ML) 0.083% nebulizer solution, Take 2.5 mg by nebulization every 4 hours as needed (as needed for copd)  insulin glargine (LANTUS SOLOSTAR) 100 UNIT/ML injection pen, Inject into the skin daily inject 14 unit subcutaneously one time a day for DM  Lidocaine 0.5 % AERO, Apply topically Apply to dialysis access topically one time a day every Mon, Wed, Fri  polyvinyl alcohol (LIQUIFILM TEARS) 1.4 % ophthalmic solution, 1 drop as needed  doxycycline hyclate (VIBRA-TABS) 100 MG tablet, Take 100 mg by mouth 2 times daily Until 7/15/19 for osteomyelitis  fluticasone (FLONASE) 50 MCG/ACT nasal spray, 2 sprays by Each Nostril route daily  ipratropium (ATROVENT) 0.02 % nebulizer solution, Take 0.5 mg by nebulization every 8 hours  Multiple Vitamins-Minerals (THERAPEUTIC MULTIVITAMIN-MINERALS) tablet, Take 1 tablet by mouth daily  allopurinol (ZYLOPRIM) 100 MG tablet, Take 100 mg by mouth daily  metoprolol succinate (TOPROL XL) 25 MG extended release tablet, Take 1 tablet by mouth daily  carboxymethylcellulose 1 % ophthalmic solution, Place 1 drop into both eyes 3 times daily as needed for Dry Eyes  sertraline (ZOLOFT) 50 MG tablet, Take 50 mg by mouth daily  calcium carbonate (TUMS) 500 MG chewable tablet, Take 2 tablets by mouth every 6 hours as needed for Heartburn  diclofenac sodium 1 % GEL, Apply topically 3 times daily Apply between last two toes and to arch of right foot  fluticasone propionate (FLOVENT DISKUS) 100 MCG/BLIST AEPB inhaler, Inhale 2 puffs into the lungs 2 times daily   Nutritional Supplements (BOOST GLUCOSE CONTROL) LIQD, Take 8 oz by mouth nightly   senna-docusate (PERICOLACE) 8.6-50 MG per tablet, Take 2 tablets by mouth four times a week On non-dialysis days (Sun, Tues, Thurs, Sat)  gabapentin (NEURONTIN) 100 MG capsule, Take 1 capsule by mouth 3 times daily  doxycycline (VIBRA-TABS) 100 MG tablet, Take 1 tablet by mouth 2 times daily for 10 days  hydrALAZINE (APRESOLINE) 10 MG tablet, Take 10 mg by mouth 3 times daily   aspirin 81 MG chewable tablet, Take 81 mg by mouth every morning     INVESTIGATIONS     Last 3 CMP:    Recent Labs     06/12/20  0550 06/13/20  0213 06/13/20  0415    137 133*   K 4.2 4.2 4.7   CL 94* 94* 91*   CO2 17* 16* 18*   BUN 59* 45* 48*   CREATININE 8.62* 7.04* 7.23*   CALCIUM 8.8 8.7 9.1       Last 3 CBC:  Recent Labs     06/11/20  0426 06/12/20  0550 06/13/20  0213   WBC 9.9 11.3 12.7*   RBC 3.96* 4.16* 4.14*   HGB 12.1* 12.1* 12.6*   HCT 36.6* 37.6* 37.8*   MCV 92.4 90.4 91.3   MCH 30.6 29.1 30.4   MCHC 33.1 32.2 33.3   RDW 14.6* 14.4 14.7*    234 245   MPV 10.7 10.7 11.0       ASSESSMENT     1. End-stage renal disease on intermittent maintenance hemodialysis Tuesday Thursday Saturday using AV fistula at [facility] HD unit. DW 79.5 Dr Ada Bill  2. COVID - 19 Pneumonia   3.  Hypotension on pressors and Midodrine  4. Aluminium toxicity on Desferoxamine weekly. Last dose was 6/10/2020  5. CABG with preserved EF  6. DM2  7. Respiratory failure, on NRBM  8. Significant metabolic acidosis from uremia. 9. Sinus tachycardia  10. Now DNRCC-A    PLAN     1. Hemodialysis today at bedside orders reviewed with RN. 2. Antibiotics as per ESRD  3. Continue Midodrine  4. Following. Please do not hesitate to call with questions    Will discuss with Dr. Phil Dodd 42 APRN   6/13/2020 11:41 AM  [facility]

## 2020-06-13 NOTE — PROGRESS NOTES
Patient's daughter [facility] who is the POA, had a zoom call meeting discussion around 10 : 30 am. with Dr. Dede Francois and the patient's prognosis/ medical condition was explained in detail to her and she voiced understanding of the medical condition. She wanted to discuss the situation with her siblings before changing the code status and wanted a zoom meeting with her father again before changing the code status. Mary Jane Barrett M.D.   Internal Medicine Resident , PGY-2  [address] [place] [address]  06/13/20

## 2020-06-13 NOTE — FLOWSHEET NOTE
[facility]   Patient Death Note  DEATH   Shift date: 20    Shift day: Saturday  Shift #: 2                 Room # 3022/3022-01   Name: Jessie Nguyen            Age: 67 y.o. Gender: male          Orthodoxy: [address] of Confucian[employer]:  Admit Date & Time: 2020 10:40 AM     Referral:  Other   Actual date of death: 20   TOD: 56       SITUATION AT DEATH:  Pt is COVID-19 pt and had code status change, then passed shortly after. IS THIS A 'S CASE? No    SPIRITUAL/EMOTIONAL INTERVENTION:  Writer called CARO Dunaway, and offered support and empathy. Brief conversation had about next steps. Family Received Grief Packet? No, will mail with sympathy card. NAME AND PHONE NUMBER OF DOCTOR SIGNING DEATH CERTIFICATE:  (full name) Dr. Dimple Babinski    (phone)  [phone]       talked to GIO Dasilva, who contacted the  home. Copy of COMPLETED Release of Body Form Received? Yes     HOME:  Name: [address]  Phone Number: [phone]     NEXT OF KIN:  Name: Reba Cochran  Relationship: child  Street Address: [address] [place] [address]. City: [place]: [address] [place] [address]  Zip code: [address]   Phone Number: [phone]    ANY FOLLOW-UP NEEDED? No    IF SO, WHAT? N/A    Electronically signed by Inez Vides Resident, on 2020 at 5:28 PM.  [facility]  [phone]       20 1726   Encounter Summary   Services provided to: Family   Referral/Consult From: Other    Support System Children   Continue Visiting   (20 phone with family)   Complexity of Encounter High   Length of Encounter 1 hour   Spiritual Assessment Completed Yes   Grief and Life Adjustment   Type Death   Assessment Approachable;Coping   Intervention Active listening;Explored feelings, thoughts, concerns;Sustaining presence/ Ministry of presence; Discussed death;Discussed illness/injury and it's impact; Develop care plan   Outcome Connection/belonging;Comfort;Expressed gratitude;Engaged in conversation;Expressed feelings/needs/concerns;Coping;Receptive

## 2020-06-13 NOTE — PLAN OF CARE
Problem: Airway Clearance - Ineffective  Goal: Achieve or maintain patent airway  6/13/2020 1542 by Zach Dasilva RN  Outcome: Met This Shift     Problem: Gas Exchange - Impaired  Goal: Absence of hypoxia  6/13/2020 1542 by Zach Dasilva RN  Outcome: Ongoing     Problem: Gas Exchange - Impaired  Goal: Promote optimal lung function  6/13/2020 1542 by Zach Dasilva RN  Outcome: Ongoing     Problem: Breathing Pattern - Ineffective  Goal: Ability to achieve and maintain a regular respiratory rate  6/13/2020 1542 by Zach Dasilva RN  Outcome: Ongoing     Problem:  Body Temperature -  Risk of, Imbalanced  Goal: Will regain or maintain usual level of consciousness  Outcome: Ongoing     Problem: Isolation Precautions - Risk of Spread of Infection  Goal: Prevent transmission of infection  6/13/2020 1542 by Zach Dasilva RN  Outcome: Ongoing     Problem: Risk for Fluid Volume Deficit  Goal: Maintain normal heart rhythm  Outcome: Ongoing     Problem: Risk for Fluid Volume Deficit  Goal: Maintain absence of muscle cramping  Outcome: Ongoing     Problem: Risk for Fluid Volume Deficit  Goal: Maintain normal serum potassium, sodium, calcium, phosphorus, and pH  Outcome: Ongoing     Problem: Loneliness or Risk for Loneliness  Goal: Demonstrate positive use of time alone when socialization is not possible  Outcome: Ongoing     Problem: Fatigue  Goal: Verbalize increase energy and improved vitality  Outcome: Ongoing     Problem: Patient Education: Go to Patient Education Activity  Goal: Patient/Family Education  6/13/2020 1542 by Zach Dasilva RN  Outcome: Ongoing     Problem: Falls - Risk of:  Goal: Will remain free from falls  Description: Will remain free from falls  6/13/2020 1542 by Zach Dasilva RN  Outcome: Ongoing     Problem: Falls - Risk of:  Goal: Absence of physical injury  Description: Absence of physical injury  6/13/2020 1542 by Zach Dasilva RN  Outcome: Ongoing     Problem: Falls - Risk of:  Goal: Will remain free from falls  Description: Will remain free from falls  6/13/2020 1542 by Mansi Lopez RN  Outcome: Ongoing     Problem: Pain:  Goal: Pain level will decrease  Description: Pain level will decrease  Outcome: Ongoing     Problem: Pain:  Goal: Control of acute pain  Description: Control of acute pain  Outcome: Ongoing     Problem: Pain:  Goal: Control of chronic pain  Description: Control of chronic pain  Outcome: Ongoing     Problem: OXYGENATION/RESPIRATORY FUNCTION  Goal: Patient will maintain patent airway  Outcome: Ongoing     Problem: OXYGENATION/RESPIRATORY FUNCTION  Goal: Patient will achieve/maintain normal respiratory rate/effort  Description: Respiratory rate and effort will be within normal limits for the patient  Outcome: Ongoing     Problem: MECHANICAL VENTILATION  Goal: Patient will maintain patent airway  Outcome: Ongoing     Problem: MECHANICAL VENTILATION  Goal: Oral health is maintained or improved  Outcome: Ongoing     Problem: MECHANICAL VENTILATION  Goal: ET tube will be managed safely  Outcome: Ongoing

## 2020-06-13 NOTE — SIGNIFICANT EVENT
I had a long discussion with the patient's family (daughter Allyson Baca)  in person, in presence of the RN, Yonathan Baker taking care of the patient. Patient's current clinical condition, laboratory and radiographic findings as well as recommendations of physicians consulted on the case were discussed with the patient's family in detail in jarocho White. All questions and concerns of family were addressed, and appropriate emotional support was provided. After understanding patient's current medical condition, family decided that did not want any lab draws or treatments aimed at prolonging the life of patient, at the cost of patient's overall comfort. They expressed their wishes to make the patient comfortable, stop all lab draws and not to do any resuscitative procedures on the patient. They requested patient's code status to be changed to [facility], and signed the [facility] order form in my presence, which was witnessed by RN, Yonathan Baker. Will honor family's wishes, and will change patient's code status to [facility]. Colten Juarez M.D.   Department of Internal Medicine,  [facility])             6/13/2020, 3:21 PM

## 2020-06-13 NOTE — PROGRESS NOTES
06/13/20 0456   Vent Information   Rate Set 28 bmp     Rate increased post ABG per Dr. Leila Galicia. (Pt spontaneous RR was 22/ set rate was 18)  Redraw in 1 hour (approx.  0600)

## 2020-06-13 NOTE — PROGRESS NOTES
Charting intubations and reintubations    ETT Size : 8.0  ETT Placement :22 @ lip  ETT secured with : Arnel.     Tube placement verified by:  Auscultation : :yes  ETCO2 = 34  CXR (yes/no) : ordered    Reason for intubation : impending respiratory failure    Performed by: MELISSA Norman CRNA    -- Notify charge therapist regarding all intubations, extubations, reintubations and self extubations    Merlin Alas  5:05 AM

## 2020-06-13 NOTE — PROGRESS NOTES
The patient was re-intubated overnight, currently on FiO2 of 100% PEEP of 8. ABG showed pH 7.17, CO2 66.9 initially with improvement to pH 7.28 CO2 46.2. He also went into SVT. Underwent adenosine 6 and 12 with temporary improvement. He was then successfully cardioverted with 100J. His HR improved from 150's to 103-120. Levophed was initially started for hypotension, currently being weaned off with Sebastian-synephrine. Will repeat an ABG later today. Cardiology (Dr. Pacheco Zungia) also consulted and was called.

## 2020-06-13 NOTE — PROGRESS NOTES
Dialysis Post Treatment Note  Vitals:    06/13/20 1300   BP: (!) 150/73   Pulse: 160   Resp: (!) 31   Temp: 97.5 °F (36.4 °C)   SpO2: (!) 89%     Pre-Weight = 67.9kg  Post-weight = Weight: 149 lb 14.6 oz (68 kg)  Total Liters Processed = Total Liters Processed (l/min): 71.4 l/min  Rinseback Volume (mL) = Rinseback Volume (ml): 600 ml  Net Removal (mL) =0  Type of access used=Right forearm AVF  Length of treatment=200 minutes out of 210 minutes ordered. Pt tolerated HD tx fair. 's-130's at start of HD tx which increased to 140's to low 160's mid tx treatment until the end of HD tx. Pt received x1 dose of Metoprolol 5mg IV per CAR3 RN which only slowed HR back to mid 100's to 130's for about 30 minutes and then went back to mid 140's to 150's. BP dropped initially in first 60 minutes of HD tx relieved with Diprivan being turned off to wake the pt up for teleconference with the pt's daughter and the pt which elevated pt's BP and HR too. Neosynephrine weaned a bit while Diprivan off but once Diprivan gtt back on had to be titrated up a little. No wt off as patient is way below his EDW. Pt clotted x2 dialyzer systems despite regular NS IVF flushes/boluses given throughout tx.

## 2020-06-13 NOTE — SIGNIFICANT EVENT
I had a long discussion with the patient's daughter via phone, in presence of the RN taking care of the patient. Patient's current clinical condition, laboratory and radiographic findings as well as recommendations of physicians consulted on the case were discussed with the patient's family in detail in simple Arneta Esme. All questions and concerns of the family were addressed, and appropriate emotional support was provided. After understanding patient's current medical condition, family decided that they wanted to continue the ongoing treatment but in case patient's heart stops (cardiac arrest), they do not want any chest compressions. He currently is intubated, however, in the event that he is extubated, they do not want re-insertion of a breathing tube down patient's throat for respiratory support or other similar  resuscitative measures to be performed on the patient. They requested that if such a condition arises, the patient should be made comfortable and nature should be allowed to take its course. They also did not want any further electrical cardioversions. They asked that patient's code status should be changed to [facility] (no intubation), and signed the [facility] order form in my presence, which was witnessed by RN, [facility] 5. Will honor family's wishes, and will change patient's code status to [facility] (with intubation).          Melani Melo MD  PGY-2, Internal medicine resident  [address] [place] [address]  6/13/2020 6:07 AM

## 2020-06-13 NOTE — SIGNIFICANT EVENT
DEATH NOTE    PATIENT NAME: Gilda Mejia  YOB: 1945  MEDICAL RECORD NO. [id number]  DATE: 6/13/2020  PRIMARY CARE PHYSICIAN: Deidra Harris MD    DIAGNOSIS OF DEATH     I have confirmed the death of this patient in accordance with accepted medical standards.   The patient is dead as evidenced by cardiac death or cessation of brain function:    Cardiac Death (check all that apply):     [x]  Absence of respiratory effort by observation     [x]  Absence of pulse by palpation     [x]  Absence of blood pressure by sphygmomanometry     [x]  Absence of sustainable cardiac rhythm by monitor    Death by Cessation of Brain Function (check all that apply):     []  Absence of Cerebral Function with no motor response     []  Absence of brain stem function by systemic physical exam     []  Failure to respond with respiratory drive by apnea test     []  Cerebral Electrical silence as interpreted by qualified reader        OR     []  Absence of brain blood flow by radiologic technique      CERTIFICATION OF DEATH     I have pronounced the patient dead on:     Date: 6/13/2020 at 4:57 PM     NOTIFICATIONS     Attending physician that will sign Death Certificate: Dr. Prince Feng notified: Name and/or Relationship: [facility] - daughter                                                          []  Per Nursing     notified (Name):                                                     [x]  Per Alphonso Cason MD  6/13/2020, 5:13 PM

## 2020-06-13 NOTE — PROGRESS NOTES
INTENSIVE CARE UNIT  Resident Physician Progress Note    Patient - Mike Tinoco  Date of Admission -  6/9/2020 10:40 AM  Date of Evaluation -  6/13/2020  Room and Bed Number -  7858/3483-94   Hospital Day - 4    Chief Complaint   Patient presents with    Hypotension      SUBJECTIVE:     OVERNIGHT EVENTS:      Patient desatted overnight with SOB and increased work of breathing, ABG consistent with resp. Acidosis. Was intubated. FiO2 of 100% PEEP of 8. ABG showed pH 7.17, CO2 66.9 initially with improvement to pH 7.28 CO2 46.2. Subsequently went into SVT - adenosine 6 mg f/b 12 mg with temporary conversion. subsequently went back again into it. Cardiology consulted and electrical cardioversion with 100J. HR improved to 100's - 120's. Levophed started initially and then on júnior-synephrine with the intent to wean levo off. Code status discussion with the daughter and changed to DNR -CCA with no reintubation or cardioversion. 1. Feeding Diet: Diet NPO Effective Now .   2. Fluids none   3. Family updated   4. Analgesic fentanyl  5. Sedation propofol. 6. Thrombo-prophylaxis heparin 5000 tid   7. Mobility fair. 8. Heads up 30 Degrees  9. Ulcer prophylaxis . None   10. Glycemic control  Lantus 20 units nightly   11. Spontaneous breathing trial per protocol. 12. Bowel regimen/urine output on HD   13. Indwelling catheter/lines et tube  CVC triple lumen, right femoral, arterial line, hd fistula  14. De-escalation wean off sedation and vasopressors as tolerated. 1. Patient was recently treated for Pseudomonas pneumonia with levofloxacin on last admission  2.  Last COVID was positive on 5/31, patient received convalescent plasma on 6/2        Brief past medical history  COVID-19 pneumonia  ESRD on hemodialysis right arm fistula   COPD on 3 L home nasal cannula   Type 2 diabetes mellitus  Essential hypertension  CAD status post CABG last echocardiogram last year, ejection fraction 55 to 60%    AWAKE & FOLLOWING COMMANDS:  [] No   [x] Yes    SECRETIONS Amount:  [x] Small [] Moderate  [] Large  [] None  Color:     [] White [] Colored  [] Bloody    SEDATION:  RAAS Score:  [] Propofol gtt  [] Versed gtt  [] Ativan gtt   [x] No Sedation    PARALYZED:  [x] No    [] Yes    VASOPRESSORS:  [] No    [x] Yes  [x] Levophed [] Dopamine [] Vasopressin  [] Dobutamine [] Phenylephrine [] Epinephrine      OBJECTIVE:     VITAL SIGNS:  BP (!) 87/58   Pulse 118   Temp 98.7 °F (37.1 °C) (Axillary)   Resp 15   Ht 5' 5\" (1.651 m)   Wt 156 lb 8.4 oz (71 kg)   SpO2 99%   BMI 26.05 kg/m²   Tmax over 24 hours:  Temp (24hrs), Av.8 °F (37.1 °C), Min:97.7 °F (36.5 °C), Max:100.1 °F (37.8 °C)      Patient Vitals for the past 8 hrs:   BP Pulse Resp SpO2 Height   20 0700 -- 118 15 99 % --   20 0645 -- 120 18 98 % --   20 0630 -- 118 16 99 % --   20 0615 -- 118 15 99 % --   20 0610 -- 118 16 99 % --   20 0605 -- 122 17 98 % --   20 0600 -- 136 29 96 % --   20 0555 -- 127 30 97 % --   20 0550 -- 123 24 99 % --   20 0545 -- 123 26 97 % --   20 0540 -- 119 26 97 % --   20 0535 -- 102 26 99 % --   20 0530 -- 106 26 100 % --   20 0525 -- 124 28 100 % --   20 0520 -- 151 23 100 % --   20 0515 -- 149 19 100 % --   20 0510 -- 148 20 100 % --   06/13/20 0505 -- 146 22 100 % --   20 0500 -- 145 28 100 % --   20 0455 -- 143 25 100 % --   20 0454 -- 143 22 100 % --   20 0453 -- 141 22 100 % --   20 0452 -- 145 21 100 % --   20 0451 -- 145 22 100 % --   20 0450 -- 142 21 100 % --   20 0447 -- 124 20 100 % --   20 0446 -- 119 20 100 % --   20 0445 -- 118 21 100 % --   20 0442 -- 95 20 98 % --   20 0441 -- 92 20 98 % --   20 0440 -- -- 20 98 % --   20 0439 -- -- 20 98 % --   20 0438 -- -- 20 98 % --   20 0437 -- -- 19 98 % --   20 0436 -- -- 18 98 % Subcutaneous Nightly    sodium chloride flush  10 mL Intravenous 2 times per day    heparin (porcine)  5,000 Units Subcutaneous 3 times per day    sodium chloride flush  10 mL Intravenous 2 times per day    vancomycin (VANCOCIN) intermittent dosing (placeholder)   Other RX Placeholder     Continuous Infusions:   propofol 20 mcg/kg/min (06/13/20 0333)    vasopressin (Septic Shock) infusion      phenylephrine (REGINA-SYNEPHRINE) 50mg/250mL infusion 225 mcg/min (06/13/20 0601)    dextrose      norepinephrine Stopped (06/13/20 0620)     PRN Meds:   fentanNYL, 50 mcg, Q1H PRN  glucose, 15 g, PRN  dextrose, 12.5 g, PRN  glucagon (rDNA), 1 mg, PRN  dextrose, 100 mL/hr, PRN  sodium chloride flush, 10 mL, PRN  ondansetron, 4 mg, Q8H PRN  sodium chloride flush, 10 mL, PRN  polyethylene glycol, 17 g, Daily PRN  promethazine, 12.5 mg, Q6H PRN    Or  ondansetron, 4 mg, Q6H PRN        SUPPORT DEVICES: [x] Ventilator [] BIPAP  []nonrebreather [] Room Air    VENT SETTINGS (Comprehensive) (if applicable):    Vent Information  $Ventilation: $Initial Day  Equipment ID: [id number]  Vent Type: Servo i  Vent Mode: PRVC  Vt Ordered: 490 mL  Rate Set: (S) 28 bmp  FiO2 : 100 %  SpO2: 99 %  SpO2/FiO2 ratio: 100  Sensitivity: 5  PEEP/CPAP: 8  I Time/ I Time %: 0.9 s  Humidification Source: HME  Additional Respiratory  Assessments  Pulse: 118  Resp: 15  SpO2: 99 %  End Tidal CO2: 36 (%)  Position: Semi-Logan's  Humidification Source: E  Oral Care Completed?: Yes  Oral Care: Mouth suctioned    ABGs:     Lab Results   Component Value Date    VSN5RNJ 23 06/13/2020    FIO2 100.0 06/13/2020         DATA:  Complete Blood Count:   Recent Labs     06/11/20  0426 06/12/20  0550 06/13/20  0213   WBC 9.9 11.3 12.7*   RBC 3.96* 4.16* 4.14*   HGB 12.1* 12.1* 12.6*   HCT 36.6* 37.6* 37.8*   MCV 92.4 90.4 91.3   MCH 30.6 29.1 30.4   MCHC 33.1 32.2 33.3   RDW 14.6* 14.4 14.7*    234 245   MPV 10.7 10.7 11.0        Last 3 Blood Glucose: Recent Labs     06/11/20  0426 06/12/20  0550 06/13/20  0213 06/13/20  0415   GLUCOSE 251* 297* 276* 285*        PT/INR:    Lab Results   Component Value Date    PROTIME 10.2 06/09/2020    INR 1.0 06/09/2020     PTT:    Lab Results   Component Value Date    APTT 40.3 06/09/2020       Comprehensive Metabolic Profile:   Recent Labs     06/12/20  0550 06/13/20  0213 06/13/20  0415    137 133*   K 4.2 4.2 4.7   CL 94* 94* 91*   CO2 17* 16* 18*   BUN 59* 45* 48*   CREATININE 8.62* 7.04* 7.23*   GLUCOSE 297* 276* 285*   CALCIUM 8.8 8.7 9.1      Magnesium:   Lab Results   Component Value Date    MG 2.6 06/13/2020    MG 1.9 06/13/2020    MG 1.8 06/05/2020     Phosphorus:   Lab Results   Component Value Date    PHOS 3.2 05/27/2019    PHOS 3.2 05/26/2019    PHOS 2.7 05/25/2019     Ionized Calcium:   Lab Results   Component Value Date    CAION 1.23 06/13/2020    CAION 1.07 06/05/2020    CAION 1.07 06/04/2020        Urinalysis:   Lab Results   Component Value Date    NITRU NEGATIVE 05/31/2020    COLORU YELLOW 05/31/2020    PHUR 8.0 05/31/2020    WBCUA 20 TO 50 05/31/2020    RBCUA 20 TO 50 05/31/2020    MUCUS NOT REPORTED 05/31/2020    TRICHOMONAS NOT REPORTED 05/31/2020    YEAST NOT REPORTED 05/31/2020    BACTERIA FEW 05/31/2020    SPECGRAV 1.016 05/31/2020    LEUKOCYTESUR NEGATIVE 05/31/2020    UROBILINOGEN Normal 05/31/2020    BILIRUBINUR NEGATIVE 05/31/2020    GLUCOSEU TRACE 05/31/2020    KETUA NEGATIVE 05/31/2020    AMORPHOUS NOT REPORTED 05/31/2020       HgBA1c:    Lab Results   Component Value Date    LABA1C 6.5 12/03/2019     TSH:    Lab Results   Component Value Date    TSH 1.47 05/18/2018     Lactic Acid:   Lab Results   Component Value Date    LACTA 2.0 05/22/2019    LACTA 2.3 05/22/2019    LACTA 0.9 09/07/2018      Troponin: No results for input(s): TROPONINI in the last 72 hours.     ASSESSMENT:     Patient Active Problem List    Diagnosis Date Noted    Hypotension 06/09/2020    Acute on chronic respiratory failure with hypoxia ([facility] 06/09/2020    Pneumonia due to COVID-19 virus 06/04/2020    Pneumonia of both lower lobes due to Pseudomonas species ([facility]     COVID-19 05/31/2020    Asymptomatic cholelithiasis 05/09/2020    Coronary artery disease involving native coronary artery of native heart without angina pectoris     Cerebrovascular disease     Hyperlipidemia     Abnormal laboratory test 05/08/2020    [facility]     COPD without exacerbation ([facility] 09/08/2018    Type 2 diabetes mellitus, with long-term current use of insulin ([facility] 12/29/2015    Essential hypertension     ESRD (end stage renal disease) on dialysis ([place] [id number].)           PLAN:     WEAN PER PROTOCOL:  [] No   [] Yes  [x] N/A    ICU PROPHYLAXIS:  Stress ulcer:  [] PPI Agent  [x] F1Ogqbv [] Sucralfate  [] Other:  VTE:   [] Enoxaparin  [x] Unfract. Heparin Subcut  [] EPC Cuffs    NUTRITION:  [] NPO [] Tube Feeding (Specify: ) [] TPN  [x] PO    HOME MEDS RECONCILED: [] No  [x] Yes    CONSULTATION NEEDED:  [x] No  [] Yes    FAMILY UPDATED:    [x] No  [] Yes    TRANSFER OUT OF ICU:   [x] No  [] Yes        Additional Assessment:  ·       Principal Problem:    Acute on chronic respiratory failure with hypoxia (HCC)  Active Problems:    ESRD (end stage renal disease) on dialysis ([facility]    Type 2 diabetes mellitus, with long-term current use of insulin (HCC)    COPD without exacerbation (HCC)    Coronary artery disease involving native coronary artery of native heart without angina pectoris    Hyperlipidemia    COVID-19    Pneumonia due to COVID-19 virus    Hypotension  Resolved Problems:    * No resolved hospital problems. *    Plan:  1. Sinus tachycardia with RBBB with intermittent episodes of afib/ paroxysmal afib. Continue lopressor 5 mg iv q6 PRN. 2. Acute hypoxic respiratory failure secondary to COVID-19. COVID-19 pneumonia. On mechanical ventilation. Intubated now and sedated. SAT and SBT per protocol.    3. Healthcare associated pneumonia . Continue vancomycin and cefepime. Follow blood cultures, COVID-19 testing. 1 out of 2 blood cultures positive for coagulase negative staphylococcus. 4. Atypical pneumonia. Azithromycin d/marcie , mycoplasma antibody and Legionella antigen testing pending. 5. Hypotension. Circulatory shock ? Wean off pressor support as appropriate. Increase midodrine 10 mg 4 times daily  6. ESRD on HD. Nephrology following along. HD today per nephro. 7. Metabolic acidosis. Secondary to uremia. Repeat ABG and lactic acid as needed. 8. Pulmonary edema. On HD. Hold lasix for now as the patient is on Levophed. 9. Troponinemia. Troponin trending downwards from prior admission. Unlikely ACS at this time  10. DVT prophylaxis. sc heparin 5000 U Q 8 hr for DVT prophylaxis. 11. Mal- Nutrition. Try NG tube insertion if the blood pressure tolerates now, there was concern for aspiration , will get a CXR. 12. Aluminium toxicity. Continue desferrioxamine. Marcin Coley M.D.   Internal Medicine Resident , PGY-2  [address] [place] [address]  6/13/2020 9:07 AM

## 2020-06-13 NOTE — PROGRESS NOTES
Emelyn ELIZONDO called, by writer and Dr. Olga Plascencia for time of death of patient. Support offered.

## 2020-06-13 NOTE — PROGRESS NOTES
Due to patient's respiratory status declining and blood gas critical care was called to patient's bed side. It was then decided that intubation would be necessary. At 0327 10 mg of Etomidate and 50 mcg of Rocuronium was given. Respiratory and CRNA, Jose Lundberg was at bedside. Successful intubated with 8.0 ET tube at 22 at the lips at 0330. 0331 bilateral breath sounds were noted and 0333 propofol was started for sedation. Stat chest xray was ordered.

## 2020-06-13 NOTE — PROGRESS NOTES
[facility] -   Infectious diseases evaluation  admission date 6/9/2020    reason for consultation:   COVID +    Impression :     · COVID + 5/31-post immune plasma 6/2  · COVID tests:  · 5-31-20: Positive  · 6/9-20: Positive  · 6/9 diffuse multifocal bilateral pneumonia-interstitial  · ESRD hemodialysis 3 times weekly  · Infected sacral wound   · Staph bacteremia    Discussion / summary of stay / plan of care   · Status post COVID,went home on 6/5  · He had received immune serum  on 6/1 tolerated well  · Was not a candidate for remdesevir due to ESRD  · Was treated with Levaquin for a Pseudomonas sputum culture thought to be either colonization or bronchitis. · Came back with increasing shortness of breath   · Has a sacral decubitus with tissue necrosis  · Remains COVID test positive  Recommendations     · Patient has been made Palliative Care/Comfort care by family after long discussion with Critical Care  · Will respect family's wishes  · ID will sign off    Infection Control Recommendations   · [place] Precautions  · Contact Isolation   · Airborne isolation  · Droplet Isolation  Antimicrobial Stewardship Recommendations   · D/C  therapy        Coordination ofOutpatient Care:   · Estimated Length of IV antimicrobials:6-13-20  · Patient will need Midline / picc Catheter Insertion: No  · Patient will need SNF:No  · Patient will need outpatient wound care: No    History of Present Illness:   Initial history:  Alicia Summers is a 67y.o.-year-old male who is known to our service for chest pain discharged on 6/5 diagnosis of COVID and chills, groundglass pneumonia, respiratory Pseudomonas infection of unclear extent. At that time he did not fit criteria for REMDESEVIR brian to HD and ESRD.   He received an immune serum 6/2 which he tolerated well without complications  Sputum culture ultimately grew Pseudomonas and he was started on Levaquin for 7 days until 6/4  He was discharged on 6/5 feeling great, on same for 2 weeks  At the time his CT of the chest showed groundglass and the Pseudomonas was causing most likely an upper respiratory infection rather than pneumonia. It seems he went to dialysis and was found to be hypoxic before he ran on dialysis, started on a nonrebreather, he was alert oriented x3, and admitted on 7 L of oxygen nonrebreather. Chest x-ray showed increased bilateral diffuse infiltrates, interstitial as shown below    At this time at this time he is on a nonrebreather, 10 L of oxygen, little confused, coughing with thick secretions in the back of his throat rather yellowish  His oxygenation has deteriorated since he came in. Abdomen is soft, he has no rash, he is confused  He has no fever    Interval changes  6/13/2020     Patient evaluated and examined in the ICU. Patient remains critically ill     He is currently being kept comfortable after transition to comfort care. Will respect family's decision. Summary of relevant labs:6/13/2020    Labs:  W 8.2 - 9.9    CRP 49 - 335 - 404   - 365  Ferritin 3412  procalc 1.49    Micro:  BC 6/9 clusters x 1 - SCN  COVID + 6/9    Sputum cx 5/31 pseudomonas S cipro and cefepime  Imaging:  Chest x-ray 6/11 worsening interstitial infiltrates    CT chest 3/10/20  Increased bilateral ground-glass opacities which are nonspecific but can be seen with COVID. CXR 6/9/20  Impression   1. Patchy bilateral infiltrates.  This likely represents an atypical   pneumonia, including viral pneumonia.  Follow-up to resolution is recommended. 2. Stable cardiomegaly. Discussed with patient, RN, CC. I have personally reviewed the past medical history, past surgical history, medications, social history, and family history, and I haveupdated the database accordingly.   Past Medical History:     Past Medical History:   Diagnosis Date    Asthma     CAD (coronary artery disease)     CKD (chronic kidney disease) use: No     Alcohol/week: 0.0 standard drinks    Drug use: No    Sexual activity: Never   Lifestyle    Physical activity     Days per week: Not on file     Minutes per session: Not on file    Stress: Not on file   Relationships    Social connections     Talks on phone: Not on file     Gets together: Not on file     Attends Christian service: Not on file     Active member of club or organization: Not on file     Attends meetings of clubs or organizations: Not on file     Relationship status: Not on file    Intimate partner violence     Fear of current or ex partner: Not on file     Emotionally abused: Not on file     Physically abused: Not on file     Forced sexual activity: Not on file   Other Topics Concern    Not on file   Social History Narrative    Not on file       Family History:     Family History   Problem Relation Age of Onset    Stroke Mother     Heart Attack Mother     Heart Attack Father     Diabetes Sister     Diabetes Brother         Allergies:   Oxycontin [oxycodone];  Avodart [dutasteride]; and Darvocet [propoxyphene n-acetaminophen]     Review of Systems:     Review of Systems   Unable to perform ROS: Patient unresponsive       Physical Examination :     Patient Vitals for the past 8 hrs:   BP Temp Pulse Resp SpO2 Weight   06/13/20 1556 -- -- -- (!) 45 (!) 76 % --   06/13/20 1415 -- -- 142 27 (!) 89 % --   06/13/20 1400 -- -- 141 29 (!) 89 % --   06/13/20 1345 -- -- 141 29 (!) 89 % --   06/13/20 1330 -- -- 140 29 90 % --   06/13/20 1315 -- -- 160 29 (!) 89 % --   06/13/20 1300 (!) 150/73 97.5 °F (36.4 °C) 160 (!) 31 (!) 89 % 149 lb 14.6 oz (68 kg)   06/13/20 1253 -- -- 137 -- -- --   06/13/20 1245 -- -- 160 (!) 31 (!) 88 % --   06/13/20 1230 -- -- 154 -- -- --   06/13/20 1215 -- -- 153 -- -- --   06/13/20 1200 -- -- 123 -- -- --   06/13/20 1145 -- -- 145 -- -- --   06/13/20 1130 -- -- 106 -- -- --   06/13/20 1115 -- -- 108 -- -- --   06/13/20 1100 -- -- 141 -- -- --   06/13/20 1045 -- -- 155 -- -- --   06/13/20 1030 -- -- 140 -- -- --   06/13/20 1015 -- -- 135 -- -- --   06/13/20 1007 -- -- 123 -- -- --   06/13/20 1004 (!) 166/86 -- 133 -- -- --   06/13/20 1000 -- -- 118 -- -- --   06/13/20 0945 -- -- 120 -- -- --   06/13/20 0930 -- -- 124 -- -- --   06/13/20 0920 -- -- 117 -- -- --   06/13/20 0917 -- -- 117 -- -- --       Physical Exam  Constitutional:       General: He is in acute distress. Appearance: He is ill-appearing. HENT:      Head: Normocephalic and atraumatic. Nose: No rhinorrhea. Eyes:      General: No scleral icterus. Conjunctiva/sclera: Conjunctivae normal.   Cardiovascular:      Rate and Rhythm: Tachycardia present. Rhythm irregular. Pulmonary:      Effort: No respiratory distress. Abdominal:      General: There is no distension. Tenderness: There is no abdominal tenderness. Genitourinary:     Comments: Urine susan  Musculoskeletal:         General: No swelling. Right lower leg: No edema. Left lower leg: No edema. Skin:     General: Skin is dry. Coloration: Skin is not jaundiced or pale. Findings: No erythema. Comments: Sacral wound   Neurological:      General: No focal deficit present. Mental Status: He is alert. Mental status is at baseline. Psychiatric:      Comments: Tired            Medical Decision Making:   I have independently reviewed/ordered the following labs:    CBC with Differential:   Recent Labs     06/12/20  0550 06/13/20 0213   WBC 11.3 12.7*   HGB 12.1* 12.6*   HCT 37.6* 37.8*    245   LYMPHOPCT 8* 11*   MONOPCT 6 4     BMP:  Recent Labs     06/13/20  0213 06/13/20  0415    133*   K 4.2 4.7   CL 94* 91*   CO2 16* 18*   BUN 45* 48*   CREATININE 7.04* 7.23*   MG 1.9 2.6     Hepatic Function Panel:   No results for input(s): PROT, LABALBU, BILIDIR, IBILI, BILITOT, ALKPHOS, ALT, AST in the last 72 hours. No results for input(s): RPR in the last 72 hours.   No results for input(s): HIV in the

## 2020-06-13 NOTE — CARE COORDINATION
TRANSITIONAL CARE PLANNING/ 2 [facility] Day: 4    Reason for Admission: COVID-19 [U07.1]     Treatment Plan of Care: comfort care    Tests/Procedures still needed: extubation          Readmission Risk              Risk of Unplanned Readmission:        28            Patient goals/Treatment Preferences/Transitional Plan: from [place] [id number] [facility]

## 2020-06-13 NOTE — PLAN OF CARE
Problem: Isolation Precautions - Risk of Spread of Infection  Goal: Prevent transmission of infection  Outcome: Met This Shift     Problem: Patient Education: Go to Patient Education Activity  Goal: Patient/Family Education  Outcome: Met This Shift     Problem: Falls - Risk of:  Goal: Will remain free from falls  Description: Will remain free from falls  Outcome: Met This Shift  Goal: Absence of physical injury  Description: Absence of physical injury  Outcome: Met This Shift     Problem: Restraint Use - Nonviolent/Non-Self-Destructive Behavior:  Goal: Absence of restraint-related injury  Description: Absence of restraint-related injury  Outcome: Met This Shift

## 2020-06-13 NOTE — PROGRESS NOTES
Dr. Jourdan Randall updated on patient condition throughout the night. 0139: Dr. Jourdan Randall asked to come to bedside to evaluate patient. 0143: Dr. Jourdan Randall ([address] resident) at bedside, Labs drawn, EKG done, ABG reveiwed, ok to give Zyprexa. See CRNA note and Maria G Dent RN note for further details. Levophen gtt, júnior-synephrine gtt, and propofol gtt titrated by writer with each rate change dictated by ICU RN Maria G Dent.

## 2020-06-13 NOTE — FLOWSHEET NOTE
asked to set up [address] for patient with his daughter Vivi Gonzalez at 10: A.M today.  spoke to 3rd shift nurse who will notify on coming nurse that a [address] is  Scheduled at 10: a.m,day.

## 2020-06-13 NOTE — PROGRESS NOTES
Patient admitted on Mechanical Ventilator Protocol. Patients height measured at 65\" for an IBW 61.5kg    Patient placed on the ventilator on settings as charted on flowsheeet. Ventilator Bronchodilator assessment    Breath sounds: diminished  Inspiratory Pressure: 26  Plateau Pressure: 24    Patient assessed at level 1          []    Bronchodilator Assessment    BRONCHODILATOR ASSESSMENT SCORE  Score 0 (Home) 1 2 3 4   Breath Sounds   []  Chronic Ventilator: Patient at baseline []  Mild Wheezes/ Clear []  Intermittent wheezes with good air entry []  Bilateral/unilateral wheezing with diminished air entry []  Insp/Exp wheeze and/or poor aeration   Ventilator Pressures   []  Chronic Ventilator []  Insp. Pressure less than 25 cm H20 []  Insp. Pressure less than 25 cm H20 [x]  Insp. Pressure exceeds 25 cm H20 []  Insp.  Pressure exceeds 30 cm H20   Plateau Pressure []  NA   [x]  Plateau Pressure less than 4  []  Plateau Pressure less than or equal to 5 []  Plateau Pressure greater than or equal to 6 []  Plateau Pressure greater than or equal to 8       [facility] [place]  5:08 AM

## 2020-06-13 NOTE — PROGRESS NOTES
06/13/20 0923   ETT (adult)   Placement Date: 06/13/20   Timeout: Patient;Procedure;Site/Side;Appropriate Equipment  Preoxygenation: Yes  Tube Size: 8 mm  Location: Oral  Placement Verified By[de-identified] Auscultation;Capnometry  Secured at: 22 cm  Placed By: Licensed provider  Measured Fro. ..    Secured at 25 cm  (advanced by Charleen MAJOR at shift change)   Measured From Lips   ET Placement Right   Secured By Commercial tube kline   Site Condition Dry       Advanced tube to 25 by Charleen MAJOR at shift change

## 2020-06-13 NOTE — PROGRESS NOTES
Patient extubated per physician order. Patient extubated in usual fashion.  Patient placed on  2 liters/min via [facility]7 Lona Austin   4:02 PM

## 2020-06-14 LAB — ALUMINUM: 92.1 UG/L (ref 0–15)

## 2020-06-15 ENCOUNTER — TELEPHONE (OUTPATIENT)
Dept: PULMONOLOGY | Age: 75
End: 2020-06-15

## 2020-06-15 LAB
CULTURE: NORMAL
Lab: NORMAL
SPECIMEN DESCRIPTION: NORMAL

## 2020-06-18 NOTE — DISCHARGE SUMMARY
were worsened from prior study. He was found to be COVID positive again and was transferred to the [facility] ICU. Patient was treated for pneumonia and ID was following the patient. Patient also was found to be malnourished and received nutritional support. Patient continued to be non re-breather. Patient subsequently developed respiratory muscle fatigue leading to intubation complicated by sinus tachycardia and hypotension. Patient was subsequently put on vasopressor support as he was hypotensive. Patient's medical condition was discussed with the patient's daughter. It was explained to the patient that his condition continued to be critical and very poor prognosis for functional recovery. Patient's daughter voiced understanding of the medical situation. She wanted the patient to be DNR-CC and active medical support with the vasopressors and mechanical ventilation to be withdrawn. Patient passed away after withdrawal of the medical support and endotracheal tube with vasopressors. Consults: Nephrology, infectious disease. Procedures: Bedside cardioversion for SVT?,  Endotracheal intubation    Any Hospital Acquired Infections: None      PATIENT'S DISCHARGE CONDITION:      Patient passed away on 6/13/2020 at 4:57 PM.  's office was notified and he was not deemed to be a 's case.

## 2024-07-12 NOTE — PROGRESS NOTES
Please review the decision aid used during our discussion regarding the Low dose lung cancer screening visit today.                           0615 (!) 116/54 -- -- 93 21 95 %   06/10/20 0600 (!) 108/58 -- -- 92 21 95 %   06/10/20 0545 (!) 106/55 -- -- 94 21 94 %   06/10/20 0530 (!) 99/50 -- -- 95 22 93 %   06/10/20 0515 98/83 -- -- 93 19 96 %   06/10/20 0500 (!) 112/54 -- -- 93 21 95 %   06/10/20 0445 (!) 126/58 -- -- 96 21 95 %   06/10/20 0430 (!) 131/56 -- -- 93 22 95 %   06/10/20 0415 (!) 117/54 -- -- 94 21 95 %   06/10/20 0400 (!) 129/59 99 °F (37.2 °C) Oral 100 23 95 %   06/10/20 0345 126/65 -- -- 92 22 96 %   06/10/20 0330 130/60 -- -- 93 20 95 %   06/10/20 0315 (!) 133/53 -- -- 92 22 94 %   06/10/20 0300 (!) 126/55 -- -- 92 22 95 %   06/10/20 0245 (!) 130/58 -- -- 91 22 95 %   06/10/20 0230 (!) 117/57 -- -- 90 21 95 %   06/10/20 0215 (!) 123/58 -- -- 91 25 95 %   06/10/20 0200 (!) 129/55 -- -- 93 22 97 %   06/10/20 0145 (!) 131/55 -- -- 92 21 92 %   06/10/20 0130 124/61 -- -- 91 21 93 %   06/10/20 0115 (!) 120/55 -- -- 92 21 93 %   06/10/20 0100 (!) 118/54 -- -- 90 17 93 %   06/10/20 0045 112/76 -- -- 92 18 95 %         Intake/Output Summary (Last 24 hours) at 6/10/2020 0831  Last data filed at 6/10/2020 0630  Gross per 24 hour   Intake 1402.52 ml   Output 2010 ml   Net -607.48 ml     Date 06/10/20 0000 - 06/10/20 2359   Shift 5418-3604 7946-2324 9580-0488 24 Hour Total   INTAKE   I.V.(mL/kg) 957.6(13.8)   957.6(13.8)   Shift Total(mL/kg) 957.6(13.8)   957.6(13.8)   OUTPUT   Shift Total(mL/kg)       Weight (kg) 69.5 69.5 69.5 69.5     Wt Readings from Last 3 Encounters:   06/09/20 153 lb 3.5 oz (69.5 kg)   06/05/20 149 lb 4 oz (67.7 kg)   05/31/20 160 lb (72.6 kg)     Body mass index is 21.37 kg/m². PHYSICAL EXAM:    Due to the current efforts to prevent transmission of COVID-19 and also the need to preserve PPE for other caregivers, face-to-face encounter with the patient was not performed. That being said, all relevant records and diagnostic tests were reviewed, including laboratory results and imaging.   Patient was evaluated from the window, chart reviewed and case was discussed with relevant caregivers.      MEDICATIONS:  Scheduled Meds:   fentaNYL        sodium chloride flush  10 mL Intravenous 2 times per day    heparin (porcine)  5,000 Units Subcutaneous 3 times per day    sodium chloride flush  10 mL Intravenous 2 times per day    cefepime  2 g Intravenous Once per day on Mon Wed Fri    vancomycin (VANCOCIN) intermittent dosing (placeholder)   Other RX Placeholder    vancomycin  750 mg Intravenous Once per day on Mon Wed Fri     Continuous Infusions:   norepinephrine 2 mcg/min (06/10/20 0640)     PRN Meds:   sodium chloride flush, 10 mL, PRN  ondansetron, 4 mg, Q8H PRN  sodium chloride flush, 10 mL, PRN  polyethylene glycol, 17 g, Daily PRN  promethazine, 12.5 mg, Q6H PRN    Or  ondansetron, 4 mg, Q6H PRN        SUPPORT DEVICES: [] Ventilator [] BIPAP  [x]nonrebreather [] Room Air    VENT SETTINGS (Comprehensive) (if applicable):    Vent Information  SpO2: 95 %  Additional Respiratory  Assessments  Pulse: 93  Resp: 21  SpO2: 95 %  Oral Care: Mouthwash, Lip moisturizer applied, Mouth swabbed, Mouth suctioned    ABGs:     Lab Results   Component Value Date    FIO2 NOT REPORTED 06/09/2020         DATA:  Complete Blood Count:   Recent Labs     06/09/20  1114 06/10/20  0448   WBC 8.2 10.9   RBC 4.53 4.07*   HGB 13.7 12.3*   HCT 43.2 37.4*   MCV 95.4 91.9   MCH 30.2 30.2   MCHC 31.7 32.9   RDW 14.4 14.5*    190   MPV 11.6 11.2        Last 3 Blood Glucose:   Recent Labs     06/09/20  1114 06/10/20  0448   GLUCOSE 161* 207*        PT/INR:    Lab Results   Component Value Date    PROTIME 10.2 06/09/2020    INR 1.0 06/09/2020     PTT:    Lab Results   Component Value Date    APTT 40.3 06/09/2020       Comprehensive Metabolic Profile:   Recent Labs     06/09/20  1055 06/09/20  1114 06/10/20  0448   NA  --  136 136   K  --  4.7 4.4   CL  --  95* 96*   CO2  --  16* 14*   BUN  --  76* 60*   CREATININE 12.28* 12.74* 10.06*   GLUCOSE  -- 161* 207*   CALCIUM  --  8.4* 7.9*   PROT  --  6.7  --    LABALBU  --  2.7*  --    BILITOT  --  0.38  --    ALKPHOS  --  221*  --    AST  --  70*  --    ALT  --  61*  --       Magnesium:   Lab Results   Component Value Date    MG 1.8 06/05/2020    MG 1.8 06/04/2020    MG 1.9 06/03/2020     Phosphorus:   Lab Results   Component Value Date    PHOS 3.2 05/27/2019    PHOS 3.2 05/26/2019    PHOS 2.7 05/25/2019     Ionized Calcium:   Lab Results   Component Value Date    CAION 1.07 06/05/2020    CAION 1.07 06/04/2020    CAION 1.03 06/03/2020        Urinalysis:   Lab Results   Component Value Date    NITRU NEGATIVE 05/31/2020    COLORU YELLOW 05/31/2020    PHUR 8.0 05/31/2020    WBCUA 20 TO 50 05/31/2020    RBCUA 20 TO 50 05/31/2020    MUCUS NOT REPORTED 05/31/2020    TRICHOMONAS NOT REPORTED 05/31/2020    YEAST NOT REPORTED 05/31/2020    BACTERIA FEW 05/31/2020    SPECGRAV 1.016 05/31/2020    LEUKOCYTESUR NEGATIVE 05/31/2020    UROBILINOGEN Normal 05/31/2020    BILIRUBINUR NEGATIVE 05/31/2020    GLUCOSEU TRACE 05/31/2020    KETUA NEGATIVE 05/31/2020    AMORPHOUS NOT REPORTED 05/31/2020       HgBA1c:    Lab Results   Component Value Date    LABA1C 6.5 12/03/2019     TSH:    Lab Results   Component Value Date    TSH 1.47 05/18/2018     Lactic Acid:   Lab Results   Component Value Date    LACTA 2.0 05/22/2019    LACTA 2.3 05/22/2019    LACTA 0.9 09/07/2018      Troponin: No results for input(s): TROPONINI in the last 72 hours.     ASSESSMENT:     Patient Active Problem List    Diagnosis Date Noted    Hypotension 06/09/2020    Acute on chronic respiratory failure with hypoxia ([facility] [address] 06/09/2020    Pneumonia due to COVID-19 virus 06/04/2020    Pneumonia of both lower lobes due to Pseudomonas species ([facility] [address]     COVID-19 05/31/2020    Asymptomatic cholelithiasis 05/09/2020    Coronary artery disease involving native coronary artery of native heart without angina pectoris     Cerebrovascular disease     Hyperlipidemia     Abnormal laboratory test 05/08/2020    [facility]F [facility]arter[facility]oven[facility] fistula) ([facility])     COPD without exacerbation ([place] [address] 09/08/2018    Type 2 diabetes mellitus, with long-term current use of insulin ([place] [address] 12/29/2015    Essential hypertension     ESRD (end stage renal disease) on dialysis ([place] [address]           PLAN:     WEAN PER PROTOCOL:  [] No   [] Yes  [x] N/A    ICU PROPHYLAXIS:  Stress ulcer:  [] PPI Agent  [x] [id number] [] Sucralfate  [] Other:  VTE:   [] Enoxaparin  [x] Unfract. Heparin Subcut  [] EPC Cuffs    NUTRITION:  [] NPO [] Tube Feeding (Specify: ) [] TPN  [x] PO    HOME MEDS RECONCILED: [] No  [x] Yes    CONSULTATION NEEDED:  [x] No  [] Yes    FAMILY UPDATED:    [x] No  [] Yes    TRANSFER OUT OF ICU:   [x] No  [] Yes        Additional Assessment:  ·       Principal Problem:    Acute on chronic respiratory failure with hypoxia ([facility])  Active Problems:    ESRD (end stage renal disease) on dialysis ([place] [address]    Type 2 diabetes mellitus, with long-term current use of insulin ([facility])    COPD without exacerbation ([facility])    Coronary artery disease involving native coronary artery of native heart without angina pectoris    Hyperlipidemia    COVID-19    Pneumonia due to COVID-19 virus    Hypotension  Resolved Problems:    * No resolved hospital problems. *    Plan:  Wean off pressor support as needed  Start midodrine 10 mg 3 times daily  - Follow blood cultures, COVID-19 testing.   -Continue vancomycin and cefepime as per ID recommendation for possible healthcare associated pneumonia   - Continue O2 supplementation with non-re breather. Will need intubation if increased work of breathing to prevent respiratory muscle fatigue. -Nephrology consult for HD  - ID consult for Covid rule out and clearance for transfer if negative  - Troponin trending downwards from prior admission. Unlikely ACS at this time  - sc heparin 7500 U Q 8 hr for DVT prophylaxis.  - PT/OT  -Full liquids. Advance to Renal diet if tolerated.   Renal jen Murray MD  PGY-3, Internal Medicine resident  [facility]. 6/10/2020 8:31 AM   Attending Physician Statement  I have discussed the care of Katerina Rosario, including pertinent history and exam findings,  with the resident/staff. I have seen  the patient and the key elements of all parts of the encounter have been performed by me. For careful stewardship of limited PPE during COVID-19 pandemic my physical exam was deferred. I agree with the assessment, plan and orders as documented by the resident with additions. LOS: 1          VENTILATOR SETTINGS:  Vent Information  SpO2: 95 %     PaO2/FiO2 RATIO:  No results for input(s): POCPO2 in the last 72 hours. LABS:  ABGs:   No results for input(s): POCPH, POCPCO2, POCPO2, POCHCO3, CWCB4IVC in the last 72 hours. ASSESSMENT:     Acute hypoxic respiratory failure secondary to COVID 19   Bilateral multifocal pneumonia due to COVID 19 infection   Covid -19 pandemic emergency   Principal Problem:    Acute on chronic respiratory failure with hypoxia (HCC)  Active Problems:    ESRD (end stage renal disease) on dialysis (HCC)    Type 2 diabetes mellitus, with long-term current use of insulin (HCC)    COPD without exacerbation (HCC)    Coronary artery disease involving native coronary artery of native heart without angina pectoris    Hyperlipidemia    COVID-19    Pneumonia due to COVID-19 virus    Hypotension  Resolved Problems:    * No resolved hospital problems. *      PLAN:    D/w RT  D/w RN   Hd today   Then wean to nc as manfred keep sats >92 %          Total critical care time caring for this patient with life threatening, unstable organ failure, including direct patient contact, management of life support systems, review of data including imaging and labs, discussions with other team members and physicians at least 27   Min so far today, excluding procedures.         Electronically signed

## 2025-04-23 NOTE — PROGRESS NOTES
hours: Temp (24hrs), Av.2 °F (36.8 °C), Min:97.7 °F (36.5 °C), Max:99.1 °F (37.3 °C)      Patient Vitals for the past 8 hrs:   BP Temp Temp src Pulse Resp SpO2   20 0608 -- -- -- 96 -- --   20 0605 -- -- -- 96 26 96 %   20 0246 (!) 89/42 99.1 °F (37.3 °C) Axillary 94 22 96 %         Intake/Output Summary (Last 24 hours) at 2020 0814  Last data filed at 2020 3607  Gross per 24 hour   Intake 943.14 ml   Output 630 ml   Net 313.14 ml     Date 20 0000 - 20 2359   Shift 1970-1814 6820-7594 2980-6115 24 Hour Total   INTAKE   P.O.(mL/kg/hr) 10(0)   10   I. V.(mL/kg) 383. 1(5.2)   383. 1(5.2)   IV Piggyback(mL/kg) 400(5.4)   400(5.4)   Shift Total(mL/kg) 793. 1(10.7)   793. 1(10.7)   OUTPUT   Shift Total(mL/kg)       Weight (kg) 74 74 74 74     Wt Readings from Last 3 Encounters:   06/10/20 163 lb 2.3 oz (74 kg)   20 149 lb 4 oz (67.7 kg)   20 160 lb (72.6 kg)     Body mass index is 22.75 kg/m². PHYSICAL EXAM:    Due to the current efforts to prevent transmission of COVID-19 and also the need to preserve PPE for other caregivers, face-to-face encounter with the patient was not performed. That being said, all relevant records and diagnostic tests were reviewed, including laboratory results and imaging. Patient was evaluated from the window, chart reviewed and case was discussed with relevant caregivers.      MEDICATIONS:  Scheduled Meds:   midodrine  10 mg Oral TID WC    sodium chloride flush  10 mL Intravenous 2 times per day    heparin (porcine)  5,000 Units Subcutaneous 3 times per day    sodium chloride flush  10 mL Intravenous 2 times per day    cefepime  2 g Intravenous Once per day on     vancomycin (VANCOCIN) intermittent dosing (placeholder)   Other RX Placeholder    vancomycin  750 mg Intravenous Once per day on      Continuous Infusions:   norepinephrine 3 mcg/min (20 0433)     PRN Meds:   fentanNYL, 50 mcg, Q2H PRN  sodium chloride flush, 10 mL, PRN  ondansetron, 4 mg, Q8H PRN  sodium chloride flush, 10 mL, PRN  polyethylene glycol, 17 g, Daily PRN  promethazine, 12.5 mg, Q6H PRN    Or  ondansetron, 4 mg, Q6H PRN        SUPPORT DEVICES: [] Ventilator [] BIPAP  [x]nonrebreather [] Room Air    VENT SETTINGS (Comprehensive) (if applicable):    Vent Information  SpO2: 96 %  Additional Respiratory  Assessments  Pulse: 96  Resp: 26  SpO2: 96 %  Oral Care: Mouthwash, Lip moisturizer applied, Mouth swabbed, Mouth suctioned    ABGs:     Lab Results   Component Value Date    FIO2 NOT REPORTED 06/09/2020         DATA:  Complete Blood Count:   Recent Labs     06/09/20  1114 06/10/20  0448 06/11/20  0426   WBC 8.2 10.9 9.9   RBC 4.53 4.07* 3.96*   HGB 13.7 12.3* 12.1*   HCT 43.2 37.4* 36.6*   MCV 95.4 91.9 92.4   MCH 30.2 30.2 30.6   MCHC 31.7 32.9 33.1   RDW 14.4 14.5* 14.6*    190 202   MPV 11.6 11.2 10.7        Last 3 Blood Glucose:   Recent Labs     06/09/20  1114 06/10/20  0448 06/11/20  0426   GLUCOSE 161* 207* 251*        PT/INR:    Lab Results   Component Value Date    PROTIME 10.2 06/09/2020    INR 1.0 06/09/2020     PTT:    Lab Results   Component Value Date    APTT 40.3 06/09/2020       Comprehensive Metabolic Profile:   Recent Labs     06/09/20  1114 06/10/20  0448 06/11/20  0426    136 135   K 4.7 4.4 4.8   CL 95* 96* 96*   CO2 16* 14* 13*   BUN 76* 60* 69*   CREATININE 12.74* 10.06* 10.85*   GLUCOSE 161* 207* 251*   CALCIUM 8.4* 7.9* 8.2*   PROT 6.7  --   --    LABALBU 2.7*  --   --    BILITOT 0.38  --   --    ALKPHOS 221*  --   --    AST 70*  --   --    ALT 61*  --   --       Magnesium:   Lab Results   Component Value Date    MG 1.8 06/05/2020    MG 1.8 06/04/2020    MG 1.9 06/03/2020     Phosphorus:   Lab Results   Component Value Date    PHOS 3.2 05/27/2019    PHOS 3.2 05/26/2019    PHOS 2.7 05/25/2019     Ionized Calcium:   Lab Results   Component Value Date    CAION 1.07 06/05/2020    CAION 1.07 06/04/2020    CAION 1.03 06/03/2020        Urinalysis:   Lab Results   Component Value Date    NITRU NEGATIVE 05/31/2020    COLORU YELLOW 05/31/2020    PHUR 8.0 05/31/2020    WBCUA 20 TO 50 05/31/2020    RBCUA 20 TO 50 05/31/2020    MUCUS NOT REPORTED 05/31/2020    TRICHOMONAS NOT REPORTED 05/31/2020    YEAST NOT REPORTED 05/31/2020    BACTERIA FEW 05/31/2020    SPECGRAV 1.016 05/31/2020    LEUKOCYTESUR NEGATIVE 05/31/2020    UROBILINOGEN Normal 05/31/2020    BILIRUBINUR NEGATIVE 05/31/2020    GLUCOSEU TRACE 05/31/2020    KETUA NEGATIVE 05/31/2020    AMORPHOUS NOT REPORTED 05/31/2020       HgBA1c:    Lab Results   Component Value Date    LABA1C 6.5 12/03/2019     TSH:    Lab Results   Component Value Date    TSH 1.47 05/18/2018     Lactic Acid:   Lab Results   Component Value Date    LACTA 2.0 05/22/2019    LACTA 2.3 05/22/2019    LACTA 0.9 09/07/2018      Troponin: No results for input(s): TROPONINI in the last 72 hours. ASSESSMENT:     Patient Active Problem List    Diagnosis Date Noted    Hypotension 06/09/2020    Acute on chronic respiratory failure with hypoxia ([facility] [address] 06/09/2020    Pneumonia due to COVID-19 virus 06/04/2020    Pneumonia of both lower lobes due to Pseudomonas species ([facility] [address]     COVID-19 05/31/2020    Asymptomatic cholelithiasis 05/09/2020    Coronary artery disease involving native coronary artery of native heart without angina pectoris     Cerebrovascular disease     Hyperlipidemia     Abnormal laboratory test 05/08/2020    AVF (arteriovenous fistula) ([facility] [address]     COPD without exacerbation ([facility] [address] 09/08/2018    Type 2 diabetes mellitus, with long-term current use of insulin ([facility] [address] 12/29/2015    Essential hypertension     ESRD (end stage renal disease) on dialysis ([facility] [address]           PLAN:     WEAN PER PROTOCOL:  [] No   [] Yes  [x] N/A    ICU PROPHYLAXIS:  Stress ulcer:  [] PPI Agent  [x] V1Cycgb [] Sucralfate  [] Other:  VTE:   [] Enoxaparin  [x] Unfract.  Heparin Subcut  [] EPC Cuffs    NUTRITION:  [] NPO [] Tube Feeding (Specify: ) [] TPN  [x] PO    HOME MEDS RECONCILED: [] No  [x] Yes    CONSULTATION NEEDED:  [x] No  [] Yes    FAMILY UPDATED:    [x] No  [] Yes    TRANSFER OUT OF ICU:   [x] No  [] Yes        Additional Assessment:  ·       Principal Problem:    Acute on chronic respiratory failure with hypoxia (HCC)  Active Problems:    ESRD (end stage renal disease) on dialysis ([facility] [address]    Type 2 diabetes mellitus, with long-term current use of insulin (HCC)    COPD without exacerbation (HCC)    Coronary artery disease involving native coronary artery of native heart without angina pectoris    Hyperlipidemia    COVID-19    Pneumonia due to COVID-19 virus    Hypotension  Resolved Problems:    * No resolved hospital problems. *    Plan:  Continue supplemental oxygen with nonrebreather, wean off as appropriate, monitor for need of intubation  Wean off pressor support as appropriate  Increase midodrine 10 mg 4 times daily  - Follow blood cultures, COVID-19 testing.   -Continue vancomycin and cefepime for healthcare associated pneumonia   -Continue azithromycin for atypical pneumonia, mycoplasma antibody and Legionella antigen testing pending.  -Nephrology consult for HD  Repeat ABG and lactic acid  - ID consult for Covid rule out and clearance for transfer if negative  - Troponin trending downwards from prior admission. Unlikely ACS at this time  - sc heparin 5000 U Q 8 hr for DVT prophylaxis. Keep NPO for now, there was concern for aspiration , will get a CXR. Eden Phelps MD  PGY-3, Internal Medicine resident  [facility] [place] [facility]. 6/11/2020 8:14 AM         Attending Physician Statement  I have discussed the care of Lucia Borges, including pertinent history and exam findings,  with the resident/CLEMENTE/staff. I have seen  the patient and the key elements of all parts of the encounter have been performed by me.  For careful stewardship of limited PPE during COVID-19 pandemic my physical exam was deferred. I agree with the assessment, plan and orders as documented by the resident with additions . I have reviewed the chart, events noted, labs seen medications reviewed and discussed with nursing staff    He remained on nonrebreather, he is tachypneic arousable in mild to moderate distress, he is maintaining saturation 94% on nonrebreather, he is tachypneic and tachycardic with heart rate of 120, he remains on Levophed, he had hemodialysis done day before yesterday, hemodialysis yesterday was inadequate because of hypotension, this morning he is getting hemodialysis again, his chest x-ray today showed worsening of bilateral infiltrate especially left-sided infiltrate some of that could be overlying pulmonary edema which could be asymmetric but chest x-ray is worse than as compared to chest x-ray on 06/09/2020, CT scan of the chest on 06/01/2020 reviewed and at that time he had areas of bilateral patchy infiltrate which was likely secondary to cough with pneumonia on his bilateral infiltrate likely related to worsening infiltrate secondary to COVID pneumonia although because of his renal failure difficulty determine whether he has overlying pulmonary edema. He is currently on cefepime and vancomycin sputum is positive for Pseudomonas initially and blood cultures positive for gram-positive cocci in clusters. Arterial blood gas showed 7.3 3/24/149/13, his CRP increased from 335 yesterday to 404 today, WBC is 9.9 hemoglobin is stable at 12 and platelet count 195, his last ferritin yesterday was 3412. Acute hypoxic respiratory failure secondary to COVID-19. COVID-19 pneumonia  COVID-19 pandemic emergency  End-stage renal disease on hemodialysis. Pulmonary edema. Chronic hypoxemic respiratory failure on long-term oxygen therapy. COPD by history. Metabolic acidosis. Continue to observe closely as he is high risk for intubation.   Repeat chest x-ray tomorrow. Continue with Levophed and try to wean Levophed down. Follow-up final blood cultures. Continue with nonrebreather. Continue with midodrine 10 mg 4 times a day. Continue with vancomycin cefepime and on Zithromax. Airborne isolation and droplet precautions to be continued  Continue supportive care   Minimize iv fluids to keep the patient on the dry side   Will obtain xray chest and ABG as needed  Patient is on appropriate DVT prophylaxis as allowed by the medical condition. Discussed with respiratory therapist.  Treatment plan Discussed with nursing staff in detail , all questions answered . Total critical care time caring for this patient with life threatening, unstable organ failure, including direct patient contact, management of life support systems, review of data including imaging and labs, discussions with other team members and physicians at least 27  Min so far today, excluding procedures. Electronically signed by Ashley Devine MD on   6/11/20 at 12:37 PM EDT    Please note that this chart was generated using voice recognition Dragon dictation software. Although every effort was made to ensure the accuracy of this automated transcription, some errors in transcription may have occurred. No - the patient is unable to be screened due to medical condition